# Patient Record
Sex: FEMALE | Race: WHITE | NOT HISPANIC OR LATINO | Employment: OTHER | URBAN - METROPOLITAN AREA
[De-identification: names, ages, dates, MRNs, and addresses within clinical notes are randomized per-mention and may not be internally consistent; named-entity substitution may affect disease eponyms.]

---

## 2018-09-21 ENCOUNTER — OFFICE VISIT (OUTPATIENT)
Dept: FAMILY MEDICINE CLINIC | Facility: HOSPITAL | Age: 78
End: 2018-09-21
Payer: MEDICARE

## 2018-09-21 VITALS
TEMPERATURE: 96.9 F | HEIGHT: 62 IN | WEIGHT: 196.4 LBS | BODY MASS INDEX: 36.14 KG/M2 | DIASTOLIC BLOOD PRESSURE: 80 MMHG | SYSTOLIC BLOOD PRESSURE: 150 MMHG | HEART RATE: 78 BPM

## 2018-09-21 DIAGNOSIS — N39.46 MIXED STRESS AND URGE URINARY INCONTINENCE: ICD-10-CM

## 2018-09-21 DIAGNOSIS — E78.2 MIXED HYPERLIPIDEMIA: ICD-10-CM

## 2018-09-21 DIAGNOSIS — F51.01 PRIMARY INSOMNIA: ICD-10-CM

## 2018-09-21 DIAGNOSIS — F41.1 GENERALIZED ANXIETY DISORDER: ICD-10-CM

## 2018-09-21 DIAGNOSIS — I48.91 ATRIAL FIBRILLATION WITH RAPID VENTRICULAR RESPONSE (HCC): Primary | ICD-10-CM

## 2018-09-21 DIAGNOSIS — G25.0 BENIGN FAMILIAL TREMOR: ICD-10-CM

## 2018-09-21 DIAGNOSIS — K21.00 GASTROESOPHAGEAL REFLUX DISEASE WITH ESOPHAGITIS: ICD-10-CM

## 2018-09-21 DIAGNOSIS — I10 ACCELERATED ESSENTIAL HYPERTENSION: ICD-10-CM

## 2018-09-21 DIAGNOSIS — G62.9 NEUROPATHY: ICD-10-CM

## 2018-09-21 PROCEDURE — 99204 OFFICE O/P NEW MOD 45 MIN: CPT | Performed by: FAMILY MEDICINE

## 2018-09-21 RX ORDER — AMLODIPINE BESYLATE 2.5 MG/1
2.5 TABLET ORAL 2 TIMES DAILY
Qty: 60 TABLET | Refills: 3 | Status: SHIPPED | OUTPATIENT
Start: 2018-09-21 | End: 2018-11-07 | Stop reason: SDUPTHER

## 2018-09-21 RX ORDER — AMLODIPINE BESYLATE 2.5 MG/1
1 TABLET ORAL 2 TIMES DAILY
COMMUNITY
Start: 2013-09-30 | End: 2018-09-21 | Stop reason: SDUPTHER

## 2018-09-21 RX ORDER — OMEPRAZOLE 20 MG/1
CAPSULE, DELAYED RELEASE ORAL
COMMUNITY
Start: 2011-10-11 | End: 2018-11-07 | Stop reason: SDUPTHER

## 2018-09-21 RX ORDER — ATORVASTATIN CALCIUM 40 MG/1
1 TABLET, FILM COATED ORAL DAILY
COMMUNITY
Start: 2013-05-10 | End: 2019-03-18

## 2018-09-21 RX ORDER — ALPRAZOLAM 0.5 MG/1
TABLET ORAL
COMMUNITY
Start: 2015-09-11 | End: 2019-03-18

## 2018-09-21 RX ORDER — DULOXETIN HYDROCHLORIDE 20 MG/1
20 CAPSULE, DELAYED RELEASE ORAL DAILY
Qty: 30 CAPSULE | Refills: 3 | Status: SHIPPED | OUTPATIENT
Start: 2018-09-21 | End: 2019-02-27 | Stop reason: SDUPTHER

## 2018-09-21 RX ORDER — PROPRANOLOL HYDROCHLORIDE 20 MG/1
TABLET ORAL
COMMUNITY
Start: 2015-09-11 | End: 2018-10-30 | Stop reason: ALTCHOICE

## 2018-09-21 RX ORDER — ZOLPIDEM TARTRATE 5 MG/1
5 TABLET ORAL
Qty: 30 TABLET | Refills: 0 | Status: SHIPPED | OUTPATIENT
Start: 2018-09-21 | End: 2019-03-18

## 2018-09-21 RX ORDER — FLUOXETINE HYDROCHLORIDE 20 MG/1
4 CAPSULE ORAL DAILY
COMMUNITY
Start: 2011-05-19 | End: 2018-09-21 | Stop reason: ALTCHOICE

## 2018-09-21 NOTE — PROGRESS NOTES
Assessment/Plan:         Diagnoses and all orders for this visit:    Atrial fibrillation with rapid ventricular response ([facility] [address] )  -     Ambulatory referral to Cardiology; Future    Accelerated essential hypertension  -     amLODIPine (NORVASC) 2 5 mg tablet; Take 1 tablet (2 5 mg total) by mouth 2 (two) times a day    Neuropathy  -     DULoxetine (CYMBALTA) 20 mg capsule; Take 1 capsule (20 mg total) by mouth daily    Mixed hyperlipidemia    Generalized anxiety disorder    Primary insomnia  -     zolpidem (AMBIEN) 5 mg tablet; Take 1 tablet (5 mg total) by mouth daily at bedtime as needed for sleep    Benign familial tremor    Gastroesophageal reflux disease with esophagitis    Mixed stress and urge urinary incontinence  -     Incontinence Supply Disposable (ADHERES INCONTINENCE PAD) MISC; by Does not apply route 2 (two) times a day  -     Incontinence Supply Disposable (INCONTINENCE BRIEF MEDIUM) MISC; by Does not apply route 2 (two) times a day          Subjective:      Patient ID: Nirmala Manzano is a 77 y o  female  Reestablshing care  Moved back to [facility] after living in [place]  Has great financial problems but trying to stabilize all things  Wants to start with a local Cardiologist, hasnt been to one in two years and hasnt had pacemaker checks since then  She just got a cell phone but doesn't have a land line  She stopped Prozac because she has been on it forever and didn't think it helped her depression  She didn't notice a mood change but did notice that her neuropathy got significantly worse and is not tolerable  Having trouble sleeping        The following portions of the patient's history were reviewed and updated as appropriate: allergies, current medications, past family history, past medical history, past social history, past surgical history and problem list     Review of Systems   Constitutional: Negative for fatigue and fever  HENT: Negative for congestion      Respiratory: Negative for shortness of breath and wheezing  Cardiovascular: Negative for chest pain, palpitations and leg swelling  Genitourinary: Positive for enuresis, frequency and urgency  Musculoskeletal: Positive for arthralgias and back pain  Neurological: Negative for weakness and headaches  Hematological: Negative for adenopathy  Bruises/bleeds easily  All other systems reviewed and are negative  Objective:      /80   Pulse 78   Temp (!) 96 9 °F (36 1 °C)   Ht 5' 1 5" (1 562 m)   Wt 89 1 kg (196 lb 6 4 oz)   BMI 36 51 kg/m²          Physical Exam   Constitutional: She is oriented to person, place, and time  She appears well-developed and well-nourished  Neck: No thyromegaly present  Cardiovascular: An irregularly irregular rhythm present  Tachycardia present  Murmur heard  Systolic murmur is present with a grade of 2/6   Pulmonary/Chest: Effort normal and breath sounds normal    Neurological: She is oriented to person, place, and time  Psychiatric: Her speech is normal and behavior is normal  Judgment and thought content normal  Her mood appears anxious  Cognition and memory are normal  She exhibits a depressed mood  Nursing note and vitals reviewed

## 2018-09-22 RX ORDER — UNDERPADS 23" X 36"
EACH MISCELLANEOUS 2 TIMES DAILY
Qty: 44 EACH | Refills: 5 | Status: SHIPPED | OUTPATIENT
Start: 2018-09-22 | End: 2019-10-23 | Stop reason: ALTCHOICE

## 2018-10-30 ENCOUNTER — OFFICE VISIT (OUTPATIENT)
Dept: CARDIOLOGY CLINIC | Facility: CLINIC | Age: 78
End: 2018-10-30
Payer: MEDICARE

## 2018-10-30 VITALS
HEART RATE: 119 BPM | SYSTOLIC BLOOD PRESSURE: 160 MMHG | DIASTOLIC BLOOD PRESSURE: 88 MMHG | WEIGHT: 186 LBS | BODY MASS INDEX: 34.23 KG/M2 | HEIGHT: 62 IN

## 2018-10-30 DIAGNOSIS — I10 ESSENTIAL HYPERTENSION: ICD-10-CM

## 2018-10-30 DIAGNOSIS — I48.91 ATRIAL FIBRILLATION WITH RAPID VENTRICULAR RESPONSE (HCC): ICD-10-CM

## 2018-10-30 DIAGNOSIS — I25.10 CORONARY ARTERY DISEASE INVOLVING NATIVE CORONARY ARTERY OF NATIVE HEART WITHOUT ANGINA PECTORIS: Primary | ICD-10-CM

## 2018-10-30 PROCEDURE — 99204 OFFICE O/P NEW MOD 45 MIN: CPT | Performed by: INTERNAL MEDICINE

## 2018-10-30 PROCEDURE — 93000 ELECTROCARDIOGRAM COMPLETE: CPT | Performed by: INTERNAL MEDICINE

## 2018-10-30 RX ORDER — ASPIRIN 325 MG
325 TABLET ORAL AS NEEDED
COMMUNITY
End: 2018-10-30 | Stop reason: ALTCHOICE

## 2018-10-30 RX ORDER — WARFARIN SODIUM 4 MG/1
4 TABLET ORAL
Qty: 30 TABLET | Refills: 5 | Status: SHIPPED | OUTPATIENT
Start: 2018-10-30 | End: 2019-10-11 | Stop reason: HOSPADM

## 2018-10-30 RX ORDER — METOPROLOL SUCCINATE 25 MG/1
25 TABLET, EXTENDED RELEASE ORAL DAILY
Qty: 90 TABLET | Refills: 3 | Status: SHIPPED | OUTPATIENT
Start: 2018-10-30 | End: 2018-11-15 | Stop reason: SDUPTHER

## 2018-10-30 NOTE — PROGRESS NOTES
Consultation - Cardiology   Meng Akash Sandoval 77 y o  female MRN: [id number]    Encounter: [id number]    Assessment/Plan     Assessment:     Atrial Fibrillation  CAD  AVR   PPM    Plan:    1  Persistent Atrial Fibrillation: Her rates are not controlled  Will add Metoprolol Succinate 25 mg daily  WE discuswsed the importance of antiocoagulation  Will start coumadin after her lab work is back with a goal INR 2 - 3  I asked her to stop using aspirin  2  Hx of AS s/p AVR  Stable based on exam and prior echo at [facility]  3  PPM implant: postop bradycardia  Will interrogate in her office this month  4  CAD: According to prior records this involved the LAD which was a small vessel and she did not have bypass at the time of AVR  History of Present Illness   Physician Requesting Consult: No att  providers found  Reason for Consult / Principal Problem: Atrial Fibrillation  HPI: Alto Sandhoff is a 77y o  year old female who presents with a new diagnosis of atrial fibrillation  She has a history of aortic stenosis s/p Bioprosthetic AVR and she has a PPM implant due to postoperative bradycardia  SHe has not seen cardiology for a while  She does have intermittent palpitations, dyspnea with essentially mild exertion, no angina and no other symptoms  She recently moved back here from [facility]  Review of Systems   Constitution: Negative  HENT: Negative  Eyes: Negative  Cardiovascular: Positive for palpitations  Respiratory: Negative  Endocrine: Negative  Hematologic/Lymphatic: Negative  Skin: Negative  Musculoskeletal: Negative  Gastrointestinal: Negative  Genitourinary: Negative  Neurological: Negative  Psychiatric/Behavioral: Negative  Allergic/Immunologic: Negative          Historical Information   Past Medical History:   Diagnosis Date    Depression     Melanoma [facility])      Past Surgical History:   Procedure Laterality Date    AORTIC VALVE REPLACEMENT 12/2012    CARDIAC PACEMAKER PLACEMENT      dual chamber last assessed 10/14/13    CHOLECYSTECTOMY      JOINT REPLACEMENT      B/L knee replacement       Social History:  History   Alcohol Use No     History   Drug Use No     History   Smoking Status    Never Smoker   Smokeless Tobacco    Never Used       Family History:   Family History   Problem Relation Age of Onset    Hypertension Mother        Meds/Allergies   Allergies   Allergen Reactions    Penicillins Hives    Sulfa Antibiotics Hives       Current Outpatient Prescriptions:     amLODIPine (NORVASC) 2 5 mg tablet, Take 1 tablet (2 5 mg total) by mouth 2 (two) times a day, Disp: 60 tablet, Rfl: 3    aspirin 325 mg tablet, Take 325 mg by mouth as needed for mild pain, Disp: , Rfl:     DULoxetine (CYMBALTA) 20 mg capsule, Take 1 capsule (20 mg total) by mouth daily, Disp: 30 capsule, Rfl: 3    Incontinence Supply Disposable (ADHERES INCONTINENCE PAD) MISC, by Does not apply route 2 (two) times a day, Disp: 75 each, Rfl: 5    Incontinence Supply Disposable (INCONTINENCE BRIEF MEDIUM) MISC, by Does not apply route 2 (two) times a day, Disp: 44 each, Rfl: 5    omeprazole (PriLOSEC) 20 mg delayed release capsule, Take by mouth, Disp: , Rfl:     ALPRAZolam (XANAX) 0 5 mg tablet, Take by mouth, Disp: , Rfl:     atorvastatin (LIPITOR) 40 mg tablet, Take 1 tablet by mouth daily, Disp: , Rfl:     propranolol (INDERAL) 20 mg tablet, Take by mouth, Disp: , Rfl:     zolpidem (AMBIEN) 5 mg tablet, Take 1 tablet (5 mg total) by mouth daily at bedtime as needed for sleep (Patient not taking: Reported on 10/30/2018 ), Disp: 30 tablet, Rfl: 0    Vitals:   Pulse: (!) 119  Blood Pressue: 160/88  Weight: 84 4 kg (186 lb)      Physical Exam   Constitutional: She is oriented to person, place, and time  No distress  HENT:   Mouth/Throat: No oropharyngeal exudate  Eyes: No scleral icterus  Neck: No JVD present  Cardiovascular: Normal rate    An irregular rhythm present  Pulmonary/Chest: Effort normal and breath sounds normal  No respiratory distress  She has no wheezes  She has no rales  Abdominal: Soft  Bowel sounds are normal  She exhibits no distension  There is no tenderness  There is no rebound  Musculoskeletal: She exhibits no edema  Neurological: She is alert and oriented to person, place, and time  No cranial nerve deficit  Skin: Skin is warm and dry  She is not diaphoretic  Psychiatric: She has a normal mood and affect  Her behavior is normal        [unfilled]    Invasive Devices          No matching active lines, drains, or airways          No results found for: NA, CL, CO2, ANIONGAP, BUN, CREATININE, EGFR, GLUCOSE, CALCIUM, AST, ALT, ALKPHOS, PROT, ALBUMIN, BILITOT  No results found for: WBC, HGB, PLT  No components found for: TROP    Imaging:     EKG: Atrial Fibrillation 119 bpm      Counseling / Coordination of Care  Total floor / unit time spent today 45 minutes  Greater than 50% of total time was spent with the patient and / or family counseling and / or coordination of care  A description of the counseling / coordination of care

## 2018-10-30 NOTE — PATIENT INSTRUCTIONS
1  Atrial Fibrillation: Start Metoprolol Succinate (Ordered to the pharmacy)  2  Have lab work done BEFORE YOU START COUMADIN    3  Once we get the lab work, will contact you about the coumadin dosing and followup lab work  4  I ordered a home INR monitor  Hold off on this until we get you started on coumadin

## 2018-11-01 ENCOUNTER — TELEPHONE (OUTPATIENT)
Dept: CARDIOLOGY CLINIC | Facility: CLINIC | Age: 78
End: 2018-11-01

## 2018-11-01 NOTE — TELEPHONE ENCOUNTER
Received message from dr Jagdeep Nava, patient was to have blood work done prior to starting on warfarin  Placed call to patient today, left message at 9 am, and again at 3 pm today, requesting patient call our office regarding if she has gone for blood work and if so, what  lab did she use  Have not seen results for blood work in her chart  Will wait for patient call back, and wait for further advisement from dr Jagdeep Nava

## 2018-11-05 NOTE — TELEPHONE ENCOUNTER
11/5/18, faxed copy of this note to dr Edgar Ornelas at [facility] office, received message from  Meng Monet, she reported dr Edgar Ornelas reviewed same

## 2018-11-05 NOTE — TELEPHONE ENCOUNTER
11/5/18, placed call to patient, left message on answering machine to please call office regarding lab work ordered by dr Michael Wilcox at last office which was requested to be done prior to starting warfarin  Will again wait for patient to return call  Will notify dr Michael Wilcox of calls

## 2018-11-06 ENCOUNTER — TELEPHONE (OUTPATIENT)
Dept: CARDIOLOGY CLINIC | Facility: CLINIC | Age: 78
End: 2018-11-06

## 2018-11-06 NOTE — TELEPHONE ENCOUNTER
Phone call to patient, left message on her answering machine to call our office regarding if she had her blood work done as requested by dr Dong Grace  Patient, is to start warfarin after blood work results are obtained  will wait for return call

## 2018-11-07 ENCOUNTER — TRANSCRIBE ORDERS (OUTPATIENT)
Dept: ADMINISTRATIVE | Facility: HOSPITAL | Age: 78
End: 2018-11-07

## 2018-11-07 ENCOUNTER — APPOINTMENT (OUTPATIENT)
Dept: LAB | Facility: HOSPITAL | Age: 78
End: 2018-11-07
Attending: INTERNAL MEDICINE
Payer: MEDICARE

## 2018-11-07 ENCOUNTER — OFFICE VISIT (OUTPATIENT)
Dept: FAMILY MEDICINE CLINIC | Facility: HOSPITAL | Age: 78
End: 2018-11-07
Payer: MEDICARE

## 2018-11-07 VITALS
WEIGHT: 191.6 LBS | SYSTOLIC BLOOD PRESSURE: 160 MMHG | TEMPERATURE: 96.8 F | HEART RATE: 84 BPM | DIASTOLIC BLOOD PRESSURE: 80 MMHG | HEIGHT: 62 IN | BODY MASS INDEX: 35.26 KG/M2

## 2018-11-07 DIAGNOSIS — I10 ACCELERATED ESSENTIAL HYPERTENSION: ICD-10-CM

## 2018-11-07 DIAGNOSIS — Z12.39 SCREENING FOR MALIGNANT NEOPLASM OF BREAST: ICD-10-CM

## 2018-11-07 DIAGNOSIS — F32.1 CURRENT MODERATE EPISODE OF MAJOR DEPRESSIVE DISORDER, UNSPECIFIED WHETHER RECURRENT (HCC): ICD-10-CM

## 2018-11-07 DIAGNOSIS — I10 ESSENTIAL HYPERTENSION: ICD-10-CM

## 2018-11-07 DIAGNOSIS — I25.10 CORONARY ARTERY DISEASE INVOLVING NATIVE CORONARY ARTERY OF NATIVE HEART WITHOUT ANGINA PECTORIS: ICD-10-CM

## 2018-11-07 DIAGNOSIS — G25.0 BENIGN FAMILIAL TREMOR: ICD-10-CM

## 2018-11-07 DIAGNOSIS — I10 ESSENTIAL HYPERTENSION: Primary | ICD-10-CM

## 2018-11-07 DIAGNOSIS — I48.0 PAROXYSMAL ATRIAL FIBRILLATION (HCC): Primary | ICD-10-CM

## 2018-11-07 DIAGNOSIS — I48.0 PAROXYSMAL ATRIAL FIBRILLATION (HCC): ICD-10-CM

## 2018-11-07 DIAGNOSIS — I48.91 ATRIAL FIBRILLATION WITH RAPID VENTRICULAR RESPONSE (HCC): ICD-10-CM

## 2018-11-07 DIAGNOSIS — F41.1 GENERALIZED ANXIETY DISORDER: ICD-10-CM

## 2018-11-07 DIAGNOSIS — G62.9 NEUROPATHY: ICD-10-CM

## 2018-11-07 DIAGNOSIS — M54.31 SCIATIC NERVE PAIN, RIGHT: ICD-10-CM

## 2018-11-07 DIAGNOSIS — K21.00 GASTROESOPHAGEAL REFLUX DISEASE WITH ESOPHAGITIS: ICD-10-CM

## 2018-11-07 LAB
ALBUMIN SERPL BCP-MCNC: 3.7 G/DL (ref 3.5–5)
ALP SERPL-CCNC: 115 U/L (ref 46–116)
ALT SERPL W P-5'-P-CCNC: 23 U/L (ref 12–78)
ANION GAP SERPL CALCULATED.3IONS-SCNC: 10 MMOL/L (ref 4–13)
AST SERPL W P-5'-P-CCNC: 20 U/L (ref 5–45)
BASOPHILS # BLD AUTO: 0.05 THOUSANDS/ΜL (ref 0–0.1)
BASOPHILS NFR BLD AUTO: 1 % (ref 0–1)
BILIRUB SERPL-MCNC: 0.5 MG/DL (ref 0.2–1)
BUN SERPL-MCNC: 19 MG/DL (ref 5–25)
CALCIUM SERPL-MCNC: 9.9 MG/DL (ref 8.3–10.1)
CHLORIDE SERPL-SCNC: 101 MMOL/L (ref 100–108)
CHOLEST SERPL-MCNC: 269 MG/DL (ref 50–200)
CO2 SERPL-SCNC: 27 MMOL/L (ref 21–32)
CREAT SERPL-MCNC: 0.98 MG/DL (ref 0.6–1.3)
EOSINOPHIL # BLD AUTO: 0.43 THOUSAND/ΜL (ref 0–0.61)
EOSINOPHIL NFR BLD AUTO: 4 % (ref 0–6)
ERYTHROCYTE [DISTWIDTH] IN BLOOD BY AUTOMATED COUNT: 15.6 % (ref 11.6–15.1)
GFR SERPL CREATININE-BSD FRML MDRD: 55 ML/MIN/1.73SQ M
GLUCOSE P FAST SERPL-MCNC: 126 MG/DL (ref 65–99)
HCT VFR BLD AUTO: 48.8 % (ref 34.8–46.1)
HDLC SERPL-MCNC: 49 MG/DL (ref 40–60)
HGB BLD-MCNC: 15.8 G/DL (ref 11.5–15.4)
IMM GRANULOCYTES # BLD AUTO: 0.03 THOUSAND/UL (ref 0–0.2)
IMM GRANULOCYTES NFR BLD AUTO: 0 % (ref 0–2)
INR PPP: 1.03 (ref 0.86–1.17)
LDLC SERPL CALC-MCNC: 195 MG/DL (ref 0–100)
LYMPHOCYTES # BLD AUTO: 2.85 THOUSANDS/ΜL (ref 0.6–4.47)
LYMPHOCYTES NFR BLD AUTO: 29 % (ref 14–44)
MCH RBC QN AUTO: 29.6 PG (ref 26.8–34.3)
MCHC RBC AUTO-ENTMCNC: 32.4 G/DL (ref 31.4–37.4)
MCV RBC AUTO: 92 FL (ref 82–98)
MONOCYTES # BLD AUTO: 0.68 THOUSAND/ΜL (ref 0.17–1.22)
MONOCYTES NFR BLD AUTO: 7 % (ref 4–12)
NEUTROPHILS # BLD AUTO: 5.79 THOUSANDS/ΜL (ref 1.85–7.62)
NEUTS SEG NFR BLD AUTO: 59 % (ref 43–75)
PLATELET # BLD AUTO: 320 THOUSANDS/UL (ref 149–390)
PMV BLD AUTO: 10.8 FL (ref 8.9–12.7)
POTASSIUM SERPL-SCNC: 4.3 MMOL/L (ref 3.5–5.3)
PROT SERPL-MCNC: 7.3 G/DL (ref 6.4–8.2)
PROTHROMBIN TIME: 12.9 SECONDS (ref 11.8–14.2)
RBC # BLD AUTO: 5.33 MILLION/UL (ref 3.81–5.12)
SODIUM SERPL-SCNC: 138 MMOL/L (ref 136–145)
TRIGL SERPL-MCNC: 126 MG/DL
TSH SERPL DL<=0.05 MIU/L-ACNC: 1.27 UIU/ML (ref 0.36–3.74)
WBC # BLD AUTO: 9.83 THOUSAND/UL (ref 4.31–10.16)

## 2018-11-07 PROCEDURE — 85025 COMPLETE CBC W/AUTO DIFF WBC: CPT

## 2018-11-07 PROCEDURE — 99215 OFFICE O/P EST HI 40 MIN: CPT | Performed by: FAMILY MEDICINE

## 2018-11-07 PROCEDURE — 85610 PROTHROMBIN TIME: CPT | Performed by: INTERNAL MEDICINE

## 2018-11-07 PROCEDURE — 36415 COLL VENOUS BLD VENIPUNCTURE: CPT | Performed by: INTERNAL MEDICINE

## 2018-11-07 PROCEDURE — 84443 ASSAY THYROID STIM HORMONE: CPT

## 2018-11-07 PROCEDURE — 80053 COMPREHEN METABOLIC PANEL: CPT

## 2018-11-07 PROCEDURE — 80061 LIPID PANEL: CPT

## 2018-11-07 RX ORDER — OMEPRAZOLE 20 MG/1
20 CAPSULE, DELAYED RELEASE ORAL DAILY
Qty: 30 CAPSULE | Refills: 5 | Status: SHIPPED | OUTPATIENT
Start: 2018-11-07 | End: 2019-05-29 | Stop reason: SDUPTHER

## 2018-11-07 RX ORDER — ACETAMINOPHEN 500 MG
500 TABLET ORAL EVERY 6 HOURS PRN
Qty: 120 TABLET | Refills: 3 | Status: SHIPPED | OUTPATIENT
Start: 2018-11-07

## 2018-11-07 RX ORDER — AMLODIPINE BESYLATE 5 MG/1
5 TABLET ORAL 2 TIMES DAILY
Qty: 60 TABLET | Refills: 5 | Status: SHIPPED | OUTPATIENT
Start: 2018-11-07 | End: 2019-05-29 | Stop reason: SDUPTHER

## 2018-11-07 NOTE — PROGRESS NOTES
Assessment/Plan:         Diagnoses and all orders for this visit:    Essential hypertension  Comments:  Increase Amlodipine back to 5mg BID    Coronary artery disease involving native coronary artery of native heart without angina pectoris    Neuropathy  Comments:  Stable on Duloxetine    Benign familial tremor    Generalized anxiety disorder    Current moderate episode of major depressive disorder, unspecified whether recurrent ([facility] [address] )  Comments:  Excellent stability on Duloxetine    Atrial fibrillation with rapid ventricular response (HCC)  Comments:  Continue Warfarin   Orders:  -     TSH, 3rd generation with Free T4 reflex; Future    Accelerated essential hypertension  -     amLODIPine (NORVASC) 5 mg tablet; Take 1 tablet (5 mg total) by mouth 2 (two) times a day    Gastroesophageal reflux disease with esophagitis  -     omeprazole (PriLOSEC) 20 mg delayed release capsule; Take 1 capsule (20 mg total) by mouth daily    Sciatic nerve pain, right  -     acetaminophen (TYLENOL) 500 mg tablet; Take 1 tablet (500 mg total) by mouth every 6 (six) hours as needed for mild pain    Screening for malignant neoplasm of breast  -     Mammo screening bilateral w cad; Future          Subjective:      Patient ID: Shelba Canavan is a 77 y o  female  6 month follow up  Seen by Dr Cora Robertson and discussed atrial fibrillation and need for anticoagulation, which she is agreeable to  Started Metoprolol last week, and will start Warfarin after blood done  He will be telephonically testing her pacer and will try to get home INR testing  She realized there was a daose error on Amlodipine and she was only getting 2 5mg BID instead of 5mg BID    Daughter is having double mastectomy, she is 48years old  Still very limited mobility, uses walker at all times    No recent falls    Feels her emotional best since stopping Prozac when she did  She feels the Duloxetine is doing a very good job for her mood but also for her R sciatic pain and neuropathy        The following portions of the patient's history were reviewed and updated as appropriate: allergies, current medications, past family history, past medical history, past social history, past surgical history and problem list     Review of Systems   Constitutional: Positive for activity change and fatigue  Negative for unexpected weight change  HENT: Negative for congestion, nosebleeds and sore throat  Respiratory: Negative for chest tightness and shortness of breath  Cardiovascular: Positive for leg swelling  Negative for chest pain and palpitations  Gastrointestinal: Negative for abdominal pain  Endocrine: Negative for polydipsia and polyuria  Musculoskeletal: Positive for arthralgias, back pain, gait problem, joint swelling, myalgias, neck pain and neck stiffness  Skin: Negative for rash  Neurological: Positive for tremors  Negative for dizziness and headaches  Hematological: Does not bruise/bleed easily  Psychiatric/Behavioral: Positive for dysphoric mood  Negative for decreased concentration  The patient is nervous/anxious  All other systems reviewed and are negative  Objective:      /80   Pulse 84   Temp (!) 96 8 °F (36 °C)   Ht 5' 1 5" (1 562 m)   Wt 86 9 kg (191 lb 9 6 oz)   BMI 35 62 kg/m²          Physical Exam   Constitutional: She is oriented to person, place, and time  She appears well-developed and well-nourished  Neck: No thyromegaly present  Cardiovascular: Normal rate and regular rhythm  Murmur heard  Pulmonary/Chest: Effort normal and breath sounds normal    Musculoskeletal: She exhibits edema  Neurological: She is alert and oriented to person, place, and time  Skin: No rash noted  Psychiatric: She has a normal mood and affect  Her behavior is normal  Judgment and thought content normal    Nursing note and vitals reviewed

## 2018-11-08 ENCOUNTER — TELEPHONE (OUTPATIENT)
Dept: CARDIOLOGY CLINIC | Facility: CLINIC | Age: 78
End: 2018-11-08

## 2018-11-08 ENCOUNTER — ANTICOAG VISIT (OUTPATIENT)
Dept: CARDIOLOGY CLINIC | Facility: CLINIC | Age: 78
End: 2018-11-08

## 2018-11-08 DIAGNOSIS — I10 ESSENTIAL HYPERTENSION: ICD-10-CM

## 2018-11-08 DIAGNOSIS — I48.91 ATRIAL FIBRILLATION WITH RAPID VENTRICULAR RESPONSE (HCC): ICD-10-CM

## 2018-11-08 DIAGNOSIS — I25.10 CORONARY ARTERY DISEASE INVOLVING NATIVE CORONARY ARTERY OF NATIVE HEART WITHOUT ANGINA PECTORIS: ICD-10-CM

## 2018-11-08 NOTE — TELEPHONE ENCOUNTER
Phone call to patient, left message on answering machine  Requested pt call office regarding inr  results and starting warfarin  Waiting for return call

## 2018-11-08 NOTE — PROGRESS NOTES
11/8/18, tc to pt, left message on answering machine, per dr Arleen Bernard, ok to start warfarin 4 mg daily, inr due mon  Pt to call office to verify she understood instructions and to review further info on warfarin   reginald

## 2018-11-09 ENCOUNTER — TELEPHONE (OUTPATIENT)
Dept: CARDIOLOGY CLINIC | Facility: CLINIC | Age: 78
End: 2018-11-09

## 2018-11-09 NOTE — TELEPHONE ENCOUNTER
Spoke with patient regarding high lipids  She stated the blood work was done fasting  Her PCP  Dr Archie Thomas stopped the atorvastatin about five years ago but she doesn't remember why   Please advise

## 2018-11-12 DIAGNOSIS — I25.10 CAD (CORONARY ARTERY DISEASE): Primary | ICD-10-CM

## 2018-11-12 RX ORDER — EZETIMIBE 10 MG/1
10 TABLET ORAL DAILY
Qty: 30 TABLET | Refills: 5 | Status: SHIPPED | OUTPATIENT
Start: 2018-11-12 | End: 2019-03-18

## 2018-11-14 ENCOUNTER — IN-CLINIC DEVICE VISIT (OUTPATIENT)
Dept: CARDIOLOGY CLINIC | Facility: CLINIC | Age: 78
End: 2018-11-14
Payer: MEDICARE

## 2018-11-14 ENCOUNTER — TELEPHONE (OUTPATIENT)
Dept: CARDIOLOGY CLINIC | Facility: CLINIC | Age: 78
End: 2018-11-14

## 2018-11-14 DIAGNOSIS — I48.19 PERSISTENT ATRIAL FIBRILLATION (HCC): Primary | ICD-10-CM

## 2018-11-14 DIAGNOSIS — I49.5 SICK SINUS SYNDROME (HCC): ICD-10-CM

## 2018-11-14 DIAGNOSIS — Z95.0 CARDIAC PACEMAKER: ICD-10-CM

## 2018-11-14 PROCEDURE — 93280 PM DEVICE PROGR EVAL DUAL: CPT | Performed by: INTERNAL MEDICINE

## 2018-11-14 NOTE — PROGRESS NOTES
Results for orders placed or performed in visit on 11/14/18   Cardiac EP device report    Narrative    MDT DUAL PPM  DEVICE INTERROGATED IN THE [place] OFFICE: NP TRANSFER TO [place] [id number] INTERROGATION 2/6/2015  BATTERY VOLTAGE ADEQUATE (6 5 YRS)  AP -70%  - 0 2% (MVP ON)  ALL LEAD PARAMETERS TEST WITHIN NORMAL LIMITS & STABLE  ALL OTHER TESTING WITHIN NORMAL LIMITS  [facility] ON DEVICE SINCE LAST INTERRO  AF IN PROGRESS - AT/AF BURDEN @ 100% SINCE 5/23/18  RVR NOTED - PRESENTING RATE ~ 112 BPM  PT ON WARFARIN, METOPROLOL SUCC  PT STATES SHE IS STILL AWARE OF THE IRREGULAR/FAST HR  TASK TO DR VARGAS FOR RVR  EGRM STORAGE PROGRAMMED TO [facility] PROTOCOLS  PT TO BEGIN HOME REMOTE MONITORING - INITIAL TRANSMISSION DONE WITH PATIENT IN OFFICE TODAY  NO OTHER PROGRAMMING CHANGES MADE TO DEVICE PARAMETERS  PACEMAKER FUNCTIONING APPROPRIATELY    eb

## 2018-11-15 ENCOUNTER — ANTICOAG VISIT (OUTPATIENT)
Dept: CARDIOLOGY CLINIC | Facility: CLINIC | Age: 78
End: 2018-11-15

## 2018-11-15 DIAGNOSIS — I10 ESSENTIAL HYPERTENSION: ICD-10-CM

## 2018-11-15 DIAGNOSIS — I25.10 CORONARY ARTERY DISEASE INVOLVING NATIVE CORONARY ARTERY OF NATIVE HEART WITHOUT ANGINA PECTORIS: ICD-10-CM

## 2018-11-15 DIAGNOSIS — I48.91 ATRIAL FIBRILLATION WITH RAPID VENTRICULAR RESPONSE (HCC): ICD-10-CM

## 2018-11-15 RX ORDER — METOPROLOL SUCCINATE 25 MG/1
25 TABLET, EXTENDED RELEASE ORAL 2 TIMES DAILY
Qty: 60 TABLET | Refills: 5 | Status: SHIPPED | OUTPATIENT
Start: 2018-11-15 | End: 2019-03-18 | Stop reason: SDUPTHER

## 2018-11-15 NOTE — PROGRESS NOTES
11/15/18, tc to pt, left message on answering machine, reminded patient to have inr done asap  Explained  importance of blood work to be done when ordered when taking warfarin  Requested pt to call office if any questions   regianld

## 2018-11-15 NOTE — TELEPHONE ENCOUNTER
Left VM for pt regarding instructions from Dr Fareed Rojas  Pt is to increase Metoprolol Succinate 25 daily to BID  Pt is to come in for an EKG in 2 weeks

## 2018-11-15 NOTE — TELEPHONE ENCOUNTER
----- Message from Quentin Bond MD sent at 11/15/2018 10:03 AM EST -----  Regarding: FW: RVR ON PPM       Can we please increase her metoprolol to 25mg bid and have her come in for an EKG in about two weeks? Thx    ----- Message -----  From: Sarahi Sinai  Sent: 11/14/2018  11:56 AM  To: Quentin Bond MD  Subject: RVR ON PPM                                       Hi Dr Kiran Chandler - pt was seen today for initial device clinic interrogation  She presents with af/vs @ ~ 112 bpm and V rates remained 90 - 122 bpm during visit  It appears AF in progress since 5/2018  Full report in EPIC and sent to [facility] for cosign  She is on Warfarin, Metoprolol Succ  25 mg daily  (recent start)and is aware of the irregular & fast heart rates  Also, she is now on home remote monitoring       Thanks  - Ruthie Horn

## 2018-11-15 NOTE — TELEPHONE ENCOUNTER
Left message for patient to call office back regarding BW results and starting medication for high lipids per Dr Dany Thornton

## 2018-11-19 NOTE — TELEPHONE ENCOUNTER
Left another  for pt with instructions on Medication increase and EKG 2 weeks after starting the new dose  Pt was instructed to call back

## 2018-12-24 ENCOUNTER — ANTICOAG VISIT (OUTPATIENT)
Dept: CARDIOLOGY CLINIC | Facility: CLINIC | Age: 78
End: 2018-12-24

## 2018-12-24 DIAGNOSIS — I48.91 ATRIAL FIBRILLATION WITH RAPID VENTRICULAR RESPONSE (HCC): ICD-10-CM

## 2018-12-24 DIAGNOSIS — I25.10 CORONARY ARTERY DISEASE INVOLVING NATIVE CORONARY ARTERY OF NATIVE HEART WITHOUT ANGINA PECTORIS: ICD-10-CM

## 2018-12-24 DIAGNOSIS — I10 ESSENTIAL HYPERTENSION: ICD-10-CM

## 2018-12-24 NOTE — PROGRESS NOTES
12/24/18,tc to pt, left message on answering machine, questioned if she is taking warfarin, if so, have inr done asap  If not please call our office    reginald

## 2018-12-26 ENCOUNTER — TELEPHONE (OUTPATIENT)
Dept: CARDIOLOGY CLINIC | Facility: CLINIC | Age: 78
End: 2018-12-26

## 2018-12-26 ENCOUNTER — ANTICOAG VISIT (OUTPATIENT)
Dept: CARDIOLOGY CLINIC | Facility: CLINIC | Age: 78
End: 2018-12-26

## 2018-12-26 DIAGNOSIS — I48.91 ATRIAL FIBRILLATION WITH RAPID VENTRICULAR RESPONSE (HCC): ICD-10-CM

## 2018-12-26 DIAGNOSIS — I10 ESSENTIAL HYPERTENSION: ICD-10-CM

## 2018-12-26 DIAGNOSIS — I25.10 CORONARY ARTERY DISEASE INVOLVING NATIVE CORONARY ARTERY OF NATIVE HEART WITHOUT ANGINA PECTORIS: ICD-10-CM

## 2018-12-26 NOTE — PROGRESS NOTES
12/26/18, tc to pt, reports she never started warfarin   She is aware of risk of stroke not taking warfarin with afib  She refuses to start warfarin  Message sent to dr Azalea montelongo

## 2018-12-26 NOTE — TELEPHONE ENCOUNTER
Phone call to patient left message on her cell phone , requesting she call regarding delinquent inr testing  Called to patient's daughter, yoshi  Patient happened to be with daughter  Spoke with patient  Questioned if she is taking warfarin  Patient stated she never started warfarin  She states she understood risk of stroke with atrial fib if not taking anticoagulation, she accepts that risk and does not want to take warfarin  She has ov with dr Jos Diallo 2/1/19  Will discuss further with dr Jos Diallo at [facility]  Will notify dr Jos Diallo of call   Will remove from [address] [place] [address] clinic program

## 2019-02-27 DIAGNOSIS — G62.9 NEUROPATHY: ICD-10-CM

## 2019-02-27 RX ORDER — DULOXETIN HYDROCHLORIDE 20 MG/1
20 CAPSULE, DELAYED RELEASE ORAL DAILY
Qty: 30 CAPSULE | Refills: 3 | Status: SHIPPED | OUTPATIENT
Start: 2019-02-27 | End: 2019-03-18 | Stop reason: SDUPTHER

## 2019-03-18 ENCOUNTER — OFFICE VISIT (OUTPATIENT)
Dept: FAMILY MEDICINE CLINIC | Facility: HOSPITAL | Age: 79
End: 2019-03-18
Payer: MEDICARE

## 2019-03-18 VITALS
HEIGHT: 62 IN | TEMPERATURE: 97.5 F | SYSTOLIC BLOOD PRESSURE: 150 MMHG | DIASTOLIC BLOOD PRESSURE: 80 MMHG | WEIGHT: 196.6 LBS | HEART RATE: 88 BPM | BODY MASS INDEX: 36.18 KG/M2

## 2019-03-18 DIAGNOSIS — E78.2 MIXED HYPERLIPIDEMIA: ICD-10-CM

## 2019-03-18 DIAGNOSIS — I10 ESSENTIAL HYPERTENSION: ICD-10-CM

## 2019-03-18 DIAGNOSIS — I25.10 CORONARY ARTERY DISEASE INVOLVING NATIVE CORONARY ARTERY OF NATIVE HEART WITHOUT ANGINA PECTORIS: ICD-10-CM

## 2019-03-18 DIAGNOSIS — Z00.00 MEDICARE ANNUAL WELLNESS VISIT, SUBSEQUENT: Primary | ICD-10-CM

## 2019-03-18 DIAGNOSIS — G25.0 BENIGN FAMILIAL TREMOR: ICD-10-CM

## 2019-03-18 DIAGNOSIS — I48.91 ATRIAL FIBRILLATION WITH RAPID VENTRICULAR RESPONSE (HCC): ICD-10-CM

## 2019-03-18 DIAGNOSIS — G62.9 NEUROPATHY: ICD-10-CM

## 2019-03-18 DIAGNOSIS — R79.9 ABNORMAL BLOOD CHEMISTRY: ICD-10-CM

## 2019-03-18 DIAGNOSIS — F32.1 CURRENT MODERATE EPISODE OF MAJOR DEPRESSIVE DISORDER, UNSPECIFIED WHETHER RECURRENT (HCC): ICD-10-CM

## 2019-03-18 PROCEDURE — 99215 OFFICE O/P EST HI 40 MIN: CPT | Performed by: FAMILY MEDICINE

## 2019-03-18 PROCEDURE — G0439 PPPS, SUBSEQ VISIT: HCPCS | Performed by: FAMILY MEDICINE

## 2019-03-18 RX ORDER — DULOXETIN HYDROCHLORIDE 60 MG/1
60 CAPSULE, DELAYED RELEASE ORAL DAILY
Qty: 30 CAPSULE | Refills: 5 | Status: SHIPPED | OUTPATIENT
Start: 2019-03-18 | End: 2019-10-11 | Stop reason: HOSPADM

## 2019-03-18 RX ORDER — METOPROLOL SUCCINATE 25 MG/1
25 TABLET, EXTENDED RELEASE ORAL 2 TIMES DAILY
Qty: 60 TABLET | Refills: 5 | Status: SHIPPED | OUTPATIENT
Start: 2019-03-18 | End: 2019-11-12 | Stop reason: ALTCHOICE

## 2019-03-18 RX ORDER — DULOXETIN HYDROCHLORIDE 20 MG/1
60 CAPSULE, DELAYED RELEASE ORAL DAILY
Qty: 30 CAPSULE | Refills: 3 | Status: SHIPPED | OUTPATIENT
Start: 2019-03-18 | End: 2019-03-18 | Stop reason: SDUPTHER

## 2019-03-18 NOTE — PROGRESS NOTES
Assessment/Plan:         Diagnoses and all orders for this visit:    Medicare annual wellness visit, subsequent    Current moderate episode of major depressive disorder, unspecified whether recurrent ([facility] [address] )  Comments:  Anticipate further improvement with increased Duloxetine    [facility]  Comments:  Fair stability of rate but increase Metoprolol to BID as previously Rxed  She is declining anticoagulants with understanding that it increass her risk of CVA  Orders:  -     metoprolol succinate (TOPROL-XL) 25 mg 24 hr tablet; Take 1 tablet (25 mg total) by mouth 2 (two) times a day    Mixed hyperlipidemia    Essential hypertension  Comments:  Good BP control    Coronary artery disease involving native coronary artery of native heart without angina pectoris    Abnormal blood chemistry    Benign familial tremor  Comments:  Increase in Metoprolol may further benefit the essential tremor    Neuropathy  Comments:  Anticipate further benefit on higher Duloxetine  Orders:  -     DULoxetine (CYMBALTA) 20 mg capsule; Take 3 capsules (60 mg total) by mouth daily          Subjective:      Patient ID: Andreina Matias is a [de-identified] y o  female  4 month followup    Happy to have hit [de-identified]years old  No recent illness or injury  Daughter had double mastectomy and chemo, now is back to work  Michaela Badillo is agreeable with our recommendation  Using Cymbalta at 20mg but wasn't helping with pain  Increased to 60mg for past 2 days  Has helped with anxiety as well  Sleeping pretty well, up to 10 hours a night  Tremors are bad again and does relate to stress in her life      Feels return of atrial fibrillation  Doesn't want to return to Dr Phuc Garcia and doesn't want to be on blood thinners at all      The following portions of the patient's history were reviewed and updated as appropriate: allergies, current medications, past family history, past medical history, past social history, past surgical history and problem list     Review of Systems   Constitutional: Positive for fatigue and unexpected weight change  HENT: Negative  Respiratory: Negative  Cardiovascular: Positive for palpitations  Negative for chest pain and leg swelling  Gastrointestinal: Negative  Genitourinary: Negative  Musculoskeletal: Positive for arthralgias, back pain, gait problem, joint swelling and myalgias  Hematological: Negative  Psychiatric/Behavioral: Positive for dysphoric mood  The patient is nervous/anxious  All other systems reviewed and are negative  Objective:      /80   Pulse 88   Temp 97 5 °F (36 4 °C)   Ht 5' 1 5" (1 562 m)   Wt 89 2 kg (196 lb 9 6 oz)   BMI 36 55 kg/m²          Physical Exam   Constitutional: She is oriented to person, place, and time  She appears well-developed and well-nourished  HENT:   Head: Normocephalic  Neck: Normal range of motion  Cardiovascular: Normal rate  An irregularly irregular rhythm present  Pulmonary/Chest: Effort normal and breath sounds normal    Musculoskeletal: She exhibits no edema  Neurological: She is alert and oriented to person, place, and time  Skin: No rash noted  Psychiatric: She has a normal mood and affect  Her behavior is normal  Judgment and thought content normal    Nursing note and vitals reviewed

## 2019-03-18 NOTE — PROGRESS NOTES
Assessment and Plan:  Problem List Items Addressed This Visit        Cardiovascular and Mediastinum    Atrial fibrillation with rapid ventricular response (HCC)    Relevant Medications    metoprolol succinate (TOPROL-XL) 25 mg 24 hr tablet    Essential hypertension    Relevant Medications    metoprolol succinate (TOPROL-XL) 25 mg 24 hr tablet    Coronary artery disease involving native coronary artery of native heart without angina pectoris    Relevant Medications    metoprolol succinate (TOPROL-XL) 25 mg 24 hr tablet       Nervous and Auditory    Benign familial tremor    Neuropathy    Relevant Medications    DULoxetine (CYMBALTA) 20 mg capsule       Other    Mixed hyperlipidemia    Abnormal blood chemistry    Current moderate episode of major depressive disorder (HCC)    Relevant Medications    DULoxetine (CYMBALTA) 20 mg capsule    Medicare annual wellness visit, subsequent - Primary        Health Maintenance Due   Topic Date Due    Medicare Annual Wellness Visit (AWV)  1940    DXA SCAN  1940    BMI: Followup Plan  01/10/1957    DTaP,Tdap,and Td Vaccines (1 - Tdap) 01/10/1960         HPI:  Patient Active Problem List   Diagnosis    History of permanent cardiac pacemaker placement    Other muscle spasm    Mixed hyperlipidemia    Generalized anxiety disorder    Gastroesophageal reflux disease with esophagitis    Depression    Benign familial tremor    Accelerated essential hypertension    Abnormal blood chemistry    Neuropathy    Primary insomnia    [facility] with [facility]    Essential hypertension    Coronary artery disease involving native coronary artery of native heart without angina pectoris    Sciatic nerve pain, right    Current moderate episode of major depressive disorder [facility]    Medicare annual wellness visit, subsequent     Past Medical History:   Diagnosis Date    Depression     [facility]oma ([facility] )      Past Surgical History:   Procedure Laterality Date    AORTIC VALVE REPLACEMENT  12/2012    CARDIAC PACEMAKER PLACEMENT      dual chamber last assessed 10/14/13    CHOLECYSTECTOMY      JOINT REPLACEMENT      B/L knee replacement     Family History   Problem Relation Age of Onset    Hypertension Mother      Social History     Tobacco Use   Smoking Status Never Smoker   Smokeless Tobacco Never Used     Social History     Substance and Sexual Activity   Alcohol Use No      Social History     Substance and Sexual Activity   Drug Use No         Current Outpatient Medications   Medication Sig Dispense Refill    amLODIPine (NORVASC) 5 mg tablet Take 1 tablet (5 mg total) by mouth 2 (two) times a day 60 tablet 5    DULoxetine (CYMBALTA) 20 mg capsule Take 3 capsules (60 mg total) by mouth daily 30 capsule 3    metoprolol succinate (TOPROL-XL) 25 mg 24 hr tablet Take 1 tablet (25 mg total) by mouth 2 (two) times a day 60 tablet 5    omeprazole (PriLOSEC) 20 mg delayed release capsule Take 1 capsule (20 mg total) by mouth daily 30 capsule 5    acetaminophen (TYLENOL) 500 mg tablet Take 1 tablet (500 mg total) by mouth every 6 (six) hours as needed for mild pain (Patient not taking: Reported on 3/18/2019) 120 tablet 3    Incontinence Supply Disposable (ADHERES INCONTINENCE PAD) MISC by Does not apply route 2 (two) times a day (Patient not taking: Reported on 3/18/2019) 75 each 5    Incontinence Supply Disposable (INCONTINENCE BRIEF MEDIUM) MISC by Does not apply route 2 (two) times a day (Patient not taking: Reported on 3/18/2019) 44 each 5    warfarin (COUMADIN) 4 mg tablet Take 1 tablet (4 mg total) by mouth daily (Patient not taking: Reported on 3/18/2019) 30 tablet 5     No current facility-administered medications for this visit        Allergies   Allergen Reactions    Penicillins Hives    Sulfa Antibiotics Hives     Immunization History   Administered Date(s) Administered    Pneumococcal Polysaccharide PPV23 11/01/2006       Patient Care Team:  Judy Cardenas MD as PCP - General    Medicare Screening Tests and Risk Assessments:  Nena Neely is here for her Subsequent Wellness visit  Health Risk Assessment:  Patient rates overall health as good  Patient feels that their physical health rating is Slightly worse  Eyesight was rated as Same  Hearing was rated as Same  Patient feels that their emotional and mental health rating is Same  Pain experienced by patient in the last 7 days has been None  Patient's pain rating has been 8/10  Patient states that she has experienced no weight loss or gain in last 6 months  (Additional comments: Lower back pain varies  )    Emotional/Mental Health:  Patient has been feeling nervous/anxious  PHQ-9 Depression Screening:    Frequency of the following problems over the past two weeks:      1  Little interest or pleasure in doing things: 0 - not at all      2  Feeling down, depressed, or hopeless: 0 - not at all  PHQ-2 Score: 0          Broken Bones/Falls: Fall Risk Assessment:    In the past year, patient has experienced: No history of falling in past year          Bladder/Bowel:  Patient has leaked urine accidently in the last six months  Patient reports no loss of bowel control  Immunizations:  Patient has not had a flu vaccination within the last year  Patient has not received a pneumonia shot  Patient has not received a shingles shot  Patient has not received tetanus/diphtheria shot  Home Safety:  Patient has trouble with stairs inside or outside of their home  Patient currently reports that there are no safety hazards present in home, working smoke alarms, working carbon monoxide detectors        Preventative Screenings:   No breast cancer screening performed, no colon cancer screen completed, cholesterol screen completed, 11/7/2018  no glaucoma eye exam completed    Nutrition:  Current diet: Regular and No Added Salt with servings of the following:    Medications:  Patient is not currently taking any over-the-counter supplements  Patient is able to manage medications  Lifestyle Choices:  Patient reports no tobacco use  Patient has not smoked or used tobacco in the past   Patient reports no alcohol use  Patient does not drive a vehicle  Patient wears seat belt  Current level of exercise of physical activity described by patient as: No regular exercise  Activities of Daily Living:  Can get out of bed by his or her self, able to dress self, able to make own meals, able to do own shopping, able to bathe self, can do own laundry/housekeeping, can manage own money, pay bills and track expenses    Previous Hospitalizations:  No hospitalization or ED visit in past 12 months        Advanced Directives:  Patient has decided on a power of   Patient has spoken to designated power of   Patient has not completed advanced directive  Preventative Screening/Counseling:      Cardiovascular:      General: Screening Current          Diabetes:      General: Screening Current          Colorectal Cancer:      General: Screening Current          Breast Cancer:      General: Risks and Benefits Discussed          Cervical Cancer:      General: Screening Not Indicated          Osteoporosis:      General: Risks and Benefits Discussed      Due for studies: DXA Axial          AAA:      General: Screening Not Indicated          Glaucoma:      General: Screening Current          HIV:      General: Screening Not Indicated          Hepatitis C:      General: Screening Not Indicated        Advanced Directives:   Patient has living will for healthcare, has durable POA for healthcare, patient does not have an advanced directive  Information on ACP and/or AD not provided  No 5 wishes given  End of life assessment reviewed with patient  Provider agrees with end of life decisions             Visual Acuity Screening    Right eye Left eye Both eyes   Without correction: 20/30 20/25 20/20   With correction:          Physical Exam:  Review of Systems   Gastrointestinal: Negative for bowel incontinence  Psychiatric/Behavioral: The patient is nervous/anxious  Vitals:    03/18/19 0923   BP: 150/80   Pulse: 88   Temp: 97 5 °F (36 4 °C)   Weight: 89 2 kg (196 lb 9 6 oz)   Height: 5' 1 5" (1 562 m)   Body mass index is 36 55 kg/m²      Physical Exam

## 2019-03-18 NOTE — PATIENT INSTRUCTIONS

## 2019-05-29 DIAGNOSIS — I10 ACCELERATED ESSENTIAL HYPERTENSION: ICD-10-CM

## 2019-05-29 DIAGNOSIS — K21.00 GASTROESOPHAGEAL REFLUX DISEASE WITH ESOPHAGITIS: ICD-10-CM

## 2019-05-29 RX ORDER — OMEPRAZOLE 20 MG/1
CAPSULE, DELAYED RELEASE ORAL
Qty: 30 CAPSULE | Refills: 5 | Status: SHIPPED | OUTPATIENT
Start: 2019-05-29

## 2019-05-29 RX ORDER — AMLODIPINE BESYLATE 5 MG/1
TABLET ORAL
Qty: 60 TABLET | Refills: 5 | Status: SHIPPED | OUTPATIENT
Start: 2019-05-29 | End: 2019-11-12 | Stop reason: ALTCHOICE

## 2019-09-24 ENCOUNTER — OFFICE VISIT (OUTPATIENT)
Dept: FAMILY MEDICINE CLINIC | Facility: HOSPITAL | Age: 79
End: 2019-09-24
Payer: MEDICARE

## 2019-09-24 VITALS
HEIGHT: 62 IN | DIASTOLIC BLOOD PRESSURE: 86 MMHG | BODY MASS INDEX: 36.99 KG/M2 | TEMPERATURE: 97.1 F | WEIGHT: 201 LBS | SYSTOLIC BLOOD PRESSURE: 150 MMHG | HEART RATE: 105 BPM

## 2019-09-24 DIAGNOSIS — N39.44 NOCTURNAL ENURESIS: ICD-10-CM

## 2019-09-24 DIAGNOSIS — F33.9 RECURRENT MAJOR DEPRESSIVE DISORDER, REMISSION STATUS UNSPECIFIED (HCC): ICD-10-CM

## 2019-09-24 DIAGNOSIS — I25.10 CORONARY ARTERY DISEASE INVOLVING NATIVE CORONARY ARTERY OF NATIVE HEART WITHOUT ANGINA PECTORIS: ICD-10-CM

## 2019-09-24 DIAGNOSIS — I48.91 ATRIAL FIBRILLATION WITH RAPID VENTRICULAR RESPONSE (HCC): ICD-10-CM

## 2019-09-24 DIAGNOSIS — I10 ESSENTIAL HYPERTENSION: Primary | ICD-10-CM

## 2019-09-24 DIAGNOSIS — F41.1 GENERALIZED ANXIETY DISORDER: ICD-10-CM

## 2019-09-24 DIAGNOSIS — E66.9 OBESITY (BMI 35.0-39.9 WITHOUT COMORBIDITY): ICD-10-CM

## 2019-09-24 DIAGNOSIS — R09.89 GLOBUS SENSATION: ICD-10-CM

## 2019-09-24 PROBLEM — R09.A2 GLOBUS SENSATION: Status: ACTIVE | Noted: 2019-09-24

## 2019-09-24 PROCEDURE — 99215 OFFICE O/P EST HI 40 MIN: CPT | Performed by: FAMILY MEDICINE

## 2019-09-24 RX ORDER — FLUOXETINE HYDROCHLORIDE 40 MG/1
40 CAPSULE ORAL DAILY
Qty: 30 CAPSULE | Refills: 3 | Status: SHIPPED | OUTPATIENT
Start: 2019-09-24 | End: 2020-12-28

## 2019-09-24 NOTE — PROGRESS NOTES
Assessment/Plan:         Diagnoses and all orders for this visit:    Essential hypertension  Comments:  Labile BP control    Coronary artery disease involving native coronary artery of native heart without angina pectoris  Comments:  CAD asymptomatic    Recurrent major depressive disorder, remission status unspecified ([facility] )  Comments:  Return to Fluoxetine    Atrial fibrillation with rapid ventricular response (HCC)  Comments:  Rate adequate control, declines cardiology follow up and declines anticoagulation    Generalized anxiety disorder  -     FLUoxetine (PROzac) 40 MG capsule; Take 1 capsule (40 mg total) by mouth daily    Obesity (BMI 35 0-39 9 without comorbidity)    Globus sensation  -     Ambulatory Referral to Otolaryngology; Future    Nocturnal enuresis  Comments:  Unable to give urine sample now, will test a m specimen          Subjective:      Patient ID: Amita Woods is a [de-identified] y o  female  6 month follow up  Urinary incontinence started all of a sudden  Happens as soon as she lies down, soaks a Depends  Never had issue before even with urgency or leakage  Urine does have an odor  No fecal incontinence  Wants to go back on Prozac instead of Cymbalta 10mg or up to 60mg  Had been on 80mg of Fluoxetine in the past     Feels something in her throat like food is stuck for the past week    Stopped caffeine, no longer palpitations  The following portions of the patient's history were reviewed and updated as appropriate: allergies, current medications, past family history, past medical history, past social history, past surgical history and problem list     Review of Systems   Constitutional: Negative for unexpected weight change  HENT: Negative  Respiratory: Negative  Cardiovascular: Negative  Gastrointestinal: Negative  Genitourinary: Positive for enuresis  Neurological: Positive for tremors     Psychiatric/Behavioral: Positive for decreased concentration and dysphoric mood  The patient is nervous/anxious  All other systems reviewed and are negative  Objective:      /86   Pulse 105   Temp (!) 97 1 °F (36 2 °C)   Ht 5' 1 5" (1 562 m)   Wt 91 2 kg (201 lb)   BMI 37 36 kg/m²          Physical Exam   Constitutional: She is oriented to person, place, and time  She appears well-developed and well-nourished  Neck: No thyromegaly present  Cardiovascular: Normal rate  An irregularly irregular rhythm present  Pulmonary/Chest: Effort normal and breath sounds normal    Musculoskeletal: She exhibits no edema  Neurological: She is alert and oriented to person, place, and time  Psychiatric: She has a normal mood and affect  Her behavior is normal    Nursing note and vitals reviewed  BMI Counseling: Body mass index is 37 36 kg/m²  The BMI is above normal  Nutrition recommendations include reducing portion sizes and moderation in carbohydrate intake  Exercise recommendations include strength training exercises

## 2019-10-06 ENCOUNTER — APPOINTMENT (EMERGENCY)
Dept: RADIOLOGY | Facility: HOSPITAL | Age: 79
End: 2019-10-06
Payer: MEDICARE

## 2019-10-06 ENCOUNTER — HOSPITAL ENCOUNTER (EMERGENCY)
Facility: HOSPITAL | Age: 79
End: 2019-10-06
Attending: EMERGENCY MEDICINE | Admitting: EMERGENCY MEDICINE
Payer: MEDICARE

## 2019-10-06 ENCOUNTER — APPOINTMENT (EMERGENCY)
Dept: RADIOLOGY | Facility: HOSPITAL | Age: 79
DRG: 253 | End: 2019-10-06
Payer: MEDICARE

## 2019-10-06 ENCOUNTER — ANESTHESIA (EMERGENCY)
Dept: PERIOP | Facility: HOSPITAL | Age: 79
DRG: 253 | End: 2019-10-06
Payer: MEDICARE

## 2019-10-06 ENCOUNTER — ANESTHESIA EVENT (EMERGENCY)
Dept: PERIOP | Facility: HOSPITAL | Age: 79
DRG: 253 | End: 2019-10-06
Payer: MEDICARE

## 2019-10-06 ENCOUNTER — HOSPITAL ENCOUNTER (INPATIENT)
Facility: HOSPITAL | Age: 79
LOS: 5 days | Discharge: NURSING, REHAB OR LONG-TERM CARE | DRG: 253 | End: 2019-10-11
Attending: INTERNAL MEDICINE | Admitting: INTERNAL MEDICINE
Payer: MEDICARE

## 2019-10-06 VITALS
BODY MASS INDEX: 35.87 KG/M2 | DIASTOLIC BLOOD PRESSURE: 88 MMHG | SYSTOLIC BLOOD PRESSURE: 165 MMHG | HEART RATE: 125 BPM | HEIGHT: 61 IN | WEIGHT: 190 LBS | OXYGEN SATURATION: 96 % | TEMPERATURE: 97.9 F | RESPIRATION RATE: 31 BRPM

## 2019-10-06 DIAGNOSIS — I74.3 ARTERIAL EMBOLISM OF LEFT LEG (HCC): Primary | ICD-10-CM

## 2019-10-06 DIAGNOSIS — I99.8 ISCHEMIA OF LEFT LOWER EXTREMITY: ICD-10-CM

## 2019-10-06 DIAGNOSIS — I74.4 PERIPHERAL ARTERY EMBOLISM OR THROMBOSIS (HCC): Primary | ICD-10-CM

## 2019-10-06 DIAGNOSIS — B35.1 ONYCHOMYCOSIS: Chronic | ICD-10-CM

## 2019-10-06 DIAGNOSIS — I48.91 ATRIAL FIBRILLATION WITH RVR (HCC): ICD-10-CM

## 2019-10-06 LAB
ABO GROUP BLD: NORMAL
ALBUMIN SERPL BCP-MCNC: 3.9 G/DL (ref 3.5–5)
ALP SERPL-CCNC: 150 U/L (ref 46–116)
ALT SERPL W P-5'-P-CCNC: 20 U/L (ref 12–78)
ANION GAP SERPL CALCULATED.3IONS-SCNC: 18 MMOL/L (ref 4–13)
ANION GAP SERPL CALCULATED.3IONS-SCNC: 7 MMOL/L (ref 4–13)
APTT PPP: 27 SECONDS (ref 23–37)
APTT PPP: >210 SECONDS (ref 23–37)
AST SERPL W P-5'-P-CCNC: 31 U/L (ref 5–45)
BASE EXCESS BLDA CALC-SCNC: 0 MMOL/L (ref -2–3)
BASOPHILS # BLD AUTO: 0.08 THOUSANDS/ΜL (ref 0–0.1)
BASOPHILS NFR BLD AUTO: 1 % (ref 0–1)
BILIRUB SERPL-MCNC: 0.5 MG/DL (ref 0.2–1)
BLD GP AB SCN SERPL QL: NEGATIVE
BUN SERPL-MCNC: 24 MG/DL (ref 5–25)
BUN SERPL-MCNC: 25 MG/DL (ref 5–25)
CA-I BLD-SCNC: 1.16 MMOL/L (ref 1.12–1.32)
CALCIUM SERPL-MCNC: 10.1 MG/DL (ref 8.3–10.1)
CALCIUM SERPL-MCNC: 7.8 MG/DL (ref 8.3–10.1)
CHLORIDE SERPL-SCNC: 110 MMOL/L (ref 100–108)
CHLORIDE SERPL-SCNC: 98 MMOL/L (ref 100–108)
CO2 SERPL-SCNC: 20 MMOL/L (ref 21–32)
CO2 SERPL-SCNC: 23 MMOL/L (ref 21–32)
CREAT SERPL-MCNC: 1.13 MG/DL (ref 0.6–1.3)
CREAT SERPL-MCNC: 1.48 MG/DL (ref 0.6–1.3)
EOSINOPHIL # BLD AUTO: 0.21 THOUSAND/ΜL (ref 0–0.61)
EOSINOPHIL NFR BLD AUTO: 2 % (ref 0–6)
ERYTHROCYTE [DISTWIDTH] IN BLOOD BY AUTOMATED COUNT: 13.3 % (ref 11.6–15.1)
ERYTHROCYTE [DISTWIDTH] IN BLOOD BY AUTOMATED COUNT: 13.3 % (ref 11.6–15.1)
GFR SERPL CREATININE-BSD FRML MDRD: 33 ML/MIN/1.73SQ M
GFR SERPL CREATININE-BSD FRML MDRD: 46 ML/MIN/1.73SQ M
GLUCOSE SERPL-MCNC: 209 MG/DL (ref 65–140)
GLUCOSE SERPL-MCNC: 253 MG/DL (ref 65–140)
GLUCOSE SERPL-MCNC: 275 MG/DL (ref 65–140)
HCO3 BLDA-SCNC: 23.8 MMOL/L (ref 22–28)
HCT VFR BLD AUTO: 38.9 % (ref 34.8–46.1)
HCT VFR BLD AUTO: 48 % (ref 34.8–46.1)
HCT VFR BLD CALC: 43 % (ref 34.8–46.1)
HGB BLD-MCNC: 12.4 G/DL (ref 11.5–15.4)
HGB BLD-MCNC: 16 G/DL (ref 11.5–15.4)
HGB BLDA-MCNC: 14.6 G/DL (ref 11.5–15.4)
IMM GRANULOCYTES # BLD AUTO: 0.07 THOUSAND/UL (ref 0–0.2)
IMM GRANULOCYTES NFR BLD AUTO: 1 % (ref 0–2)
INR PPP: 1.04 (ref 0.84–1.19)
INR PPP: 1.42 (ref 0.84–1.19)
KCT BLD-ACNC: 166 SEC (ref 89–137)
KCT BLD-ACNC: 206 SEC (ref 89–137)
LYMPHOCYTES # BLD AUTO: 4.04 THOUSANDS/ΜL (ref 0.6–4.47)
LYMPHOCYTES NFR BLD AUTO: 36 % (ref 14–44)
MCH RBC QN AUTO: 30.4 PG (ref 26.8–34.3)
MCH RBC QN AUTO: 30.7 PG (ref 26.8–34.3)
MCHC RBC AUTO-ENTMCNC: 31.9 G/DL (ref 31.4–37.4)
MCHC RBC AUTO-ENTMCNC: 33.3 G/DL (ref 31.4–37.4)
MCV RBC AUTO: 92 FL (ref 82–98)
MCV RBC AUTO: 95 FL (ref 82–98)
MONOCYTES # BLD AUTO: 0.81 THOUSAND/ΜL (ref 0.17–1.22)
MONOCYTES NFR BLD AUTO: 7 % (ref 4–12)
NEUTROPHILS # BLD AUTO: 5.97 THOUSANDS/ΜL (ref 1.85–7.62)
NEUTS SEG NFR BLD AUTO: 53 % (ref 43–75)
NRBC BLD AUTO-RTO: 0 /100 WBCS
PCO2 BLD: 25 MMOL/L (ref 21–32)
PCO2 BLD: 37.1 MM HG (ref 36–44)
PH BLD: 7.42 [PH] (ref 7.35–7.45)
PLATELET # BLD AUTO: 300 THOUSANDS/UL (ref 149–390)
PLATELET # BLD AUTO: 391 THOUSANDS/UL (ref 149–390)
PMV BLD AUTO: 10.6 FL (ref 8.9–12.7)
PMV BLD AUTO: 9.9 FL (ref 8.9–12.7)
PO2 BLD: 125 MM HG (ref 75–129)
POTASSIUM BLD-SCNC: 4.4 MMOL/L (ref 3.5–5.3)
POTASSIUM SERPL-SCNC: 4 MMOL/L (ref 3.5–5.3)
POTASSIUM SERPL-SCNC: 4.2 MMOL/L (ref 3.5–5.3)
PROT SERPL-MCNC: 7.8 G/DL (ref 6.4–8.2)
PROTHROMBIN TIME: 13.3 SECONDS (ref 11.6–14.5)
PROTHROMBIN TIME: 16.9 SECONDS (ref 11.6–14.5)
RBC # BLD AUTO: 4.08 MILLION/UL (ref 3.81–5.12)
RBC # BLD AUTO: 5.22 MILLION/UL (ref 3.81–5.12)
RH BLD: POSITIVE
SAO2 % BLD FROM PO2: 99 % (ref 95–98)
SODIUM BLD-SCNC: 137 MMOL/L (ref 136–145)
SODIUM SERPL-SCNC: 136 MMOL/L (ref 136–145)
SODIUM SERPL-SCNC: 140 MMOL/L (ref 136–145)
SPECIMEN EXPIRATION DATE: NORMAL
SPECIMEN SOURCE: ABNORMAL
TROPONIN I SERPL-MCNC: 0.13 NG/ML
WBC # BLD AUTO: 11.18 THOUSAND/UL (ref 4.31–10.16)
WBC # BLD AUTO: 13.47 THOUSAND/UL (ref 4.31–10.16)

## 2019-10-06 PROCEDURE — 04CL0ZZ EXTIRPATION OF MATTER FROM LEFT FEMORAL ARTERY, OPEN APPROACH: ICD-10-PCS | Performed by: SURGERY

## 2019-10-06 PROCEDURE — 85730 THROMBOPLASTIN TIME PARTIAL: CPT | Performed by: SURGERY

## 2019-10-06 PROCEDURE — 99223 1ST HOSP IP/OBS HIGH 75: CPT | Performed by: SURGERY

## 2019-10-06 PROCEDURE — 93005 ELECTROCARDIOGRAM TRACING: CPT

## 2019-10-06 PROCEDURE — 80053 COMPREHEN METABOLIC PANEL: CPT | Performed by: PHYSICIAN ASSISTANT

## 2019-10-06 PROCEDURE — 75710 ARTERY X-RAYS ARM/LEG: CPT

## 2019-10-06 PROCEDURE — 96375 TX/PRO/DX INJ NEW DRUG ADDON: CPT

## 2019-10-06 PROCEDURE — 82803 BLOOD GASES ANY COMBINATION: CPT

## 2019-10-06 PROCEDURE — 85025 COMPLETE CBC W/AUTO DIFF WBC: CPT | Performed by: PHYSICIAN ASSISTANT

## 2019-10-06 PROCEDURE — 99285 EMERGENCY DEPT VISIT HI MDM: CPT

## 2019-10-06 PROCEDURE — 84132 ASSAY OF SERUM POTASSIUM: CPT

## 2019-10-06 PROCEDURE — 34201 REMOVAL OF ARTERY CLOT: CPT | Performed by: SURGERY

## 2019-10-06 PROCEDURE — 86900 BLOOD TYPING SEROLOGIC ABO: CPT | Performed by: SURGERY

## 2019-10-06 PROCEDURE — 85014 HEMATOCRIT: CPT

## 2019-10-06 PROCEDURE — 84295 ASSAY OF SERUM SODIUM: CPT

## 2019-10-06 PROCEDURE — 84484 ASSAY OF TROPONIN QUANT: CPT | Performed by: PHYSICIAN ASSISTANT

## 2019-10-06 PROCEDURE — 85730 THROMBOPLASTIN TIME PARTIAL: CPT | Performed by: PHYSICIAN ASSISTANT

## 2019-10-06 PROCEDURE — 99285 EMERGENCY DEPT VISIT HI MDM: CPT | Performed by: EMERGENCY MEDICINE

## 2019-10-06 PROCEDURE — 99284 EMERGENCY DEPT VISIT MOD MDM: CPT

## 2019-10-06 PROCEDURE — 96365 THER/PROPH/DIAG IV INF INIT: CPT

## 2019-10-06 PROCEDURE — 85610 PROTHROMBIN TIME: CPT | Performed by: SURGERY

## 2019-10-06 PROCEDURE — 82947 ASSAY GLUCOSE BLOOD QUANT: CPT

## 2019-10-06 PROCEDURE — 96366 THER/PROPH/DIAG IV INF ADDON: CPT

## 2019-10-06 PROCEDURE — 82330 ASSAY OF CALCIUM: CPT

## 2019-10-06 PROCEDURE — 85347 COAGULATION TIME ACTIVATED: CPT

## 2019-10-06 PROCEDURE — 1124F ACP DISCUSS-NO DSCNMKR DOCD: CPT | Performed by: INTERNAL MEDICINE

## 2019-10-06 PROCEDURE — 86901 BLOOD TYPING SEROLOGIC RH(D): CPT | Performed by: SURGERY

## 2019-10-06 PROCEDURE — 80048 BASIC METABOLIC PNL TOTAL CA: CPT | Performed by: SURGERY

## 2019-10-06 PROCEDURE — 85610 PROTHROMBIN TIME: CPT | Performed by: PHYSICIAN ASSISTANT

## 2019-10-06 PROCEDURE — 96374 THER/PROPH/DIAG INJ IV PUSH: CPT

## 2019-10-06 PROCEDURE — 99222 1ST HOSP IP/OBS MODERATE 55: CPT | Performed by: PHYSICIAN ASSISTANT

## 2019-10-06 PROCEDURE — 36415 COLL VENOUS BLD VENIPUNCTURE: CPT | Performed by: PHYSICIAN ASSISTANT

## 2019-10-06 PROCEDURE — 96376 TX/PRO/DX INJ SAME DRUG ADON: CPT

## 2019-10-06 PROCEDURE — 85027 COMPLETE CBC AUTOMATED: CPT | Performed by: SURGERY

## 2019-10-06 PROCEDURE — 86850 RBC ANTIBODY SCREEN: CPT | Performed by: SURGERY

## 2019-10-06 PROCEDURE — 04CK0ZZ EXTIRPATION OF MATTER FROM RIGHT FEMORAL ARTERY, OPEN APPROACH: ICD-10-PCS | Performed by: SURGERY

## 2019-10-06 PROCEDURE — B41GZZZ FLUOROSCOPY OF LEFT LOWER EXTREMITY ARTERIES: ICD-10-PCS | Performed by: SURGERY

## 2019-10-06 PROCEDURE — B41FZZZ FLUOROSCOPY OF RIGHT LOWER EXTREMITY ARTERIES: ICD-10-PCS | Performed by: SURGERY

## 2019-10-06 PROCEDURE — 71045 X-RAY EXAM CHEST 1 VIEW: CPT

## 2019-10-06 RX ORDER — HEPARIN SODIUM 1000 [USP'U]/ML
INJECTION, SOLUTION INTRAVENOUS; SUBCUTANEOUS AS NEEDED
Status: DISCONTINUED | OUTPATIENT
Start: 2019-10-06 | End: 2019-10-06 | Stop reason: SURG

## 2019-10-06 RX ORDER — OXYCODONE HYDROCHLORIDE 10 MG/1
10 TABLET ORAL EVERY 4 HOURS PRN
Status: DISCONTINUED | OUTPATIENT
Start: 2019-10-06 | End: 2019-10-11 | Stop reason: HOSPADM

## 2019-10-06 RX ORDER — NITROGLYCERIN 5 MG/ML
INJECTION, SOLUTION INTRAVENOUS AS NEEDED
Status: DISCONTINUED | OUTPATIENT
Start: 2019-10-06 | End: 2019-10-06 | Stop reason: HOSPADM

## 2019-10-06 RX ORDER — FENTANYL CITRATE 50 UG/ML
INJECTION, SOLUTION INTRAMUSCULAR; INTRAVENOUS AS NEEDED
Status: DISCONTINUED | OUTPATIENT
Start: 2019-10-06 | End: 2019-10-06 | Stop reason: SURG

## 2019-10-06 RX ORDER — PANTOPRAZOLE SODIUM 40 MG/1
40 TABLET, DELAYED RELEASE ORAL
Status: DISCONTINUED | OUTPATIENT
Start: 2019-10-07 | End: 2019-10-11 | Stop reason: HOSPADM

## 2019-10-06 RX ORDER — PROPOFOL 10 MG/ML
INJECTION, EMULSION INTRAVENOUS AS NEEDED
Status: DISCONTINUED | OUTPATIENT
Start: 2019-10-06 | End: 2019-10-06 | Stop reason: SURG

## 2019-10-06 RX ORDER — SODIUM CHLORIDE 9 MG/ML
75 INJECTION, SOLUTION INTRAVENOUS CONTINUOUS
Status: DISCONTINUED | OUTPATIENT
Start: 2019-10-06 | End: 2019-10-07

## 2019-10-06 RX ORDER — OXYCODONE HYDROCHLORIDE 5 MG/1
5 TABLET ORAL EVERY 4 HOURS PRN
Status: DISCONTINUED | OUTPATIENT
Start: 2019-10-06 | End: 2019-10-11 | Stop reason: HOSPADM

## 2019-10-06 RX ORDER — METOPROLOL TARTRATE 5 MG/5ML
10 INJECTION INTRAVENOUS ONCE
Status: COMPLETED | OUTPATIENT
Start: 2019-10-06 | End: 2019-10-06

## 2019-10-06 RX ORDER — SODIUM CHLORIDE 9 MG/ML
125 INJECTION, SOLUTION INTRAVENOUS CONTINUOUS
Status: DISCONTINUED | OUTPATIENT
Start: 2019-10-06 | End: 2019-10-06 | Stop reason: HOSPADM

## 2019-10-06 RX ORDER — HEPARIN SODIUM 10000 [USP'U]/100ML
INJECTION, SOLUTION INTRAVENOUS CONTINUOUS PRN
Status: DISCONTINUED | OUTPATIENT
Start: 2019-10-06 | End: 2019-10-06

## 2019-10-06 RX ORDER — HEPARIN SODIUM 10000 [USP'U]/100ML
22 INJECTION, SOLUTION INTRAVENOUS CONTINUOUS
Status: DISCONTINUED | OUTPATIENT
Start: 2019-10-06 | End: 2019-10-06

## 2019-10-06 RX ORDER — LIDOCAINE HYDROCHLORIDE 10 MG/ML
INJECTION, SOLUTION EPIDURAL; INFILTRATION; INTRACAUDAL; PERINEURAL AS NEEDED
Status: DISCONTINUED | OUTPATIENT
Start: 2019-10-06 | End: 2019-10-06 | Stop reason: SURG

## 2019-10-06 RX ORDER — DULOXETIN HYDROCHLORIDE 60 MG/1
60 CAPSULE, DELAYED RELEASE ORAL DAILY
Status: DISCONTINUED | OUTPATIENT
Start: 2019-10-07 | End: 2019-10-06

## 2019-10-06 RX ORDER — HEPARIN SODIUM 1000 [USP'U]/ML
3200 INJECTION, SOLUTION INTRAVENOUS; SUBCUTANEOUS AS NEEDED
Status: DISCONTINUED | OUTPATIENT
Start: 2019-10-06 | End: 2019-10-06 | Stop reason: HOSPADM

## 2019-10-06 RX ORDER — FENTANYL CITRATE 50 UG/ML
1 INJECTION, SOLUTION INTRAMUSCULAR; INTRAVENOUS ONCE
Status: COMPLETED | OUTPATIENT
Start: 2019-10-06 | End: 2019-10-06

## 2019-10-06 RX ORDER — DILTIAZEM HYDROCHLORIDE 5 MG/ML
20 INJECTION INTRAVENOUS ONCE
Status: COMPLETED | OUTPATIENT
Start: 2019-10-06 | End: 2019-10-06

## 2019-10-06 RX ORDER — AMLODIPINE BESYLATE 5 MG/1
5 TABLET ORAL 2 TIMES DAILY
Status: DISCONTINUED | OUTPATIENT
Start: 2019-10-07 | End: 2019-10-08

## 2019-10-06 RX ORDER — MORPHINE SULFATE 4 MG/ML
4 INJECTION, SOLUTION INTRAMUSCULAR; INTRAVENOUS ONCE
Status: COMPLETED | OUTPATIENT
Start: 2019-10-06 | End: 2019-10-06

## 2019-10-06 RX ORDER — NEOSTIGMINE METHYLSULFATE 1 MG/ML
INJECTION INTRAVENOUS AS NEEDED
Status: DISCONTINUED | OUTPATIENT
Start: 2019-10-06 | End: 2019-10-06 | Stop reason: SURG

## 2019-10-06 RX ORDER — SODIUM CHLORIDE 9 MG/ML
INJECTION, SOLUTION INTRAVENOUS CONTINUOUS PRN
Status: DISCONTINUED | OUTPATIENT
Start: 2019-10-06 | End: 2019-10-06

## 2019-10-06 RX ORDER — HYDROMORPHONE HCL/PF 1 MG/ML
0.2 SYRINGE (ML) INJECTION
Status: DISCONTINUED | OUTPATIENT
Start: 2019-10-06 | End: 2019-10-06 | Stop reason: HOSPADM

## 2019-10-06 RX ORDER — ROCURONIUM BROMIDE 10 MG/ML
INJECTION, SOLUTION INTRAVENOUS AS NEEDED
Status: DISCONTINUED | OUTPATIENT
Start: 2019-10-06 | End: 2019-10-06 | Stop reason: SURG

## 2019-10-06 RX ORDER — ONDANSETRON 2 MG/ML
INJECTION INTRAMUSCULAR; INTRAVENOUS AS NEEDED
Status: DISCONTINUED | OUTPATIENT
Start: 2019-10-06 | End: 2019-10-06 | Stop reason: SURG

## 2019-10-06 RX ORDER — HEPARIN SODIUM 10000 [USP'U]/100ML
3-30 INJECTION, SOLUTION INTRAVENOUS
Status: DISCONTINUED | OUTPATIENT
Start: 2019-10-06 | End: 2019-10-06 | Stop reason: HOSPADM

## 2019-10-06 RX ORDER — SODIUM CHLORIDE 9 MG/ML
20 INJECTION, SOLUTION INTRAVENOUS CONTINUOUS
Status: CANCELLED | OUTPATIENT
Start: 2019-10-06

## 2019-10-06 RX ORDER — ALBUMIN, HUMAN INJ 5% 5 %
SOLUTION INTRAVENOUS CONTINUOUS PRN
Status: DISCONTINUED | OUTPATIENT
Start: 2019-10-06 | End: 2019-10-06

## 2019-10-06 RX ORDER — FLUOXETINE HYDROCHLORIDE 20 MG/1
40 CAPSULE ORAL DAILY
Status: DISCONTINUED | OUTPATIENT
Start: 2019-10-07 | End: 2019-10-11 | Stop reason: HOSPADM

## 2019-10-06 RX ORDER — GLYCOPYRROLATE 0.2 MG/ML
INJECTION INTRAMUSCULAR; INTRAVENOUS AS NEEDED
Status: DISCONTINUED | OUTPATIENT
Start: 2019-10-06 | End: 2019-10-06 | Stop reason: SURG

## 2019-10-06 RX ORDER — KETOROLAC TROMETHAMINE 30 MG/ML
1 INJECTION, SOLUTION INTRAMUSCULAR; INTRAVENOUS ONCE
Status: COMPLETED | OUTPATIENT
Start: 2019-10-06 | End: 2019-10-06

## 2019-10-06 RX ORDER — ONDANSETRON 2 MG/ML
4 INJECTION INTRAMUSCULAR; INTRAVENOUS ONCE AS NEEDED
Status: DISCONTINUED | OUTPATIENT
Start: 2019-10-06 | End: 2019-10-06 | Stop reason: HOSPADM

## 2019-10-06 RX ORDER — ONDANSETRON 2 MG/ML
1 INJECTION INTRAMUSCULAR; INTRAVENOUS ONCE
Status: COMPLETED | OUTPATIENT
Start: 2019-10-06 | End: 2019-10-06

## 2019-10-06 RX ORDER — METOPROLOL SUCCINATE 25 MG/1
25 TABLET, EXTENDED RELEASE ORAL EVERY 12 HOURS SCHEDULED
Status: DISCONTINUED | OUTPATIENT
Start: 2019-10-06 | End: 2019-10-11 | Stop reason: HOSPADM

## 2019-10-06 RX ORDER — ONDANSETRON 2 MG/ML
4 INJECTION INTRAMUSCULAR; INTRAVENOUS ONCE
Status: COMPLETED | OUTPATIENT
Start: 2019-10-06 | End: 2019-10-06

## 2019-10-06 RX ORDER — HEPARIN SODIUM 10000 [USP'U]/100ML
3-30 INJECTION, SOLUTION INTRAVENOUS
Status: DISPENSED | OUTPATIENT
Start: 2019-10-06 | End: 2019-10-08

## 2019-10-06 RX ORDER — HEPARIN SODIUM 1000 [USP'U]/ML
6400 INJECTION, SOLUTION INTRAVENOUS; SUBCUTANEOUS ONCE
Status: COMPLETED | OUTPATIENT
Start: 2019-10-06 | End: 2019-10-06

## 2019-10-06 RX ORDER — HEPARIN SODIUM 1000 [USP'U]/ML
3400 INJECTION, SOLUTION INTRAVENOUS; SUBCUTANEOUS AS NEEDED
Status: DISCONTINUED | OUTPATIENT
Start: 2019-10-06 | End: 2019-10-08

## 2019-10-06 RX ORDER — HEPARIN SODIUM 1000 [USP'U]/ML
6400 INJECTION, SOLUTION INTRAVENOUS; SUBCUTANEOUS AS NEEDED
Status: DISCONTINUED | OUTPATIENT
Start: 2019-10-06 | End: 2019-10-06 | Stop reason: HOSPADM

## 2019-10-06 RX ORDER — ACETAMINOPHEN 325 MG/1
650 TABLET ORAL EVERY 6 HOURS PRN
Status: DISCONTINUED | OUTPATIENT
Start: 2019-10-06 | End: 2019-10-11 | Stop reason: HOSPADM

## 2019-10-06 RX ORDER — HEPARIN SODIUM 1000 [USP'U]/ML
6800 INJECTION, SOLUTION INTRAVENOUS; SUBCUTANEOUS AS NEEDED
Status: DISCONTINUED | OUTPATIENT
Start: 2019-10-06 | End: 2019-10-08

## 2019-10-06 RX ORDER — CLINDAMYCIN PHOSPHATE 900 MG/50ML
INJECTION INTRAVENOUS AS NEEDED
Status: DISCONTINUED | OUTPATIENT
Start: 2019-10-06 | End: 2019-10-06 | Stop reason: SURG

## 2019-10-06 RX ORDER — FENTANYL CITRATE/PF 50 MCG/ML
12.5 SYRINGE (ML) INJECTION
Status: DISCONTINUED | OUTPATIENT
Start: 2019-10-06 | End: 2019-10-06 | Stop reason: HOSPADM

## 2019-10-06 RX ORDER — SUCCINYLCHOLINE/SOD CL,ISO/PF 100 MG/5ML
SYRINGE (ML) INTRAVENOUS AS NEEDED
Status: DISCONTINUED | OUTPATIENT
Start: 2019-10-06 | End: 2019-10-06 | Stop reason: SURG

## 2019-10-06 RX ADMIN — HEPARIN SODIUM 5000 UNITS: 1000 INJECTION INTRAVENOUS; SUBCUTANEOUS at 17:47

## 2019-10-06 RX ADMIN — LIDOCAINE HYDROCHLORIDE 50 MG: 10 INJECTION, SOLUTION EPIDURAL; INFILTRATION; INTRACAUDAL; PERINEURAL at 16:51

## 2019-10-06 RX ADMIN — SODIUM CHLORIDE 75 ML/HR: 0.9 INJECTION, SOLUTION INTRAVENOUS at 21:00

## 2019-10-06 RX ADMIN — MORPHINE SULFATE 4 MG: 4 INJECTION INTRAVENOUS at 14:18

## 2019-10-06 RX ADMIN — CLINDAMYCIN PHOSPHATE 900 MG: 900 INJECTION, SOLUTION INTRAVENOUS at 17:25

## 2019-10-06 RX ADMIN — ONDANSETRON 4 MG: 2 INJECTION INTRAMUSCULAR; INTRAVENOUS at 19:23

## 2019-10-06 RX ADMIN — SODIUM CHLORIDE: 0.9 INJECTION, SOLUTION INTRAVENOUS at 16:20

## 2019-10-06 RX ADMIN — PHENYLEPHRINE HYDROCHLORIDE 200 MCG: 10 INJECTION INTRAVENOUS at 17:45

## 2019-10-06 RX ADMIN — PHENYLEPHRINE HYDROCHLORIDE 100 MCG: 10 INJECTION INTRAVENOUS at 17:30

## 2019-10-06 RX ADMIN — ALBUMIN (HUMAN): 12.5 SOLUTION INTRAVENOUS at 17:40

## 2019-10-06 RX ADMIN — SODIUM CHLORIDE: 0.9 INJECTION, SOLUTION INTRAVENOUS at 19:03

## 2019-10-06 RX ADMIN — METOPROLOL TARTRATE 10 MG: 5 INJECTION INTRAVENOUS at 14:34

## 2019-10-06 RX ADMIN — FENTANYL CITRATE 25 MCG: 50 INJECTION, SOLUTION INTRAMUSCULAR; INTRAVENOUS at 17:00

## 2019-10-06 RX ADMIN — PHENYLEPHRINE HYDROCHLORIDE 20 MCG/MIN: 10 INJECTION INTRAVENOUS at 17:54

## 2019-10-06 RX ADMIN — PHENYLEPHRINE HYDROCHLORIDE 100 MCG: 10 INJECTION INTRAVENOUS at 18:19

## 2019-10-06 RX ADMIN — PROPOFOL 80 MG: 10 INJECTION, EMULSION INTRAVENOUS at 16:51

## 2019-10-06 RX ADMIN — HEPARIN SODIUM AND DEXTROSE 18 UNITS/KG/HR: 10000; 5 INJECTION INTRAVENOUS at 14:59

## 2019-10-06 RX ADMIN — HEPARIN SODIUM 6400 UNITS: 1000 INJECTION, SOLUTION INTRAVENOUS; SUBCUTANEOUS at 14:58

## 2019-10-06 RX ADMIN — SODIUM CHLORIDE 125 ML/HR: 0.9 INJECTION, SOLUTION INTRAVENOUS at 13:42

## 2019-10-06 RX ADMIN — MORPHINE SULFATE 4 MG: 4 INJECTION INTRAVENOUS at 13:45

## 2019-10-06 RX ADMIN — ROCURONIUM BROMIDE 20 MG: 50 INJECTION, SOLUTION INTRAVENOUS at 17:00

## 2019-10-06 RX ADMIN — PHENYLEPHRINE HYDROCHLORIDE 100 MCG: 10 INJECTION INTRAVENOUS at 17:08

## 2019-10-06 RX ADMIN — DILTIAZEM HYDROCHLORIDE 20 MG: 5 INJECTION INTRAVENOUS at 13:31

## 2019-10-06 RX ADMIN — METOPROLOL SUCCINATE 25 MG: 25 TABLET, EXTENDED RELEASE ORAL at 21:37

## 2019-10-06 RX ADMIN — DILTIAZEM HYDROCHLORIDE 5 MG/HR: 5 INJECTION INTRAVENOUS at 13:36

## 2019-10-06 RX ADMIN — PHENYLEPHRINE HYDROCHLORIDE 100 MCG: 10 INJECTION INTRAVENOUS at 17:54

## 2019-10-06 RX ADMIN — HEPARIN SODIUM 18 UNITS/KG/HR: 10000 INJECTION, SOLUTION INTRAVENOUS at 16:30

## 2019-10-06 RX ADMIN — ALBUMIN (HUMAN): 12.5 SOLUTION INTRAVENOUS at 18:10

## 2019-10-06 RX ADMIN — NEOSTIGMINE METHYLSULFATE 3 MG: 1 INJECTION, SOLUTION INTRAVENOUS at 19:30

## 2019-10-06 RX ADMIN — GLYCOPYRROLATE 0.4 MG: 0.2 INJECTION, SOLUTION INTRAMUSCULAR; INTRAVENOUS at 19:30

## 2019-10-06 RX ADMIN — ONDANSETRON 4 MG: 2 INJECTION INTRAMUSCULAR; INTRAVENOUS at 13:49

## 2019-10-06 RX ADMIN — FENTANYL CITRATE 25 MCG: 50 INJECTION, SOLUTION INTRAMUSCULAR; INTRAVENOUS at 19:24

## 2019-10-06 RX ADMIN — Medication 100 MG: at 16:51

## 2019-10-06 NOTE — SEPSIS NOTE
Sepsis Note   Corky Sandoval [de-identified] y o  female MRN: [id number]  Unit/Bed#: ED 04 A Encounter: [id number]      qSOFA     Row Name 10/06/19 1303                Altered mental status GCS < 15          Respiratory Rate > / =11  1        Systolic BP < / =779  0        Q Sofa Score  1            Initial Sepsis Screening     Row Name 10/06/19 1349                Is the patient's history suggestive of a new or worsening infection? (!) Yes (Proceed)  -PRINCE        Suspected source of infection  pneumonia  -PRINCE        Are two or more of the following signs & symptoms of infection both present and new to the patient? (!) Yes (Proceed)  -PRINCE        Indicate SIRS criteria  Altered mental status; Tachycardia > 90 bpm  -PRINCE        If the answer is yes to both questions, suspicion of sepsis is present          If severe sepsis is present AND tissue hypoperfusion perists in the hour after fluid resuscitation or lactate > 4, the patient meets criteria for SEPTIC SHOCK          Are any of the following organ dysfunction criteria present within 6 hours of suspected infection and SIRS criteria that are NOT considered to be chronic conditions? No  -PRINCE        Organ dysfunction          Date of presentation of severe sepsis          Time of presentation of severe sepsis          Tissue hypoperfusion persists in the hour after crystalloid fluid administration, evidenced, by either:          Was hypotension present within one hour of the conclusion of crystalloid fluid administration?           Date of presentation of septic shock          Time of presentation of septic shock            User Key  (r) = Recorded By, (t) = Taken By, (c) = Cosigned By    Initials Name Provider Type    [address], DO Physician

## 2019-10-06 NOTE — CONSULTS
Consultation - Vascular Surgery   Glide Jaelyn Sandoval [de-identified] y o  female MRN: [id number]  Unit/Bed#: ED 01 Encounter: [id number]      Assessment/Plan      Assessment:  [de-identified] F with h/o afib (not on AC), HTN who p/w acute LLE ischemia  Monitor showing afib with RVR  Plan:  · Hybrid OR for urgent LLE thromboembolectomy, possible fasciotomy  · Continue heparin gtt  · Admit to medicine    History of Present Illness   Physician Requesting Consult: No att  providers found  Reason for Consult / Principal Problem: acute LLE ischemia    HPI: Ruthie Blackman is a [de-identified]y o  year old female with PMHx of afib (not on AC), HTN who presents with acute LLE ischemia  The patient states she fell 2 days ago, but refused medical treatment  She presented to the [place] ED today d/t L-sided back pain radiating down her LLE with numbness  She was [facility] from [facility] for further care  Consults    Review of Systems   Constitutional: Negative  HENT: Negative  Respiratory: Negative  Cardiovascular: Negative  Gastrointestinal: Negative  Endocrine: Negative  Genitourinary: Negative  Musculoskeletal: Negative  L foot pain   Skin: Negative  Neurological: Positive for weakness and numbness         Historical Information   Past Medical History:   Diagnosis Date    [facility]     Depression     Hypertension     Melanoma [facility])      Past Surgical History:   Procedure Laterality Date    AORTIC VALVE REPLACEMENT  12/2012    CARDIAC PACEMAKER PLACEMENT      dual chamber last assessed 10/14/13    CHOLECYSTECTOMY      JOINT REPLACEMENT      B/L knee replacement     Social History   Social History     Substance and Sexual Activity   Alcohol Use No     Social History     Substance and Sexual Activity   Drug Use No     Social History     Tobacco Use   Smoking Status Never Smoker   Smokeless Tobacco Never Used     Family History:   Family History   Problem Relation Age of Onset    Hypertension Mother      Meds/Allergies   current meds:   No current facility-administered medications for this encounter  and PTA meds:   Prior to Admission Medications   Prescriptions Last Dose Informant Patient Reported? Taking? DULoxetine (CYMBALTA) 20 mg capsule   Yes No   Sig: Take 20 mg by mouth daily   DULoxetine (CYMBALTA) 60 mg delayed release capsule   No No   Sig: Take 1 capsule (60 mg total) by mouth daily   FLUoxetine (PROzac) 40 MG capsule   No No   Sig: Take 1 capsule (40 mg total) by mouth daily   Incontinence Supply Disposable (ADHERES INCONTINENCE PAD) MISC  Self No No   Sig: by Does not apply route 2 (two) times a day   Patient not taking: Reported on 3/18/2019   Incontinence Supply Disposable (INCONTINENCE BRIEF MEDIUM) MISC  Self No No   Sig: by Does not apply route 2 (two) times a day   Patient not taking: Reported on 3/18/2019   acetaminophen (TYLENOL) 500 mg tablet   No No   Sig: Take 1 tablet (500 mg total) by mouth every 6 (six) hours as needed for mild pain   Patient not taking: Reported on 3/18/2019   amLODIPine (NORVASC) 5 mg tablet   No No   Sig: TAKE ONE TABLET BY MOUTH TWICE DAILY   metoprolol succinate (TOPROL-XL) 25 mg 24 hr tablet   No No   Sig: Take 1 tablet (25 mg total) by mouth 2 (two) times a day   omeprazole (PriLOSEC) 20 mg delayed release capsule   No No   Sig: TAKE ONE CAPSULE BY MOUTH EVERY DAY   warfarin (COUMADIN) 4 mg tablet   No No   Sig: Take 1 tablet (4 mg total) by mouth daily   Patient not taking: Reported on 3/18/2019      Facility-Administered Medications: None     Allergies   Allergen Reactions    Penicillins Hives    Sulfa Antibiotics Hives    Medical Tape Rash       Objective   Vitals: Blood pressure 149/95, pulse (!) 113, resp  rate 18, SpO2 96 %  ,There is no height or weight on file to calculate BMI    No intake or output data in the 24 hours ending 10/06/19 1612  Invasive Devices     Peripheral Intravenous Line            Peripheral IV 10/06/19 Left Forearm less than 1 day    Peripheral IV 10/06/19 Right Forearm less than 1 day                Physical Exam   Constitutional: She is oriented to person, place, and time  She appears well-developed and well-nourished  No distress  HENT:   Head: Normocephalic and atraumatic  Eyes: EOM are normal    Neck: Normal range of motion  Cardiovascular:   Pulses:       Femoral pulses are 2+ on the right side, and 2+ on the left side  Popliteal pulses are 0 on the left side  Dorsalis pedis pulses are Detected w/ doppler on the right side, and 0 on the left side  Posterior tibial pulses are Detected w/ doppler on the right side, and 0 on the left side  Pulmonary/Chest: Effort normal  No respiratory distress  Abdominal: Soft  She exhibits distension  Genitourinary:   Genitourinary Comments: Deferred   Musculoskeletal: She exhibits no edema or deformity  Left foot: There is decreased range of motion  L foot decreased ROM (ankle, toes)  Sensation to LT decreased distal to ankle  L foot cold to touch, L calf cool up to level of knee   Neurological: She is alert and oriented to person, place, and time  Skin: Skin is dry  No rash noted  She is not diaphoretic  No erythema  Psychiatric: She has a normal mood and affect   Her behavior is normal        Lab Results:   CBC with diff:   Lab Results   Component Value Date    WBC 11 18 (H) 10/06/2019    HGB 16 0 (H) 10/06/2019    HCT 48 0 (H) 10/06/2019    MCV 92 10/06/2019     (H) 10/06/2019    MCH 30 7 10/06/2019    MCHC 33 3 10/06/2019    RDW 13 3 10/06/2019    MPV 10 6 10/06/2019    NRBC 0 10/06/2019   , BMP/CMP:   Lab Results   Component Value Date    SODIUM 136 10/06/2019    K 4 0 10/06/2019    CL 98 (L) 10/06/2019    CO2 20 (L) 10/06/2019    BUN 25 10/06/2019    CREATININE 1 48 (H) 10/06/2019    CALCIUM 10 1 10/06/2019    AST 31 10/06/2019    ALT 20 10/06/2019    ALKPHOS 150 (H) 10/06/2019    EGFR 33 10/06/2019   , Coags:   Lab Results   Component Value Date    PTT 27 10/06/2019    INR 1 04 10/06/2019     Imaging Studies: I have personally reviewed pertinent reports  EKG, Pathology, and Other Studies: I have personally reviewed pertinent reports

## 2019-10-06 NOTE — ANESTHESIA PROCEDURE NOTES
Arterial Line Insertion  Performed by: Rosy Howard CRNA  Authorized by: Peace Childers MD   Consent: Verbal consent obtained  Written consent obtained  Risks and benefits: risks, benefits and alternatives were discussed  Consent given by: patient  Patient identity confirmed: verbally with patient  Preparation: Patient was prepped and draped in the usual sterile fashion    Indications: hemodynamic monitoring  Orientation:  Left  Location: radial artery  Sedation:  Patient sedated: yes  Sedation type: Dyann Marrow)      Procedure Details:  Landry's test normal: yes  Needle gauge: 20  Seldinger technique: Seldinger technique used  Number of attempts: 2 (attempt by BENNY on right)    Post-procedure:  Post-procedure: dressing applied  Waveform: good waveform  Post-procedure CNS: normal  Patient tolerance: Patient tolerated the procedure well with no immediate complications

## 2019-10-06 NOTE — H&P
[address] [place] [de-identified] y o  female MRN: [id number]  Unit/Bed#: OR POOL Encounter: [id number]      -------------------------------------------------------------------------------------------------------------  Chief Complaint: ***    History of Present Illness :  [de-identified]year old female with hx of afib, not anticoagulated, HTN, transferred from [facility] with LLE ischemia in the setting of Afib with RVR  Sent to [place] for thrombectomy  History obtained from chart review and the patient   -------------------------------------------------------------------------------------------------------------  Assessment and Plan:    Neuro:    Diagnosis: ***   Plan: ***      CV:    Diagnosis: ***   Plan: ***      Pulm:   Diagnosis: ***   Plan: ***      GI:    Diagnosis: ***   Plan: ***      :    Diagnosis: ***   Plan: ***      F/E/N:    Plan: ***      Heme/Onc:    Diagnosis: ***   Plan: ***      Endo:    Diagnosis: ***   Plan: ***      ID:    Diagnosis: ***   Plan: ***      MSK/Skin:    Diagnosis: ***   Plan: ***    Disposition: { Dispo ULQK:66627}  Code Status: No Order  --------------------------------------------------------------------------------------------------------------  Review of Systems   Constitutional: Negative  Negative for chills and fever  HENT: Negative  Negative for rhinorrhea  Eyes: Negative  Respiratory: Negative  Negative for cough and shortness of breath  Cardiovascular: Negative  Negative for chest pain and leg swelling  Gastrointestinal: Negative  Negative for abdominal pain, diarrhea, nausea and vomiting  Genitourinary: Negative  Negative for dysuria, flank pain and frequency  Musculoskeletal: Negative  Negative for back pain and neck pain  Skin: Negative  Negative for rash  Neurological: Negative  Negative for light-headedness and headaches  All other systems reviewed and are negative        A 12-point, complete review of systems was reviewed and negative except as stated above     Physical Exam   Constitutional: She is oriented to person, place, and time  She appears well-developed and well-nourished  No distress  HENT:   Head: Normocephalic and atraumatic  Mouth/Throat: Oropharynx is clear and moist    Eyes: EOM are normal    Neck: Normal range of motion  Neck supple  Cardiovascular: Normal rate, regular rhythm, normal heart sounds and intact distal pulses  Exam reveals no gallop and no friction rub  No murmur heard  Pulmonary/Chest: Effort normal and breath sounds normal  No respiratory distress  She has no wheezes  She has no rales  Abdominal: Soft  There is no tenderness  There is no rebound and no guarding  Musculoskeletal: She exhibits no edema or tenderness  Neurological: She is alert and oriented to person, place, and time  No cranial nerve deficit  Clear fluent speech   Skin: Skin is warm and dry  Capillary refill takes less than 2 seconds  Psychiatric: She has a normal mood and affect  Nursing note and vitals reviewed      --------------------------------------------------------------------------------------------------------------  Historical Information   Past Medical History:   Diagnosis Date    Atrial fibrillation ([facility] )     Depression     Hypertension     Melanoma ([facility] )      Past Surgical History:   Procedure Laterality Date    AORTIC VALVE REPLACEMENT  12/2012    CARDIAC PACEMAKER PLACEMENT      dual chamber last assessed 10/14/13    CHOLECYSTECTOMY      JOINT REPLACEMENT      B/L knee replacement     Social History   Social History     Substance and Sexual Activity   Alcohol Use No     Social History     Substance and Sexual Activity   Drug Use No     Social History     Tobacco Use   Smoking Status Never Smoker   Smokeless Tobacco Never Used     Family History: {Critical Care Family History Options :72139::"I have reviewed this patient's family history and commented on sigificant items within the HPI"}    Vitals:   Vitals:    10/06/19 1546   BP: 149/95   Pulse: (!) 113   Resp: 18   Temp: 99 °F (37 2 °C)   TempSrc: Oral   SpO2: 96%     Temp  Min: 97 9 °F (36 6 °C)  Max: 99 °F (37 2 °C)        There is no height or weight on file to calculate BMI  Medications:  Current Facility-Administered Medications   Medication Dose Route Frequency    gentamicin (GARAMYCIN) 40 mg/mL 80 mg in sodium chloride 0 9 % 1,000 mL irrigation bottle    PRN    heparin (porcine) 2,000 Units, papaverine 60 mg in multi-electrolyte (PLASMALYTE-A/ISOLYTE-S PH 7 4) 500 mL irrigation   Irrigation Once     Facility-Administered Medications Ordered in Other Encounters   Medication Dose Route Frequency    albumin human (FLEXBUMIN) 5 % injection    Continuous PRN    clindamycin (CLEOCIN) IVPB (premix)    PRN    fentanyl citrate (PF) 100 MCG/2ML   Intravenous PRN    heparin (porcine) injection    PRN    lidocaine (PF) (XYLOCAINE-MPF) 1 % injection    PRN    norepinephrine (LEVOPHED) bolus from bag    PRN    phenylephrine (KIMO-SYNEPHRINE) 50 mg (STANDARD CONCENTRATION) in sodium chloride 0 9% 250 mL    Continuous PRN    phenylephrine bolus from bag    PRN    propofol (DIPRIVAN) 200 MG/20ML bolus injection   Intravenous PRN    ROCuronium (ZEMURON) injection    PRN    sodium chloride 0 9 % infusion    Continuous PRN    Succinylcholine Chloride 100 mg/5 mL syringe   Intravenous PRN     Home medications:  Prior to Admission Medications   Prescriptions Last Dose Informant Patient Reported? Taking?    DULoxetine (CYMBALTA) 20 mg capsule   Yes No   Sig: Take 20 mg by mouth daily   DULoxetine (CYMBALTA) 60 mg delayed release capsule   No No   Sig: Take 1 capsule (60 mg total) by mouth daily   FLUoxetine (PROzac) 40 MG capsule   No No   Sig: Take 1 capsule (40 mg total) by mouth daily   Incontinence Supply Disposable (ADHERES INCONTINENCE PAD) MISC  Self No No   Sig: by Does not apply route 2 (two) times a day   Patient not taking: Reported on 3/18/2019   Incontinence Supply Disposable (INCONTINENCE BRIEF MEDIUM) MISC  Self No No   Sig: by Does not apply route 2 (two) times a day   Patient not taking: Reported on 3/18/2019   acetaminophen (TYLENOL) 500 mg tablet   No No   Sig: Take 1 tablet (500 mg total) by mouth every 6 (six) hours as needed for mild pain   Patient not taking: Reported on 3/18/2019   amLODIPine (NORVASC) 5 mg tablet   No No   Sig: TAKE ONE TABLET BY MOUTH TWICE DAILY   metoprolol succinate (TOPROL-XL) 25 mg 24 hr tablet   No No   Sig: Take 1 tablet (25 mg total) by mouth 2 (two) times a day   omeprazole (PriLOSEC) 20 mg delayed release capsule   No No   Sig: TAKE ONE CAPSULE BY MOUTH EVERY DAY   warfarin (COUMADIN) 4 mg tablet   No No   Sig: Take 1 tablet (4 mg total) by mouth daily   Patient not taking: Reported on 3/18/2019      Facility-Administered Medications: None     Allergies:   Allergies   Allergen Reactions    Penicillins Hives    Sulfa Antibiotics Hives    Medical Tape Rash         Laboratory and Diagnostics:  Results from last 7 days   Lab Units 10/06/19  1327   WBC Thousand/uL 11 18*   HEMOGLOBIN g/dL 16 0*   HEMATOCRIT % 48 0*   PLATELETS Thousands/uL 391*   NEUTROS PCT % 53   MONOS PCT % 7     Results from last 7 days   Lab Units 10/06/19  1327   SODIUM mmol/L 136   POTASSIUM mmol/L 4 0   CHLORIDE mmol/L 98*   CO2 mmol/L 20*   ANION GAP mmol/L 18*   BUN mg/dL 25   CREATININE mg/dL 1 48*   CALCIUM mg/dL 10 1   GLUCOSE RANDOM mg/dL 275*   ALT U/L 20   AST U/L 31   ALK PHOS U/L 150*   ALBUMIN g/dL 3 9   TOTAL BILIRUBIN mg/dL 0 50          Results from last 7 days   Lab Units 10/06/19  1327   INR  1 04   PTT seconds 27      Results from last 7 days   Lab Units 10/06/19  1327   TROPONIN I ng/mL 0 13*         ABG:    VBG:          Micro:          EKG: ***  Imaging: {Results Review Statement:82403}    ------------------------------------------------------------------------------------------------------------  Advance Directive and Living Will: Yes    Power of :    POLST:    ------------------------------------------------------------------------------------------------------------  Anticipated Length of Stay is > 2 midnights    Counseling / Coordination of Care  {SL IP Counseling/Coordination of Care Statement:79629}      Allyn Robles MD        Portions of the record may have been created with voice recognition software  Occasional wrong word or "sound a like" substitutions may have occurred due to the inherent limitations of voice recognition software    Read the chart carefully and recognize, using context, where substitutions have occurred

## 2019-10-06 NOTE — ANESTHESIA PREPROCEDURE EVALUATION
Review of Systems/Medical History  Patient summary reviewed  Chart reviewed  No history of anesthetic complications     Cardiovascular  Hyperlipidemia, Hypertension , CAD ,    Pulmonary       GI/Hepatic            Endo/Other     GYN       Hematology   Musculoskeletal    Arthritis     Neurology   Psychology   Anxiety, Depression ,              Physical Exam    Airway    Mallampati score: II  TM Distance: >3 FB  Neck ROM: full     Dental   Comment: Upper dentures removed, upper dentures,     Cardiovascular  Rhythm: irregular, Rate: abnormal,     Pulmonary      Other Findings        Anesthesia Plan  ASA Score- 4     Anesthesia Type- general with ASA Monitors  Additional Monitors: arterial line  Airway Plan: ETT  Plan Factors-    Induction- intravenous  Postoperative Plan- Plan for postoperative opioid use  Informed Consent- Anesthetic plan and risks discussed with patient

## 2019-10-06 NOTE — EMTALA/ACUTE CARE TRANSFER
[address]  [facility]  [address]  Dept: [address] TRANSFER CONSENT    NAME Delano Sandoval                                         1940                              MRN [id number]    I have been informed of my rights regarding examination, treatment, and transfer   by Dr Elma Colbert DO    Benefits: Specialized equipment and/or services available at the receiving facility (Include comment)________________________(vascular surgeon, IR)    Risks: Potential for delay in receiving treatment, Potential deterioration of medical condition, Loss of IV, Increased discomfort during transfer, Possible worsening of condition or death during transfer      Consent for Transfer:  I acknowledge that my medical condition has been evaluated and explained to me by the emergency department physician or other qualified medical person and/or my attending physician, who has recommended that I be transferred to the service of  Accepting Physician: Juan Diego Graff at [facility] Name, [address] : BRAIN [facility]IN  The above potential benefits of such transfer, the potential risks associated with such transfer, and the probable risks of not being transferred have been explained to me, and I fully understand them  The doctor has explained that, in my case, the benefits of transfer outweigh the risks  I agree to be transferred  I authorize the performance of emergency medical procedures and treatments upon me in both transit and upon arrival at the receiving facility  Additionally, I authorize the release of any and all medical records to the receiving facility and request they be transported with me, if possible  I understand that the safest mode of transportation during a medical emergency is an ambulance and that the Hospital advocates the use of this mode of transport   Risks of traveling to the receiving facility by car, including absence of medical control, life sustaining equipment, such as oxygen, and medical personnel has been explained to me and I fully understand them  (BASSAM CORRECT BOX BELOW)  [  ]  I consent to the stated transfer and to be transported by ambulance/helicopter  [  ]  I consent to the stated transfer, but refuse transportation by ambulance and accept full responsibility for my transportation by car  I understand the risks of non-ambulance transfers and I exonerate the Hospital and its staff from any deterioration in my condition that results from this refusal     X___________________________________________    DATE  10/06/19  TIME________  Signature of patient or legally responsible individual signing on patient behalf           RELATIONSHIP TO PATIENT_________________________          Provider Certification    NAME Elizabeth Sandoval                                        [facility] 1940                              MRN [id number]    A medical screening exam was performed on the above named patient  Based on the examination:    Condition Necessitating Transfer The primary encounter diagnosis was Arterial embolism of left leg ([address] )  A diagnosis of Atrial fibrillation with RVR (HCC) was also pertinent to this visit      Patient Condition: The patient has been stabilized such that within reasonable medical probability, no material deterioration of the patient condition or the condition of the unborn child(aydee) is likely to result from the transfer    Reason for Transfer: Level of Care needed not available at this facility    Transfer Requirements: [address]   · Space available and qualified personnel available for treatment as acknowledged by [id number]  · Agreed to accept transfer and to provide appropriate medical treatment as acknowledged by       [place]  · Appropriate medical records of the examination and treatment of the patient are provided at the time of transfer   [address] _______  · Transfer will be performed by qualified personnel from    and appropriate transfer equipment as required, including the use of necessary and appropriate life support measures  Provider Certification: I have examined the patient and explained the following risks and benefits of being transferred/refusing transfer to the patient/family:  General risk, such as traffic hazards, adverse weather conditions, rough terrain or turbulence, possible failure of equipment (including vehicle or aircraft), or consequences of actions of persons outside the control of the transport personnel, Unanticipated needs of medical equipment and personnel during transport, Risk of worsening condition, The possibility of a transport vehicle being unavailable      Based on these reasonable risks and benefits to the patient and/or the unborn child(aydee), and based upon the information available at the time of the patients examination, I certify that the medical benefits reasonably to be expected from the provision of appropriate medical treatments at another medical facility outweigh the increasing risks, if any, to the individuals medical condition, and in the case of labor to the unborn child, from effecting the transfer      X____________________________________________ DATE 10/06/19        TIME_______      ORIGINAL - SEND TO MEDICAL RECORDS   COPY - SEND WITH PATIENT DURING TRANSFER

## 2019-10-06 NOTE — OP NOTE
OPERATIVE REPORT  PATIENT NAME: Romaine Brand    :  1940  MRN: [id number]  Pt Location: [facility] OR ROOM 02    SURGERY DATE: 10/6/2019    Surgeon(s) and Role:     Shahab Lyon MD - Primary     * Abel Odonnell MD - Assisting    Preop Diagnosis:  LLE ischemia    Postop Diagnosis:  LLE ischemia 2/2 cardiac embolus    Procedure(s) (LRB):  L femoral exposure and primary closure  Femoral embolectomy with #3 and #4 Dieudonne catheter  Right lower extremity runoff    Specimen(s):  none    Estimated Blood Loss:   100 mL    Drains:  Urethral Catheter Latex 16 Fr  (Active)   Number of days: 0       Anesthesia Type:   GETA    Operative Indications:  Pt is an [de-identified] yo F w/ hx of CAD, s/p bioprosthetic AoV, s/p PPM, afib, not on anticoagulation (patient refused), HTN, presents with acute LLE ischemia most likely 2/2 cardiac embolus  Operative Findings:  Fresh appearing thrombus at the left femoral bifurcation  Smaller thrombus at the proximal popliteal artery  What appears to be chronic disease at the tibial-peroneal trunk with high grade stenosis vs short segment occlusion  Dominant PT runoff; peroneal and AT are patent but smaller    Complications:   None    Procedure and Technique:  After informed consent was obtained, the patient was brought to the operating room and placed in the supine position  Perioperative IV antibiotics were given  She was given anesthesia and endotracheally intubated  She was then prepped and draped in the usual sterile fashion exposing the left groin  A timeout was performed  A marking pen was used to rani longitudinal incision over the femoral artery, below the level of the inguinal ligament  A 15-blade was used to make the incision  Cautery was used to dissect through the soft tissue  The femoral sheath was incised and the common femoral artery was identified and freed proximally  A vessel loop was placed    The dissection was continued distally along the superficial femoral artery which was freed circumferentially and a vessel loop was placed  Lastly, the profunda femoral artery was identified and encircled with a vessel loop  A palpable pulse was noted in the proximal common femoral artery  There was thrombus noted at the femoral bifurcation particularly in the origin of the profunda femoral artery  Despite continued heparin gtt, an ACT was found to be 160   5000 units of additional IV heparin were given  An angled debakey clamp was placed on the common femoral and a transverse arteriotomy was made in the distal common femoral artery  Fresh thrombus was noted within  A #4 Dieudonne catheter was inserted proximally and pulled back with return of a small amount of fresh thrombus  Brisk pulsatile bleeding was now noted  It was passed one more time with a small amount of debris  The artery was flushed and reclamped  We then passed a #3 Dieudonne into the profunda femoral artery with return of a chunk of thrombus with endcap  Brisk backbleeding was noted  The artery was flushed and clamped with a profunda clamp  Lastly, the catheter was inserted into the superficial femoral artery with return of small amount of thrombus and difficulty passing the catheter past the 30 rani  It was passed again and we were able to pass the obstruction with a small amount of subacute to chronic debris returned  There was some backbleeding and the artery was flushed and clamped  The arteriotomy was repaired with running 6-0 prolene suture  A micropuncture needle was directed in a retrograde fashion into the common femoral artery and a microcatheter was placed  Right lower extremity runoff was performed  This showed patent common femoral, profunda femoral   There was a 2-3cm piece of residual thrombus in the proximal popliteal artery  There was high grade stenosis vs occlusion of the TP trunk with numerous surrounding collaterals    We then reclamped the arteries and reopened our arteriotomy  Again the #3 Dieudonne was passed down the superficial femoral artery to the level of the residual thrombus and a small piece was returned  We again flushed the artery and closed the arteriotomy with 6-0 prolene running suture  Right lower extremity runoff was again performed  The area of residual thrombus still has a small defect but flow was much improved  There was still a more chronic appearing lesion at the TP trunk  PT runoff was dominent with smaller AT and peroneal vessels  The wound was then copiously irrigated with antibiotic saline  It was then checked for hemostasis  The wound was closed with two layers of running 2-0 vicryl suture for the femoral sheath and shannon's fascia, and two layers of running 3-0 vicryl for the soft tissue and deep dermal layers  Staples were used for the skin  The incision was dressed with a silver mepilex  Strong multiphasic L PT signal at completion    The patient was allowed to awaken and was extubated  She was then transferred to the PACU for postoperative care       I was present for the entire procedure    Patient Disposition:  PACU     SIGNATURE: Rohan Lyon MD  DATE: October 6, 2019  TIME: 7:54 PM

## 2019-10-06 NOTE — ED NOTES
Dr Franco Rankin at [facility] for evaluation       Jessica Teixeira, SOFÍA  10/06/19 8319

## 2019-10-06 NOTE — ED PROVIDER NOTES
History  Chief Complaint   Patient presents with    Back Pain     pt presents to ER stating she fell 2 days ago, was seen by EMS and refused to be seen  today she called due to left sided back pain radiating down her left leg with numbness      Patient brought in by ambulance from home for severe left buttock pain radiating to entire left leg with associated numbness  Symptoms started at rest couple hours ago  Patient did fall 2 days ago but did not hit her head or pass out  She did not notice any injuries from the fall  Patient does have hx of a fibb but is not taking anticoagulants  She recently moved to area from [place] as states that her memory is getting bad  She admits to SOB but she thought that was anxiety  Denies Chest pain  Prior to Admission Medications   Prescriptions Last Dose Informant Patient Reported? Taking?    DULoxetine (CYMBALTA) 20 mg capsule   Yes No   Sig: Take 20 mg by mouth daily   DULoxetine (CYMBALTA) 60 mg delayed release capsule   No No   Sig: Take 1 capsule (60 mg total) by mouth daily   FLUoxetine (PROzac) 40 MG capsule   No No   Sig: Take 1 capsule (40 mg total) by mouth daily   Incontinence Supply Disposable (ADHERES INCONTINENCE PAD) MISC  Self No No   Sig: by Does not apply route 2 (two) times a day   Patient not taking: Reported on 3/18/2019   Incontinence Supply Disposable (INCONTINENCE BRIEF MEDIUM) MISC  Self No No   Sig: by Does not apply route 2 (two) times a day   Patient not taking: Reported on 3/18/2019   acetaminophen (TYLENOL) 500 mg tablet   No No   Sig: Take 1 tablet (500 mg total) by mouth every 6 (six) hours as needed for mild pain   Patient not taking: Reported on 3/18/2019   amLODIPine (NORVASC) 5 mg tablet   No No   Sig: TAKE ONE TABLET BY MOUTH TWICE DAILY   metoprolol succinate (TOPROL-XL) 25 mg 24 hr tablet   No No   Sig: Take 1 tablet (25 mg total) by mouth 2 (two) times a day   omeprazole (PriLOSEC) 20 mg delayed release capsule   No No Sig: TAKE ONE CAPSULE BY MOUTH EVERY DAY   warfarin (COUMADIN) 4 mg tablet   No No   Sig: Take 1 tablet (4 mg total) by mouth daily   Patient not taking: Reported on 3/18/2019      Facility-Administered Medications: None       Past Medical History:   Diagnosis Date    [facility]rial fibrillation ([facility] )     Depression     Hypertension     Melanoma ([facility] )        Past Surgical History:   Procedure Laterality Date    AORTIC VALVE REPLACEMENT  12/2012    CARDIAC PACEMAKER PLACEMENT      dual chamber last assessed 10/14/13    CHOLECYSTECTOMY      JOINT REPLACEMENT      B/L knee replacement       Family History   Problem Relation Age of Onset    Hypertension Mother      I have reviewed and agree with the history as documented  Social History     Tobacco Use    Smoking status: Never Smoker    Smokeless tobacco: Never Used   Substance Use Topics    Alcohol use: No    Drug use: No        Review of Systems   All other systems reviewed and are negative  Physical Exam  Physical Exam   Constitutional: She is oriented to person, place, and time  She appears well-developed and well-nourished  HENT:   Mouth/Throat: Oropharynx is clear and moist    Eyes: Pupils are equal, round, and reactive to light  Conjunctivae and EOM are normal    Neck: Normal range of motion  Neck supple  No spinous process tenderness present  Cardiovascular: Normal heart sounds and intact distal pulses  An irregularly irregular rhythm present  Tachycardia present  Pulmonary/Chest: Effort normal and breath sounds normal  No respiratory distress  She has no wheezes  Abdominal: Soft  Bowel sounds are normal  She exhibits no distension  There is no tenderness  Musculoskeletal: Normal range of motion  She exhibits tenderness  Legs:  Neurological: She is alert and oriented to person, place, and time  She has normal strength  GCS eye subscore is 4  GCS verbal subscore is 5  GCS motor subscore is 6  Skin: Skin is warm  No rash noted  She is diaphoretic  Psychiatric: Her mood appears anxious  Nursing note and vitals reviewed        Vital Signs  ED Triage Vitals [10/06/19 1303]   Temperature Pulse Respirations Blood Pressure SpO2   97 9 °F (36 6 °C) (!) 120 (!) 24 (!) 179/93 97 %      Temp Source Heart Rate Source Patient Position - Orthostatic VS BP Location FiO2 (%)   Temporal Monitor Lying Right arm --      Pain Score       8           Vitals:    10/06/19 1345 10/06/19 1400 10/06/19 1416 10/06/19 1445   BP: 151/67 151/67 145/65 165/88   Pulse: 104 (!) 132 (!) 137 (!) 125   Patient Position - Orthostatic VS: Lying Lying Lying Lying         Visual Acuity      ED Medications  Medications   fentanyl citrate (PF) (FOR EMS ONLY) 100 mcg/2 mL injection 100 mcg (0 mcg Does not apply Given to EMS 10/6/19 1309)   ondansetron (FOR EMS ONLY) (ZOFRAN) 4 mg/2 mL injection 4 mg (0 mg Does not apply Given to EMS 10/6/19 1310)   ketorolac (FOR EMS ONLY) (TORADOL) injection 30 mg (0 mg Does not apply Given to EMS 10/6/19 1310)   diltiazem (CARDIZEM) injection 20 mg (20 mg Intravenous Given 10/6/19 1331)   diltiazem (CARDIZEM) 125 mg in sodium chloride 0 9 % 125 mL infusion (7 5 mg/hr Intravenous Rate/Dose Change 10/6/19 1416)   morphine (PF) 4 mg/mL injection 4 mg (4 mg Intravenous Given 10/6/19 1345)   ondansetron (ZOFRAN) injection 4 mg (4 mg Intravenous Given 10/6/19 1349)   morphine (PF) 4 mg/mL injection 4 mg (4 mg Intravenous Given 10/6/19 1418)   metoprolol (LOPRESSOR) injection 10 mg (10 mg Intravenous Given 10/6/19 1434)   heparin (porcine) injection 6,400 Units (6,400 Units Intravenous Given 10/6/19 1458)       Diagnostic Studies  Results Reviewed     Procedure Component Value Units Date/Time    Troponin I [[id number]]  (Abnormal) Collected:  10/06/19 1327    Lab Status:  Final result Specimen:  Blood from Arm, Left Updated:  10/06/19 1359     Troponin I 0 13 ng/mL     Comprehensive metabolic panel [[id number]]  (Abnormal) Collected:  10/06/19 1327 Lab Status:  Final result Specimen:  Blood from Arm, Left Updated:  10/06/19 1358     Sodium 136 mmol/L      Potassium 4 0 mmol/L      Chloride 98 mmol/L      CO2 20 mmol/L      ANION GAP 18 mmol/L      BUN 25 mg/dL      Creatinine 1 48 mg/dL      Glucose 275 mg/dL      Calcium 10 1 mg/dL      AST 31 U/L      ALT 20 U/L      Alkaline Phosphatase 150 U/L      Total Protein 7 8 g/dL      Albumin 3 9 g/dL      Total Bilirubin 0 50 mg/dL      eGFR 33 ml/min/1 73sq m     Narrative:       National Kidney Disease Foundation guidelines for Chronic Kidney Disease (CKD):     Stage 1 with normal or high GFR (GFR > 90 mL/min/1 73 square meters)    Stage 2 Mild CKD (GFR = 60-89 mL/min/1 73 square meters)    Stage 3A Moderate CKD (GFR = 45-59 mL/min/1 73 square meters)    Stage 3B Moderate CKD (GFR = 30-44 mL/min/1 73 square meters)    Stage 4 Severe CKD (GFR = 15-29 mL/min/1 73 square meters)    Stage 5 End Stage CKD (GFR <15 mL/min/1 73 square meters)  Note: GFR calculation is accurate only with a steady state creatinine    Protime-INR [[id number]]  (Normal) Collected:  10/06/19 1327    Lab Status:  Final result Specimen:  Blood from Arm, Left Updated:  10/06/19 1349     Protime 13 3 seconds      INR 1 04    APTT [[id number]]  (Normal) Collected:  10/06/19 1327    Lab Status:  Final result Specimen:  Blood from Arm, Left Updated:  10/06/19 1349     PTT 27 seconds     CBC and differential [[id number]]  (Abnormal) Collected:  10/06/19 1327    Lab Status:  Final result Specimen:  Blood from Arm, Left Updated:  10/06/19 1337     WBC 11 18 Thousand/uL      RBC 5 22 Million/uL      Hemoglobin 16 0 g/dL      Hematocrit 48 0 %      MCV 92 fL      MCH 30 7 pg      MCHC 33 3 g/dL      RDW 13 3 %      MPV 10 6 fL      Platelets 369 Thousands/uL      nRBC 0 /100 WBCs      Neutrophils Relative 53 %      Immat GRANS % 1 %      Lymphocytes Relative 36 %      Monocytes Relative 7 %      Eosinophils Relative 2 %      Basophils Relative 1 % Neutrophils Absolute 5 97 Thousands/µL      Immature Grans Absolute 0 07 Thousand/uL      Lymphocytes Absolute 4 04 Thousands/µL      Monocytes Absolute 0 81 Thousand/µL      Eosinophils Absolute 0 21 Thousand/µL      Basophils Absolute 0 08 Thousands/µL                  XR chest 1 view portable   ED Interpretation by Maddison Lancaster DO (10/06 1416)   Increased vascular markings                 Procedures  ECG 12 Lead Documentation Only  Date/Time: 10/6/2019 1:44 PM  Performed by: Maddison Lancaster DO  Authorized by: Maddison Lancaster DO     Indications / Diagnosis:  Tachycardia  ECG reviewed by me, the ED Provider: yes    Patient location:  ED  Previous ECG:     Previous ECG:  Compared to current    Comparison ECG info:  2009    Similarity:  Changes noted  Interpretation:     Interpretation: abnormal    Rate:     ECG rate:  149    ECG rate assessment: tachycardic    Rhythm:     Rhythm: atrial fibrillation    Ectopy:     Ectopy: none    QRS:     QRS axis:  Normal    QRS intervals:  Normal  Conduction:     Conduction: normal    ST segments:     ST segments:  Normal  T waves:     T waves: normal             ED Course  ED Course as of Oct 06 2053   Sun Oct 06, 2019   1416 Patient admits to SOB but thinks it is anxiety  Initial Sepsis Screening     Row Name 10/06/19 1349                Is the patient's history suggestive of a new or worsening infection? (!) Yes (Proceed)  -PRINCE        Suspected source of infection  pneumonia  -PRINCE        Are two or more of the following signs & symptoms of infection both present and new to the patient? (!) Yes (Proceed)  -PRINCE        Indicate SIRS criteria  Altered mental status; Tachycardia > 90 bpm  -PRINCE        If the answer is yes to both questions, suspicion of sepsis is present          If severe sepsis is present AND tissue hypoperfusion perists in the hour after fluid resuscitation or lactate > 4, the patient meets criteria for SEPTIC SHOCK          Are any of the following organ dysfunction criteria present within 6 hours of suspected infection and SIRS criteria that are NOT considered to be chronic conditions? No  -PRINCE        Organ dysfunction          Date of presentation of severe sepsis          Time of presentation of severe sepsis          Tissue hypoperfusion persists in the hour after crystalloid fluid administration, evidenced, by either:          Was hypotension present within one hour of the conclusion of crystalloid fluid administration?         Date of presentation of septic shock          Time of presentation of septic shock            User Key  (r) = Recorded By, (t) = Taken By, (c) = Cosigned By    [address] Name Provider Type    [address], DO Physician                  MDM  Number of Diagnoses or Management Options  Arterial embolism of left leg [facility]): new and requires workup  Atrial fibrillation with R [facility]): new and requires workup     Amount and/or Complexity of Data Reviewed  Clinical lab tests: ordered and reviewed  Tests in the radiology section of CPT®: ordered and reviewed  Obtain history from someone other than the patient: yes  Discuss the patient with other providers: yes    Patient Progress  Patient progress: improved      Disposition  Final diagnoses:   Arterial embolism of left leg ([address] )   Atrial fibrillation with RVR ([facility] )     Time reflects when diagnosis was documented in both MDM as applicable and the Disposition within this note     Time User Action Codes Description Comment    10/6/2019  2:29 PM Caryl Mariner Add [I74 3] Arterial embolism of left leg ([facility] )     10/6/2019  2:30 PM Caryl Mariner Add [I48 91] Atrial fibrillation with RVR [facility])       ED Disposition     ED Disposition Condition Date/Time Comment    Transfer to Another Facility-In Network  Carmel Oct 6, 2019  2:28 PM Jazzy Marks should be transferred out to **SLB*          MD Documentation      Most Recent Value   Patient Condition  The patient has been stabilized such that within reasonable medical probability, no material deterioration of the patient condition or the condition of the unborn child(aydee) is likely to result from the transfer   Reason for Transfer  Level of Care needed not available at this facility   Benefits of Transfer  Specialized equipment and/or services available at the receiving facility (Include comment)________________________ Jerson Greene surgeon, IR]   Risks of Transfer  Potential for delay in receiving treatment, Potential deterioration of medical condition, Loss of IV, Increased discomfort during transfer, Possible worsening of condition or death during transfer   Accepting Physician  Leandra Name, [address] [place]    (Name & Tel [phone]   Sending MD Jolene Bunn Do   Provider Certification  General risk, such as traffic hazards, adverse weather conditions, rough terrain or turbulence, possible failure of equipment (including vehicle or aircraft), or consequences of actions of persons outside the control of the transport personnel, Unanticipated needs of medical equipment and personnel during transport, Risk of worsening condition, The possibility of a transport vehicle being unavailable      RN Documentation      [address] Name, [address] [facility]    (Name & Tel number)  [phone]   Level of Care  Advanced life support      Follow-up Information    None         Discharge Medication List as of 10/6/2019  3:15 PM      CONTINUE these medications which have NOT CHANGED    Details   acetaminophen (TYLENOL) 500 mg tablet Take 1 tablet (500 mg total) by mouth every 6 (six) hours as needed for mild pain, Starting Wed 11/7/2018, Normal      amLODIPine (NORVASC) 5 mg tablet TAKE ONE TABLET BY MOUTH TWICE DAILY, Normal      !!  DULoxetine (CYMBALTA) 20 mg capsule Take 20 mg by mouth daily, Starting Tue 4/30/2019, Historical Med      !! DULoxetine (CYMBALTA) 60 mg delayed release capsule Take 1 capsule (60 mg total) by mouth daily, Starting Mon 3/18/2019, Normal      FLUoxetine (PROzac) 40 MG capsule Take 1 capsule (40 mg total) by mouth daily, Starting Tue 9/24/2019, Normal      !! Incontinence Supply Disposable (ADHERES INCONTINENCE PAD) MISC by Does not apply route 2 (two) times a day, Starting Sat 9/22/2018, Print      !! Incontinence Supply Disposable (INCONTINENCE BRIEF MEDIUM) MISC by Does not apply route 2 (two) times a day, Starting Sat 9/22/2018, Print      metoprolol succinate (TOPROL-XL) 25 mg 24 hr tablet Take 1 tablet (25 mg total) by mouth 2 (two) times a day, Starting Mon 3/18/2019, Normal      omeprazole (PriLOSEC) 20 mg delayed release capsule TAKE ONE CAPSULE BY MOUTH EVERY DAY, Normal      warfarin (COUMADIN) 4 mg tablet Take 1 tablet (4 mg total) by mouth daily, Starting Tue 10/30/2018, Normal       !! - Potential duplicate medications found  Please discuss with provider  No discharge procedures on file      ED Provider  Electronically Signed by           Millie Crawley DO  10/06/19 7805

## 2019-10-07 ENCOUNTER — APPOINTMENT (INPATIENT)
Dept: NON INVASIVE DIAGNOSTICS | Facility: HOSPITAL | Age: 79
DRG: 253 | End: 2019-10-07
Payer: MEDICARE

## 2019-10-07 PROBLEM — R32 URINARY INCONTINENCE: Chronic | Status: ACTIVE | Noted: 2019-10-07

## 2019-10-07 PROBLEM — B35.1 ONYCHOMYCOSIS: Chronic | Status: ACTIVE | Noted: 2019-10-07

## 2019-10-07 PROBLEM — Z98.890 HISTORY OF EMBOLECTOMY: Status: ACTIVE | Noted: 2019-10-07

## 2019-10-07 PROBLEM — Z00.00 MEDICARE ANNUAL WELLNESS VISIT, SUBSEQUENT: Status: RESOLVED | Noted: 2019-03-18 | Resolved: 2019-10-07

## 2019-10-07 PROBLEM — F32.1 CURRENT MODERATE EPISODE OF MAJOR DEPRESSIVE DISORDER (HCC): Status: RESOLVED | Noted: 2019-03-18 | Resolved: 2019-10-07

## 2019-10-07 PROBLEM — I48.91 ATRIAL FIBRILLATION WITH RAPID VENTRICULAR RESPONSE (HCC): Status: RESOLVED | Noted: 2018-09-21 | Resolved: 2019-10-07

## 2019-10-07 PROBLEM — I48.91 ATRIAL FIBRILLATION (HCC): Chronic | Status: ACTIVE | Noted: 2019-10-06

## 2019-10-07 LAB
ANION GAP SERPL CALCULATED.3IONS-SCNC: 6 MMOL/L (ref 4–13)
APTT PPP: 121 SECONDS (ref 23–37)
APTT PPP: 134 SECONDS (ref 23–37)
APTT PPP: >210 SECONDS (ref 23–37)
ATRIAL RATE: 147 BPM
BASE EXCESS BLDA CALC-SCNC: -5 MMOL/L (ref -2–3)
BUN SERPL-MCNC: 21 MG/DL (ref 5–25)
CA-I BLD-SCNC: 1.09 MMOL/L (ref 1.12–1.32)
CALCIUM SERPL-MCNC: 8 MG/DL (ref 8.3–10.1)
CHLORIDE SERPL-SCNC: 110 MMOL/L (ref 100–108)
CO2 SERPL-SCNC: 25 MMOL/L (ref 21–32)
CREAT SERPL-MCNC: 0.87 MG/DL (ref 0.6–1.3)
ERYTHROCYTE [DISTWIDTH] IN BLOOD BY AUTOMATED COUNT: 13.7 % (ref 11.6–15.1)
EST. AVERAGE GLUCOSE BLD GHB EST-MCNC: 177 MG/DL
GFR SERPL CREATININE-BSD FRML MDRD: 63 ML/MIN/1.73SQ M
GLUCOSE SERPL-MCNC: 162 MG/DL (ref 65–140)
GLUCOSE SERPL-MCNC: 203 MG/DL (ref 65–140)
HBA1C MFR BLD: 7.8 % (ref 4.2–6.3)
HCO3 BLDA-SCNC: 21.4 MMOL/L (ref 22–28)
HCT VFR BLD AUTO: 35 % (ref 34.8–46.1)
HCT VFR BLD CALC: 34 % (ref 34.8–46.1)
HGB BLD-MCNC: 11.1 G/DL (ref 11.5–15.4)
HGB BLDA-MCNC: 11.6 G/DL (ref 11.5–15.4)
MCH RBC QN AUTO: 30.5 PG (ref 26.8–34.3)
MCHC RBC AUTO-ENTMCNC: 31.7 G/DL (ref 31.4–37.4)
MCV RBC AUTO: 96 FL (ref 82–98)
PCO2 BLD: 23 MMOL/L (ref 21–32)
PCO2 BLD: 42.8 MM HG (ref 36–44)
PH BLD: 7.31 [PH] (ref 7.35–7.45)
PLATELET # BLD AUTO: 267 THOUSANDS/UL (ref 149–390)
PMV BLD AUTO: 10.4 FL (ref 8.9–12.7)
PO2 BLD: 348 MM HG (ref 75–129)
POTASSIUM BLD-SCNC: 4 MMOL/L (ref 3.5–5.3)
POTASSIUM SERPL-SCNC: 4 MMOL/L (ref 3.5–5.3)
QRS AXIS: -4 DEGREES
QRSD INTERVAL: 76 MS
QT INTERVAL: 314 MS
QTC INTERVAL: 494 MS
RBC # BLD AUTO: 3.64 MILLION/UL (ref 3.81–5.12)
SAO2 % BLD FROM PO2: 100 % (ref 95–98)
SODIUM BLD-SCNC: 139 MMOL/L (ref 136–145)
SODIUM SERPL-SCNC: 141 MMOL/L (ref 136–145)
SPECIMEN SOURCE: ABNORMAL
T WAVE AXIS: 121 DEGREES
VENTRICULAR RATE: 149 BPM
WBC # BLD AUTO: 13.93 THOUSAND/UL (ref 4.31–10.16)

## 2019-10-07 PROCEDURE — 85730 THROMBOPLASTIN TIME PARTIAL: CPT | Performed by: INTERNAL MEDICINE

## 2019-10-07 PROCEDURE — 83036 HEMOGLOBIN GLYCOSYLATED A1C: CPT | Performed by: INTERNAL MEDICINE

## 2019-10-07 PROCEDURE — NC001 PR NO CHARGE: Performed by: NURSE PRACTITIONER

## 2019-10-07 PROCEDURE — 93306 TTE W/DOPPLER COMPLETE: CPT

## 2019-10-07 PROCEDURE — 99232 SBSQ HOSP IP/OBS MODERATE 35: CPT | Performed by: PHYSICIAN ASSISTANT

## 2019-10-07 PROCEDURE — 80048 BASIC METABOLIC PNL TOTAL CA: CPT | Performed by: INTERNAL MEDICINE

## 2019-10-07 PROCEDURE — 85027 COMPLETE CBC AUTOMATED: CPT | Performed by: INTERNAL MEDICINE

## 2019-10-07 PROCEDURE — 0HBRXZZ EXCISION OF TOE NAIL, EXTERNAL APPROACH: ICD-10-PCS | Performed by: INTERNAL MEDICINE

## 2019-10-07 PROCEDURE — 93306 TTE W/DOPPLER COMPLETE: CPT | Performed by: INTERNAL MEDICINE

## 2019-10-07 PROCEDURE — 99024 POSTOP FOLLOW-UP VISIT: CPT | Performed by: SURGERY

## 2019-10-07 PROCEDURE — 93010 ELECTROCARDIOGRAM REPORT: CPT | Performed by: INTERNAL MEDICINE

## 2019-10-07 RX ORDER — TRIAMCINOLONE ACETONIDE 1 MG/G
CREAM TOPICAL 2 TIMES DAILY
Status: DISCONTINUED | OUTPATIENT
Start: 2019-10-07 | End: 2019-10-11 | Stop reason: HOSPADM

## 2019-10-07 RX ORDER — LANOLIN ALCOHOL/MO/W.PET/CERES
3 CREAM (GRAM) TOPICAL
Status: DISCONTINUED | OUTPATIENT
Start: 2019-10-07 | End: 2019-10-11 | Stop reason: HOSPADM

## 2019-10-07 RX ORDER — LANOLIN ALCOHOL/MO/W.PET/CERES
3 CREAM (GRAM) TOPICAL ONCE
Status: COMPLETED | OUTPATIENT
Start: 2019-10-07 | End: 2019-10-07

## 2019-10-07 RX ORDER — CLOTRIMAZOLE 1 %
CREAM (GRAM) TOPICAL 2 TIMES DAILY
Status: DISCONTINUED | OUTPATIENT
Start: 2019-10-07 | End: 2019-10-07

## 2019-10-07 RX ORDER — DOCUSATE SODIUM 100 MG/1
100 CAPSULE, LIQUID FILLED ORAL 2 TIMES DAILY
Status: DISCONTINUED | OUTPATIENT
Start: 2019-10-07 | End: 2019-10-11 | Stop reason: HOSPADM

## 2019-10-07 RX ORDER — NYSTATIN 100000 [USP'U]/G
POWDER TOPICAL 2 TIMES DAILY
Status: DISCONTINUED | OUTPATIENT
Start: 2019-10-07 | End: 2019-10-11 | Stop reason: HOSPADM

## 2019-10-07 RX ADMIN — HEPARIN SODIUM 16 UNITS/KG/HR: 10000 INJECTION, SOLUTION INTRAVENOUS at 08:15

## 2019-10-07 RX ADMIN — NYSTATIN: 100000 POWDER TOPICAL at 08:15

## 2019-10-07 RX ADMIN — DOCUSATE SODIUM 100 MG: 100 CAPSULE, LIQUID FILLED ORAL at 18:37

## 2019-10-07 RX ADMIN — Medication 1 SPRAY: at 01:51

## 2019-10-07 RX ADMIN — OXYCODONE HYDROCHLORIDE 5 MG: 5 TABLET ORAL at 13:34

## 2019-10-07 RX ADMIN — METOPROLOL SUCCINATE 25 MG: 25 TABLET, EXTENDED RELEASE ORAL at 08:14

## 2019-10-07 RX ADMIN — FLUOXETINE 40 MG: 20 CAPSULE ORAL at 08:14

## 2019-10-07 RX ADMIN — NYSTATIN: 100000 POWDER TOPICAL at 18:50

## 2019-10-07 RX ADMIN — Medication 1 SPRAY: at 04:32

## 2019-10-07 RX ADMIN — OXYCODONE HYDROCHLORIDE 5 MG: 5 TABLET ORAL at 18:50

## 2019-10-07 RX ADMIN — MELATONIN 3 MG: 3 TAB ORAL at 01:55

## 2019-10-07 RX ADMIN — PANTOPRAZOLE SODIUM 40 MG: 40 TABLET, DELAYED RELEASE ORAL at 06:02

## 2019-10-07 RX ADMIN — MELATONIN 3 MG: 3 TAB ORAL at 22:08

## 2019-10-07 RX ADMIN — DOCUSATE SODIUM 100 MG: 100 CAPSULE, LIQUID FILLED ORAL at 12:00

## 2019-10-07 RX ADMIN — AMLODIPINE BESYLATE 5 MG: 5 TABLET ORAL at 18:37

## 2019-10-07 RX ADMIN — AMLODIPINE BESYLATE 5 MG: 5 TABLET ORAL at 08:14

## 2019-10-07 NOTE — PROGRESS NOTES
PT refusing to get OOB after ECHO due to pain  Pain medication given, will encourage OOB for dinner

## 2019-10-07 NOTE — PROGRESS NOTES
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    Plan:  - debrided x10 toenails with large nail nipper without any incidents  - Rx Kenalog daily to be applied on both feet  - recommended patient to follow up with podiatrist outpatient to manage, tenia pedis and possible considering to Rx orthotics as she complaints of unsteady while ambulating   - Will update attending   - rest of care per primary service   - Podiatry will sign off, please call if any questions  History of Present Illness     HPI:  Butch Boast is a [de-identified] y o  female who presents with elongated, discolored and thickened toenails  Patient also presents with bilateral feet tinea pedis vs  Psoriasis  She does not have pain to the feet however complaints of neuropathy of the left lower extremity  Patient denies itching but complaints of skin flaking  She states she does not see a foot doctor  She also does not a doctor for psoriasis of the elbow  Patient has collapsed left foot that is secondary to trauma years ago  She states she is unsteady when she walks but uses walker to desirae around  Patient is accompanied her daughter today  Denies n/v/f/sob  Inpatient consult to Podiatry     Date/Time 10/7/2019 1:31 PM     Performed by  Adriano Lantigua DPM     Authorized by SHIRA Dow            Review of Systems   Constitutional: Negative  HENT: Negative  Eyes: Negative  Respiratory: Negative  Cardiovascular: Negative  Gastrointestinal: Negative  Musculoskeletal: Negative    Skin: elongated toenails, tinea pedis   Neurological: Negative  Psych: negative         Historical Information   Past Medical History: Diagnosis Date    Atrial fibrillation ([facility] [facility] )     Depression     Hypertension     Melanoma [facility])      Past Surgical History:   Procedure Laterality Date    AORTIC VALVE REPLACEMENT  12/2012    CARDIAC PACEMAKER PLACEMENT      dual chamber last assessed 10/14/13    CHOLECYSTECTOMY      JOINT REPLACEMENT      B/L knee replacement     Social History   Social History     Substance and Sexual Activity   Alcohol Use No     Social History     Substance and Sexual Activity   Drug Use No     Social History     Tobacco Use   Smoking Status Never Smoker   Smokeless Tobacco Never Used     Family History:   Family History   Problem Relation Age of Onset    Hypertension Mother        Meds/Allergies   Medications Prior to Admission   Medication    acetaminophen (TYLENOL) 500 mg tablet    amLODIPine (NORVASC) 5 mg tablet    DULoxetine (CYMBALTA) 20 mg capsule    DULoxetine (CYMBALTA) 60 mg delayed release capsule    FLUoxetine (PROzac) 40 MG capsule    Incontinence Supply Disposable (ADHERES INCONTINENCE PAD) MISC    Incontinence Supply Disposable (INCONTINENCE BRIEF MEDIUM) MISC    metoprolol succinate (TOPROL-XL) 25 mg 24 hr tablet    omeprazole (PriLOSEC) 20 mg delayed release capsule    warfarin (COUMADIN) 4 mg tablet     Allergies   Allergen Reactions    Penicillins Hives    Sulfa Antibiotics Hives    Medical Tape Rash       Objective   First Vitals:   Blood Pressure: 149/95 (10/06/19 1546)  Pulse: (!) 113 (10/06/19 1546)  Temperature: 99 °F (37 2 °C) (10/06/19 1546)  Temp Source: Oral (10/06/19 1546)  Respirations: 18 (10/06/19 1546)  SpO2: 96 % (10/06/19 1546)    Current Vitals:   Blood Pressure: 126/74 (10/07/19 0718)  Pulse: 86 (10/07/19 1100)  Temperature: 98 8 °F (37 1 °C) (10/07/19 0718)  Temp Source: Oral (10/07/19 0718)  Respirations: (!) 28 (10/07/19 1100)  SpO2: 95 % (10/07/19 1100)        /74 (BP Location: Left arm)   Pulse 86   Temp 98 8 °F (37 1 °C) (Oral)   Resp (!) 28   SpO2 95% General Appearance:    Alert, cooperative, no distress   Head:    Normocephalic, without obvious abnormality, atraumatic   Eyes:    PERRL, conjunctiva/corneas clear, EOM's intact        Nose:   Moist mucous membranes   Neck:   Supple, symmetrical, trachea midline   Back:     Symmetric   Lungs:     Respirations unlabored   Heart:    Regular rate and rhythm, S1 and S2 normal, no murmur, rub   or gallop   Abdomen:     Soft, non-tender   Extremities:   Cavus foot deformity noted on the right, flatfoot deformity noted on the left secondary to old trauma  No gross edema or tenderness noted to the lower extremities  Pulses:   Palpable right pedal pulses  Non-palpable however dopplerable left pedal pulses  Both feet warm to touch  Cap refill less then 3 seconds  Skin:  Bilateral feet with flaky skin and small circular, nonpalpable flat macules/rash noted on the medial arch  Plantar skin is falky  Thickened, elongated and discolored toenails with subungual debris noted  Neurologic:   Gross sensation is intact              Lab Results:   Admission on 10/06/2019   Component Date Value    ABO Grouping 10/06/2019 O     Rh Factor 10/06/2019 Positive     Antibody Screen 10/06/2019 Negative     Specimen Expiration Date 10/06/2019 [id number]     pH, Art i-STAT 10/06/2019 7 415     pCO2, Art i-STAT 10/06/2019 37 1     pO2, ART i-STAT 10/06/2019 125 0     BE, i-STAT 10/06/2019 0     HCO3, Art i-STAT 10/06/2019 23 8     CO2, i-STAT 10/06/2019 25     O2 Sat, i-STAT 10/06/2019 99*    SODIUM, I-STAT 10/06/2019 137     Potassium, i-STAT 10/06/2019 4 4     Calcium, Ionized i-STAT 10/06/2019 1 16     Hct, i-STAT 10/06/2019 43     Hgb, i-STAT 10/06/2019 14 6     Glucose, i-STAT 10/06/2019 253*    Specimen Type 10/06/2019 ARTERIAL     Activated Clotting Time,* 10/06/2019 166*    Specimen Type 10/06/2019 ARTERIAL     Activated Clotting Time,* 10/06/2019 206*    Specimen Type 10/06/2019 ARTERIAL     WBC 10/06/2019 13 47*    RBC 10/06/2019 4 08     Hemoglobin 10/06/2019 12 4     Hematocrit 10/06/2019 38 9     MCV 10/06/2019 95     MCH 10/06/2019 30 4     MCHC 10/06/2019 31 9     RDW 10/06/2019 13 3     Platelets 73/64/1065 300     MPV 10/06/2019 9 9     Sodium 10/06/2019 140     Potassium 10/06/2019 4 2     Chloride 10/06/2019 110*    CO2 10/06/2019 23     ANION GAP 10/06/2019 7     BUN 10/06/2019 24     Creatinine 10/06/2019 1 13     Glucose 10/06/2019 209*    Calcium 10/06/2019 7 8*    eGFR 10/06/2019 46     PTT 10/06/2019 >210*    Protime 10/06/2019 16 9*    INR 10/06/2019 1 42*    PTT 10/07/2019 >210*    Sodium 10/07/2019 141     Potassium 10/07/2019 4 0     Chloride 10/07/2019 110*    CO2 10/07/2019 25     ANION GAP 10/07/2019 6     BUN 10/07/2019 21     Creatinine 10/07/2019 0 87     Glucose 10/07/2019 162*    Calcium 10/07/2019 8 0*    eGFR 10/07/2019 63     WBC 10/07/2019 13 93*    RBC 10/07/2019 3 64*    Hemoglobin 10/07/2019 11 1*    Hematocrit 10/07/2019 35 0     MCV 10/07/2019 96     MCH 10/07/2019 30 5     MCHC 10/07/2019 31 7     RDW 10/07/2019 13 7     Platelets 77/04/2401 267     MPV 10/07/2019 10 4     Hemoglobin A1C 10/07/2019 7 8*    EAG 10/07/2019 177     pH, Art i-STAT 10/06/2019 7 307*    pCO2, Art i-STAT 10/06/2019 42 8     pO2, ART i-STAT 10/06/2019 348 0*    BE, i-STAT 10/06/2019 -5*    HCO3, Art i-STAT 10/06/2019 21 4*    CO2, i-STAT 10/06/2019 23     O2 Sat, i-STAT 10/06/2019 100*    SODIUM, I-STAT 10/06/2019 139     Potassium, i-STAT 10/06/2019 4 0     Calcium, Ionized i-STAT 10/06/2019 1 09*    Hct, i-STAT 10/06/2019 34*    Hgb, i-STAT 10/06/2019 11 6     Glucose, i-STAT 10/06/2019 203*    Specimen Type 10/06/2019 ARTERIAL                    Invalid input(s): LABAEARO            Imaging: I have personally reviewed pertinent films in PACS  EKG, Pathology, and Other Studies: I have personally reviewed pertinent reports        Code Status: Level 1 - Full Code  Advance Directive and Living Will: Yes    Power of :    POLST:

## 2019-10-07 NOTE — PLAN OF CARE
Problem: Prexisting or High Potential for Compromised Skin Integrity  Goal: Skin integrity is maintained or improved  Description  INTERVENTIONS:  - Identify patients at risk for skin breakdown  - Assess and monitor skin integrity  - Assess and monitor nutrition and hydration status  - Monitor labs   - Assess for incontinence   - Turn and reposition patient  - Assist with mobility/ambulation  - Relieve pressure over bony prominences  - Avoid friction and shearing  - Provide appropriate hygiene as needed including keeping skin clean and dry  - Evaluate need for skin moisturizer/barrier cream  - Collaborate with interdisciplinary team   - Patient/family teaching  - Consider wound care consult   Outcome: Progressing     Problem: Potential for Falls  Goal: Patient will remain free of falls  Description  INTERVENTIONS:  - Assess patient frequently for physical needs  -  Identify cognitive and physical deficits and behaviors that affect risk of falls    -  [place] fall precautions as indicated by assessment   - Educate patient/family on patient safety including physical limitations  - Instruct patient to call for assistance with activity based on assessment  - Modify environment to reduce risk of injury  - Consider OT/PT consult to assist with strengthening/mobility  Outcome: Progressing     Problem: CARDIOVASCULAR - ADULT  Goal: Maintains optimal cardiac output and hemodynamic stability  Description  INTERVENTIONS:  - Monitor I/O, vital signs and rhythm  - Monitor for S/S and trends of decreased cardiac output  - Administer and titrate ordered vasoactive medications to optimize hemodynamic stability  - Assess quality of pulses, skin color and temperature  - Assess for signs of decreased coronary artery perfusion  - Instruct patient to report change in severity of symptoms  Outcome: Progressing  Goal: Absence of cardiac dysrhythmias or at baseline rhythm  Description  INTERVENTIONS:  - Continuous cardiac monitoring, vital signs, obtain 12 lead EKG if ordered  - Administer antiarrhythmic and heart rate control medications as ordered  - Monitor electrolytes and administer replacement therapy as ordered  Outcome: Progressing     Problem: SKIN/TISSUE INTEGRITY - ADULT  Goal: Skin integrity remains intact  Description  INTERVENTIONS  - Identify patients at risk for skin breakdown  - Assess and monitor skin integrity  - Assess and monitor nutrition and hydration status  - Monitor labs (i e  albumin)  - Assess for incontinence   - Turn and reposition patient  - Assist with mobility/ambulation  - Relieve pressure over bony prominences  - Avoid friction and shearing  - Provide appropriate hygiene as needed including keeping skin clean and dry  - Evaluate need for skin moisturizer/barrier cream  - Collaborate with interdisciplinary team (i e  Nutrition, Rehabilitation, etc )   - Patient/family teaching  Outcome: Progressing  Goal: Incision(s), wounds(s) or drain site(s) healing without S/S of infection  Description  INTERVENTIONS  - Assess and document risk factors for skin impairment   - Assess and document dressing, incision, wound bed, drain sites and surrounding tissue  - Consider nutrition services referral as needed  - Oral mucous membranes remain intact  - Provide patient/ family education  Outcome: Progressing     Problem: PAIN - ADULT  Goal: Verbalizes/displays adequate comfort level or baseline comfort level  Description  Interventions:  - Encourage patient to monitor pain and request assistance  - Assess pain using appropriate pain scale  - Administer analgesics based on type and severity of pain and evaluate response  - Implement non-pharmacological measures as appropriate and evaluate response  - Consider cultural and social influences on pain and pain management  - Notify physician/advanced practitioner if interventions unsuccessful or patient reports new pain  Outcome: Progressing     Problem: DISCHARGE PLANNING  Goal: Discharge to home or other facility with appropriate resources  Description  INTERVENTIONS:  - Identify barriers to discharge w/patient and caregiver  - Arrange for needed discharge resources and transportation as appropriate  - Identify discharge learning needs (meds, wound care, etc )  - Arrange for interpretive services to assist at discharge as needed  - Refer to Case Management Department for coordinating discharge planning if the patient needs post-hospital services based on physician/advanced practitioner order or complex needs related to functional status, cognitive ability, or social support system  Outcome: Progressing     Problem: Knowledge Deficit  Goal: Patient/family/caregiver demonstrates understanding of disease process, treatment plan, medications, and discharge instructions  Description  Complete learning assessment and assess knowledge base    Interventions:  - Provide teaching at level of understanding  - Provide teaching via preferred learning methods  Outcome: Progressing

## 2019-10-07 NOTE — SOCIAL WORK
Met with the patient to complete assessment and explain role of CM  The patient lives alone in a first floor apartment with 3 ALISHA  She reports that she is independent with self care and ambulates using a wheeled walker  Other DME in the home includes commode and tub bench  Patient had past HHC from [facility] but would not want that agency again  She would select  HHC if she needs HHC at discharge  PCP is Dr Estrella Mujica  The patient has prescription coverage and uses [employer]  Patient has an Advance Directive and was asked to provide a copy  Family contact is Chani Ocasio, daughter, [phone]  Her daughter has current health issues and is limited in ability to help at discharge  A neighbor transports to medical appointments  Patient receives [facility] and is open to [facility]  She says she needs homemaker services and she will call her  to discuss this after she is home  CM reviewed d/c planning process including the following: identifying help at home, patient preference for d/c planning needs, Discharge Lounge, [facility] Meds to Bed program, availability of treatment team to discuss questions or concerns patient and/or family may have regarding understanding medications and recognizing signs and symptoms once discharged  CM also encouraged patient to follow up with all recommended appointments after discharge  Patient advised of importance for patient and family to participate in managing patients medical well being  Patient/caregiver received discharge checklist  Content reviewed  Patient/caregiver encouraged to participate in discharge plan of care prior to discharge home

## 2019-10-07 NOTE — H&P
H&P Exam - Via Nancy Venegas M Jaime [de-identified] y o  female MRN: [id number]  Unit/Bed#: [facility] 680-00 Encounter: [id number]      -------------------------------------------------------------------------------------------------------------  Chief Complaint:  Patient came to the ER with a complaint left buttock pain and left leg numbness    History of Present Illness   HX and PE limited by: Erma Klein is a [de-identified] y o  female who presents with who presented to the [facility] ER with a fall 2 days ago and left-sided back pain radiating down her left leg with numbness  Patient stated to me she did fall 2 days ago refused to go to the hospital status post fall she did not hit her head or pass out it was a mechanical fall  She stated to me she has a history of AFib but does not take any anticoagulation and she has recently moved to this area, patient denies any recent chest pain admits to shortness of breath unrelated to exertion she also states to me that she has some memory problem  Patient was transferred from [address] [place] [address] directly to the OR with vascular for left lower extremity ischemia secondary to cardiac embolus procedures performed was a femoral embolectomy, patient was extubated in the PACU and transferred to   Postop labs were ordered under significant for leukocytosis  History obtained from chart review and the patient   -------------------------------------------------------------------------------------------------------------  Assessment and Plan:    Neuro:    Diagnosis:  Depression  · Plan:    · Continue Prozac and Cymbalta  · CAM ICU daily  · Monitor wake and sleep cycles  · Neuro checks q 4 hours  · Pain controlled with oxycodone IR 10 Q 4 p r n  For severe pain and 5 Q 4 p r n   Moderate pain      CV:    Diagnosis:  Rapid atrial fibrillation, peripheral artery thrombosis, left lower extremity ischemia, Postop day # 0 status post left femoral embolectomy, hypertension, history of aortic valve replacement   Plan:    Vascular is following   Neurovascular checks q 1 hour in bilateral lower extremities   Heparin GTT high VT protocol    Will need permanent anticoagulation choice patient used to be on Coumadin and was discontinued in March of 2019      Continue Norvasc and metoprolol   Patient has a permanent cardiac pacemaker   From an echocardiogram in 2013 she has a normal EF of 60% with grade 1 diastolic dysfunction also has a aortic valve replacement   Echocardiogram pending      Pulm:   Diagnosis:  Acute respiratory insufficiency requiring nasal cannula O2   Plan:    Wean as tolerated   Incentive spirometer   Encourage deep breaths and coughing      GI:    Diagnosis:  GERD with esophagitis, obesity   Plan:     Continue Protonix 40 mg   Start stool softeners and bowel regime   Regular diet      :    Diagnosis:     Plan:     On arrival to the ER elevated creatinine which resolved postoperatively   Monitor ins and outs   Monitor creatinine      F/E/N:    Plan:  Monitor electrolytes and keep potassium greater than 4 2, magnesium greater than 2 2 and potassium greater than 2 8   DVT prophylaxis patient is on heparin drip        Heme/Onc:    Diagnosis:  Leukocytosis   Plan:    Reactive in nature   Monitor endpoints be WBC hemoglobin and platelets      Endo:    Diagnosis:  Postoperatively hyperglycemia   Plan:  Monitor glucose with BMP daily no history of diabetes      ID:    Diagnosis:  Leukocytosis   Plan:    Reactive in nature   Postoperative antibiotics only   Monitor white count and fever curve      MSK/Skin:    Diagnosis:  History of bilateral knee replacement, stage I sacral decubitus, cutanous candida infection within the abdominal skin folds   Plan:     Monitor for skin breakdown used preventative measures   Treatment of stage I   Nystatin powder    Discussion was made with patient no family at bedside at time, updated patient       Disposition: Continue Critical Care   Code Status: No Order  --------------------------------------------------------------------------------------------------------------  Review of Systems   Constitutional: Negative for appetite change, diaphoresis, fatigue and fever  HENT: Negative for congestion, facial swelling, rhinorrhea, sneezing and tinnitus  Eyes: Negative for photophobia, pain and discharge  Respiratory: Negative for apnea, cough and shortness of breath  Cardiovascular: Negative for chest pain and leg swelling  Gastrointestinal: Negative for abdominal distention, abdominal pain, diarrhea, nausea and vomiting  Endocrine: Negative for cold intolerance, polydipsia and polyphagia  Genitourinary: Negative for enuresis, frequency and hematuria  Musculoskeletal: Positive for back pain and gait problem  Negative for arthralgias and myalgias  Skin: Negative for color change and rash  Allergic/Immunologic: Negative for environmental allergies and food allergies  Neurological: Positive for tremors and weakness  Negative for syncope, speech difficulty, light-headedness and headaches  Hematological: Negative for adenopathy  Does not bruise/bleed easily  Psychiatric/Behavioral: Negative for behavioral problems, decreased concentration and hallucinations  The patient is not hyperactive  A 12-point, complete review of systems was reviewed and negative except as stated above     Physical Exam   Constitutional: She is oriented to person, place, and time  She appears well-developed and well-nourished  No distress  HENT:   Head: Normocephalic and atraumatic  Mouth/Throat: No oropharyngeal exudate  Eyes: Pupils are equal, round, and reactive to light  Conjunctivae and EOM are normal    Neck: Normal range of motion  Neck supple  No JVD present  No tracheal deviation present  Cardiovascular: Normal rate, normal heart sounds and intact distal pulses   Exam reveals no gallop and no friction rub  No murmur heard  Irregular, A-line in the left radial   Pulmonary/Chest: Effort normal and breath sounds normal  No respiratory distress  She has no wheezes  She has no rales  She exhibits no tenderness  Abdominal: Soft  Bowel sounds are normal  She exhibits no distension  There is no tenderness  There is no guarding  Musculoskeletal: Normal range of motion  She exhibits edema  She exhibits no tenderness or deformity  Neurological: She is alert and oriented to person, place, and time  Skin: Skin is warm and dry  Capillary refill takes less than 2 seconds  She is not diaphoretic  No erythema  Left buttocks sacral decubitus stage I  Redness  within the abdominal folds - Candida infection   Psychiatric: She has a normal mood and affect  Her behavior is normal    Nursing note and vitals reviewed      --------------------------------------------------------------------------------------------------------------  Historical Information   Past Medical History:   Diagnosis Date    [facility]     Depression     Hypertension     [facility]anoma [facility]      Past Surgical History:   Procedure Laterality Date    AORTIC VALVE REPLACEMENT  12/2012    CARDIAC PACEMAKER PLACEMENT      dual chamber last assessed 10/14/13    CHOLECYSTECTOMY      JOINT REPLACEMENT      B/L knee replacement     Social History   Social History     Substance and Sexual Activity   Alcohol Use No     Social History     Substance and Sexual Activity   Drug Use No     Social History     Tobacco Use   Smoking Status Never Smoker   Smokeless Tobacco Never Used     Exercise History: N/A  Family History:   I have reviewed this patient's family history and commented on sigificant items within the HPI and   Family History   Problem Relation Age of Onset    Hypertension Mother        Vitals:   Vitals:    10/06/19 1546 10/06/19 2000 10/06/19 2015 10/06/19 2030   BP: 149/95 121/65  111/63   Pulse: (!) 113 70 70 72 Resp: 18 20 17 16   Temp: 99 °F (37 2 °C) 98 3 °F (36 8 °C)     TempSrc: Oral      SpO2: 96% 98% 93% 96%     Temp  Min: 97 9 °F (36 6 °C)  Max: 99 °F (37 2 °C)        There is no height or weight on file to calculate BMI  N/A    Medications:  Current Facility-Administered Medications   Medication Dose Route Frequency    acetaminophen (TYLENOL) tablet 650 mg  650 mg Oral Q6H PRN    [START ON 10/7/2019] amLODIPine (NORVASC) tablet 5 mg  5 mg Oral BID    [START ON 10/7/2019] DULoxetine (CYMBALTA) delayed release capsule 60 mg  60 mg Oral Daily    [START ON 10/7/2019] FLUoxetine (PROzac) capsule 40 mg  40 mg Oral Daily    heparin (porcine) 25,000 units in 250 mL infusion (premix)  3-30 Units/kg/hr (Order-Specific) Intravenous Titrated    heparin (porcine) injection 3,400 Units  3,400 Units Intravenous PRN    heparin (porcine) injection 6,800 Units  6,800 Units Intravenous PRN    lactated ringers bolus 500 mL  500 mL Intravenous Once PRN    And    lactated ringers bolus 500 mL  500 mL Intravenous Once PRN    metoprolol succinate (TOPROL-XL) 24 hr tablet 25 mg  25 mg Oral Q12H PATRICIA    oxyCODONE (ROXICODONE) IR tablet 10 mg  10 mg Oral Q4H PRN    oxyCODONE (ROXICODONE) IR tablet 5 mg  5 mg Oral Q4H PRN    [START ON 10/7/2019] pantoprazole (PROTONIX) EC tablet 40 mg  40 mg Oral Early Morning    sodium chloride 0 9 % bolus 500 mL  500 mL Intravenous Once PRN    And    sodium chloride 0 9 % bolus 500 mL  500 mL Intravenous Once PRN    sodium chloride 0 9 % infusion  75 mL/hr Intravenous Continuous     Home medications:  Prior to Admission Medications   Prescriptions Last Dose Informant Patient Reported? Taking?    DULoxetine (CYMBALTA) 20 mg capsule   Yes No   Sig: Take 20 mg by mouth daily   DULoxetine (CYMBALTA) 60 mg delayed release capsule   No No   Sig: Take 1 capsule (60 mg total) by mouth daily   FLUoxetine (PROzac) 40 MG capsule   No No   Sig: Take 1 capsule (40 mg total) by mouth daily   Incontinence Supply Disposable (ADHERES INCONTINENCE PAD) MISC  Self No No   Sig: by Does not apply route 2 (two) times a day   Patient not taking: Reported on 3/18/2019   Incontinence Supply Disposable (INCONTINENCE BRIEF MEDIUM) MISC  Self No No   Sig: by Does not apply route 2 (two) times a day   Patient not taking: Reported on 3/18/2019   acetaminophen (TYLENOL) 500 mg tablet   No No   Sig: Take 1 tablet (500 mg total) by mouth every 6 (six) hours as needed for mild pain   Patient not taking: Reported on 3/18/2019   amLODIPine (NORVASC) 5 mg tablet   No No   Sig: TAKE ONE TABLET BY MOUTH TWICE DAILY   metoprolol succinate (TOPROL-XL) 25 mg 24 hr tablet   No No   Sig: Take 1 tablet (25 mg total) by mouth 2 (two) times a day   omeprazole (PriLOSEC) 20 mg delayed release capsule   No No   Sig: TAKE ONE CAPSULE BY MOUTH EVERY DAY   warfarin (COUMADIN) 4 mg tablet   No No   Sig: Take 1 tablet (4 mg total) by mouth daily   Patient not taking: Reported on 3/18/2019      Facility-Administered Medications: None     Allergies:   Allergies   Allergen Reactions    Penicillins Hives    Sulfa Antibiotics Hives    Medical Tape Rash         Laboratory and Diagnostics:  Results from last 7 days   Lab Units 10/06/19  2008 10/06/19  1716 10/06/19  1327   WBC Thousand/uL 13 47*  --  11 18*   HEMOGLOBIN g/dL 12 4  --  16 0*   I STAT HEMOGLOBIN g/dl  --  14 6  --    HEMATOCRIT % 38 9  --  48 0*   HEMATOCRIT, ISTAT %  --  43  --    PLATELETS Thousands/uL 300  --  391*   NEUTROS PCT %  --   --  53   MONOS PCT %  --   --  7     Results from last 7 days   Lab Units 10/06/19  2008 10/06/19  1716 10/06/19  1327   SODIUM mmol/L 140  --  136   POTASSIUM mmol/L 4 2  --  4 0   CHLORIDE mmol/L 110*  --  98*   CO2 mmol/L 23  --  20*   CO2, I-STAT mmol/L  --  25  --    ANION GAP mmol/L 7  --  18*   BUN mg/dL 24  --  25   CREATININE mg/dL 1 13  --  1 48*   CALCIUM mg/dL 7 8*  --  10 1   GLUCOSE RANDOM mg/dL 209*  --  275*   ALT U/L  --   --  20   AST U/L  -- --  31   ALK PHOS U/L  --   --  150*   ALBUMIN g/dL  --   --  3 9   TOTAL BILIRUBIN mg/dL  --   --  0 50          Results from last 7 days   Lab Units 10/06/19  2008 10/06/19  1327   INR  1 42* 1 04   PTT seconds >210* 27      Results from last 7 days   Lab Units 10/06/19  1327   TROPONIN I ng/mL 0 13*         ABG:    VBG:          Micro:          EKG:  A fibrillation  Imaging: I have personally reviewed pertinent reports  ------------------------------------------------------------------------------------------------------------  Advance Directive and Living Will: Yes    Power of :    POLST:    ------------------------------------------------------------------------------------------------------------  Anticipated Length of Stay is > 2 midnights    Counseling / Coordination of Care  Total Critical Care time spent 30 minutes excluding procedures, teaching and family updates  Leigha Ng PA-C        Portions of the record may have been created with voice recognition software  Occasional wrong word or "sound a like" substitutions may have occurred due to the inherent limitations of voice recognition software    Read the chart carefully and recognize, using context, where substitutions have occurred

## 2019-10-07 NOTE — PROGRESS NOTES
Daily Progress Note - Critical Care   Enoc Sandoval [de-identified] y o  female MRN: [id number]  Unit/Bed#: [facility] 669-33 Encounter: [id number]        ----------------------------------------------------------------------------------------  HPI/24hr events:  Patient is postop day 1 status post left femoral embolectomy, patient remained stable during the night no acute events blood pressure within normal limits heart rate remained at 83 in AFib  Patient was continued on PE protocol heparin  Patient is aware that she has to continue anticoagulation, she did stop anticoagulation due to living alone in afraid to bleed  She used to be on Coumadin and she stopped in March of 2019     ---------------------------------------------------------------------------------------  SUBJECTIVE  Patient has no acute complaints denies any pain in her left lower extremities stated the numbness has resolved, patient only complaint is soreness in her throat and the dryness in the mouth    Review of Systems   Constitutional: Negative for appetite change, diaphoresis, fatigue and fever  HENT: Negative for congestion, facial swelling, rhinorrhea, sneezing and tinnitus  Eyes: Negative for photophobia, pain and discharge  Respiratory: Negative for apnea, cough and shortness of breath  Cardiovascular: Negative for chest pain and leg swelling  Gastrointestinal: Negative for abdominal distention, abdominal pain, diarrhea, nausea and vomiting  Endocrine: Negative for cold intolerance, polydipsia and polyphagia  Genitourinary: Negative for enuresis, frequency and hematuria  Musculoskeletal: Negative for arthralgias, gait problem and myalgias  Skin: Negative for color change and rash  Allergic/Immunologic: Negative for environmental allergies and food allergies  Neurological: Negative for syncope, speech difficulty, light-headedness and headaches  Hematological: Negative for adenopathy  Does not bruise/bleed easily  Psychiatric/Behavioral: Negative for behavioral problems, decreased concentration and hallucinations  The patient is not hyperactive  Review of systems was reviewed and negative unless stated above in HPI/24-hour events   ---------------------------------------------------------------------------------------  Assessment and Plan:  Neuro:   · ·Diagnosis:  Depression  · ·Plan:    · ·Continue Prozac and Cymbalta  · ·CAM ICU daily  · ·Monitor wake and sleep cycles  · ·Neuro checks q 4 hours  · ·Pain controlled with oxycodone IR 10 Q 4 p r n  For severe pain and 5 Q 4 p r n  Moderate pain        CV:   · Diagnosis:  Rapid atrial fibrillation, peripheral artery thrombosis, left lower extremity ischemia, Postop day # 0 status post left femoral embolectomy, hypertension, history of aortic valve replacement  · Plan:   · Vascular is following  · Neurovascular checks  Q 4 hour in bilateral lower extremities  · Heparin GTT high VT protocol change to PO   · ·Will need permanent anticoagulation choice patient used to be on Coumadin and was discontinued in March of 2019     · ·Continue Norvasc and metoprolol  · ·Patient has a permanent cardiac pacemaker  · ·From an echocardiogram in 2013 she has a normal EF of 60% with grade 1 diastolic dysfunction also has a aortic valve replacement  · ·Echocardiogram pending  · DC a line         Pulm:  · ·Diagnosis:  Acute respiratory insufficiency requiring nasal cannula O2  · ·Plan:   · ·Wean as tolerated  · ·Incentive spirometer  · ·Encourage deep breaths and coughing  ·       GI:   · Diagnosis:  GERD with esophagitis, obesity  · Plan:    · Continue Protonix 40 mg  · ·Start stool softeners and bowel regime  · ·Regular diet        :   · Diagnosis:    · Plan:    · On arrival to the ER elevated creatinine which resolved postoperatively  · Monitor ins and outs  · Monitor creatinine  · DC bonnie         F/E/N:   ·Plan:  Monitor electrolytes and keep potassium greater than 4 2, magnesium greater than 2 2 and potassium greater than 2 8  ·DVT prophylaxis patient is on heparin drip           Heme/Onc:   · ·Diagnosis:  Leukocytosis  · ·Plan:   · ·Reactive in nature  · ·Monitor endpoints be WBC hemoglobin and platelets        Endo:   ·Diagnosis:  Postoperatively hyperglycemia  ·Plan:  Monitor glucose with BMP daily no history of diabetes        ID:   · Diagnosis:  Leukocytosis  · Plan:   · ·Reactive in nature  · ·Postoperative antibiotics only  · ·Monitor white count and fever curve        MSK/Skin:   · Diagnosis:  History of bilateral knee replacement, stage I sacral decubitus, cutanous candida infection within the abdominal skin folds  · Plan:    · Monitor for skin breakdown used preventative measures  · ·Treatment of stage I  · ·Nystatin powder       Disposition: Transfer to Stepdown Level 2  Code Status: Level 1 - Full Code  ---------------------------------------------------------------------------------------  ICU CORE MEASURES    Prophylaxis   VTE Pharmacologic Prophylaxis: Heparin Drip  VTE Mechanical Prophylaxis: sequential compression device  Stress Ulcer Prophylaxis: Pantoprazole PO    ABCDE Protocol (if indicated)  Plan to perform spontaneous awakening trial today? Not applicable  Plan to perform spontaneous breathing trial today? Not applicable  CAM-ICU: Negative  Obvious barriers to extubation? no    Invasive Devices Review  Invasive Devices     Peripheral Intravenous Line            Peripheral IV 10/06/19 Left Hand less than 1 day    Peripheral IV 10/06/19 Right Forearm less than 1 day    Peripheral IV 10/06/19 Right Wrist less than 1 day          Arterial Line            Arterial Line 10/06/19 Left Radial less than 1 day          Drain            Urethral Catheter Latex 16 Fr  less than 1 day              Can any invasive devices be discontinued today?  Yes  ---------------------------------------------------------------------------------------  OBJECTIVE    Physical Exam Constitutional: She is oriented to person, place, and time  She appears well-developed and well-nourished  No distress  HENT:   Head: Normocephalic and atraumatic  Mouth/Throat: No oropharyngeal exudate  Eyes: Pupils are equal, round, and reactive to light  Conjunctivae and EOM are normal    Neck: Normal range of motion  Neck supple  No JVD present  No tracheal deviation present  Cardiovascular: Normal rate, normal heart sounds and intact distal pulses  Exam reveals no gallop and no friction rub  No murmur heard  Irregular rhythm   Pulmonary/Chest: Effort normal and breath sounds normal  No respiratory distress  She has no wheezes  She has no rales  She exhibits no tenderness  Abdominal: Soft  Bowel sounds are normal  She exhibits no distension  There is no tenderness  There is no guarding  Musculoskeletal: Normal range of motion  She exhibits no edema, tenderness or deformity  +2 pulses bilaterally lower extremity   Neurological: She is alert and oriented to person, place, and time  Skin: Skin is warm and dry  Capillary refill takes less than 2 seconds  She is not diaphoretic  No erythema  Left-sided buttocks decubitus stage I  Cutaneous miotic infection treated with nystatin powder within the folds of the abdomen   Psychiatric: She has a normal mood and affect  Her behavior is normal    Nursing note and vitals reviewed        Vitals   Vitals:    10/07/19 0100 10/07/19 0200 10/07/19 0300 10/07/19 0400   BP:       Pulse: 90 88 78 82   Resp: 14 21 14 16   Temp:    97 8 °F (36 6 °C)   TempSrc:    Oral   SpO2: 96% 98% 98% 98%     Temp (24hrs), Av 1 °F (36 7 °C), Min:97 7 °F (36 5 °C), Max:99 °F (37 2 °C)  Current: Temperature: 97 8 °F (36 6 °C)  HR: 83  BP: 136/56  RR: 16  SpO2: 96 %    Invasive/non-invasive ventilation settings   Respiratory    Lab Data (Last 4 hours)    None         O2/Vent Data (Last 4 hours)    None                Height and Weights         There is no height or weight on file to calculate BMI  Weight (last 2 days)     None            Intake and Output  I/O       10/05 0701 - 10/06 0700 10/06 0701 - 10/07 0700    I V   1817 4    IV Piggyback  500    Total Intake  2317 4    Urine  375    Blood  100    Total Output  475    Net  +1842 4              UOP: 50ml/hr     Nutrition       Diet Orders   (From admission, onward)             Start     Ordered    10/06/19 1953  Diet Regular; Regular House  Diet effective now     Question Answer Comment   Diet Type Regular    Regular Regular House    RD to adjust diet per protocol?  Yes        10/06/19 1956              Patient is on a regular  Laboratory and Diagnostics:  Results from last 7 days   Lab Units 10/07/19  0417 10/06/19  2008 10/06/19  1716 10/06/19  1327   WBC Thousand/uL 13 93* 13 47*  --  11 18*   HEMOGLOBIN g/dL 11 1* 12 4  --  16 0*   I STAT HEMOGLOBIN g/dl  --   --  14 6  --    HEMATOCRIT % 35 0 38 9  --  48 0*   HEMATOCRIT, ISTAT %  --   --  43  --    PLATELETS Thousands/uL 267 300  --  391*   NEUTROS PCT %  --   --   --  53   MONOS PCT %  --   --   --  7     Results from last 7 days   Lab Units 10/07/19  0417 10/06/19  2008 10/06/19  1716 10/06/19  1327   SODIUM mmol/L 141 140  --  136   POTASSIUM mmol/L 4 0 4 2  --  4 0   CHLORIDE mmol/L 110* 110*  --  98*   CO2 mmol/L 25 23  --  20*   CO2, I-STAT mmol/L  --   --  25  --    ANION GAP mmol/L 6 7  --  18*   BUN mg/dL 21 24  --  25   CREATININE mg/dL 0 87 1 13  --  1 48*   CALCIUM mg/dL 8 0* 7 8*  --  10 1   GLUCOSE RANDOM mg/dL 162* 209*  --  275*   ALT U/L  --   --   --  20   AST U/L  --   --   --  31   ALK PHOS U/L  --   --   --  150*   ALBUMIN g/dL  --   --   --  3 9   TOTAL BILIRUBIN mg/dL  --   --   --  0 50          Results from last 7 days   Lab Units 10/06/19  2008 10/06/19  1327   INR  1 42* 1 04   PTT seconds >210* 27      Results from last 7 days   Lab Units 10/06/19  1327   TROPONIN I ng/mL 0 13*         ABG:    VBG:          Micro        EKG: a fib  Imaging: I have personally reviewed pertinent reports        Active Medications  Scheduled Meds:    Current Facility-Administered Medications:  acetaminophen 650 mg Oral Q6H PRN Kam Ross MD    amLODIPine 5 mg Oral BID Kam Ross MD    FLUoxetine 40 mg Oral Daily Kam Ross MD    heparin (porcine) 3-30 Units/kg/hr (Order-Specific) Intravenous Titrated Kam Ross MD Last Rate: 19 Units/kg/hr (10/06/19 2215)   heparin (porcine) 3,400 Units Intravenous PRN Kam Ross MD    heparin (porcine) 6,800 Units Intravenous PRN Kam Ross MD    lactated ringers 500 mL Intravenous Once PRN Kam Ross MD    And        lactated ringers 500 mL Intravenous Once PRN Kam Ross MD    metoprolol succinate 25 mg Oral Q12H [facility] Kam Ross MD    nystatin  Topical BID Leigha Ng PA-C    oxyCODONE 10 mg Oral Q4H PRN Kam Ross MD    oxyCODONE 5 mg Oral Q4H PRN Kam Ross MD    pantoprazole 40 mg Oral Early Morning Kam Ross MD    phenol 1 spray Mouth/Throat Q2H PRN Leigha Ng PA-C    sodium chloride 500 mL Intravenous Once PRN Kam Ross MD    And        sodium chloride 500 mL Intravenous Once PRN Kam Ross MD    sodium chloride 75 mL/hr Intravenous Continuous Kam Ross MD Last Rate: 75 mL/hr (10/06/19 2100)     Continuous Infusions:    heparin (porcine) 3-30 Units/kg/hr (Order-Specific) Last Rate: 19 Units/kg/hr (10/06/19 2215)   sodium chloride 75 mL/hr Last Rate: 75 mL/hr (10/06/19 2100)     PRN Meds:     acetaminophen 650 mg Q6H PRN   heparin (porcine) 3,400 Units PRN   heparin (porcine) 6,800 Units PRN   lactated ringers 500 mL Once PRN   And     lactated ringers 500 mL Once PRN   oxyCODONE 10 mg Q4H PRN   oxyCODONE 5 mg Q4H PRN   phenol 1 spray Q2H PRN   sodium chloride 500 mL Once PRN   And     sodium chloride 500 mL Once PRN       Allergies   Allergies   Allergen Reactions    Penicillins Hives    Sulfa Antibiotics Hives    Medical Tape Rash Advance Directive and Living Will: Yes    Power of :    POLST:        Counseling / Coordination of Care  Total Critical Care time spent 30 minutes excluding procedures, teaching and family updates  Leigha Ng PA-C        Portions of the record may have been created with voice recognition software  Occasional wrong word or "sound a like" substitutions may have occurred due to the inherent limitations of voice recognition software    Read the chart carefully and recognize, using context, where substitutions have occurred

## 2019-10-07 NOTE — PROGRESS NOTES
Progress Note - Vascular Surgery   Yayo Sandoval [de-identified] y o  female MRN: [id number]  Unit/Bed#: [facility] 272-73 Encounter: [id number]    Assessment:  [de-identified] yo F with A fib (no anticoagulation) with LLE ischemia secondary to emboli  S/P LLE thrombectomy    Plan:  Continue heparin gtt  Recommend long-term anticoagulation, although patient has refused in the past for A fib  HR control - controlled this AM, continue home meds & monitor  Keep nicole - patient incontinent and high risk of wound infection due to pannus, urine contamination  Okay for transfer out of ICU - to medical service    Subjective/Objective   Chief Complaint:     Subjective: No complaints, states leg feels much better s/p thromboembolectomy  Notes some L upper thigh pain  HR better after OR, in 90-100s    Objective:     Blood pressure 126/74, pulse 79, temperature 98 8 °F (37 1 °C), temperature source Oral, resp  rate 18, SpO2 98 %  ,There is no height or weight on file to calculate BMI  Intake/Output Summary (Last 24 hours) at 10/7/2019 0734  Last data filed at 10/7/2019 0600  Gross per 24 hour   Intake 2860 59 ml   Output 850 ml   Net 2010 59 ml       Invasive Devices     Peripheral Intravenous Line            Peripheral IV 10/06/19 Left Hand less than 1 day    Peripheral IV 10/06/19 Right Forearm less than 1 day    Peripheral IV 10/06/19 Right Wrist less than 1 day          Arterial Line            Arterial Line 10/06/19 Left Radial less than 1 day          Drain            Urethral Catheter Latex 16 Fr  less than 1 day                Physical Exam:   Gen: A&O, NAD  Cardio: RRR  Lungs: CTAB  Abd: Soft, non distended, non tender  Groin: L groin meplex c/d/i, some ecchymosis left thigh medial to incision   LLE warm, M/S intact  Vasc; Biphasic DP signal LLE      Lab, Imaging and other studies:  CBC:   Lab Results   Component Value Date    WBC 13 93 (H) 10/07/2019    HGB 11 1 (L) 10/07/2019    HCT 35 0 10/07/2019    MCV 96 10/07/2019     10/07/2019 MCH 30 5 10/07/2019    MCHC 31 7 10/07/2019    RDW 13 7 10/07/2019    MPV 10 4 10/07/2019    NRBC 0 10/06/2019   , CMP:   Lab Results   Component Value Date    SODIUM 141 10/07/2019    K 4 0 10/07/2019     (H) 10/07/2019    CO2 25 10/07/2019    CO2 25 10/06/2019    BUN 21 10/07/2019    CREATININE 0 87 10/07/2019    GLUCOSE 253 (H) 10/06/2019    CALCIUM 8 0 (L) 10/07/2019    AST 31 10/06/2019    ALT 20 10/06/2019    ALKPHOS 150 (H) 10/06/2019    EGFR 63 10/07/2019   , Coagulation:   Lab Results   Component Value Date    INR 1 42 (H) 10/06/2019     VTE Pharmacologic Prophylaxis: Heparin  VTE Mechanical Prophylaxis: sequential compression device

## 2019-10-07 NOTE — UTILIZATION REVIEW
MEDICARE ICU INPATIENT ADMISSION - NO REVIEW REQUIRED    10/06/19 1957  Inpatient Admission Once     Transfer Service: General Medicine       Question Answer Comment   Admitting Physician Cassidy Said    Level of Care Critical Care    Estimated length of stay More than 2 Midnights    Certification I certify that inpatient services are medically necessary for this patient for a duration of greater than two midnights  See H&P and MD Progress Notes for additional information about the patient's course of treatment          10/06/19 1957

## 2019-10-07 NOTE — PROGRESS NOTES
Transfer Note - ICU/Stepdown Transfer to [facility]/MS tele   Ruddy Sandoval [de-identified] y o  female MRN: [id number]  [address]   Unit/Bed#: [facility] 714-33 Encounter: [id number]    Code Status: Level 1 - Full Code    Reason for ICU/Stepdown admission: left lower limb ischemia s/p left femoral embolectomy     Active problems:     * Peripheral artery embolism or thrombosis ([facility] [address] )  Assessment & Plan  · S/p left femoral embolectomy 10/6   · Continue heparin gtt with eventual bridge to coumadin per vascular surgery recommendations  · Continue ASA    Atrial fibrillation ([facility] [address] )  Assessment & Plan  · Continue metoprolol for rate control  · Anticoagulation with heparin gtt, will need coumadin bridge and long term coumadin therapy at discharge    Accelerated essential hypertension  Assessment & Plan  · Continue Norvasc and metoprolol    Urinary incontinence  Assessment & Plan  · Continue nicole catheter today, voiding trial tomorrow    Onychomycosis  Assessment & Plan  · Podiatry consulted         Consultants:   · Vascular     History of Present Illness/Summary of clinical course: From H&P 10/6 2119 by Leigha Ng PA-C: "[de-identified] y o  female who presents with who presented to the [facility] ER with a fall 2 days ago and left-sided back pain radiating down her left leg with numbness  Patient stated to me she did fall 2 days ago refused to go to the hospital status post fall she did not hit her head or pass out it was a mechanical fall  She stated to me she has a history of AFib but does not take any anticoagulation and she has recently moved to this area, patient denies any recent chest pain admits to shortness of breath unrelated to exertion she also states to me that she has some memory problem     Patient was transferred from [address] [place] [address] directly to the OR with vascular for left lower extremity ischemia secondary to cardiac embolus procedures performed was a femoral embolectomy, patient was extubated in the PACU and transferred to   Postop labs were ordered under significant for leukocytosis "     Please refer to today's progress note for further clinical details  Recent or scheduled procedures: 10/6: left femoral embolectomy     Outstanding/pending diagnostics: none       Mobilization Plan: ambulates with walker  PT/OT consult    Nutrition Plan: regular diet     Discharge Plan: Patient should be ready for discharge to home verses rehab after medical optimization     [  ] Family aware of transfer out of critical care: no family present, patient aware    Spoke with Dr Mariam Olson regarding transfer @    Patient accepted to their service      SHIRA Solano

## 2019-10-07 NOTE — ASSESSMENT & PLAN NOTE
· S/p left femoral embolectomy 10/6   · Continue heparin gtt with eventual bridge to coumadin per vascular surgery recommendations  · Continue ASA

## 2019-10-07 NOTE — PROGRESS NOTES
Pt encouraged to get OOB to chair for breakfast  PT asked to wait a little while   Agreed upon getting OOB before lunch

## 2019-10-07 NOTE — ASSESSMENT & PLAN NOTE
· Continue metoprolol for rate control  · Anticoagulation with heparin gtt, will need coumadin bridge and long term coumadin therapy at discharge

## 2019-10-08 LAB
ANION GAP SERPL CALCULATED.3IONS-SCNC: 8 MMOL/L (ref 4–13)
APTT PPP: 55 SECONDS (ref 23–37)
APTT PPP: 60 SECONDS (ref 23–37)
BUN SERPL-MCNC: 14 MG/DL (ref 5–25)
CALCIUM SERPL-MCNC: 8.7 MG/DL (ref 8.3–10.1)
CHLORIDE SERPL-SCNC: 105 MMOL/L (ref 100–108)
CO2 SERPL-SCNC: 23 MMOL/L (ref 21–32)
CREAT SERPL-MCNC: 0.88 MG/DL (ref 0.6–1.3)
ERYTHROCYTE [DISTWIDTH] IN BLOOD BY AUTOMATED COUNT: 13.7 % (ref 11.6–15.1)
GFR SERPL CREATININE-BSD FRML MDRD: 62 ML/MIN/1.73SQ M
GLUCOSE SERPL-MCNC: 129 MG/DL (ref 65–140)
HCT VFR BLD AUTO: 31.2 % (ref 34.8–46.1)
HGB BLD-MCNC: 9.8 G/DL (ref 11.5–15.4)
INR PPP: 1.24 (ref 0.84–1.19)
MAGNESIUM SERPL-MCNC: 2 MG/DL (ref 1.6–2.6)
MCH RBC QN AUTO: 30.6 PG (ref 26.8–34.3)
MCHC RBC AUTO-ENTMCNC: 31.4 G/DL (ref 31.4–37.4)
MCV RBC AUTO: 98 FL (ref 82–98)
PHOSPHATE SERPL-MCNC: 2.7 MG/DL (ref 2.3–4.1)
PLATELET # BLD AUTO: 263 THOUSANDS/UL (ref 149–390)
PMV BLD AUTO: 10.8 FL (ref 8.9–12.7)
POTASSIUM SERPL-SCNC: 3.8 MMOL/L (ref 3.5–5.3)
PROTHROMBIN TIME: 15.2 SECONDS (ref 11.6–14.5)
RBC # BLD AUTO: 3.2 MILLION/UL (ref 3.81–5.12)
SODIUM SERPL-SCNC: 136 MMOL/L (ref 136–145)
WBC # BLD AUTO: 13.62 THOUSAND/UL (ref 4.31–10.16)

## 2019-10-08 PROCEDURE — 85730 THROMBOPLASTIN TIME PARTIAL: CPT | Performed by: INTERNAL MEDICINE

## 2019-10-08 PROCEDURE — 97167 OT EVAL HIGH COMPLEX 60 MIN: CPT

## 2019-10-08 PROCEDURE — 85610 PROTHROMBIN TIME: CPT | Performed by: NURSE PRACTITIONER

## 2019-10-08 PROCEDURE — 83735 ASSAY OF MAGNESIUM: CPT | Performed by: NURSE PRACTITIONER

## 2019-10-08 PROCEDURE — 84100 ASSAY OF PHOSPHORUS: CPT | Performed by: NURSE PRACTITIONER

## 2019-10-08 PROCEDURE — 99232 SBSQ HOSP IP/OBS MODERATE 35: CPT | Performed by: INTERNAL MEDICINE

## 2019-10-08 PROCEDURE — NS001 PR NO SIGNATURE OR ATTESTATION: Performed by: SURGERY

## 2019-10-08 PROCEDURE — G8979 MOBILITY GOAL STATUS: HCPCS

## 2019-10-08 PROCEDURE — G8987 SELF CARE CURRENT STATUS: HCPCS

## 2019-10-08 PROCEDURE — G8978 MOBILITY CURRENT STATUS: HCPCS

## 2019-10-08 PROCEDURE — 99223 1ST HOSP IP/OBS HIGH 75: CPT | Performed by: INTERNAL MEDICINE

## 2019-10-08 PROCEDURE — 97163 PT EVAL HIGH COMPLEX 45 MIN: CPT

## 2019-10-08 PROCEDURE — 85027 COMPLETE CBC AUTOMATED: CPT | Performed by: NURSE PRACTITIONER

## 2019-10-08 PROCEDURE — G8988 SELF CARE GOAL STATUS: HCPCS

## 2019-10-08 PROCEDURE — 80048 BASIC METABOLIC PNL TOTAL CA: CPT | Performed by: NURSE PRACTITIONER

## 2019-10-08 RX ORDER — ATORVASTATIN CALCIUM 20 MG/1
20 TABLET, FILM COATED ORAL
Status: DISCONTINUED | OUTPATIENT
Start: 2019-10-08 | End: 2019-10-11 | Stop reason: HOSPADM

## 2019-10-08 RX ORDER — AMLODIPINE BESYLATE 5 MG/1
5 TABLET ORAL DAILY
Status: DISCONTINUED | OUTPATIENT
Start: 2019-10-09 | End: 2019-10-11 | Stop reason: HOSPADM

## 2019-10-08 RX ADMIN — METOPROLOL SUCCINATE 25 MG: 25 TABLET, EXTENDED RELEASE ORAL at 08:43

## 2019-10-08 RX ADMIN — ATORVASTATIN CALCIUM 20 MG: 20 TABLET, FILM COATED ORAL at 20:35

## 2019-10-08 RX ADMIN — NYSTATIN: 100000 POWDER TOPICAL at 19:52

## 2019-10-08 RX ADMIN — DOCUSATE SODIUM 100 MG: 100 CAPSULE, LIQUID FILLED ORAL at 08:43

## 2019-10-08 RX ADMIN — AMLODIPINE BESYLATE 5 MG: 5 TABLET ORAL at 08:43

## 2019-10-08 RX ADMIN — MELATONIN 3 MG: 3 TAB ORAL at 20:33

## 2019-10-08 RX ADMIN — OXYCODONE HYDROCHLORIDE 5 MG: 5 TABLET ORAL at 13:07

## 2019-10-08 RX ADMIN — HEPARIN SODIUM 11 UNITS/KG/HR: 10000 INJECTION, SOLUTION INTRAVENOUS at 05:30

## 2019-10-08 RX ADMIN — METOPROLOL SUCCINATE 25 MG: 25 TABLET, EXTENDED RELEASE ORAL at 20:34

## 2019-10-08 RX ADMIN — FLUOXETINE 40 MG: 20 CAPSULE ORAL at 08:43

## 2019-10-08 RX ADMIN — OXYCODONE HYDROCHLORIDE 5 MG: 5 TABLET ORAL at 18:11

## 2019-10-08 RX ADMIN — RIVAROXABAN 15 MG: 15 TABLET, FILM COATED ORAL at 19:51

## 2019-10-08 RX ADMIN — HEPARIN SODIUM 3400 UNITS: 1000 INJECTION, SOLUTION INTRAVENOUS; SUBCUTANEOUS at 12:56

## 2019-10-08 RX ADMIN — PANTOPRAZOLE SODIUM 40 MG: 40 TABLET, DELAYED RELEASE ORAL at 05:34

## 2019-10-08 NOTE — ASSESSMENT & PLAN NOTE
Patient currently with Ervin in place, however will attempt voiding trial today  States she normally wears adult diapers at home; consider diapers vs bed pan and versus wick while inpatient

## 2019-10-08 NOTE — CONSULTS
Cardiology   Pawel Sandoval [de-identified] y o  female MRN: [id number]  Unit/Bed#: [facility] 423-01 Encounter: [id number]      Reason for Consult / Principal Problem: Afib, LLE ischemia; anticoagulation recs    Physician Requesting Consult:  Joann Huertas MD    Cardiologist: Dr Alice Lanier    PCP: Dr Blank Thorne    Assessment/Plan:     Persistent atrial fibrillation  Tachy-lashon syndrome s/p ppm implantation (2012)  -12 Lead ECG 10/6:  Atrial fibrillation with RVR, rate 149 bpm  -24 hr telemetry review: Atrial fibrillation, rates controlled  -Previously was ordered Coumadin by outpatient Cardiologist, pt non-compliant d/t easy bleeding, and fear of falling/living by herself  -On metoprolol succinate 25 mg q12h--can up titrate further if needed  -Currently on IV heparin infusion--eventual plan to transition to Coumadin vs NOAC agent--will discuss final plan w/attending--patient prefers a NOAC agent if affordable secondary to limited means of transportation to have blood work drawn--will price out both Eliquis and Xarelto    Left lower limb ischemia 2/2 cardiac embolus  -Status post left femoral embolectomy POD #2  -Remains on IV heparin infusion  -Long term anticoagulation plan--eventual plan to transition to Coumadin vs NOAC agent--will discuss final plan w/attending    CAD  -Report of significant LAD lesion (2012, pre-op cath report), vessel was small and not grafted at time of AVR surgery  -No complaints of angina, SOB, or CHAMBERLAIN on admission or prior to admission  -12 Lead ECG;  Afib RVR, lateral T wave inversions, septal q-waves  -Troponin elevation (POA) 0 13--likely represents a non-MI trop elevation in the setting of afib RVR  -2D echo 10/6--preserved EF, no regional wall motion abnormalites  -On BB therapy, no aspirin (d/t bleed risk w/anticouagilation), and can consider statin therapy     Dyslipidemia  -Last lipid profile 11/2018:  Cholesterol 269, triglyceride 126, HDL 49, LDL direct 195  -Repeat lipid profile in am  -Consider low dose statin initiation    Hypertension  -BP controlled over the past 24 hours  -Current regimen:  Amlodipine 5 mg b i d, and Toprol XL 25 mg q 12 hours  -Will decrease amlodipine to 5 mg daily and continue Toprol XL 25 mg q 12 hours--this will allow for more room and up titration in beta-blocker if needed    History of severe AS status post bioprosthetic AVR (2012)  2D echo 10/7:  LVEF 65%, no regional wall motion abnormalities, RV normal, LA and RA mildly dilated, mild MR, mild AS, mild to moderate TR      HPI: Samantha Sandoval [de-identified]y o  year old female who presents with a past medical history of persistent atrial fibrillation--previously on no oral anticoagulation, severe AS status post bioprosthetic AVR; with postop bradycardia status post ppm implantation, hypertension, and hyperlipidemia    She was previously following with Dr Nida Barron with [facility] last having been seen back in October 2018  She was seen as a new consult/referral for new onset atrial fibrillation  She was started on Toprol XL 25 mg daily and was ordered Coumadin to be started as an outpatient  Unfortunately the patient was noncompliant with Coumadin as an outpatient  She presented to the [address] [place] [address] ED, with complaints of back pain and left lower extremity numbness following a mechanical fall she sustained 2 days ago  She was found to be in atrial fibrillation with RVR  On exam she was reported to have nonpalpable pulses in the low left lower extremity, consistent with left lower limb ischemia  She was initiated on IV heparin infusion and was transferred to the [facility] for vascular surgery evaluation  On 10/6/19 she underwent left femoral embolectomy  Cardiology was consulted for anticoagulation recommendations      Family History:   Family History   Problem Relation Age of Onset    Hypertension Mother      Historical Information   Past Medical History:   Diagnosis Date    Atrial fibrillation ([facility] [address] )     Depression     Hypertension     Melanoma [facility])      Past Surgical History:   Procedure Laterality Date    AORTIC VALVE REPLACEMENT  12/2012    CARDIAC PACEMAKER PLACEMENT      dual chamber last assessed 10/14/13    CHOLECYSTECTOMY      JOINT REPLACEMENT      B/L knee replacement    THROMBECTOMY W/ EMBOLECTOMY N/A 10/6/2019    Procedure: EMBOLECTOMY/THROMBECTOMY Left LOWER EXTREMITY; angiogram;  Surgeon: Humberto Santos MD;  Location: [facility];  Service: Vascular     Social History   Social History     Substance and Sexual Activity   Alcohol Use No     Social History     Substance and Sexual Activity   Drug Use No     Social History     Tobacco Use   Smoking Status Never Smoker   Smokeless Tobacco Never Used     Family History:   Family History   Problem Relation Age of Onset    Hypertension Mother        Review of Systems:  Review of Systems   Constitutional: Negative for chills, fatigue and fever  HENT: Negative for congestion  Eyes: Negative for visual disturbance  Respiratory: Negative for cough, chest tightness and shortness of breath  Cardiovascular: Negative for chest pain, palpitations and leg swelling  Gastrointestinal: Negative for abdominal distention, abdominal pain, constipation, diarrhea, nausea and vomiting  Genitourinary: Negative for difficulty urinating and dysuria  Musculoskeletal: Negative for arthralgias and myalgias  Neurological: Negative for dizziness, light-headedness and headaches  All other systems reviewed and are negative            Scheduled Meds:  Current Facility-Administered Medications:  acetaminophen 650 mg Oral Q6H PRN Ame Spironello V, CRNP    amLODIPine 5 mg Oral BID Ame Spironello V, CRNP    docusate sodium 100 mg Oral BID Ame Spironello V, CRNP    FLUoxetine 40 mg Oral Daily Ame Spironello V, CRNP    heparin (porcine) 3-30 Units/kg/hr (Order-Specific) Intravenous Titrated Ame Spironello V, CRNP Last Rate: 11 Units/kg/hr (10/08/19 0530)   heparin (porcine) 3,400 Units Intravenous PRN Ame Spironello V, CRNP    heparin (porcine) 6,800 Units Intravenous PRN Ame Spironello V, CRNP    melatonin 3 mg Oral HS Ame Spironello V, CRNP    metoprolol succinate 25 mg Oral Q12H [facility] Ame Spironello V, CRNP    nystatin  Topical BID Ame Spironello V, CRNP    oxyCODONE 10 mg Oral Q4H PRN Ame Spironello V, CRNP    oxyCODONE 5 mg Oral Q4H PRN Ame Spironello V, CRNP    pantoprazole 40 mg Oral Early Morning Ame Spironello V, CRNP    phenol 1 spray Mouth/Throat Q2H PRN Ame Spironello V, CRNP    [MAR Hold] triamcinolone  Topical BID Darcella Vahid, DPM      Continuous Infusions:  heparin (porcine) 3-30 Units/kg/hr (Order-Specific) Last Rate: 11 Units/kg/hr (10/08/19 0530)     PRN Meds:   acetaminophen    heparin (porcine)    heparin (porcine)    oxyCODONE    oxyCODONE    phenol  all current active meds have been reviewed, current meds:   Current Facility-Administered Medications   Medication Dose Route Frequency    acetaminophen (TYLENOL) tablet 650 mg  650 mg Oral Q6H PRN    amLODIPine (NORVASC) tablet 5 mg  5 mg Oral BID    docusate sodium (COLACE) capsule 100 mg  100 mg Oral BID    FLUoxetine (PROzac) capsule 40 mg  40 mg Oral Daily    heparin (porcine) 25,000 units in 250 mL infusion (premix)  3-30 Units/kg/hr (Order-Specific) Intravenous Titrated    heparin (porcine) injection 3,400 Units  3,400 Units Intravenous PRN    heparin (porcine) injection 6,800 Units  6,800 Units Intravenous PRN    melatonin tablet 3 mg  3 mg Oral HS    metoprolol succinate (TOPROL-XL) 24 hr tablet 25 mg  25 mg Oral Q12H PATRICIA    nystatin (MYCOSTATIN) powder   Topical BID    oxyCODONE (ROXICODONE) IR tablet 10 mg  10 mg Oral Q4H PRN    oxyCODONE (ROXICODONE) IR tablet 5 mg  5 mg Oral Q4H PRN    pantoprazole (PROTONIX) EC tablet 40 mg  40 mg Oral Early Morning    phenol (CHLORASEPTIC) 1 4 % mucosal liquid 1 spray  1 spray Mouth/Throat Q2H PRN    [MAR Hold] triamcinolone (KENALOG) 0 1 % cream   Topical BID    and PTA meds:   Prior to Admission Medications   Prescriptions Last Dose Informant Patient Reported? Taking? DULoxetine (CYMBALTA) 20 mg capsule   Yes No   Sig: Take 20 mg by mouth daily   DULoxetine (CYMBALTA) 60 mg delayed release capsule   No No   Sig: Take 1 capsule (60 mg total) by mouth daily   FLUoxetine (PROzac) 40 MG capsule   No No   Sig: Take 1 capsule (40 mg total) by mouth daily   Incontinence Supply Disposable (ADHERES INCONTINENCE PAD) MISC  Self No No   Sig: by Does not apply route 2 (two) times a day   Patient not taking: Reported on 3/18/2019   Incontinence Supply Disposable (INCONTINENCE BRIEF MEDIUM) MISC  Self No No   Sig: by Does not apply route 2 (two) times a day   Patient not taking: Reported on 3/18/2019   acetaminophen (TYLENOL) 500 mg tablet   No No   Sig: Take 1 tablet (500 mg total) by mouth every 6 (six) hours as needed for mild pain   Patient not taking: Reported on 3/18/2019   amLODIPine (NORVASC) 5 mg tablet   No No   Sig: TAKE ONE TABLET BY MOUTH TWICE DAILY   metoprolol succinate (TOPROL-XL) 25 mg 24 hr tablet   No No   Sig: Take 1 tablet (25 mg total) by mouth 2 (two) times a day   omeprazole (PriLOSEC) 20 mg delayed release capsule   No No   Sig: TAKE ONE CAPSULE BY MOUTH EVERY DAY   warfarin (COUMADIN) 4 mg tablet   No No   Sig: Take 1 tablet (4 mg total) by mouth daily   Patient not taking: Reported on 3/18/2019      Facility-Administered Medications: None       Allergies   Allergen Reactions    Penicillins Hives    Sulfa Antibiotics Hives    Medical Tape Rash       Objective   Vitals: Blood pressure 109/56, pulse 78, temperature 99 5 °F (37 5 °C), temperature source Oral, resp  rate 20, SpO2 98 %  , There is no height or weight on file to calculate BMI , Orthostatic Blood Pressures      Most Recent Value   Blood Pressure  109/56 filed at 10/08/2019 0720   Patient Position - Orthostatic VS  Lying filed at 10/08/2019 0720          Intake/Output Summary (Last 24 hours) at 10/8/2019 1035  Last data filed at 10/8/2019 0720  Gross per 24 hour   Intake 783 75 ml   Output 455 ml   Net 328 75 ml       Invasive Devices     Peripheral Intravenous Line            Peripheral IV 10/06/19 Left Hand 1 day    Peripheral IV 10/06/19 Right Forearm 1 day    Peripheral IV 10/06/19 Right Wrist 1 day          Drain            Urethral Catheter Latex 16 Fr  1 day              Physical Exam:  Physical Exam   Constitutional: She is oriented to person, place, and time  She appears well-developed and well-nourished  No distress  HENT:   Head: Normocephalic and atraumatic  Mouth/Throat: Oropharynx is clear and moist    Eyes: No scleral icterus  Neck: No JVD present  Cardiovascular: An irregularly irregular rhythm present  Tachycardia present  No murmur heard  Pulses:       Radial pulses are 2+ on the right side, and 2+ on the left side  Dorsalis pedis pulses are 2+ on the right side, and 2+ on the left side  Pulmonary/Chest: Effort normal and breath sounds normal    Abdominal: Soft  Bowel sounds are normal    Obese   Musculoskeletal: She exhibits no edema  Neurological: She is alert and oriented to person, place, and time  Skin: Skin is warm and dry  Capillary refill takes less than 2 seconds  She is not diaphoretic  Left femoral incision, covered by CDI dressing, resolving hematoma left groin/inner thigh   Psychiatric: She has a normal mood and affect  Nursing note and vitals reviewed        Lab Results:   Recent Results (from the past 24 hour(s))   APTT    Collection Time: 10/07/19 11:59 AM   Result Value Ref Range     (HH) 23 - 37 seconds   APTT    Collection Time: 10/07/19  8:25 PM   Result Value Ref Range     (H) 23 - 37 seconds   APTT    Collection Time: 10/08/19  4:50 AM   Result Value Ref Range    PTT 60 (H) 23 - 37 seconds   Basic metabolic panel Collection Time: 10/08/19  4:50 AM   Result Value Ref Range    Sodium 136 136 - 145 mmol/L    Potassium 3 8 3 5 - 5 3 mmol/L    Chloride 105 100 - 108 mmol/L    CO2 23 21 - 32 mmol/L    ANION GAP 8 4 - 13 mmol/L    BUN 14 5 - 25 mg/dL    Creatinine 0 88 0 60 - 1 30 mg/dL    Glucose 129 65 - 140 mg/dL    Calcium 8 7 8 3 - 10 1 mg/dL    eGFR 62 ml/min/1 73sq m   Magnesium    Collection Time: 10/08/19  4:50 AM   Result Value Ref Range    Magnesium 2 0 1 6 - 2 6 mg/dL   Phosphorus    Collection Time: 10/08/19  4:50 AM   Result Value Ref Range    Phosphorus 2 7 2 3 - 4 1 mg/dL   CBC    Collection Time: 10/08/19  4:50 AM   Result Value Ref Range    WBC 13 62 (H) 4 31 - 10 16 Thousand/uL    RBC 3 20 (L) 3 81 - 5 12 Million/uL    Hemoglobin 9 8 (L) 11 5 - 15 4 g/dL    Hematocrit 31 2 (L) 34 8 - 46 1 %    MCV 98 82 - 98 fL    MCH 30 6 26 8 - 34 3 pg    MCHC 31 4 31 4 - 37 4 g/dL    RDW 13 7 11 6 - 15 1 %    Platelets 595 181 - 493 Thousands/uL    MPV 10 8 8 9 - 12 7 fL   Protime-INR    Collection Time: 10/08/19  4:50 AM   Result Value Ref Range    Protime 15 2 (H) 11 6 - 14 5 seconds    INR 1 24 (H) 0 84 - 1 19     Imaging: I have personally reviewed pertinent reports  and I have personally reviewed pertinent films in PACS  Code Status:LVL 1 Full Code  Epic/ Allscripts/Care Everywhere records reviewed: Yes  * Please Note: Fluency DirectDictation voice to text software may have been used in the creation of this document   **

## 2019-10-08 NOTE — PROGRESS NOTES
Progress Note - Vascular Surgery   Sarai Sandoval [de-identified] y o  female MRN: [id number]  Unit/Bed#: [facility] 423-01 Encounter: [id number]    Assessment:  [de-identified] yo F with A fib (no anticoagulation) with LLE ischemia secondary to emboli  S/P LLE thrombectomy    Plan:  Diet as tolerated  Heparin gtt- recommend long term anticoagulation  Podiatry following  ECHO- EF 65%, f/u Cards consult  Ervin  PT/OT  Primary per [facility]    Subjective/Objective   Chief Complaint:     Subjective: VON  Pain controlled  Denies being out of bed yet  Tolerating PO  Objective:     Blood pressure 115/72, pulse 105, temperature 98 6 °F (37 °C), temperature source Oral, resp  rate 20, SpO2 90 %  ,There is no height or weight on file to calculate BMI  Intake/Output Summary (Last 24 hours) at 10/8/2019 0026  Last data filed at 10/7/2019 1801  Gross per 24 hour   Intake 1212 59 ml   Output 845 ml   Net 367 59 ml       Invasive Devices     Peripheral Intravenous Line            Peripheral IV 10/06/19 Left Hand 1 day    Peripheral IV 10/06/19 Right Forearm 1 day    Peripheral IV 10/06/19 Right Wrist 1 day          Drain            Urethral Catheter Latex 16 Fr  1 day                Physical Exam: NAD  Atraumatic/normocephalic  AAOX3  Normal mood and affect  Normal respiratory effort  Soft, NT, ND  Skin warm/dry  Cap refil <2 sec  B/l feet motor-sensation intact  L dopp PT  R dopp PT/DP      Lab, Imaging and other studies:  I have personally reviewed pertinent lab results    , CBC:   Lab Results   Component Value Date    WBC 13 93 (H) 10/07/2019    HGB 11 1 (L) 10/07/2019    HCT 35 0 10/07/2019    MCV 96 10/07/2019     10/07/2019    MCH 30 5 10/07/2019    MCHC 31 7 10/07/2019    RDW 13 7 10/07/2019    MPV 10 4 10/07/2019   , CMP:   Lab Results   Component Value Date    SODIUM 141 10/07/2019    K 4 0 10/07/2019     (H) 10/07/2019    CO2 25 10/07/2019    BUN 21 10/07/2019    CREATININE 0 87 10/07/2019    CALCIUM 8 0 (L) 10/07/2019    EGFR 63 10/07/2019     VTE Pharmacologic Prophylaxis: Heparin  VTE Mechanical Prophylaxis: sequential compression device

## 2019-10-08 NOTE — ASSESSMENT & PLAN NOTE
Appreciate Podiatry recommendations, as per Podiatry note:   debrided x10 toenails with large nail nipper without any incidents     - Rx Kenalog daily to be applied on both feet  - recommended patient to follow up with podiatrist outpatient to manage, tenia pedis and possible considering to Rx orthotics as she complaints of unsteady while ambulating

## 2019-10-08 NOTE — OCCUPATIONAL THERAPY NOTE
[address] Evaluation     Patient Name: Bridget Gongora  CKIBL'B Date: 10/8/2019  Problem List  Principal Problem:    Peripheral artery embolism or thrombosis (HCC)  Active Problems:    History of permanent cardiac pacemaker placement    Mixed hyperlipidemia    Generalized anxiety disorder    Gastroesophageal reflux disease with esophagitis    Depression    Accelerated essential hypertension    Ischemia of left lower extremity    Atrial fibrillation (HCC)    Onychomycosis    Left femoral embolectomy    Urinary incontinence    Past Medical History  Past Medical History:   Diagnosis Date    [facility] fibrillation ([facility] [address] )     Depression     Hypertension     [facility])      Past Surgical History  Past Surgical History:   Procedure Laterality Date    AORTIC VALVE REPLACEMENT  12/2012    CARDIAC PACEMAKER PLACEMENT      dual chamber last assessed 10/14/13    CHOLECYSTECTOMY      JOINT REPLACEMENT      B/L knee replacement    THROMBECTOMY W/ EMBOLECTOMY N/A 10/6/2019    Procedure: EMBOLECTOMY/THROMBECTOMY Left LOWER EXTREMITY; angiogram;  Surgeon: Santos Lyon MD;  Location: [facility];  Service: Vascular         10/08/19 1113   Note Type   Note type Eval/Treat   Restrictions/Precautions   Other Precautions Multiple lines;Telemetry; Bed Alarm; Fall Risk;O2;Pain  (BED ALARM ACTIVATED END OF SESION 2* FALL RISK)   Pain Assessment   Pain Assessment FLACC   Pain Rating: FLACC (Rest) - Face 0   Pain Rating: FLACC (Rest) - Legs 0   Pain Rating: FLACC (Rest) - Activity 0   Pain Rating: FLACC (Rest) - Cry 0   Pain Rating: FLACC (Rest) - Consolability 0   Score: FLACC (Rest) 0   Pain Rating: FLACC (Activity) - Face 1   Pain Rating: FLACC (Activity) - Legs 0   Pain Rating: FLACC (Activity) - Activity 1   Pain Rating: FLACC (Activity) - Cry 1   Pain Rating: FLACC (Activity) - Consolability 1   Score: FLACC (Activity) 4   Home Living   Type of Home Apartment   Home Layout One level;Stairs to enter with rails Bathroom Shower/Tub Tub/shower unit   Bathroom Toilet Standard   Bathroom Equipment Toilet raiser;Tub transfer bench   Home Equipment Walker   Prior Function   Level of [place] Independent with ADLs and functional mobility   Lives With Alone   Receives Help From [facility]   IAD Independent   Falls in the last 6 months 1 to 4  (1 RECENT FALL)   Vocational Retired   Lifestyle   Autonomy PTA PT REPORTS I IN ADLS/IADLS/ NEIGHBORS DRIVE HER PLACES; REPORTS SHE HAS HAD MROE DIFFICULTY RECENTLY MANAGING HER HOME   Reciprocal Relationships LOCAL NEIGHBORS WHO ASSIST HER    Service to Others RETIRED   Intrinsic Gratification ENJOYS WATCHING TV   Psychosocial   Psychosocial (WDL) WDL   Subjective   Subjective "HOW WILL I MANAGE AT HOME?"   ADL   Where Assessed Edge of bed   Eating Assistance 5  Supervision/Setup   Grooming Assistance 4  Minimal Assistance   UB Bathing Assistance 3  Moderate Assistance   LB Bathing Assistance 2  Maximal Assistance   UB Dressing Assistance 3  Moderate Assistance   LB Dressing Assistance 2  Maximal Assistance   Bed Mobility   Supine to Sit 3  Moderate assistance   Additional items Assist x 2; Increased time required;Verbal cues;LE management   Sit to Supine 3  Moderate assistance   Additional items Assist x 2; Increased time required;Verbal cues;LE management   Transfers   Sit to Stand 3  Moderate assistance   Additional items Assist x 2; Increased time required;Verbal cues   Stand to Sit 3  Moderate assistance   Additional items Assist x 2; Increased time required;Verbal cues   Functional Mobility   Additional Comments PT REPORTING TOO MUCH PAIN IN LEG UPON STANDING   Balance   Static Sitting Poor +   Dynamic Sitting Poor -   Static Standing Poor   Activity Tolerance   Activity Tolerance Patient limited by fatigue;Patient limited by pain   Medical Staff Made Aware PT ERON   Nurse Made Aware OKAY TO SEE PER SOFÍA BROWN Assessment   RUE Assessment WFL   JIMMY Assessment   LUE Assessment WFL   Hand Function   Gross Motor Coordination Functional   Fine Motor Coordination Functional   Cognition   Overall Cognitive Status WFL   Arousal/Participation Cooperative   Attention Attends with cues to redirect   Orientation Level Oriented X4   Memory Decreased recall of precautions   Following Commands Follows one step commands without difficulty   Comments REPETITION OF CUES UPON STANDING 2* INCREASED PAIN? Assessment   Limitation Decreased ADL status; Decreased Safe judgement during ADL;Decreased endurance;Decreased self-care trans;Decreased high-level ADLs   Prognosis Good   Assessment Pt is a [de-identified] y o  YO  female admitted to [facility] on 10/6/2019 w/ L PERIPHERAL ARTERY EMBOLISM S/P FEMORAL EMBOLECTOMY ON 10/6/19  Comorbidities include a h/o CAD, A-FIB, ANXIETY, DEPRESSION, AND HTN   Pt with active OT orders, OKAY TO SEE PER RN  Pt resides in AN APT ALONE  Pt was I w/  ADLS and IADLS, (-) drove, & required USE OF RW PTA  Currently pt is MOD A X 2 FOR BED MOBILITY AND TRANSFERS, MOD A FOR UB ADLS, AND MAX A FOR LB ADLS  Pt is limited at this time 2*: pain, endurance, activity tolerance, functional mobility, balance, trunk control, functional standing tolerance, unsupportive home environment, decreased I w/ ADLS/IADLS, decreased safety awareness and decreased insight into deficits  The following Occupational Performance Areas to address include: grooming, bathing/shower, toilet hygiene, dressing, functional mobility and household maintenance  Pt scored overall  30/100 on the Barthel Index  Based on the aforementioned OT evaluation, functional performance deficits, and assessments, pt has been identified as a high complexity evaluation  From OT standpoint, anticipate d/c STR  Pt to continue to benefit from acute immediate OT services to address the following goals 3-5x/week to  w/in 7-10 days: Majo Red    Goals   Patient Goals TO GET BETTER   LTG Time Frame 7-10   Plan   Treatment Interventions ADL retraining;Functional transfer training; Endurance training;Patient/family training;Equipment evaluation/education; Compensatory technique education;Continued evaluation; Energy conservation; Activityengagement   Goal Expiration Date 10/18/19   OT Treatment Day 1   OT Frequency 3-5x/wk   Recommendation   OT Discharge Recommendation Short Term Rehab   Barthel Index   Feeding 10   Bathing 0   Grooming Score 0   Dressing Score 0   Bladder Score 0   Bowels Score 10   Toilet Use Score 5   Transfers (Bed/Chair) Score 5   Mobility (Level Surface) Score 0   Stairs Score 0   Barthel Index Score 30   Modified Jonah Scale   Modified Jonah Scale 4     GOALS    1) Pt will increase activity tolerance to G for 30 min txment sessions    2) Pt will complete UB/LB dressing/self care w/ mod I using adaptive device and DME as needed    3) Pt will complete bathing w/ Mod I w/ use of AE and DME as needed    4) Pt will complete toileting w/ mod I w/ G hygiene/thoroughness using DME as needed    5) Pt will improve functional transfers to Mod I on/off all surfaces using DME as needed w/ G balance/safety     6) Pt will improve functional mobility during ADL/IADL/leisure tasks to Mod I using DME as needed w/ G balance/safety     7) Pt will participate in simulated IADL management task to increase independence to  w/ G safety and endurance    8) Pt will demonstrate G carryover of pt/caregiver education and training as appropriate  9) Pt will demonstrate 100% carryover of energy conservation techniques t/o functional I/ADL/leisure tasks w/o cues s/p skilled education    10) Pt will independently identify and utilize 2-3 coping strategies to increase positive affect and promote overall well-being      11) Pt will engage in ongoing cognitive assessment w/ G participation to assist w/ safe d/c planning/recommendations (AS NEEDED)    Svetlana Dougherty MS, OTR/L

## 2019-10-08 NOTE — SOCIAL WORK
Pt is recommended for short-term skilled rehab placement for her aftercare plan  CM met w/ pt to discuss recommendation  Pt is agreeable  CM provided pt w/ freedom of choice for SNF providers  Pt requested referral to [address] [place] [address],[facility]  CM made Ecin referral to [address] [place] [address] [facility]  CM to follow

## 2019-10-08 NOTE — PLAN OF CARE
Problem: PHYSICAL THERAPY ADULT  Goal: Performs mobility at highest level of function for planned discharge setting  See evaluation for individualized goals  Description  Treatment/Interventions: Functional transfer training, LE strengthening/ROM, Elevations, Therapeutic exercise, Endurance training, Bed mobility, Gait training, Spoke to nursing, Spoke to case management, OT, Spoke to MD  Equipment Recommended: Walker(w/ (A)x2)       See flowsheet documentation for full assessment, interventions and recommendations  Note:   Prognosis: Good  Problem List: Decreased strength, Decreased range of motion, Decreased endurance, Impaired balance, Decreased mobility, Obesity, Pain  Assessment: Pt is [de-identified] y o  female admitted with hx of left-sided back pain radiating down her left leg with numbness and hx of fall; pt was found to be in atrial fibrillation with RVR  Pt was Dx of LLE ischemia 2/2 cardiac embolus and underwent L femoral exposure and primary closure, femoral embolectomy with #3 and #4 Dieudonne catheter, and right lower extremity runoff on 10/6/2019  Pt 's comorbidities affecting POC include: CAD, a-fib, anxiety, depression, and HTN and personal factors of: advanced age, living alone, ALISHA and step in the apt  Pt's clinical presentation is currently unstable/unpredictable which is evident in mod (L) LE pain and guarding w/ limited ability to WB on it, ongoing telem monitoring, and abn lab values being monitored  Pt presents w/ generalized weakness, decreased (L) LE AROM, decreased (L) > (R) LE strength, decreased functional endurance and activity tolerance, impaired balance, bed mob and transfers deficits, inability to progress further w/ OOB mobility at this time and fall risk  Will cont to follow pt in PT for progressive mobilization to address above functional deficits and to max level of (I), endurance, and safety   Currently, based on overall status, recommend rehab upon D/C when medically cleared; pt informed  Will cont to follow until then  Barriers to Discharge: Inaccessible home environment, Decreased caregiver support     Recommendation: Post acute IP rehab          See flowsheet documentation for full assessment

## 2019-10-08 NOTE — ASSESSMENT & PLAN NOTE
10/8-patient not on statin therapy for unclear reasons, will discuss with patient regarding allergies and tolerance  Consider starting high-intensity statin

## 2019-10-08 NOTE — ASSESSMENT & PLAN NOTE
· S/p left femoral embolectomy 10/6   · Continue heparin gtt with eventual bridge to coumadin or transition to Eliquis  · Continue ASA  · Patient with hematoma of left groin, complains of moderate discomfort as well as radiculopathy in the left leg  · Physical exam reveals warm extremity with weak pulses palpated  · PT/OT evaluation likely to reveal patient needs short-term skilled nursing facility for physical rehab, will await recommendations and seek placement as appropriate

## 2019-10-08 NOTE — ASSESSMENT & PLAN NOTE
Patient with history of atrial fibrillation, however is not anticoagulated as she is noncompliant with medications    Noted to be in AFib with RVR on EKG-10/06/2019  Currently anticoagulated with heparin drip, will consider lifelong anticoagulation with NOAC pending vascular surgery approval

## 2019-10-08 NOTE — PROGRESS NOTES
Progress Note - Elizabeth Sandoval 1940, [de-identified] y o  female MRN: [id number]    Unit/Bed#: [facility] 423-01 Encounter: [id number]    Primary Care Provider: Aide Stevens MD   Date and time admitted to hospital: 10/6/2019  3:34 PM        Urinary incontinence  Assessment & Plan  Patient currently with Ervin in place, however will attempt voiding trial today  States she normally wears adult diapers at home; consider diapers vs bed pan and versus wick while inpatient    Onychomycosis  Assessment & Plan  Appreciate Podiatry recommendations, as per Podiatry note:   debrided x10 toenails with large nail nipper without any incidents  - Rx Kenalog daily to be applied on both feet  - recommended patient to follow up with podiatrist outpatient to manage, tenia pedis and possible considering to Rx orthotics as she complaints of unsteady while ambulating     Atrial fibrillation [facility])  Assessment & Plan  Patient with history of atrial fibrillation, however is not anticoagulated as she is noncompliant with medications  Noted to be in AFib with RVR on EKG-10/06/2019  Currently anticoagulated with heparin drip, will consider lifelong anticoagulation with NOAC pending vascular surgery approval     Accelerated essential hypertension  Assessment & Plan  Continue home dose amlodipine, 5 mg b i d  And Toprol, 25 mg b i d  Blood pressure well controlled    Gastroesophageal reflux disease with esophagitis  Assessment & Plan  Continue PPI    Generalized anxiety disorder  Assessment & Plan  Continue home dose Prozac    Mixed hyperlipidemia  Assessment & Plan  10/8-patient not on statin therapy for unclear reasons, will discuss with patient regarding allergies and tolerance  Consider starting high-intensity statin      * Peripheral artery embolism or thrombosis (HCC)  Assessment & Plan  · S/p left femoral embolectomy 10/6   · Continue heparin gtt with eventual bridge to coumadin or transition to Eliquis  · Continue ASA  · Patient with hematoma of left groin, complains of moderate discomfort as well as radiculopathy in the left leg  · Physical exam reveals warm extremity with weak pulses palpated  · PT/OT evaluation likely to reveal patient needs short-term skilled nursing facility for physical rehab, will await recommendations and seek placement as appropriate      VTE Pharmacologic Prophylaxis:   Pharmacologic: Heparin Drip  Mechanical VTE Prophylaxis in Place: Yes    Patient Centered Rounds: I have performed bedside rounds with nursing staff today  Discussions with Specialists or Other Care Team Provider:  Vascular surgery    Education and Discussions with Family / Patient:  Care plan discussed with patient voiced understanding and agrees with findings  Time Spent for Care: 30 minutes  More than 50% of total time spent on counseling and coordination of care as described above  Current Length of Stay: 2 day(s)    Current Patient Status: Inpatient   Certification Statement: The patient will continue to require additional inpatient hospital stay due to Likely physical rehabilitation placement and anticoagulation    Discharge Plan: To be determined    Code Status: Level 1 - Full Code      Subjective:   Patient seen examined bedside, no acute distress or discomfort noted  Will remove Ervin and do voiding trial; bandage over left groin hematoma dry and intact  PT OT has evaluated patient and likely will recommend patient received acute care rehab  Will discuss and place as appropriate  Otherwise, continue patient on heparin drip and awaiting vascular surgeries okay on transitioning to Eliquis b i d  For lifelong anticoagulation      Objective:     Vitals:   Temp (24hrs), Av 9 °F (37 2 °C), Min:98 2 °F (36 8 °C), Max:99 5 °F (37 5 °C)    Temp:  [98 2 °F (36 8 °C)-99 5 °F (37 5 °C)] 98 8 °F (37 1 °C)  HR:  [] 117  Resp:  [19-20] 20  BP: (102-116)/(46-82) 116/82  SpO2:  [90 %-98 %] 97 %  There is no height or weight on file to calculate BMI  Input and Output Summary (last 24 hours): Intake/Output Summary (Last 24 hours) at 10/8/2019 1230  Last data filed at 10/8/2019 0720  Gross per 24 hour   Intake 783 75 ml   Output 455 ml   Net 328 75 ml       Physical Exam:     Physical Exam   Constitutional: She is oriented to person, place, and time  She appears well-developed and well-nourished  No distress  HENT:   Head: Normocephalic and atraumatic  Nose: Nose normal    Mouth/Throat: Oropharynx is clear and moist  No oropharyngeal exudate  Eyes: Pupils are equal, round, and reactive to light  Conjunctivae and EOM are normal  Right eye exhibits no discharge  Left eye exhibits no discharge  No scleral icterus  Neck: Normal range of motion  No JVD present  No tracheal deviation present  Cardiovascular: Normal rate  Exam reveals no gallop and no friction rub  No murmur heard  Irregularly irregular rhythm   Pulmonary/Chest: Effort normal and breath sounds normal  No stridor  No respiratory distress  She has no wheezes  She has no rales  Abdominal: Soft  She exhibits no distension and no mass  There is no tenderness  There is no rebound and no guarding  Genitourinary:   Genitourinary Comments: Ervin in place   Musculoskeletal: Normal range of motion  She exhibits no edema, tenderness or deformity  Neurological: She is alert and oriented to person, place, and time  Skin: Skin is warm and dry  She is not diaphoretic  There is erythema (Suprapubic region)  Hematoma left groin   Psychiatric: She has a normal mood and affect   Her behavior is normal  Judgment normal          Additional Data:     Labs:    Results from last 7 days   Lab Units 10/08/19  0450  10/06/19  1327   WBC Thousand/uL 13 62*   < > 11 18*   HEMOGLOBIN g/dL 9 8*   < > 16 0*   I STAT HEMOGLOBIN   --    < >  --    HEMATOCRIT % 31 2*   < > 48 0*   HEMATOCRIT, ISTAT   --    < >  --    PLATELETS Thousands/uL 263   < > 391*   NEUTROS PCT %  --   --  53   LYMPHS PCT %  --   --  36   MONOS PCT %  --   --  7   EOS PCT %  --   --  2    < > = values in this interval not displayed  Results from last 7 days   Lab Units 10/08/19  0450  10/06/19  1327   SODIUM mmol/L 136   < > 136   POTASSIUM mmol/L 3 8   < > 4 0   CHLORIDE mmol/L 105   < > 98*   CO2 mmol/L 23   < > 20*   CO2, I-STAT   --    < >  --    BUN mg/dL 14   < > 25   CREATININE mg/dL 0 88   < > 1 48*   ANION GAP mmol/L 8   < > 18*   CALCIUM mg/dL 8 7   < > 10 1   ALBUMIN g/dL  --   --  3 9   TOTAL BILIRUBIN mg/dL  --   --  0 50   ALK PHOS U/L  --   --  150*   ALT U/L  --   --  20   AST U/L  --   --  31   GLUCOSE RANDOM mg/dL 129   < > 275*    < > = values in this interval not displayed  Results from last 7 days   Lab Units 10/08/19  0450   INR  1 24*         Results from last 7 days   Lab Units 10/07/19  0417   HEMOGLOBIN A1C % 7 8*               * I Have Reviewed All Lab Data Listed Above  * Additional Pertinent Lab Tests Reviewed:  All Labs Within Last 24 Hours Reviewed    Imaging:    Imaging Reports Reviewed Today Include:  None  Imaging Personally Reviewed by Myself Includes:  None    Recent Cultures (last 7 days):           Last 24 Hours Medication List:     Current Facility-Administered Medications:  acetaminophen 650 mg Oral Q6H PRN Bettye Hernandez MD    [START ON 10/9/2019] amLODIPine 5 mg Oral Daily Lawence SHIRA Foster    atorvastatin 20 mg Oral Daily With Basia Salmeron MD    docusate sodium 100 mg Oral BID Bettye Hernandez MD    FLUoxetine 40 mg Oral Daily Bettey Hernandez MD    heparin (porcine) 3-30 Units/kg/hr (Order-Specific) Intravenous Titrated Bettye Hernandez MD Last Rate: 11 Units/kg/hr (10/08/19 0530)   heparin (porcine) 3,400 Units Intravenous PRN Bettye Hernandez MD    heparin (porcine) 6,800 Units Intravenous PRN Bettye Hernandez MD    melatonin 3 mg Oral HS Bettye Hernandez MD    metoprolol succinate 25 mg Oral Q12H 1338 Mahsa Reese MD    nystatin  Topical BID Bettye Hernandez MD    oxyCODONE 10 mg Oral Q4H PRN Obed Baldwin MD    oxyCODONE 5 mg Oral Q4H PRN Obed Baldwin MD    pantoprazole 40 mg Oral Early Morning Obed Baldwin MD    phenol 1 spray Mouth/Throat Q2H PRN Obed Baldwin MD    triamcinolone  Topical BID Obed Baldwin MD         Today, Patient Was Seen By: Chivo Choudhary MD    ** Please Note: Dictation voice to text software may have been used in the creation of this document   **

## 2019-10-08 NOTE — PROGRESS NOTES
Pt being transported from P5 to room 423 and received a phone call from previous nurse about a new finding of a large hematoma on the pt's left groin site  Vascular surgery team paged and at bedside to assess pt  Pt hemodynamically stable with /69, HR 89  Left groin site hard and ecchymotic with large hematoma  Hematoma size marked  Vascular surgery at bedside and able to doppler pedal pulse  Pt still on heparin gtts  Will continue to monitor pt's groin and neurovascular assessment

## 2019-10-08 NOTE — ASSESSMENT & PLAN NOTE
Continue home dose amlodipine, 5 mg b i d  And Toprol, 25 mg b i d    Blood pressure well controlled

## 2019-10-08 NOTE — PHYSICAL THERAPY NOTE
Physical Therapy Evaluation     Patient's Name: Luzma Partida    Admitting Diagnosis  A-fib [facility]) [G31 97]    Problem List  Patient Active Problem List   Diagnosis    History of permanent cardiac pacemaker placement    Mixed hyperlipidemia    Generalized anxiety disorder    Gastroesophageal reflux disease with esophagitis    Depression    Benign familial tremor    Accelerated essential hypertension    Abnormal blood chemistry    Neuropathy    Primary insomnia    Essential hypertension    Coronary artery disease involving native coronary artery of native heart without angina pectoris    Sciatic nerve pain, right    Obesity (BMI 35 0-39 9 without comorbidity)    Globus sensation    Nocturnal enuresis    Ischemia of left lower extremity    Peripheral artery embolism or thrombosis (HCC)    [facility] fibrillation [facility]    Onychomycosis    Left femoral embolectomy    Urinary incontinence       Past Medical History  Past Medical History:   Diagnosis Date    Atrial fibrillation ([facility] )     Depression     Hypertension     [facility]        Past Surgical History  Past Surgical History:   Procedure Laterality Date    AORTIC VALVE REPLACEMENT  12/2012    CARDIAC PACEMAKER PLACEMENT      dual chamber last assessed 10/14/13    CHOLECYSTECTOMY      JOINT REPLACEMENT      B/L knee replacement    THROMBECTOMY W/ EMBOLECTOMY N/A 10/6/2019    Procedure: EMBOLECTOMY/THROMBECTOMY Left LOWER EXTREMITY; angiogram;  Surgeon: Osito yLon MD;  Location: [facility];  Service: Vascular        10/08/19 1114   Note Type   Note type Eval only   Pain Assessment   Pain Assessment FLACC   Pain Type Acute pain;Surgical pain;Neuropathic pain   Pain Location Incision;Leg;Groin   Pain Orientation Left   Pain Descriptors Aching;Discomfort;Radiating; Shooting   Pain Frequency Intermittent   Pain Onset Ongoing   Clinical Progression Not changed   Effect of Pain on Daily Activities guarding w/ mvt and mobility; pain w/ sitting up initially   Patient's Stated Pain Goal No pain   Hospital Pain Intervention(s) Repositioned; Ambulation/increased activity; Distraction; Emotional support   Response to Interventions comfortable   Pain Rating: FLACC (Rest) - Face 0   Pain Rating: FLACC (Rest) - Legs 0   Pain Rating: FLACC (Rest) - Activity 0   Pain Rating: FLACC (Rest) - Cry 0   Pain Rating: FLACC (Rest) - Consolability 0   Score: FLACC (Rest) 0   Pain Rating: FLACC (Activity) - Face 1   Pain Rating: FLACC (Activity) - Legs 0   Pain Rating: FLACC (Activity) - Activity 1   Pain Rating: FLACC (Activity) - Cry 1   Pain Rating: FLACC (Activity) - Consolability 1   Score: FLACC (Activity) 4   Home Living   Type of Home Apartment   Home Layout One level  (1st floor w/ 3 ALISHA w/ hand rail and 1 step inside)   Home Equipment Walker; Other (Comment)  (BSC, tub bench)   Prior Function   Level of [place] Independent with ADLs and functional mobility  (amb w/ rw)   Lives With Alone   Restrictions/Precautions   Braces or Orthoses   (denies)   Other Precautions Multiple lines;Telemetry; Fall Risk;Pain;Bed Alarm  (bed alarm re-activated at the end of session)   General   Additional Pertinent History Cleared for assessment/mobilization (spoke to nsg)   Cognition   Overall Cognitive Status [facility]   Arousal/Participation Alert   Orientation Level Oriented to person;Oriented to place;Oriented to situation   Memory Decreased recall of recent events   Following Commands Follows one step commands without difficulty   Comments Pt is observed in bed; responds to questions appropriately; somewhat apprehensive about mobilization but agreeable to try   RUE Assessment   RUE Assessment WFL  (AROM)   LUE Assessment   LUE Assessment WFL  (AROM)   RLE Assessment   RLE Assessment WFL  (AROM)   Strength RLE   RLE Overall Strength   (fair + (grossly))   LLE Assessment   LLE Assessment X  (decreased AROM hip and knee)   Strength LLE   LLE Overall Strength   (poor - hip; poor knee; fair ankle (grossly))   Bed Mobility   Rolling R 3  Moderate assistance   Additional items Assist x 2; Increased time required;Verbal cues;LE management   Rolling L 3  Moderate assistance   Additional items Assist x 2; Increased time required;LE management;Verbal cues   Supine to Sit 3  Moderate assistance   Additional items Assist x 2;HOB elevated; Increased time required;Verbal cues;LE management   Sit to Supine 3  Moderate assistance   Additional items Assist x 2; Increased time required;Verbal cues;LE management   Additional Comments Repositioned higher in bed w/ (A)x2   Transfers   Sit to Stand 3  Moderate assistance   Additional items Assist x 2; Increased time required;Verbal cues   Stand to Sit 3  Moderate assistance   Additional items Assist x 2; Increased time required;Verbal cues   Stand pivot   (not appropriate at this time)   Ambulation/Elevation   Gait pattern Not appropriate; Not tested  (decreased standing balance/tolerance; unable to WB on (L) LE)   Assistive Device Rolling walker   Balance   Static Sitting Poor +  (retropulsive initially due to (L) groin discomfort;; )   Dynamic Sitting Poor -   Static Standing Poor  (20 sec prior to returning back to edge of bed)   Activity Tolerance   Activity Tolerance Patient limited by fatigue;Patient limited by pain   Medical Staff Made Aware Spoke to Dr Kathryn Tee (also requested clarification of activity orders)   Nurse Made Aware spoke to Earlene De Oliveira, [address] [place] [address]   Assessment   Prognosis Good   Problem List Decreased strength;Decreased range of motion;Decreased endurance; Impaired balance;Decreased mobility;Obesity;Pain   Assessment Pt is [de-identified] y o  female admitted with hx of left-sided back pain radiating down her left leg with numbness and hx of fall; pt was found to be in atrial fibrillation with RVR   Pt was Dx of LLE ischemia 2/2 cardiac embolus and underwent L femoral exposure and primary closure, femoral embolectomy with #3 and #4 Dieudonne catheter, and right lower extremity runoff on 10/6/2019  Pt 's comorbidities affecting POC include: CAD, a-fib, anxiety, depression, and HTN and personal factors of: advanced age, living alone, ALISHA and step in the apt  Pt's clinical presentation is currently unstable/unpredictable which is evident in mod (L) LE pain and guarding w/ limited ability to WB on it, ongoing telem monitoring, and abn lab values being monitored  Pt presents w/ generalized weakness, decreased (L) LE AROM, decreased (L) > (R) LE strength, decreased functional endurance and activity tolerance, impaired balance, bed mob and transfers deficits, inability to progress further w/ OOB mobility at this time and fall risk  Will cont to follow pt in PT for progressive mobilization to address above functional deficits and to max level of (I), endurance, and safety  Currently, based on overall status, recommend rehab upon D/C when medically cleared; pt informed  Will cont to follow until then  Barriers to Discharge Inaccessible home environment;Decreased caregiver support   Goals   Patient Goals to feel better   STG Expiration Date 10/18/19   Short Term Goal #1 7-10 days  Pt will amb 50 ft w/ rw, (S)x1 and chair follow PRN in order to initiate return to premorbid environment and at least household amb status  Pt will negotiate 1 step w/ rw, (S)x1 and 3 steps w/ hand rail and SPC PRN, (S)x1, in order to assure safe navigation in and out of the premorbid living environment  Pt will achieve (I) level w/ bed mob in order to facilitate safety with OOB and back to bed transitions in own living environment  Pt will perform transfers w/ mod (I) to assure (I) and safety w/ functional mobility/transitions w/ all aspects of mobility/locomotion  Pt will participate in LE therex and balance activities to max progression w/ mobility skills  PT Treatment Day 0   Plan   Treatment/Interventions Functional transfer training;LE strengthening/ROM; Elevations; Therapeutic exercise; Endurance training; Bed mobility;Gait training;Spoke to nursing;Spoke to case management;OT;Spoke to MD   PT Frequency Other (Comment)  (3-5x/wk)   Recommendation   Recommendation Post acute IP rehab   Equipment Recommended Walker  (w/ (A)x2)   Modified Jonah Scale   Modified Terrell Scale 4   Barthel Index   Feeding 10   Bathing 0   Grooming Score 0   Dressing Score 0   Bladder Score 0   Bowels Score 10   Toilet Use Score 5   Transfers (Bed/Chair) Score 5   Mobility (Level Surface) Score 0   Stairs Score 0   Barthel Index Score 30           Gasper Woodson, PT

## 2019-10-08 NOTE — PLAN OF CARE
Problem: OCCUPATIONAL THERAPY ADULT  Goal: Performs self-care activities at highest level of function for planned discharge setting  See evaluation for individualized goals  Description  Treatment Interventions: ADL retraining, Functional transfer training, Endurance training, Patient/family training, Equipment evaluation/education, Compensatory technique education, Continued evaluation, Energy conservation, Activityengagement          See flowsheet documentation for full assessment, interventions and recommendations  Note:   Limitation: Decreased ADL status, Decreased Safe judgement during ADL, Decreased endurance, Decreased self-care trans, Decreased high-level ADLs  Prognosis: Good  Assessment: Pt is a [de-identified] y o  YO  female admitted to [facility] on 10/6/2019 w/ L PERIPHERAL ARTERY EMBOLISM S/P FEMORAL EMBOLECTOMY ON 10/6/19  Comorbidities include a h/o CAD, A-FIB, ANXIETY, DEPRESSION, AND HTN   Pt with active OT orders, OKAY TO SEE PER RN  Pt resides in AN APT ALONE  Pt was I w/  ADLS and IADLS, (-) drove, & required USE OF RW PTA  Currently pt is MOD A X 2 FOR BED MOBILITY AND TRANSFERS, MOD A FOR UB ADLS, AND MAX A FOR LB ADLS  Pt is limited at this time 2*: pain, endurance, activity tolerance, functional mobility, balance, trunk control, functional standing tolerance, unsupportive home environment, decreased I w/ ADLS/IADLS, decreased safety awareness and decreased insight into deficits  The following Occupational Performance Areas to address include: grooming, bathing/shower, toilet hygiene, dressing, functional mobility and household maintenance  Pt scored overall  30/100 on the Barthel Index  Based on the aforementioned OT evaluation, functional performance deficits, and assessments, pt has been identified as a high complexity evaluation  From OT standpoint, anticipate d/c STR  Pt to continue to benefit from acute immediate OT services to address the following goals 3-5x/week to  w/in 7-10 days:        OT Discharge Recommendation: Short Term Rehab

## 2019-10-09 ENCOUNTER — APPOINTMENT (INPATIENT)
Dept: RADIOLOGY | Facility: HOSPITAL | Age: 79
DRG: 253 | End: 2019-10-09
Attending: INTERNAL MEDICINE
Payer: MEDICARE

## 2019-10-09 LAB
ANION GAP SERPL CALCULATED.3IONS-SCNC: 7 MMOL/L (ref 4–13)
BASOPHILS # BLD AUTO: 0.06 THOUSANDS/ΜL (ref 0–0.1)
BASOPHILS NFR BLD AUTO: 0 % (ref 0–1)
BUN SERPL-MCNC: 16 MG/DL (ref 5–25)
CALCIUM SERPL-MCNC: 9 MG/DL (ref 8.3–10.1)
CHLORIDE SERPL-SCNC: 104 MMOL/L (ref 100–108)
CHOLEST SERPL-MCNC: 162 MG/DL (ref 50–200)
CO2 SERPL-SCNC: 25 MMOL/L (ref 21–32)
CREAT SERPL-MCNC: 0.84 MG/DL (ref 0.6–1.3)
EOSINOPHIL # BLD AUTO: 0.17 THOUSAND/ΜL (ref 0–0.61)
EOSINOPHIL NFR BLD AUTO: 1 % (ref 0–6)
ERYTHROCYTE [DISTWIDTH] IN BLOOD BY AUTOMATED COUNT: 13.6 % (ref 11.6–15.1)
GFR SERPL CREATININE-BSD FRML MDRD: 66 ML/MIN/1.73SQ M
GLUCOSE SERPL-MCNC: 171 MG/DL (ref 65–140)
HCT VFR BLD AUTO: 30.9 % (ref 34.8–46.1)
HDLC SERPL-MCNC: 32 MG/DL (ref 40–60)
HGB BLD-MCNC: 9.7 G/DL (ref 11.5–15.4)
IMM GRANULOCYTES # BLD AUTO: 0.23 THOUSAND/UL (ref 0–0.2)
IMM GRANULOCYTES NFR BLD AUTO: 2 % (ref 0–2)
LDLC SERPL CALC-MCNC: 104 MG/DL (ref 0–100)
LYMPHOCYTES # BLD AUTO: 1.56 THOUSANDS/ΜL (ref 0.6–4.47)
LYMPHOCYTES NFR BLD AUTO: 11 % (ref 14–44)
MCH RBC QN AUTO: 30.5 PG (ref 26.8–34.3)
MCHC RBC AUTO-ENTMCNC: 31.4 G/DL (ref 31.4–37.4)
MCV RBC AUTO: 97 FL (ref 82–98)
MONOCYTES # BLD AUTO: 1.42 THOUSAND/ΜL (ref 0.17–1.22)
MONOCYTES NFR BLD AUTO: 10 % (ref 4–12)
NEUTROPHILS # BLD AUTO: 10.78 THOUSANDS/ΜL (ref 1.85–7.62)
NEUTS SEG NFR BLD AUTO: 76 % (ref 43–75)
NRBC BLD AUTO-RTO: 0 /100 WBCS
PLATELET # BLD AUTO: 268 THOUSANDS/UL (ref 149–390)
PMV BLD AUTO: 10.4 FL (ref 8.9–12.7)
POTASSIUM SERPL-SCNC: 3.9 MMOL/L (ref 3.5–5.3)
RBC # BLD AUTO: 3.18 MILLION/UL (ref 3.81–5.12)
SODIUM SERPL-SCNC: 136 MMOL/L (ref 136–145)
TRIGL SERPL-MCNC: 132 MG/DL
WBC # BLD AUTO: 14.22 THOUSAND/UL (ref 4.31–10.16)

## 2019-10-09 PROCEDURE — 99232 SBSQ HOSP IP/OBS MODERATE 35: CPT | Performed by: INTERNAL MEDICINE

## 2019-10-09 PROCEDURE — 85025 COMPLETE CBC W/AUTO DIFF WBC: CPT | Performed by: INTERNAL MEDICINE

## 2019-10-09 PROCEDURE — 71045 X-RAY EXAM CHEST 1 VIEW: CPT

## 2019-10-09 PROCEDURE — 80061 LIPID PANEL: CPT | Performed by: NURSE PRACTITIONER

## 2019-10-09 PROCEDURE — 80048 BASIC METABOLIC PNL TOTAL CA: CPT | Performed by: INTERNAL MEDICINE

## 2019-10-09 RX ORDER — POLYETHYLENE GLYCOL 3350 17 G/17G
17 POWDER, FOR SOLUTION ORAL DAILY
Status: DISCONTINUED | OUTPATIENT
Start: 2019-10-09 | End: 2019-10-11 | Stop reason: HOSPADM

## 2019-10-09 RX ORDER — ASPIRIN 81 MG/1
81 TABLET ORAL DAILY
Status: DISCONTINUED | OUTPATIENT
Start: 2019-10-09 | End: 2019-10-11 | Stop reason: HOSPADM

## 2019-10-09 RX ORDER — SENNOSIDES 8.6 MG
1 TABLET ORAL
Status: DISCONTINUED | OUTPATIENT
Start: 2019-10-09 | End: 2019-10-11 | Stop reason: HOSPADM

## 2019-10-09 RX ADMIN — METOPROLOL SUCCINATE 25 MG: 25 TABLET, EXTENDED RELEASE ORAL at 08:59

## 2019-10-09 RX ADMIN — SENNOSIDES 8.6 MG: 8.6 TABLET, FILM COATED ORAL at 10:21

## 2019-10-09 RX ADMIN — FLUOXETINE 40 MG: 20 CAPSULE ORAL at 08:59

## 2019-10-09 RX ADMIN — TRIAMCINOLONE ACETONIDE: 1 CREAM TOPICAL at 09:00

## 2019-10-09 RX ADMIN — SENNOSIDES 8.6 MG: 8.6 TABLET, FILM COATED ORAL at 21:22

## 2019-10-09 RX ADMIN — NYSTATIN: 100000 POWDER TOPICAL at 17:15

## 2019-10-09 RX ADMIN — RIVAROXABAN 15 MG: 15 TABLET, FILM COATED ORAL at 08:59

## 2019-10-09 RX ADMIN — OXYCODONE HYDROCHLORIDE 5 MG: 5 TABLET ORAL at 08:59

## 2019-10-09 RX ADMIN — RIVAROXABAN 15 MG: 15 TABLET, FILM COATED ORAL at 17:14

## 2019-10-09 RX ADMIN — ATORVASTATIN CALCIUM 20 MG: 20 TABLET, FILM COATED ORAL at 17:14

## 2019-10-09 RX ADMIN — PANTOPRAZOLE SODIUM 40 MG: 40 TABLET, DELAYED RELEASE ORAL at 06:25

## 2019-10-09 RX ADMIN — MELATONIN 3 MG: 3 TAB ORAL at 21:23

## 2019-10-09 RX ADMIN — POLYETHYLENE GLYCOL 3350 17 G: 17 POWDER, FOR SOLUTION ORAL at 10:21

## 2019-10-09 RX ADMIN — NYSTATIN: 100000 POWDER TOPICAL at 08:59

## 2019-10-09 RX ADMIN — AMLODIPINE BESYLATE 5 MG: 5 TABLET ORAL at 08:59

## 2019-10-09 RX ADMIN — TRIAMCINOLONE ACETONIDE: 1 CREAM TOPICAL at 17:15

## 2019-10-09 RX ADMIN — ASPIRIN 81 MG: 81 TABLET, COATED ORAL at 08:59

## 2019-10-09 RX ADMIN — METOPROLOL SUCCINATE 25 MG: 25 TABLET, EXTENDED RELEASE ORAL at 21:22

## 2019-10-09 RX ADMIN — DOCUSATE SODIUM 100 MG: 100 CAPSULE, LIQUID FILLED ORAL at 17:15

## 2019-10-09 RX ADMIN — DOCUSATE SODIUM 100 MG: 100 CAPSULE, LIQUID FILLED ORAL at 08:59

## 2019-10-09 NOTE — PROGRESS NOTES
Progress Note - [employer]oval 1940, [de-identified] y o  female MRN: [id number]    Unit/Bed#: [facility] 423-01 Encounter: [id number]    Primary Care Provider: Elham Em MD   Date and time admitted to hospital: 10/6/2019  3:34 PM        Urinary incontinence  Assessment & Plan  Patient currently with Ervin in place, however will attempt voiding trial today  States she normally wears adult diapers at home; consider diapers vs bed pan and versus wick while inpatient    Onychomycosis  Assessment & Plan  Appreciate Podiatry recommendations, as per Podiatry note:   debrided x10 toenails with large nail nipper without any incidents  - Rx Kenalog daily to be applied on both feet  - recommended patient to follow up with podiatrist outpatient to manage, tenia pedis and possible considering to Rx orthotics as she complaints of unsteady while ambulating     Atrial fibrillation [facility])  Assessment & Plan  Patient with history of atrial fibrillation, however is not anticoagulated as she is noncompliant with medications  Noted to be in AFib with RVR on EKG-10/06/2019  Currently anticoagulated with heparin drip, will consider lifelong anticoagulation with NOAC pending vascular surgery approval     10/9-discussed with vascular surgery and Cardiology, patient appropriate for lifelong anticoagulation with Xarelto  Will start Xarelto at 15 mg b i d  For an additional 20 days, then transition to 20 mg daily  Accelerated essential hypertension  Assessment & Plan  Continue home dose amlodipine, 5 mg b i d  And Toprol, 25 mg b i d  Blood pressure well controlled    Gastroesophageal reflux disease with esophagitis  Assessment & Plan  Continue PPI    Generalized anxiety disorder  Assessment & Plan  Continue home dose Prozac    Mixed hyperlipidemia  Assessment & Plan  10/8-patient not on statin therapy for unclear reasons, will discuss with patient regarding allergies and tolerance  Consider starting  Statin      10/9-patient was started on Lipitor 20 mg q day    * Peripheral artery embolism or thrombosis (HCC)  Assessment & Plan  · S/p left femoral embolectomy 10/6   · Continue heparin gtt with eventual bridge to coumadin or transition to Eliquis  · Continue ASA  · Patient with hematoma of left groin, complains of moderate discomfort as well as radiculopathy in the left leg  · Physical exam reveals warm extremity with weak pulses palpated  · PT/OT evaluation likely to reveal patient needs short-term skilled nursing facility for physical rehab, will await recommendations and seek placement as appropriate    10/9- Started on Xarelto for lifelong anticoagulation  Stated during exam that she has still not had a bowel movement, will increase bowel regimen  Also with elevated white count, will get chest x-ray to rule out possible pulmonary infiltrates  Likely stable for discharge tomorrow  VTE Pharmacologic Prophylaxis:   Pharmacologic: Rivaroxaban (Xarelto)  Mechanical VTE Prophylaxis in Place: Yes    Patient Centered Rounds: I have performed bedside rounds with nursing staff today  Discussions with Specialists or Other Care Team Provider:  None    Education and Discussions with Family / Patient:  Care plan discussed with patient who voiced understanding and agrees with recommendations  Time Spent for Care: 45 minutes  More than 50% of total time spent on counseling and coordination of care as described above  Current Length of Stay: 3 day(s)    Current Patient Status: Inpatient   Certification Statement: The patient will continue to require additional inpatient hospital stay due to Constipation and elevated white count    Discharge Plan:  Patient likely stable for discharge tomorrow    Code Status: Level 1 - Full Code      Subjective:   Patient seen examined bedside, no acute distress or discomfort noted  Patient did admit that she was constipated, not having had a bowel movement 6 days  Have increased bowel regimen  Patient also with elevation of white count, could be from having undergone embolectomy, and she has no dysuria, cough, fever, chills, or any other acute complaint  However concerning for occult infection  Will obtain chest x-ray as there are mild crackles on examination  Otherwise vital signs stable, and labs largly within normal limits  Anticipate discharge tomorrow after patient successfully had bowel movement and ruled out pulmonary infection  Objective:     Vitals:   Temp (24hrs), Av 3 °F (36 8 °C), Min:97 6 °F (36 4 °C), Max:98 8 °F (37 1 °C)    Temp:  [97 6 °F (36 4 °C)-98 8 °F (37 1 °C)] 98 7 °F (37 1 °C)  HR:  [] 86  Resp:  [18-20] 20  BP: (114-122)/(56-82) 114/64  SpO2:  [94 %-97 %] 95 %  There is no height or weight on file to calculate BMI  Input and Output Summary (last 24 hours): Intake/Output Summary (Last 24 hours) at 10/9/2019 1009  Last data filed at 10/9/2019 1000  Gross per 24 hour   Intake 875 52 ml   Output 1465 ml   Net -589 48 ml       Physical Exam:     Physical Exam  Constitutional: She is oriented to person, place, and time  She appears well-developed and well-nourished  No distress  HENT:   Head: Normocephalic and atraumatic  Nose: Nose normal    Mouth/Throat: Oropharynx is clear and moist  No oropharyngeal exudate  Eyes: Pupils are equal, round, and reactive to light  Conjunctivae and EOM are normal  Right eye exhibits no discharge  Left eye exhibits no discharge  No scleral icterus  Neck: Normal range of motion  No JVD present  No tracheal deviation present  Cardiovascular: Normal rate  Exam reveals no gallop and no friction rub  No murmur heard  Irregularly irregular rhythm   Pulmonary/Chest: Effort normal and breath sounds normal  No stridor  No respiratory distress  Right basilar rales  Abdominal: Soft  She exhibits no distension and no mass  There is no tenderness  There is no rebound and no guarding     Genitourinary:   Genitourinary Comments: Ervin in place   Musculoskeletal: Normal range of motion  She exhibits no edema, tenderness or deformity  Neurological: She is alert and oriented to person, place, and time  Skin: Skin is warm and dry  She is not diaphoretic  There is erythema (Suprapubic region)  Hematoma left groin   Psychiatric: She has a normal mood and affect  Her behavior is normal  Judgment normal      Additional Data:     Labs:    Results from last 7 days   Lab Units 10/09/19  0447   WBC Thousand/uL 14 22*   HEMOGLOBIN g/dL 9 7*   HEMATOCRIT % 30 9*   PLATELETS Thousands/uL 268   NEUTROS PCT % 76*   LYMPHS PCT % 11*   MONOS PCT % 10   EOS PCT % 1     Results from last 7 days   Lab Units 10/09/19  0447  10/06/19  1327   SODIUM mmol/L 136   < > 136   POTASSIUM mmol/L 3 9   < > 4 0   CHLORIDE mmol/L 104   < > 98*   CO2 mmol/L 25   < > 20*   CO2, I-STAT   --    < >  --    BUN mg/dL 16   < > 25   CREATININE mg/dL 0 84   < > 1 48*   ANION GAP mmol/L 7   < > 18*   CALCIUM mg/dL 9 0   < > 10 1   ALBUMIN g/dL  --   --  3 9   TOTAL BILIRUBIN mg/dL  --   --  0 50   ALK PHOS U/L  --   --  150*   ALT U/L  --   --  20   AST U/L  --   --  31   GLUCOSE RANDOM mg/dL 171*   < > 275*    < > = values in this interval not displayed  Results from last 7 days   Lab Units 10/08/19  0450   INR  1 24*         Results from last 7 days   Lab Units 10/07/19  0417   HEMOGLOBIN A1C % 7 8*               * I Have Reviewed All Lab Data Listed Above  * Additional Pertinent Lab Tests Reviewed:  All Labs Within Last 24 Hours Reviewed    Imaging:    Imaging Reports Reviewed Today Include:  None  Imaging Personally Reviewed by Myself Includes:  None    Recent Cultures (last 7 days):           Last 24 Hours Medication List:     Current Facility-Administered Medications:  acetaminophen 650 mg Oral Q6H PRN Chelsea Angulo MD   amLODIPine 5 mg Oral Daily SHIRA Parada   aspirin 81 mg Oral Daily Eladia Lyon MD   atorvastatin 20 mg Oral After Ygle Anabel Ambrocio MD   docusate sodium 100 mg Oral BID Anabel Ambrocio MD   FLUoxetine 40 mg Oral Daily Anabel Ambrocio MD   melatonin 3 mg Oral HS Anabel Ambrocio MD   metoprolol succinate 25 mg Oral Q12H [facility] Anabel Ambrocio MD   nystatin  Topical BID Anabel Ambrocio MD   oxyCODONE 10 mg Oral Q4H PRN Anabel Ambrocio MD   oxyCODONE 5 mg Oral Q4H PRN Anabel Ambrocio MD   pantoprazole 40 mg Oral Early Morning Anabel Ambrocio MD   phenol 1 spray Mouth/Throat Q2H PRN Anabel Ambrocio MD   polyethylene glycol 17 g Oral Daily Anabel Ambrocio MD   rivaroxaban 15 mg Oral BID With Meals SHIRA Lorenzo   senna 1 tablet Oral HS Anabel Ambrocio MD   triamcinolone  Topical BID Anabel Ambrocio MD        Today, Patient Was Seen By: Christi Alaniz MD    ** Please Note: Dictation voice to text software may have been used in the creation of this document   **

## 2019-10-09 NOTE — ASSESSMENT & PLAN NOTE
· S/p left femoral embolectomy 10/6   · Continue heparin gtt with eventual bridge to coumadin or transition to Eliquis  · Continue ASA  · Patient with hematoma of left groin, complains of moderate discomfort as well as radiculopathy in the left leg  · Physical exam reveals warm extremity with weak pulses palpated  · PT/OT evaluation likely to reveal patient needs short-term skilled nursing facility for physical rehab, will await recommendations and seek placement as appropriate    10/9- Started on Xarelto for lifelong anticoagulation  Stated during exam that she has still not had a bowel movement, will increase bowel regimen  Also with elevated white count, will get chest x-ray to rule out possible pulmonary infiltrates  Likely stable for discharge tomorrow

## 2019-10-09 NOTE — ASSESSMENT & PLAN NOTE
10/8-patient not on statin therapy for unclear reasons, will discuss with patient regarding allergies and tolerance  Consider starting  Statin      10/9-patient was started on Lipitor 20 mg q day

## 2019-10-09 NOTE — ASSESSMENT & PLAN NOTE
Patient with history of atrial fibrillation, however is not anticoagulated as she is noncompliant with medications  Noted to be in AFib with RVR on EKG-10/06/2019  Currently anticoagulated with heparin drip, will consider lifelong anticoagulation with NOAC pending vascular surgery approval     10/9-discussed with vascular surgery and Cardiology, patient appropriate for lifelong anticoagulation with Xarelto  Will start Xarelto at 15 mg b i d  For an additional 20 days, then transition to 20 mg daily

## 2019-10-09 NOTE — PROGRESS NOTES
General Cardiology   Progress Note -  [facility]   Skip Sandoval [de-identified] y o  female MRN: [id number]    Unit/Bed#: [facility] 423-01 Encounter: [id number]    Assessment/ Plan  Persistent atrial fibrillation  Tachy-lashon syndrome s/p ppm implantation (2012)  -12 Lead ECG 10/6:  Atrial fibrillation with RVR, rate 149 bpm  -24 hr telemetry review: Atrial fibrillation, rates controlled  -Pt noncompliant with Coumadin as an outpatient  -Transitioned off of IV heparin infusion to Xarelto  -On metoprolol succinate 25 mg q12h    Acute Left lower limb ischemia 2/2 cardiac embolus  -Vascular surgery on board closely following along  -Pt noncompliant with Coumadin as an outpatient  -Status post left femoral embolectomy POD #2  -Transitioned off of IV heparin infusion to Xarelto    CAD  -No complaints of CP, SOB, or CHAMBERLAIN  -Report of significant LAD lesion (2012, pre-op cath report), vessel was small and not grafted at time of AVR surgery  -12 Lead ECG 10/6; Afib RVR, lateral T wave inversions, septal q-waves  -Troponin elevation (POA) 0 13--likely represents a non-MI trop elevation in the setting of afib RVR  -2D echo 10/6--preserved EF, no regional wall motion abnormalites  -On BB therapy, no aspirin (d/t bleed risk w/anticouagilation), and no statin therapy as an outpatient     Dyslipidemia  -Last lipid profile 11/2018:  Cholesterol 269, triglyceride 126, HDL 49, LDL direct 195  -Lipid profile/direct LDL pending     Hypertension  -average /65, last recorded 114/64, HR 86  -Current oral antihypertensive regimen:  Amlodipine 5 mg daily  and Toprol XL 25 mg q 12 hours    History of severe AS status post bioprosthetic AVR (2012)  -2D echo 10/7/19:  LVEF 65%, no regional wall motion abnormalities, RV normal, LA and RA mildly dilated, mild MR, mild AS, mild to moderate TR      Plan:  -Discussed with vascular surgery, patient to continue Xarelto on discharge (will utilize venous thromboembolism dosing); will order Xarelto starter pack, 15 mg b i d  for 21 days and then 20 mg daily going forward  -Continue amlodipine 5 mg daily  -Continue Toprol XL 25 mg q 12 hours  -Follow-up lipid profile can consider initiation of low-dose statin therapy--with history of CAD, and PVD  -Continue closely monitor volume status, renal function and electrolytes  -Continue to monitor on telemetry  -Will need close cardiology follow-up as an outpatient with Dr Kelly Stevenson of Systems   Constitution: Negative for chills, fever and malaise/fatigue  HENT: Negative for congestion  Eyes: Negative for visual disturbance  Cardiovascular: Negative for chest pain, dyspnea on exertion, irregular heartbeat, leg swelling, near-syncope, orthopnea, palpitations and syncope  Respiratory: Negative for cough and shortness of breath  Musculoskeletal: Negative for arthritis and myalgias  Gastrointestinal: Negative for abdominal pain, constipation, diarrhea, nausea and vomiting  Genitourinary: Negative for dysuria  Neurological: Negative for dizziness, headaches, light-headedness and weakness  All other systems reviewed and are negative  Objective:   Vitals: Blood pressure 114/64, pulse 86, temperature 98 7 °F (37 1 °C), temperature source Oral, resp  rate 20, SpO2 95 %  ,       There is no height or weight on file to calculate BMI ,     Systolic (84QPK), CRI:773 , Min:114 , MTV:773     Diastolic (55HBM), KENIA:39, Min:56, Max:82      Intake/Output Summary (Last 24 hours) at 10/9/2019 1109  Last data filed at 10/9/2019 1000  Gross per 24 hour   Intake 875 52 ml   Output 1465 ml   Net -589 48 ml     Weight (last 2 days)     None        Telemetry Review: No significant arrhythmias seen on telemetry review    EKG personally reviewed by SHIRA Cordova    Physical Exam   Constitutional: She is oriented to person, place, and time  She appears well-developed and well-nourished  No distress  HENT:   Head: Normocephalic and atraumatic  Mouth/Throat: Oropharynx is clear and moist  No oropharyngeal exudate  Eyes: Pupils are equal, round, and reactive to light  No scleral icterus  Neck: No JVD present  Cardiovascular: Normal rate  An irregularly irregular rhythm present  No murmur heard  Pulses:       Radial pulses are 2+ on the right side, and 2+ on the left side  Dorsalis pedis pulses are 1+ on the right side, and 1+ on the left side  Pulmonary/Chest: Effort normal and breath sounds normal  She has no wheezes  She has no rales  Abdominal: Soft  Bowel sounds are normal  There is no tenderness  Musculoskeletal: Normal range of motion  She exhibits no edema  Neurological: She is alert and oriented to person, place, and time  Skin: Skin is warm and dry  Capillary refill takes less than 2 seconds  She is not diaphoretic  Left groin incision covered by CDI dressing; resolving hematoma; bruising noted in the left groin and inner thigh   Psychiatric: She has a normal mood and affect  Nursing note and vitals reviewed        LABORATORY RESULTS  Results from last 7 days   Lab Units 10/06/19  1327   TROPONIN I ng/mL 0 13*     CBC with diff:   Results from last 7 days   Lab Units 10/09/19  0447 10/08/19  0450 10/07/19  0417 10/06/19  2008 10/06/19  1941 10/06/19  1716 10/06/19  1327   WBC Thousand/uL 14 22* 13 62* 13 93* 13 47*  --   --  11 18*   HEMOGLOBIN g/dL 9 7* 9 8* 11 1* 12 4  --   --  16 0*   I STAT HEMOGLOBIN g/dl  --   --   --   --  11 6 14 6  --    HEMATOCRIT % 30 9* 31 2* 35 0 38 9  --   --  48 0*   HEMATOCRIT, ISTAT %  --   --   --   --  34* 43  --    MCV fL 97 98 96 95  --   --  92   PLATELETS Thousands/uL 268 263 267 300  --   --  391*   MCH pg 30 5 30 6 30 5 30 4  --   --  30 7   MCHC g/dL 31 4 31 4 31 7 31 9  --   --  33 3   RDW % 13 6 13 7 13 7 13 3  --   --  13 3   MPV fL 10 4 10 8 10 4 9 9  --   --  10 6   NRBC AUTO /100 WBCs 0  --   --   --   --   --  0       CMP:  Results from last 7 days   Lab Units 10/09/19  0447 10/08/19  0450 10/07/19  0417 10/06/19  2008 10/06/19  1941 10/06/19  1716 10/06/19  1327   POTASSIUM mmol/L 3 9 3 8 4 0 4 2  --   --  4 0   CHLORIDE mmol/L 104 105 110* 110*  --   --  98*   CO2 mmol/L 25 23 25 23  --   --  20*   CO2, I-STAT mmol/L  --   --   --   --  23 25  --    BUN mg/dL 16 14 21 24  --   --  25   CREATININE mg/dL 0 84 0 88 0 87 1 13  --   --  1 48*   GLUCOSE, ISTAT mg/dl  --   --   --   --  203* 253*  --    CALCIUM mg/dL 9 0 8 7 8 0* 7 8*  --   --  10 1   AST U/L  --   --   --   --   --   --  31   ALT U/L  --   --   --   --   --   --  20   ALK PHOS U/L  --   --   --   --   --   --  150*   EGFR ml/min/1 73sq m 66 62 63 46  --   --  33       BMP:  Results from last 7 days   Lab Units 10/09/19  0447 10/08/19  0450 10/07/19  0417 10/06/19  2008 10/06/19  1941 10/06/19  1716  10/06/19  1327   POTASSIUM mmol/L 3 9 3 8 4 0 4 2  --   --   --  4 0   CHLORIDE mmol/L 104 105 110* 110*  --   --   --  98*   CO2 mmol/L 25 23 25 23  --   --   --  20*   CO2, I-STAT mmol/L  --   --   --   --  23 25  --   --    BUN mg/dL 16 14 21 24  --   --   --  25   CREATININE mg/dL 0 84 0 88 0 87 1 13  --   --   --  1 48*   GLUCOSE, ISTAT mg/dl  --   --   --   --  203* 253*   < >  --    CALCIUM mg/dL 9 0 8 7 8 0* 7 8*  --   --   --  10 1    < > = values in this interval not displayed         No results found for: NTBNP          Results from last 7 days   Lab Units 10/08/19  0450   MAGNESIUM mg/dL 2 0          Results from last 7 days   Lab Units 10/07/19  0417   HEMOGLOBIN A1C % 7 8*              Results from last 7 days   Lab Units 10/08/19  0450 10/06/19  2008 10/06/19  1327   INR  1 24* 1 42* 1 04       Lipid Profile:   No results found for: CHOL  Lab Results   Component Value Date    HDL 49 11/07/2018     Lab Results   Component Value Date    LDLCALC 195 (H) 11/07/2018     Lab Results   Component Value Date    TRIG 126 11/07/2018       Cardiac testing:   Results for orders placed during the hospital encounter of 10/06/19   Echo complete with contrast if indicated    Narrative [address]  [facility] [place], [address] [facility]  [phone]    Transthoracic Echocardiogram  2D, M-mode, Doppler, and Color Doppler    Study date:  07-Oct-2019    Patient: Asif Gold  MR number: [id number]  Account number: [de-identified]  : 10-Oleg-1940  Age: [de-identified] years  Gender: Female  Status: Inpatient  Location: Bedside  Height: 61 in  Weight: 189 6 lb  BP: 126/ 74 mmHg    Indications: AFIB    Diagnoses: I48 0 - Atrial fibrillation    Sonographer:  ROGELIO Calero  Interpreting Physician:  Austin Germain MD  Primary Physician:  Crystal Spaulding MD  Referring Physician:  Socorro Crandall PA-C  Group:  [facility]  Cardiology Fellow:  Jeri Ganser, DO    SUMMARY    LEFT VENTRICLE:  Systolic function was normal  Ejection fraction was estimated to be 65 %  Although no diagnostic regional wall motion abnormality was identified, this possibility cannot be completely excluded on the basis of this study  RIGHT VENTRICLE:  The size was at the upper limits of normal     LEFT ATRIUM:  The atrium was mildly dilated  RIGHT ATRIUM:  The atrium was mildly dilated  MITRAL VALVE:  There was moderate annular calcification  There was mild regurgitation  AORTIC VALVE:  There was mild stenosis  Valve mean gradient was 7 7 mmHg  Estimated aortic valve area (by Vmean) was 1 63 cmï¾²  TRICUSPID VALVE:  There was mild to moderate regurgitation  PULMONIC VALVE:  There was trace regurgitation  HISTORY: PRIOR HISTORY: Pacemaker,GERD,HLD,AFIB,CAD,Obesity,HTN    PROCEDURE: The procedure was performed at the bedside  This was a routine study  The transthoracic approach was used  The study included complete 2D imaging, M-mode, complete spectral Doppler, and color Doppler  Image quality was adequate      LEFT VENTRICLE: Size was normal  Systolic function was normal  Ejection fraction was estimated to be 65 %  Although no diagnostic regional wall motion abnormality was identified, this possibility cannot be completely excluded on the basis  of this study  Wall thickness was normal  There was no evidence of concentric hypertrophy  DOPPLER: Transmitral flow pattern: atrial fibrillation  The study was not technically sufficient to allow evaluation of LV diastolic function  RIGHT VENTRICLE: The size was at the upper limits of normal  Systolic function was low normal     LEFT ATRIUM: The atrium was mildly dilated  RIGHT ATRIUM: The atrium was mildly dilated  MITRAL VALVE: There was moderate annular calcification  Valve structure was normal  There was normal leaflet separation  DOPPLER: The transmitral velocity was within the normal range  There was no evidence for stenosis  There was mild  regurgitation  AORTIC VALVE: The valve was trileaflet  Leaflets exhibited normal thickness, mild to moderate calcification, and normal cuspal separation  DOPPLER: Transaortic velocity was minimally increased  There was mild stenosis  There was no  regurgitation  TRICUSPID VALVE: The valve structure was normal  There was normal leaflet separation  DOPPLER: The transtricuspid velocity was within the normal range  There was no evidence for stenosis  There was mild to moderate regurgitation  Pulmonary  artery systolic pressure was at the upper limits of normal  Estimated peak PA pressure was 40 mmHg  PULMONIC VALVE: Leaflets exhibited normal thickness, no calcification, and normal cuspal separation  DOPPLER: The transpulmonic velocity was within the normal range  There was trace regurgitation  PERICARDIUM: There was no pericardial effusion  AORTA: The root exhibited normal size      MEASUREMENT TABLES    2D MEASUREMENTS  LVOT   (Reference normals)  Diam   21 mm   (--)    DOPPLER MEASUREMENTS  LVOT   (Reference normals)  Peak ahsan   90 cm/s   (--)  Mean ahsan   66 cm/s   (--)  VTI   22 cm (--)  Peak gradient   2 mmHg   (--)  Mean gradient   1 mmHg   (--)  Stroke vol   76 2 ml   (--)  Aortic valve   (Reference normals)  Peak ahsan   200 cm/s   (--)  Mean ahsan   139 cm/s   (--)  VTI   44 cm   (--)  Peak gradient   16 mmHg   (--)  Mean gradient   7 7 mmHg   (--)  Obstr index, VTI   0 5    (--)  Valve area, VTI   1 73 cmï¾²   (--)  Obstr index, Vmax   0 45    (--)  Valve area, Vmax   1 56 cmï¾²   (--)  Obstr index, Vmean   0 47    (--)  Valve area, Vmean   1 63 cmï¾²   (--)    SYSTEM MEASUREMENT TABLES    2D  %FS: 27 65 %  Ao Diam: 2 54 cm  EDV(Teich): 62 12 ml  EF(Teich): 54 4 %  ESV(Teich): 28 33 ml  IVSd: 1 35 cm  LA Area: 23 83 cm2  LA Diam: 3 95 cm  LAAs A2C: 24 23 cm2  LAAs A4C: 22 63 cm2  LAESV A-L A2C: 92 02 ml  LAESV A-L A4C: 79 78 ml  LAESV Index (A-L): 46 45 ml/m2  LAESV MOD A2C: 88 35 ml  LAESV MOD A4C: 76 15 ml  LAESV(A-L): 85 94 ml  LALs A2C: 5 41 cm  LALs A4C: 5 45 cm  LVIDd: 3 8 cm  LVIDs: 2 75 cm  LVOT Diam: 1 88 cm  LVPWd: 1 28 cm  RA Area: 13 73 cm2  RVIDd: 4 22 cm  SV(Teich): 33 79 ml    CW  AV Env  Ti: 336 79 ms  AV VTI: 55 63 cm  AV Vmax: 2 55 m/s  AV Vmean: 1 65 m/s  AV maxP 07 mmHg  AV meanP 3 mmHg  RAP: 5 mmHg  TR Vmax: 2 66 m/s  TR maxP 21 mmHg    MM  AV Cusp: 1 08 cm  Ao Diam: 2 67 cm  LA Diam: 4 58 cm  LA/Ao: 1 71  TAPSE: 0 91 cm    PW  BARRON (VTI): 0 86 cm2  BARRON Vmax: 0 88 cm2  LVOT Env  Ti: 295 27 ms  LVOT VTI: 17 1 cm  LVOT Vmax: 0 81 m/s  LVOT Vmean: 0 58 m/s  LVOT maxP 61 mmHg  LVOT meanP 54 mmHg  LVSI Dopp: 25 77 ml/m2  LVSV Dopp: 47 68 ml  RVSP: 33 21 mmHg    [facility] Accredited Echocardiography Laboratory    Prepared and electronically signed by    Marty Baez MD  Signed 07-Oct-2019 16:28:59       No results found for this or any previous visit  No results found for this or any previous visit  No procedure found  No results found for this or any previous visit        Meds/Allergies   all current active meds have been reviewed, current meds:   Current Facility-Administered Medications   Medication Dose Route Frequency    acetaminophen (TYLENOL) tablet 650 mg  650 mg Oral Q6H PRN    amLODIPine (NORVASC) tablet 5 mg  5 mg Oral Daily    aspirin (ECOTRIN LOW STRENGTH) EC tablet 81 mg  81 mg Oral Daily    atorvastatin (LIPITOR) tablet 20 mg  20 mg Oral After Dinner    docusate sodium (COLACE) capsule 100 mg  100 mg Oral BID    FLUoxetine (PROzac) capsule 40 mg  40 mg Oral Daily    melatonin tablet 3 mg  3 mg Oral HS    metoprolol succinate (TOPROL-XL) 24 hr tablet 25 mg  25 mg Oral Q12H PATRICIA    nystatin (MYCOSTATIN) powder   Topical BID    oxyCODONE (ROXICODONE) IR tablet 10 mg  10 mg Oral Q4H PRN    oxyCODONE (ROXICODONE) IR tablet 5 mg  5 mg Oral Q4H PRN    pantoprazole (PROTONIX) EC tablet 40 mg  40 mg Oral Early Morning    phenol (CHLORASEPTIC) 1 4 % mucosal liquid 1 spray  1 spray Mouth/Throat Q2H PRN    polyethylene glycol (MIRALAX) packet 17 g  17 g Oral Daily    rivaroxaban (XARELTO) tablet 15 mg  15 mg Oral BID With Meals    senna (SENOKOT) tablet 8 6 mg  1 tablet Oral HS    triamcinolone (KENALOG) 0 1 % cream   Topical BID    and PTA meds:   Prior to Admission Medications   Prescriptions Last Dose Informant Patient Reported? Taking?    DULoxetine (CYMBALTA) 20 mg capsule   Yes No   Sig: Take 20 mg by mouth daily   DULoxetine (CYMBALTA) 60 mg delayed release capsule   No No   Sig: Take 1 capsule (60 mg total) by mouth daily   FLUoxetine (PROzac) 40 MG capsule   No No   Sig: Take 1 capsule (40 mg total) by mouth daily   Incontinence Supply Disposable (ADHERES INCONTINENCE PAD) MISC  Self No No   Sig: by Does not apply route 2 (two) times a day   Patient not taking: Reported on 3/18/2019   Incontinence Supply Disposable (INCONTINENCE BRIEF MEDIUM) MISC  Self No No   Sig: by Does not apply route 2 (two) times a day   Patient not taking: Reported on 3/18/2019   acetaminophen (TYLENOL) 500 mg tablet   No No   Sig: Take 1 tablet (500 mg total) by mouth every 6 (six) hours as needed for mild pain   Patient not taking: Reported on 3/18/2019   amLODIPine (NORVASC) 5 mg tablet   No No   Sig: TAKE ONE TABLET BY MOUTH TWICE DAILY   metoprolol succinate (TOPROL-XL) 25 mg 24 hr tablet   No No   Sig: Take 1 tablet (25 mg total) by mouth 2 (two) times a day   omeprazole (PriLOSEC) 20 mg delayed release capsule   No No   Sig: TAKE ONE CAPSULE BY MOUTH EVERY DAY   warfarin (COUMADIN) 4 mg tablet   No No   Sig: Take 1 tablet (4 mg total) by mouth daily   Patient not taking: Reported on 3/18/2019      Facility-Administered Medications: None     Medications Prior to Admission   Medication    acetaminophen (TYLENOL) 500 mg tablet    amLODIPine (NORVASC) 5 mg tablet    DULoxetine (CYMBALTA) 20 mg capsule    DULoxetine (CYMBALTA) 60 mg delayed release capsule    FLUoxetine (PROzac) 40 MG capsule    Incontinence Supply Disposable (ADHERES INCONTINENCE PAD) MISC    Incontinence Supply Disposable (INCONTINENCE BRIEF MEDIUM) MISC    metoprolol succinate (TOPROL-XL) 25 mg 24 hr tablet    omeprazole (PriLOSEC) 20 mg delayed release capsule    warfarin (COUMADIN) 4 mg tablet        Assessment:  Principal Problem:    Peripheral artery embolism or thrombosis (HCC)  Active Problems:    History of permanent cardiac pacemaker placement    Mixed hyperlipidemia    Generalized anxiety disorder    Gastroesophageal reflux disease with esophagitis    Depression    Accelerated essential hypertension    Ischemia of left lower extremity    Atrial fibrillation (HCC)    Onychomycosis    Left femoral embolectomy    Urinary incontinence    Counseling / Coordination of Care  Total floor / unit time spent today 20 minutes  Greater than 50% of total time was spent with the patient and / or family counseling and / or coordination of care  ** Please Note: Dragon 360 Dictation voice to text software may have been used in the creation of this document   **

## 2019-10-10 LAB
ANION GAP SERPL CALCULATED.3IONS-SCNC: 6 MMOL/L (ref 4–13)
BACTERIA UR QL AUTO: ABNORMAL /HPF
BASOPHILS # BLD AUTO: 0.08 THOUSANDS/ΜL (ref 0–0.1)
BASOPHILS NFR BLD AUTO: 1 % (ref 0–1)
BILIRUB UR QL STRIP: NEGATIVE
BUN SERPL-MCNC: 19 MG/DL (ref 5–25)
CALCIUM SERPL-MCNC: 9.4 MG/DL (ref 8.3–10.1)
CHLORIDE SERPL-SCNC: 104 MMOL/L (ref 100–108)
CLARITY UR: ABNORMAL
CO2 SERPL-SCNC: 26 MMOL/L (ref 21–32)
COLOR UR: YELLOW
CREAT SERPL-MCNC: 0.86 MG/DL (ref 0.6–1.3)
EOSINOPHIL # BLD AUTO: 0.31 THOUSAND/ΜL (ref 0–0.61)
EOSINOPHIL NFR BLD AUTO: 2 % (ref 0–6)
ERYTHROCYTE [DISTWIDTH] IN BLOOD BY AUTOMATED COUNT: 13.6 % (ref 11.6–15.1)
GFR SERPL CREATININE-BSD FRML MDRD: 64 ML/MIN/1.73SQ M
GLUCOSE SERPL-MCNC: 141 MG/DL (ref 65–140)
GLUCOSE UR STRIP-MCNC: NEGATIVE MG/DL
HCT VFR BLD AUTO: 32 % (ref 34.8–46.1)
HGB BLD-MCNC: 10.2 G/DL (ref 11.5–15.4)
HGB UR QL STRIP.AUTO: ABNORMAL
HYALINE CASTS #/AREA URNS LPF: ABNORMAL /LPF
IMM GRANULOCYTES # BLD AUTO: 0.32 THOUSAND/UL (ref 0–0.2)
IMM GRANULOCYTES NFR BLD AUTO: 2 % (ref 0–2)
KETONES UR STRIP-MCNC: NEGATIVE MG/DL
LEUKOCYTE ESTERASE UR QL STRIP: ABNORMAL
LYMPHOCYTES # BLD AUTO: 2.05 THOUSANDS/ΜL (ref 0.6–4.47)
LYMPHOCYTES NFR BLD AUTO: 14 % (ref 14–44)
MCH RBC QN AUTO: 31 PG (ref 26.8–34.3)
MCHC RBC AUTO-ENTMCNC: 31.9 G/DL (ref 31.4–37.4)
MCV RBC AUTO: 97 FL (ref 82–98)
MONOCYTES # BLD AUTO: 1.18 THOUSAND/ΜL (ref 0.17–1.22)
MONOCYTES NFR BLD AUTO: 8 % (ref 4–12)
NEUTROPHILS # BLD AUTO: 10.3 THOUSANDS/ΜL (ref 1.85–7.62)
NEUTS SEG NFR BLD AUTO: 73 % (ref 43–75)
NITRITE UR QL STRIP: NEGATIVE
NON-SQ EPI CELLS URNS QL MICRO: ABNORMAL /HPF
NRBC BLD AUTO-RTO: 0 /100 WBCS
PH UR STRIP.AUTO: 6 [PH]
PLATELET # BLD AUTO: 294 THOUSANDS/UL (ref 149–390)
PMV BLD AUTO: 10.9 FL (ref 8.9–12.7)
POTASSIUM SERPL-SCNC: 4.2 MMOL/L (ref 3.5–5.3)
PROT UR STRIP-MCNC: NEGATIVE MG/DL
RBC # BLD AUTO: 3.29 MILLION/UL (ref 3.81–5.12)
RBC #/AREA URNS AUTO: ABNORMAL /HPF
SODIUM SERPL-SCNC: 136 MMOL/L (ref 136–145)
SP GR UR STRIP.AUTO: 1.01 (ref 1–1.03)
UROBILINOGEN UR QL STRIP.AUTO: 0.2 E.U./DL
WBC # BLD AUTO: 14.24 THOUSAND/UL (ref 4.31–10.16)
WBC #/AREA URNS AUTO: ABNORMAL /HPF

## 2019-10-10 PROCEDURE — 81001 URINALYSIS AUTO W/SCOPE: CPT | Performed by: INTERNAL MEDICINE

## 2019-10-10 PROCEDURE — 97530 THERAPEUTIC ACTIVITIES: CPT

## 2019-10-10 PROCEDURE — 99232 SBSQ HOSP IP/OBS MODERATE 35: CPT | Performed by: INTERNAL MEDICINE

## 2019-10-10 PROCEDURE — 87086 URINE CULTURE/COLONY COUNT: CPT | Performed by: INTERNAL MEDICINE

## 2019-10-10 PROCEDURE — 80048 BASIC METABOLIC PNL TOTAL CA: CPT | Performed by: INTERNAL MEDICINE

## 2019-10-10 PROCEDURE — 87077 CULTURE AEROBIC IDENTIFY: CPT | Performed by: INTERNAL MEDICINE

## 2019-10-10 PROCEDURE — 87186 SC STD MICRODIL/AGAR DIL: CPT | Performed by: INTERNAL MEDICINE

## 2019-10-10 PROCEDURE — 97535 SELF CARE MNGMENT TRAINING: CPT

## 2019-10-10 PROCEDURE — 85025 COMPLETE CBC W/AUTO DIFF WBC: CPT | Performed by: INTERNAL MEDICINE

## 2019-10-10 RX ORDER — MAGNESIUM CARB/ALUMINUM HYDROX 105-160MG
296 TABLET,CHEWABLE ORAL ONCE
Status: COMPLETED | OUTPATIENT
Start: 2019-10-10 | End: 2019-10-10

## 2019-10-10 RX ORDER — CIPROFLOXACIN 500 MG/1
500 TABLET, FILM COATED ORAL EVERY 12 HOURS SCHEDULED
Status: DISCONTINUED | OUTPATIENT
Start: 2019-10-10 | End: 2019-10-11 | Stop reason: HOSPADM

## 2019-10-10 RX ADMIN — DOCUSATE SODIUM 100 MG: 100 CAPSULE, LIQUID FILLED ORAL at 09:47

## 2019-10-10 RX ADMIN — CIPROFLOXACIN HYDROCHLORIDE 500 MG: 500 TABLET, FILM COATED ORAL at 12:02

## 2019-10-10 RX ADMIN — AMLODIPINE BESYLATE 5 MG: 5 TABLET ORAL at 09:48

## 2019-10-10 RX ADMIN — MELATONIN 3 MG: 3 TAB ORAL at 21:23

## 2019-10-10 RX ADMIN — MAGNESIUM CITRATE 296 ML: 1.75 LIQUID ORAL at 12:01

## 2019-10-10 RX ADMIN — METOPROLOL SUCCINATE 25 MG: 25 TABLET, EXTENDED RELEASE ORAL at 21:24

## 2019-10-10 RX ADMIN — TRIAMCINOLONE ACETONIDE: 1 CREAM TOPICAL at 09:52

## 2019-10-10 RX ADMIN — NYSTATIN: 100000 POWDER TOPICAL at 17:04

## 2019-10-10 RX ADMIN — METOPROLOL SUCCINATE 25 MG: 25 TABLET, EXTENDED RELEASE ORAL at 09:49

## 2019-10-10 RX ADMIN — RIVAROXABAN 15 MG: 15 TABLET, FILM COATED ORAL at 17:04

## 2019-10-10 RX ADMIN — ASPIRIN 81 MG: 81 TABLET, COATED ORAL at 09:57

## 2019-10-10 RX ADMIN — FLUOXETINE 40 MG: 20 CAPSULE ORAL at 09:49

## 2019-10-10 RX ADMIN — POLYETHYLENE GLYCOL 3350 17 G: 17 POWDER, FOR SOLUTION ORAL at 09:49

## 2019-10-10 RX ADMIN — RIVAROXABAN 15 MG: 15 TABLET, FILM COATED ORAL at 06:33

## 2019-10-10 RX ADMIN — NYSTATIN: 100000 POWDER TOPICAL at 09:51

## 2019-10-10 RX ADMIN — DOCUSATE SODIUM 100 MG: 100 CAPSULE, LIQUID FILLED ORAL at 17:04

## 2019-10-10 RX ADMIN — PANTOPRAZOLE SODIUM 40 MG: 40 TABLET, DELAYED RELEASE ORAL at 05:48

## 2019-10-10 RX ADMIN — TRIAMCINOLONE ACETONIDE: 1 CREAM TOPICAL at 17:04

## 2019-10-10 RX ADMIN — OXYCODONE HYDROCHLORIDE 5 MG: 5 TABLET ORAL at 21:23

## 2019-10-10 RX ADMIN — CIPROFLOXACIN HYDROCHLORIDE 500 MG: 500 TABLET, FILM COATED ORAL at 21:23

## 2019-10-10 RX ADMIN — OXYCODONE HYDROCHLORIDE 5 MG: 5 TABLET ORAL at 12:53

## 2019-10-10 RX ADMIN — SENNOSIDES 8.6 MG: 8.6 TABLET, FILM COATED ORAL at 21:24

## 2019-10-10 RX ADMIN — ATORVASTATIN CALCIUM 20 MG: 20 TABLET, FILM COATED ORAL at 17:04

## 2019-10-10 NOTE — QUICK NOTE
Discussed with vascular surgery team and cardiology attending, will plan for Xarelto starter pack on discharge (15 mg BID 0-21 days, and 20 mg daily thereafter)  Pt will follow-up with Dr Claudia Alvarado on discharge at the [address] [facility]  Cardiology will sign off at this time      Lana Barrett

## 2019-10-10 NOTE — ASSESSMENT & PLAN NOTE
Patient currently with Ervin in place, however will attempt voiding trial today  States she normally wears adult diapers at home; consider diapers vs bed pan and versus wick while inpatient    10/10-Ervin was removed on 10/08, patient with good urinary output and currently on wick

## 2019-10-10 NOTE — ASSESSMENT & PLAN NOTE
Patient with history of atrial fibrillation, however is not anticoagulated as she is noncompliant with medications  Noted to be in AFib with RVR on EKG-10/06/2019  Currently anticoagulated with heparin drip, will consider lifelong anticoagulation with NOAC pending vascular surgery approval     10/9-discussed with vascular surgery and Cardiology, patient appropriate for lifelong anticoagulation with Xarelto  Will start Xarelto at 15 mg b i d  For an additional 19 days, then transition to 20 mg daily

## 2019-10-10 NOTE — PHYSICAL THERAPY NOTE
PHYSICAL THERAPY NOTE          Patient Name: Ariel Washington  OZBCQ'N Date: 10/10/2019     10/10/19 1103   Pain Assessment   Pain Assessment No/denies pain   Restrictions/Precautions   Other Precautions Multiple lines; Fall Risk   General   Chart Reviewed Yes   Additional Pertinent History cleared for Tx session (spoke to thalia)   Response to Previous Treatment Patient with no complaints from previous session  Cognition   Overall Cognitive Status WFL   Arousal/Participation Alert; Cooperative   Attention Attends with cues to redirect   Orientation Level Oriented to person;Oriented to place;Oriented to situation   Memory Within functional limits   Following Commands Follows one step commands without difficulty   Subjective   Subjective Pt is reclined in the chair; reports feeling better; agreeable to perform therex and mobilize   Transfers   Sit to Stand 4  Minimal assistance   Additional items Assist x 1;Verbal cues   Stand to Sit 4  Minimal assistance   Additional items Assist x 1;Verbal cues   Ambulation/Elevation   Gait pattern Excessively slow; Short stride; Inconsistent rommel   Gait Assistance 4  Minimal assist   Additional items Assist x 1;Verbal cues; Tactile cues  (stand by (A) of 2nd for chair follow)   Assistive Device Rolling walker   Distance 25 ft   Balance   Static Sitting Fair   Static Standing Fair -   Ambulatory Poor +   Activity Tolerance   Activity Tolerance Patient tolerated treatment well   Nurse Made Aware spoke to Joshua Rodriguez RN   Exercises   Knee AROM Long Arc Quad Sitting;10 reps;AROM; Bilateral  (2 sets)   Ankle Pumps Sitting;10 reps;AROM; Bilateral  (2 sets)   Equipment Use   Comments Pt was reclined in the chair w/ LE elevated at the end of session; call bell placed w/in reach; Assessment   Prognosis Good   Problem List Decreased strength;Decreased endurance; Impaired balance;Decreased mobility   Assessment Pt demonstrated mod improvement in tolerance to mobilization progressing to amb trial w/ rw and (A)x1; pt still exhibits min guarding w/ overall weakness and deconditioning likely affecting her balance and mobility skills --> cont to recommend rehab upon D/C when medically cleared; will otherwise cont to follow per updated Tx goals below  Barriers to Discharge Inaccessible home environment;Decreased caregiver support   Goals   Patient Goals to get better   STG Expiration Date 10/20/19   Short Term Goal #1 7-10 days  Pt will amb 150 ft w/ rw, mod (I) in order to facilitate safe return to premorbid environment and to initiate return to community amb status  Pt will negotiate 1 step w/ rw, mod (I) and 3 steps w/ hand rail and SPC PRN, mod (I) in order to assure safe navigation in and out of the premorbid living environment  Pt will achieve (I) level w/ bed mob in order to facilitate safety with OOB and back to bed transitions in own living environment  Pt will perform transfers w/ mod (I) to assure (I) and safety w/ functional mobility/transitions w/ all aspects of mobility/locomotion  PT Treatment Day 1   Plan   Treatment/Interventions Functional transfer training;LE strengthening/ROM; Elevations; Therapeutic exercise; Endurance training;Bed mobility;Gait training;Spoke to nursing;Spoke to case management;OT   Progress Progressing toward goals   PT Frequency Other (Comment)  (3-5x/wk)   Recommendation   Recommendation Post acute IP rehab   Equipment Recommended Mat Prader  (w/ (A))       Emma Nichole, PT

## 2019-10-10 NOTE — PLAN OF CARE
Problem: PHYSICAL THERAPY ADULT  Goal: Performs mobility at highest level of function for planned discharge setting  See evaluation for individualized goals  Description  Treatment/Interventions: Functional transfer training, LE strengthening/ROM, Elevations, Therapeutic exercise, Endurance training, Bed mobility, Gait training, elevations, Spoke to nursing, Spoke to case management, OT, Spoke to MD  Equipment Recommended: Desirae Marshall (w/ (A))       See flowsheet documentation for full assessment, interventions and recommendations  Outcome: Progressing  Note:   Prognosis: Good  Problem List: Decreased strength, Decreased endurance, Impaired balance, Decreased mobility  Assessment: Pt demonstrated mod improvement in tolerance to mobilization progressing to amb trial w/ rw and (A)x1; pt still exhibits min guarding w/ overall weakness and deconditioning likely affecting her balance and mobility skills --> cont to recommend rehab upon D/C when medically cleared; will otherwise cont to follow per updated Tx goals below  Barriers to Discharge: Inaccessible home environment, Decreased caregiver support     Recommendation: Post acute IP rehab          See flowsheet documentation for full assessment

## 2019-10-10 NOTE — ASSESSMENT & PLAN NOTE
· S/p left femoral embolectomy 10/6   · Continue heparin gtt with eventual bridge to coumadin or transition to Eliquis  · Continue ASA  · Patient with hematoma of left groin, complains of moderate discomfort as well as radiculopathy in the left leg  · Physical exam reveals warm extremity with weak pulses palpated  · PT/OT evaluation likely to reveal patient needs short-term skilled nursing facility for physical rehab, will await recommendations and seek placement as appropriate    10/9- Started on Xarelto for lifelong anticoagulation  Stated during exam that she has still not had a bowel movement, will increase bowel regimen  Also with elevated white count, will get chest x-ray to rule out possible pulmonary infiltrates  Likely stable for discharge tomorrow  10/10-patient still with no bowel movement over night despite PEG and senna started  Will trial Mag citrate, and get a UA for elevated white count despite patient denying dysuria or other abnormal urinary symptoms    She has a history of a continence, however that has been well documented in her past

## 2019-10-10 NOTE — PROGRESS NOTES
Progress Note - Luis Sandoval 1940, [de-identified] y o  female MRN: [id number]    Unit/Bed#: [facility] 423-01 Encounter: [id number]    Primary Care Provider: Judi Antoine MD   Date and time admitted to hospital: 10/6/2019  3:34 PM        Urinary incontinence  Assessment & Plan  Patient currently with Ervin in place, however will attempt voiding trial today  States she normally wears adult diapers at home; consider diapers vs bed pan and versus wick while inpatient    10/10-Ervin was removed on 10/08, patient with good urinary output and currently on wick  Onychomycosis  Assessment & Plan  Appreciate Podiatry recommendations, as per Podiatry note:   debrided x10 toenails with large nail nipper without any incidents  - Rx Kenalog daily to be applied on both feet  - recommended patient to follow up with podiatrist outpatient to manage, tenia pedis and possible considering to Rx orthotics as she complaints of unsteady while ambulating     Atrial fibrillation [facility])  Assessment & Plan  Patient with history of atrial fibrillation, however is not anticoagulated as she is noncompliant with medications  Noted to be in AFib with RVR on EKG-10/06/2019  Currently anticoagulated with heparin drip, will consider lifelong anticoagulation with NOAC pending vascular surgery approval     10/9-discussed with vascular surgery and Cardiology, patient appropriate for lifelong anticoagulation with Xarelto  Will start Xarelto at 15 mg b i d  For an additional 19 days, then transition to 20 mg daily  Accelerated essential hypertension  Assessment & Plan  Continue home dose amlodipine, 5 mg b i d  And Toprol, 25 mg b i d    Blood pressure well controlled    Gastroesophageal reflux disease with esophagitis  Assessment & Plan  Continue PPI    Generalized anxiety disorder  Assessment & Plan  Continue home dose Prozac    Mixed hyperlipidemia  Assessment & Plan  10/8-patient not on statin therapy for unclear reasons, will discuss with patient regarding allergies and tolerance  Consider starting  Statin  10/9-patient was started on Lipitor 20 mg q day    * Peripheral artery embolism or thrombosis (HCC)  Assessment & Plan  · S/p left femoral embolectomy 10/6   · Continue heparin gtt with eventual bridge to coumadin or transition to Eliquis  · Continue ASA  · Patient with hematoma of left groin, complains of moderate discomfort as well as radiculopathy in the left leg  · Physical exam reveals warm extremity with weak pulses palpated  · PT/OT evaluation likely to reveal patient needs short-term skilled nursing facility for physical rehab, will await recommendations and seek placement as appropriate    10/9- Started on Xarelto for lifelong anticoagulation  Stated during exam that she has still not had a bowel movement, will increase bowel regimen  Also with elevated white count, will get chest x-ray to rule out possible pulmonary infiltrates  Likely stable for discharge tomorrow  10/10-patient still with no bowel movement over night despite PEG and senna started  Will trial Mag citrate, and get a UA for elevated white count despite patient denying dysuria or other abnormal urinary symptoms  She has a history of a continence, however that has been well documented in her past       VTE Pharmacologic Prophylaxis:   Pharmacologic: Rivaroxaban (Xarelto)  Mechanical VTE Prophylaxis in Place: Yes    Patient Centered Rounds: I have performed bedside rounds with nursing staff today  Discussions with Specialists or Other Care Team Provider:  None    Education and Discussions with Family / Patient:  Care plan discussed with patient voiced understanding and agrees with recommendations  Time Spent for Care: 30 minutes  More than 50% of total time spent on counseling and coordination of care as described above      Current Length of Stay: 4 day(s)    Current Patient Status: Inpatient   Certification Statement: The patient will continue to require additional inpatient hospital stay due to Constipation and possibly occult infection    Discharge Plan:  Likely tomorrow    Code Status: Level 1 - Full Code      Subjective:   Patient seen examined bedside, states she feels constipated with some mild chills  Will obtain a UA, currently 1/for of SIRS criteria  Despite increasing bowel regimen yesterday, patient still with no bowel movement  Will trial Mag citrate  Not stable for discharge at this time, will hold reassess in the morning  Objective:     Vitals:   Temp (24hrs), Av 3 °F (36 8 °C), Min:97 8 °F (36 6 °C), Max:98 7 °F (37 1 °C)    Temp:  [97 8 °F (36 6 °C)-98 7 °F (37 1 °C)] 98 °F (36 7 °C)  HR:  [78-94] 85  Resp:  [16-20] 16  BP: (110-133)/(52-70) 133/63  SpO2:  [92 %-97 %] 92 %  There is no height or weight on file to calculate BMI  Input and Output Summary (last 24 hours): Intake/Output Summary (Last 24 hours) at 10/10/2019 1047  Last data filed at 10/10/2019 0951  Gross per 24 hour   Intake 720 ml   Output 558 ml   Net 162 ml       Physical Exam:     Physical Exam  Constitutional: She is oriented to person, place, and time  She appears well-developed and well-nourished  No distress  HENT:   Head: Normocephalic and atraumatic  Nose: Nose normal    Mouth/Throat: Oropharynx is clear and moist  No oropharyngeal exudate  Eyes: Pupils are equal, round, and reactive to light  Conjunctivae and EOM are normal  Right eye exhibits no discharge  Left eye exhibits no discharge  No scleral icterus  Neck: Normal range of motion  No JVD present  No tracheal deviation present  Cardiovascular: Normal rate  Exam reveals no gallop and no friction rub    No murmur heard  Irregularly irregular rhythm   Pulmonary/Chest: Effort normal and breath sounds normal  No stridor  No respiratory distress  Right basilar rales  Abdominal: Soft  She exhibits no distension and no mass  There is no tenderness  There is no rebound and no guarding  Genitourinary:   Genitourinary Comments: Ervin in place   Musculoskeletal: Normal range of motion  She exhibits no edema, tenderness or deformity  Neurological: She is alert and oriented to person, place, and time  Skin: Skin is warm and dry  She is not diaphoretic  There is erythema (Suprapubic region)    Hematoma left groin   Psychiatric: She has a normal mood and affect  Her behavior is normal  Judgment normal      Additional Data:     Labs:    Results from last 7 days   Lab Units 10/10/19  0449   WBC Thousand/uL 14 24*   HEMOGLOBIN g/dL 10 2*   HEMATOCRIT % 32 0*   PLATELETS Thousands/uL 294   NEUTROS PCT % 73   LYMPHS PCT % 14   MONOS PCT % 8   EOS PCT % 2     Results from last 7 days   Lab Units 10/10/19  0449  10/06/19  1327   SODIUM mmol/L 136   < > 136   POTASSIUM mmol/L 4 2   < > 4 0   CHLORIDE mmol/L 104   < > 98*   CO2 mmol/L 26   < > 20*   CO2, I-STAT   --    < >  --    BUN mg/dL 19   < > 25   CREATININE mg/dL 0 86   < > 1 48*   ANION GAP mmol/L 6   < > 18*   CALCIUM mg/dL 9 4   < > 10 1   ALBUMIN g/dL  --   --  3 9   TOTAL BILIRUBIN mg/dL  --   --  0 50   ALK PHOS U/L  --   --  150*   ALT U/L  --   --  20   AST U/L  --   --  31   GLUCOSE RANDOM mg/dL 141*   < > 275*    < > = values in this interval not displayed  Results from last 7 days   Lab Units 10/08/19  0450   INR  1 24*         Results from last 7 days   Lab Units 10/07/19  0417   HEMOGLOBIN A1C % 7 8*               * I Have Reviewed All Lab Data Listed Above  * Additional Pertinent Lab Tests Reviewed:  All Labs Within Last 24 Hours Reviewed    Imaging:    Imaging Reports Reviewed Today Include:  Chest x-ray  Imaging Personally Reviewed by Myself Includes:  None    Recent Cultures (last 7 days):           Last 24 Hours Medication List:     Current Facility-Administered Medications:  acetaminophen 650 mg Oral Q6H PRN Nurys Ta MD   amLODIPine 5 mg Oral Daily SHIRA Tate   aspirin 81 mg Oral Daily Izabel Lyon MD atorvastatin 20 mg Oral After Avery Rogers MD   docusate sodium 100 mg Oral BID [place] Slates, MD   FLUoxetine 40 mg Oral Daily [facility]   magnesium citrate 296 mL Oral Once [facility], MD   melatonin 3 mg Oral HS [facility]   metoprolol succinate 25 mg Oral Q12H [address] [place]alfont Slates, MD   nystatin  Topical BID [facility], MD   oxyCODONE 10 mg Oral Q4H PRN [facility], MD   oxyCODONE 5 mg Oral Q4H PRN [facility], MD   pantoprazole 40 mg Oral Early Morning [facility], [facility]   phenol 1 spray Mouth/Throat Q2H PRN [facility], MD   polyethylene glycol 17 g Oral Daily [facility], MD   rivaroxaban 15 mg Oral BID With Meals SHIRA Schneider   senna 1 tablet Oral HS [facility], MD   triamcinolone  Topical BID [facility], MD        Today, Patient Was Seen By: Stephie August MD    ** Please Note: Dictation voice to text software may have been used in the creation of this document   **

## 2019-10-10 NOTE — OCCUPATIONAL THERAPY NOTE
OccupationalTherapy Progress Note     Patient Name: Dorys Chavarria  [id number] Date: 10/10/2019  Problem List  Principal Problem:    Peripheral artery embolism or thrombosis (HCC)  Active Problems:    History of permanent cardiac pacemaker placement    Mixed hyperlipidemia    Generalized anxiety disorder    Gastroesophageal reflux disease with esophagitis    Depression    Accelerated essential hypertension    Ischemia of left lower extremity    Atrial fibrillation (HCC)    Onychomycosis    Left femoral embolectomy    Urinary incontinence          10/10/19 0930   Lifestyle   Autonomy PTA PT REPORTS I IN ADLS/IADLS/ NEIGHBORS DRIVE HER PLACES; REPORTS SHE HAS HAD MORE DIFFICULTY RECENTLY MANAGING HER HOME   Reciprocal Relationships LOCAL NEIGHBORS WHO ASSIST HER    Service to Others RETIRED   Intrinsic Gratification ENJOYS WATCHING TV   Pain Assessment   Pain Assessment No/denies pain   Bed Mobility   Supine to Sit 4  Minimal assistance   Additional items Assist x 1; Increased time required   Transfers   Sit to Stand 4  Minimal assistance   Additional items Assist x 2; Increased time required;Verbal cues   Stand to Sit 4  Minimal assistance   Additional items Assist x 2; Increased time required;Verbal cues   Functional Mobility   Functional Mobility 3  Moderate assistance   Additional Comments Pt took a few steps walking from bed to chair with mod A and VC for proper hand placement with walker  Additional items Rolling walker   Cognition   Overall Cognitive Status WFL   Arousal/Participation Alert; Cooperative   Attention Attends with cues to redirect   Orientation Level Oriented X4   Memory Within functional limits   Following Commands Follows one step commands without difficulty   Activity Tolerance   Activity Tolerance Patient limited by fatigue;Patient limited by pain   Medical Staff Made Aware RN confirmed okay to see pt   Assessment   Assessment Patient participated in Skilled OT session this date with interventions consisting of functional mobility and functional transfer training  Patient agreeable to OT treatment session, upon arrival patient was found supine in bed  In comparison to previous session, patient with improvements in functional transfers  Patient requiring verbal cues for correct technique with walker use during transfers  Patient continues to be functioning below baseline level, occupational performance remains limited secondary to factors listed above and increased risk for falls and injury  From OT standpoint, recommendation at time of d/c would be Short Term Rehab  Patient to benefit from continued Occupational Therapy treatment while in the hospital to address deficits as defined above and maximize level of functional independence with ADLs and functional mobility  Plan   Treatment Interventions ADL retraining;Functional transfer training; Endurance training;Patient/family training;Equipment evaluation/education; Compensatory technique education;Continued evaluation; Energy conservation; Activityengagement   Goal Expiration Date 10/18/19   OT Treatment Day 2   OT Frequency 3-5x/wk   Recommendation   OT Discharge Recommendation Short Term Rehab     [place], [place], OTR/L

## 2019-10-10 NOTE — PLAN OF CARE
Problem: OCCUPATIONAL THERAPY ADULT  Goal: Performs self-care activities at highest level of function for planned discharge setting  See evaluation for individualized goals  Description  Treatment Interventions: ADL retraining, Functional transfer training, Endurance training, Patient/family training, Equipment evaluation/education, Compensatory technique education, Continued evaluation, Energy conservation, Activityengagement          See flowsheet documentation for full assessment, interventions and recommendations  Outcome: Progressing  Note:   Limitation: Decreased ADL status, Decreased Safe judgement during ADL, Decreased endurance, Decreased self-care trans, Decreased high-level ADLs  Prognosis: Good  Assessment: Patient participated in Skilled OT session this date with interventions consisting of functional mobility and functional transfer training  Patient agreeable to OT treatment session, upon arrival patient was found supine in bed  In comparison to previous session, patient with improvements in functional transfers  Patient requiring verbal cues for correct technique with walker use during transfers  Patient continues to be functioning below baseline level, occupational performance remains limited secondary to factors listed above and increased risk for falls and injury  From OT standpoint, recommendation at time of d/c would be Short Term Rehab  Patient to benefit from continued Occupational Therapy treatment while in the hospital to address deficits as defined above and maximize level of functional independence with ADLs and functional mobility        OT Discharge Recommendation: Short Term Rehab

## 2019-10-11 ENCOUNTER — TRANSITIONAL CARE MANAGEMENT (OUTPATIENT)
Dept: FAMILY MEDICINE CLINIC | Facility: HOSPITAL | Age: 79
End: 2019-10-11

## 2019-10-11 VITALS
SYSTOLIC BLOOD PRESSURE: 122 MMHG | OXYGEN SATURATION: 100 % | HEART RATE: 79 BPM | RESPIRATION RATE: 18 BRPM | DIASTOLIC BLOOD PRESSURE: 60 MMHG | TEMPERATURE: 98.5 F

## 2019-10-11 LAB
ANION GAP SERPL CALCULATED.3IONS-SCNC: 5 MMOL/L (ref 4–13)
BASOPHILS # BLD AUTO: 0.07 THOUSANDS/ΜL (ref 0–0.1)
BASOPHILS NFR BLD AUTO: 1 % (ref 0–1)
BUN SERPL-MCNC: 20 MG/DL (ref 5–25)
CALCIUM SERPL-MCNC: 9.3 MG/DL (ref 8.3–10.1)
CHLORIDE SERPL-SCNC: 103 MMOL/L (ref 100–108)
CO2 SERPL-SCNC: 28 MMOL/L (ref 21–32)
CREAT SERPL-MCNC: 0.81 MG/DL (ref 0.6–1.3)
EOSINOPHIL # BLD AUTO: 0.33 THOUSAND/ΜL (ref 0–0.61)
EOSINOPHIL NFR BLD AUTO: 2 % (ref 0–6)
ERYTHROCYTE [DISTWIDTH] IN BLOOD BY AUTOMATED COUNT: 13.6 % (ref 11.6–15.1)
GFR SERPL CREATININE-BSD FRML MDRD: 69 ML/MIN/1.73SQ M
GLUCOSE SERPL-MCNC: 135 MG/DL (ref 65–140)
HCT VFR BLD AUTO: 30.3 % (ref 34.8–46.1)
HGB BLD-MCNC: 9.4 G/DL (ref 11.5–15.4)
IMM GRANULOCYTES # BLD AUTO: 0.24 THOUSAND/UL (ref 0–0.2)
IMM GRANULOCYTES NFR BLD AUTO: 2 % (ref 0–2)
LYMPHOCYTES # BLD AUTO: 1.78 THOUSANDS/ΜL (ref 0.6–4.47)
LYMPHOCYTES NFR BLD AUTO: 13 % (ref 14–44)
MCH RBC QN AUTO: 30.2 PG (ref 26.8–34.3)
MCHC RBC AUTO-ENTMCNC: 31 G/DL (ref 31.4–37.4)
MCV RBC AUTO: 97 FL (ref 82–98)
MONOCYTES # BLD AUTO: 1.3 THOUSAND/ΜL (ref 0.17–1.22)
MONOCYTES NFR BLD AUTO: 10 % (ref 4–12)
NEUTROPHILS # BLD AUTO: 9.88 THOUSANDS/ΜL (ref 1.85–7.62)
NEUTS SEG NFR BLD AUTO: 72 % (ref 43–75)
NRBC BLD AUTO-RTO: 0 /100 WBCS
PLATELET # BLD AUTO: 361 THOUSANDS/UL (ref 149–390)
PMV BLD AUTO: 10.6 FL (ref 8.9–12.7)
POTASSIUM SERPL-SCNC: 4.2 MMOL/L (ref 3.5–5.3)
RBC # BLD AUTO: 3.11 MILLION/UL (ref 3.81–5.12)
SODIUM SERPL-SCNC: 136 MMOL/L (ref 136–145)
WBC # BLD AUTO: 13.6 THOUSAND/UL (ref 4.31–10.16)

## 2019-10-11 PROCEDURE — 99239 HOSP IP/OBS DSCHRG MGMT >30: CPT | Performed by: INTERNAL MEDICINE

## 2019-10-11 PROCEDURE — 80048 BASIC METABOLIC PNL TOTAL CA: CPT | Performed by: INTERNAL MEDICINE

## 2019-10-11 PROCEDURE — 85025 COMPLETE CBC W/AUTO DIFF WBC: CPT | Performed by: INTERNAL MEDICINE

## 2019-10-11 RX ORDER — RIVAROXABAN 15 MG-20MG
KIT ORAL
Qty: 1 PACKAGE | Refills: 0 | Status: SHIPPED | OUTPATIENT
Start: 2019-10-11 | End: 2019-11-07 | Stop reason: ALTCHOICE

## 2019-10-11 RX ORDER — TRIAMCINOLONE ACETONIDE 1 MG/G
CREAM TOPICAL 2 TIMES DAILY
Qty: 30 G | Refills: 0 | Status: SHIPPED | OUTPATIENT
Start: 2019-10-11 | End: 2019-10-11

## 2019-10-11 RX ORDER — ASPIRIN 81 MG/1
81 TABLET ORAL DAILY
Qty: 60 TABLET | Refills: 0 | Status: SHIPPED | OUTPATIENT
Start: 2019-10-11 | End: 2019-10-11

## 2019-10-11 RX ORDER — ASPIRIN 81 MG/1
81 TABLET ORAL DAILY
Qty: 60 TABLET | Refills: 0 | Status: SHIPPED | OUTPATIENT
Start: 2019-10-11

## 2019-10-11 RX ORDER — SENNOSIDES 8.6 MG
1 TABLET ORAL
Qty: 120 EACH | Refills: 0 | Status: SHIPPED | OUTPATIENT
Start: 2019-10-11 | End: 2019-10-11

## 2019-10-11 RX ORDER — ATORVASTATIN CALCIUM 20 MG/1
20 TABLET, FILM COATED ORAL
Qty: 60 TABLET | Refills: 0 | Status: SHIPPED | OUTPATIENT
Start: 2019-10-11 | End: 2019-10-11

## 2019-10-11 RX ORDER — ATORVASTATIN CALCIUM 20 MG/1
20 TABLET, FILM COATED ORAL
Qty: 60 TABLET | Refills: 0 | Status: SHIPPED | OUTPATIENT
Start: 2019-10-11

## 2019-10-11 RX ORDER — TRIAMCINOLONE ACETONIDE 1 MG/G
CREAM TOPICAL 2 TIMES DAILY
Qty: 30 G | Refills: 0 | Status: SHIPPED | OUTPATIENT
Start: 2019-10-11

## 2019-10-11 RX ORDER — SENNOSIDES 8.6 MG
1 TABLET ORAL
Qty: 120 EACH | Refills: 0 | Status: SHIPPED | OUTPATIENT
Start: 2019-10-11

## 2019-10-11 RX ORDER — CIPROFLOXACIN 500 MG/1
500 TABLET, FILM COATED ORAL EVERY 12 HOURS SCHEDULED
Qty: 10 TABLET | Refills: 0 | Status: SHIPPED | OUTPATIENT
Start: 2019-10-11 | End: 2019-10-16

## 2019-10-11 RX ORDER — CIPROFLOXACIN 500 MG/1
500 TABLET, FILM COATED ORAL EVERY 12 HOURS SCHEDULED
Qty: 10 TABLET | Refills: 0 | Status: SHIPPED | OUTPATIENT
Start: 2019-10-11 | End: 2019-10-11

## 2019-10-11 RX ADMIN — FLUOXETINE 40 MG: 20 CAPSULE ORAL at 09:21

## 2019-10-11 RX ADMIN — ASPIRIN 81 MG: 81 TABLET, COATED ORAL at 09:59

## 2019-10-11 RX ADMIN — NYSTATIN: 100000 POWDER TOPICAL at 09:22

## 2019-10-11 RX ADMIN — DOCUSATE SODIUM 100 MG: 100 CAPSULE, LIQUID FILLED ORAL at 09:21

## 2019-10-11 RX ADMIN — PANTOPRAZOLE SODIUM 40 MG: 40 TABLET, DELAYED RELEASE ORAL at 06:31

## 2019-10-11 RX ADMIN — RIVAROXABAN 15 MG: 15 TABLET, FILM COATED ORAL at 06:30

## 2019-10-11 RX ADMIN — CIPROFLOXACIN HYDROCHLORIDE 500 MG: 500 TABLET, FILM COATED ORAL at 09:21

## 2019-10-11 RX ADMIN — POLYETHYLENE GLYCOL 3350 17 G: 17 POWDER, FOR SOLUTION ORAL at 09:21

## 2019-10-11 RX ADMIN — AMLODIPINE BESYLATE 5 MG: 5 TABLET ORAL at 09:22

## 2019-10-11 RX ADMIN — METOPROLOL SUCCINATE 25 MG: 25 TABLET, EXTENDED RELEASE ORAL at 09:23

## 2019-10-11 RX ADMIN — TRIAMCINOLONE ACETONIDE: 1 CREAM TOPICAL at 09:22

## 2019-10-11 NOTE — DISCHARGE SUMMARY
Progress Note - Sean Sandoval 1940, [de-identified] y o  female MRN: [id number]    Unit/Bed#: [facility] 423-01 Encounter: [id number]    Primary Care Provider: Maryam Kamara MD   Date and time admitted to hospital: 10/6/2019  3:34 PM        Urinary incontinence  Assessment & Plan  Patient currently with Ervin in place, however will attempt voiding trial today  States she normally wears adult diapers at home; consider diapers vs bed pan and versus wick while inpatient    10/10-Ervin was removed on 10/08, patient with good urinary output and currently on wick  Onychomycosis  Assessment & Plan  Appreciate Podiatry recommendations, as per Podiatry note:   debrided x10 toenails with large nail nipper without any incidents  - Rx Kenalog daily to be applied on both feet  - recommended patient to follow up with podiatrist outpatient to manage, tenia pedis and possible considering to Rx orthotics as she complaints of unsteady while ambulating     Atrial fibrillation [facility])  Assessment & Plan  Patient with history of atrial fibrillation, however is not anticoagulated as she is noncompliant with medications  Noted to be in AFib with RVR on EKG-10/06/2019  Currently anticoagulated with heparin drip, will consider lifelong anticoagulation with NOAC pending vascular surgery approval     10/9-discussed with vascular surgery and Cardiology, patient appropriate for lifelong anticoagulation with Xarelto  Will start Xarelto at 15 mg b i d  For an additional 19 days, then transition to 20 mg daily  10/11 - patient has received 3 days of anticoagulation with Xarelto, 15 mg b i d     Will need an additional 18 days of therapy with same; then transition to 20 mg daily  Accelerated essential hypertension  Assessment & Plan  Continue home dose amlodipine, 5 mg b i d  And Toprol, 25 mg b i d    Blood pressure well controlled    Gastroesophageal reflux disease with esophagitis  Assessment & Plan  Continue PPI    Generalized anxiety disorder  Assessment & Plan  Continue home dose Prozac    Mixed hyperlipidemia  Assessment & Plan  10/8-patient not on statin therapy for unclear reasons, will discuss with patient regarding allergies and tolerance  Consider starting  Statin  10/9-patient was started on Lipitor 20 mg q day    * Peripheral artery embolism or thrombosis (HCC)  Assessment & Plan  · S/p left femoral embolectomy 10/6   · Continue heparin gtt with eventual bridge to coumadin or transition to Eliquis  · Continue ASA  · Patient with hematoma of left groin, complains of moderate discomfort as well as radiculopathy in the left leg  · Physical exam reveals warm extremity with weak pulses palpated  · PT/OT evaluation likely to reveal patient needs short-term skilled nursing facility for physical rehab, will await recommendations and seek placement as appropriate    10/9- Started on Xarelto for lifelong anticoagulation  Stated during exam that she has still not had a bowel movement, will increase bowel regimen  Also with elevated white count, will get chest x-ray to rule out possible pulmonary infiltrates  Likely stable for discharge tomorrow  10/10-patient still with no bowel movement over night despite PEG and senna started  Will trial Mag citrate, and get a UA for elevated white count despite patient denying dysuria or other abnormal urinary symptoms  She has a history of a continence, however that has been well documented in her past     10/11-patient with bowel movement yesterday, vital signs stable and white count is trending down  Patient stable for discharge to rehabilitation        Discharging Physician / Practitioner: Wilfrid Dhaliwal MD  PCP: Shawna Vázquez MD  Admission Date:   Admission Orders (From admission, onward)     Ordered        10/06/19 1957  Inpatient Admission  Once                   Discharge Date: 10/11/19    Resolved Problems  Date Reviewed: 10/8/2019    None          Consultations [facility]:  · Vascular surgery, Cardiology, Critical Care, Podiatry    Procedures Performed:   · Embolectomy left lower extremity    Significant Findings / Test Results:   · Left lower extremity embolus    Incidental Findings:   · None    Test Results Pending at Discharge (will require follow up): · None     Outpatient Tests Requested:  · None    Complications:  None    Reason for Admission:  Lower extremity pain and numbness    Hospital Course:       Please see above list of diagnoses and related plan for additional information  Condition at Discharge: stable     Discharge Day Visit / Exam:     Subjective:  Patient seen examined today, had bowel movement yesterday  Vital signs stable and labs large within normal limits  White count declining, has been cleared by vascular surgery for participation in PT/OT  Talita Liu will be removed at outpatient follow-up  Cleared by Cardiology and patient will continue treatment with Xarelto, 15 mg b i d  For an additional 18 days  At which time she will then transition to 20 mg Xarelto daily  Patient stable for discharge at this time  Vitals: Blood Pressure: 127/59 (10/11/19 0743)  Pulse: 81 (10/11/19 0743)  Temperature: 98 5 °F (36 9 °C) (10/11/19 0743)  Temp Source: Oral (10/11/19 0743)  Respirations: 18 (10/11/19 0743)  SpO2: 100 % (10/11/19 0743)  Exam:   Physical Exam   Constitutional: She is oriented to person, place, and time  She appears well-developed and well-nourished  No distress  HENT:   Head: Normocephalic and atraumatic  Nose: Nose normal    Mouth/Throat: Oropharynx is clear and moist  No oropharyngeal exudate  Eyes: Pupils are equal, round, and reactive to light  Conjunctivae and EOM are normal  Right eye exhibits no discharge  Left eye exhibits no discharge  No scleral icterus  Neck: Normal range of motion  No JVD present  No tracheal deviation present  Cardiovascular: Normal rate and normal heart sounds   Exam reveals no gallop and no friction rub    No murmur heard  Irregularly irregular rhythm   Pulmonary/Chest: Effort normal and breath sounds normal  No stridor  No respiratory distress  She has no wheezes  She has no rales  Abdominal: Soft  She exhibits no distension and no mass  There is no tenderness  There is no rebound and no guarding  Musculoskeletal: Normal range of motion  She exhibits no edema, tenderness or deformity  Neurological: She is alert and oriented to person, place, and time  Skin: Skin is warm and dry  She is not diaphoretic  Psychiatric: She has a normal mood and affect  Her behavior is normal  Judgment normal    Nursing note and vitals reviewed  Discharge instructions/Information to patient and family:   See after visit summary for information provided to patient and family  Provisions for Follow-Up Care:  See after visit summary for information related to follow-up care and any pertinent home health orders  Disposition:     Assisted Living Facility at [facility]    For Discharges to [facility] SNF:   · Not Applicable to this Patient - Not Applicable to this Patient    Planned Readmission:  None     Discharge Statement:  I spent 35 minutes discharging the patient  This time was spent on the day of discharge  I had direct contact with the patient on the day of discharge  Greater than 50% of the total time was spent examining patient, answering all patient questions, arranging and discussing plan of care with patient as well as directly providing post-discharge instructions  Additional time then spent on discharge activities  Discharge Medications:  See after visit summary for reconciled discharge medications provided to patient and family        ** Please Note: This note has been constructed using a voice recognition system **

## 2019-10-11 NOTE — ASSESSMENT & PLAN NOTE
· S/p left femoral embolectomy 10/6   · Continue heparin gtt with eventual bridge to coumadin or transition to Eliquis  · Continue ASA  · Patient with hematoma of left groin, complains of moderate discomfort as well as radiculopathy in the left leg  · Physical exam reveals warm extremity with weak pulses palpated  · PT/OT evaluation likely to reveal patient needs short-term skilled nursing facility for physical rehab, will await recommendations and seek placement as appropriate    10/9- Started on Xarelto for lifelong anticoagulation  Stated during exam that she has still not had a bowel movement, will increase bowel regimen  Also with elevated white count, will get chest x-ray to rule out possible pulmonary infiltrates  Likely stable for discharge tomorrow  10/10-patient still with no bowel movement over night despite PEG and senna started  Will trial Mag citrate, and get a UA for elevated white count despite patient denying dysuria or other abnormal urinary symptoms  She has a history of a continence, however that has been well documented in her past     10/11-patient with bowel movement yesterday, vital signs stable and white count is trending down  Patient stable for discharge to rehabilitation

## 2019-10-11 NOTE — NURSING NOTE
Pt d/c to [address] [place] [address],[facility] per transport team on stretcher  Paperwork and prescriptions faxed to facility  Pt d/c with all paperwork and belongings   Report called to Bindu from [address] [place] [address],[facility]

## 2019-10-11 NOTE — ASSESSMENT & PLAN NOTE
Patient with history of atrial fibrillation, however is not anticoagulated as she is noncompliant with medications  Noted to be in AFib with RVR on EKG-10/06/2019  Currently anticoagulated with heparin drip, will consider lifelong anticoagulation with NOAC pending vascular surgery approval     10/9-discussed with vascular surgery and Cardiology, patient appropriate for lifelong anticoagulation with Xarelto  Will start Xarelto at 15 mg b i d  For an additional 19 days, then transition to 20 mg daily  10/11 - patient has received 3 days of anticoagulation with Xarelto, 15 mg b i d     Will need an additional 18 days of therapy with same; then transition to 20 mg daily

## 2019-10-11 NOTE — SOCIAL WORK
Pt is cleared for d/c by CATIE Pina  RN Meseret Suggs was notified of d/c order  Pt is accepted for short-term skilled rehab placement at [facility] located in [address] [place] [address]  CM arranged BLS ambulance via [facility] for 11:00AM pickup this day to transport pt to SNF  The pt and her family were informed of d/c  IMM signed by pt on 10/9/19  CMN completed and faxed to [facility] (f: [phone])  Chart copy requested for SNF  CM to follow

## 2019-10-11 NOTE — TRANSPORTATION MEDICAL NECESSITY
Section I - General Information    Name of Patient: Harshad Granda                 : 1940    Medicare #: [id number]  Transport Date: 10/11/19 (PCS is valid for round trips on this date and for all repetitive trips in the 60-day range as noted below )  Origin: [facility]                                                         Destination: [facility] / Willem CHIN  Is the pt's stay covered under Medicare Part A (PPS/DRG)   [x]     Closest appropriate facility? If no, why is transport to more distant facility required? Yes  If hospice pt, is this transport related to pt's terminal illness? NA       Section II - Medical Necessity Questionnaire  Ambulance transportation is medically necessary only if other means of transport are contraindicated or would be potentially harmful to the patient  To meet this requirement, the patient must either be "bed confined" or suffer from a condition such that transport by means other than ambulance is contraindicated by the patient's condition  The following questions must be answered by the medical professional signing below for this form to be valid:    1)  Describe the MEDICAL CONDITION (physical and/or mental) of this patient AT [address] [place] [address] that requires the patient to be transported in an ambulance and why transport by other means is contraindicated by the patient's condition: PATIENT REQUIRES 2L OF O2 AND IS UNABLE TO SELF ADMINISTER  PATIENT HAS GENERALIZED WEAKNESS AND IS A FALL RISK DUE TO THIS  PATIENT WILL FALL/SLUMP OVER IF LEFT SEATED UPRIGHT FOR TIME OF TRANSPORT  2) Is the patient "bed confined" as defined below? No  To be "be confined" the patient must satisfy all three of the following conditions: (1) unable to get up from bed without Assistance; AND (2) unable to ambulate; AND (3) unable to sit in a chair or wheelchair      3) Can this patient safely be transported by car or wheelchair van (i e , seated during transport without a medical attendant or monitoring)? No    4) In addition to completing questions 1-3 above, please check any of the following conditions that apply*:   *Note: supporting documentation for any boxes checked must be maintained in the patient's medical records  If hosp-hosp transfer, describe services needed at 2nd facility not available at 1st facility? Patient is confused  Moderate/severe pain on movement   Danger to self/others  Medical attendant required   Requires oxygen-unable to self administer  Unable to tolerate seated position for time needed to transport       Section III - Signature of Physician or Healthcare Professional  I certify that the above information is true and correct based on my evaluation of this patient, and represent that the patient requires transport by ambulance and that other forms of transport are contraindicated  I understand that this information will be used by the Centers for Medicare and Medicaid Services (CMS) to support the determination of medical necessity for ambulance services, and I represent that I have personal knowledge of the patient's condition at time of transport  []  If this box is checked, I also certify that the patient is physically or mentally incapable of signing the ambulance service's claim and that the institution with which I am affiliated has furnished care, services, or assistance to the patient  My signature below is made on behalf of the patient pursuant to 42 CFR §424 36(b)(4)   In accordance with 42 CFR §424 37, the specific reason(s) that the patient is physically or mentally incapable of signing the claim form is as follows:       Signature of Physician* or Healthcare Professional______________________________________________________________  Signature Date 10/11/19 (For scheduled repetitive transports, this form is not valid for transports performed more than 60 days after this date)    Printed Name & Credentials of Physician or Healthcare Professional (MD, DO, RN, etc )________________________________  *Form must be signed by patient's attending physician for scheduled, repetitive transports   For non-repetitive, unscheduled ambulance transports, if unable to obtain the signature of the attending physician, any of the following may sign (choose appropriate option below)  [] Physician Assistant []  Clinical Nurse Specialist []  Registered Nurse  []  Nurse Practitioner  [x] Discharge Planner

## 2019-10-12 LAB — BACTERIA UR CULT: ABNORMAL

## 2019-10-16 ENCOUNTER — TRANSITIONAL CARE MANAGEMENT (OUTPATIENT)
Dept: FAMILY MEDICINE CLINIC | Facility: HOSPITAL | Age: 79
End: 2019-10-16

## 2019-10-23 ENCOUNTER — OFFICE VISIT (OUTPATIENT)
Dept: VASCULAR SURGERY | Facility: CLINIC | Age: 79
End: 2019-10-23

## 2019-10-23 VITALS
RESPIRATION RATE: 18 BRPM | HEART RATE: 75 BPM | TEMPERATURE: 97.5 F | SYSTOLIC BLOOD PRESSURE: 104 MMHG | DIASTOLIC BLOOD PRESSURE: 62 MMHG

## 2019-10-23 DIAGNOSIS — I74.4 PERIPHERAL ARTERY EMBOLISM OR THROMBOSIS (HCC): Primary | ICD-10-CM

## 2019-10-23 DIAGNOSIS — Z98.890 HISTORY OF EMBOLECTOMY: ICD-10-CM

## 2019-10-23 PROCEDURE — 99024 POSTOP FOLLOW-UP VISIT: CPT | Performed by: NURSE PRACTITIONER

## 2019-10-23 RX ORDER — LANOLIN ALCOHOL/MO/W.PET/CERES
3 CREAM (GRAM) TOPICAL
COMMUNITY

## 2019-10-23 RX ORDER — GABAPENTIN 100 MG/1
100 CAPSULE ORAL EVERY MORNING
COMMUNITY

## 2019-10-23 RX ORDER — GABAPENTIN 300 MG/1
300 CAPSULE ORAL
COMMUNITY

## 2019-10-23 NOTE — ASSESSMENT & PLAN NOTE
[de-identified] yo F w/ hx of CAD, s/p bioprosthetic AoV, s/p PPM, afib not previously on anticoagulation (patient refused), HTN, who presented to the ER with acute LLE ischemia most likely 2/2 cardiac embolus after a mechanical fall  Patient s/p L femoral embolectomy 10/6/19 by Dr Rashel Kim Doctor  She was d/c on ASA, statin, and Xarelto to nursing facility  She presents to Vascular office today for post-operative f/u and possible staple removal     Patient with large 8-9cm x 6-7cm palpable lump to the left thigh at the incision site  Staples intact, minimal slough to the medial incision but no   wound dehiscence or drainage noted  The area is not red, warm or showing signs of infection  Patient denies any chest pain, shortness of breath, fever, chills, nausea, vomiting  She remains motor and sensory intact to left lower extremity with Doppler signals to PT and DP arteries  Discussed case with Dr Rashel Kim Doctor and reviewed clinical images  Will advise for continued wound care including washing incision with soap and water, pat dry and keep clean and dry with gauze dressing  No lotions, powders, ointments to the area  Patient is aware of instructions and instructions sent to nursing facility  If wound starts to break down or dehisce, or patient develops any s/s of infection, she is aware to call the office or go to the ER at [facility] for further evaluation  We will keep a close eye on the incision and have patient return for f/u in 2 weeks  Susanna Schmidt will remain intact and may be removed at next office visit if deemed appropriate     -cotinue medical management including ASA/Statin and Xarelto  -Continue with moisturizer to lower leg/foot  -continue with PT/OT and rehab  -if any changes to the left leg signs of ischemia, patient aware to go to the ER for evaluation    -She will need LEAD in 3 months - this was ordered today with f/u after LEAD is completed

## 2019-10-23 NOTE — PROGRESS NOTES
Assessment/Plan:    Peripheral artery embolism or thrombosis ([facility] [address] )  [de-identified] yo F w/ hx of CAD, s/p bioprosthetic AoV, s/p PPM, afib not previously on anticoagulation (patient refused), HTN, who presented to the ER with acute LLE ischemia most likely 2/2 cardiac embolus after a mechanical fall  Patient s/p L femoral embolectomy 10/6/19 by Dr Toni Cannon Doctor  She was d/c on ASA, statin, and Xarelto to nursing facility  She presents to Vascular office today for post-operative f/u and possible staple removal     Patient with large 8-9cm x 6-7cm palpable lump to the left thigh at the incision site  Staples intact, minimal slough to the medial incision but no   wound dehiscence or drainage noted  The area is not red, warm or showing signs of infection  Patient denies any chest pain, shortness of breath, fever, chills, nausea, vomiting  She remains motor and sensory intact to left lower extremity with Doppler signals to PT and DP arteries  Discussed case with Dr Toni Cannon Doctor and reviewed clinical images  Will advise for continued wound care including washing incision with soap and water, pat dry and keep clean and dry with gauze dressing  No lotions, powders, ointments to the area  Patient is aware of instructions and instructions sent to nursing facility  If wound starts to break down or dehisce, or patient develops any s/s of infection, she is aware to call the office or go to the ER at [facility] for further evaluation  We will keep a close eye on the incision and have patient return for f/u in 2 weeks    Sabrina Espinoza will remain intact and may be removed at next office visit if deemed appropriate     -cotinue medical management including ASA/Statin and Xarelto  -Continue with moisturizer to lower leg/foot  -continue with PT/OT and rehab  -if any changes to the left leg signs of ischemia, patient aware to go to the ER for evaluation    -She will need LEAD in 3 months - this was ordered today with f/u after LEAD is completed  Diagnoses and all orders for this visit:    Peripheral artery embolism or thrombosis ([facility] [address] )    Left femoral embolectomy    Other orders  -     gabapentin (NEURONTIN) 100 mg capsule; Take 100 mg by mouth every morning  -     gabapentin (NEURONTIN) 300 mg capsule; Take 300 mg by mouth daily at bedtime  -     melatonin 3 mg; Take 3 mg by mouth daily at bedtime as needed          Subjective:      Patient ID: Charlene Rutherford is a [de-identified] y o  female  Patient s/p left femoral embolectomy 10/6/19  Patient complains of swelling to left leg  Patient complains of "lump" to top of left leg near groin  Patient denies complaints of fever, chills, warmth, drainage, redness to incision sites  Patient currently residing at [address] [place] [address] is taking Angelo Shaye is a [de-identified] yo F w/ hx of CAD, s/p bioprosthetic AoV, s/p PPM, afib not previously on anticoagulation (patient refused), HTN, who presented to the ER with acute LLE ischemia most likely 2/2 cardiac embolus after a mechanical fall  Patient s/p L femoral embolectomy 10/6/19 by Dr Arthur Vital Doctor  She was d/c'd from the hospital on ASA, statin, and Xarelto to nursing facility where she continues to participate in rehab  She presents to Vascular office today for post-operative f/u and possible staple removal     Patient with large 8-9cm x 6-7cm palpable lump to the left thigh at the incision site  Staples intact, minimal slough to the medial incision but no wound dehiscence or drainage noted  The area is not red, warm or showing signs of infection  Dressing to the left groin with serous fluid  She does have a malodor but unclear that this is directly related to the left groin vs pannus vs incontinence  Patient denies any chest pain, shortness of breath, fever, chills, nausea, vomiting  She remains motor and sensory intact to left lower extremity with Doppler signals to PT and DP arteries            The following portions of the patient's history were reviewed and updated as appropriate: allergies, current medications, past family history, past medical history, past social history, past surgical history and problem list     Review of Systems   Constitutional: Negative  Negative for chills and fever  HENT: Negative  Eyes: Negative  Respiratory: Negative  Negative for shortness of breath  Cardiovascular: Positive for leg swelling (Left leg)  Negative for chest pain  Gastrointestinal: Negative  Endocrine: Negative  Genitourinary: Negative  Musculoskeletal: Positive for gait problem (Using walker and wheelchair  )  Skin: Negative  Allergic/Immunologic: Negative  Neurological: Positive for numbness (Left leg)  Hematological: Negative  Psychiatric/Behavioral: Negative  Objective:      /62 (BP Location: Left arm, Patient Position: Sitting)   Pulse 75   Temp 97 5 °F (36 4 °C) (Tympanic)   Resp 18          Physical Exam   Constitutional: She is oriented to person, place, and time  She appears well-developed and well-nourished  HENT:   Head: Normocephalic and atraumatic  Eyes: Pupils are equal, round, and reactive to light  EOM are normal  No scleral icterus  Neck: Normal range of motion  Cardiovascular: Normal rate, regular rhythm and normal heart sounds  Doppler signals are biphasic to the LEFT PT/DP  Signals to R DP/PT obtained as well, not as strong   Pulmonary/Chest: Effort normal and breath sounds normal    Abdominal: Soft  Bowel sounds are normal    Musculoskeletal: Normal range of motion  Neurological: She is alert and oriented to person, place, and time  Skin: Skin is warm and dry  Capillary refill takes less than 2 seconds  Psychiatric: She has a normal mood and affect  Her speech is normal and behavior is normal  Judgment and thought content normal  Cognition and memory are normal    Nursing note and vitals reviewed        LEFT GROIN:            I have reviewed and made appropriate changes to the review of systems input by the medical assistant      Vitals:    10/23/19 1308   BP: 104/62   BP Location: Left arm   Patient Position: Sitting   Pulse: 75   Resp: 18   Temp: 97 5 °F (36 4 °C)   TempSrc: Tympanic       Patient Active Problem List   Diagnosis    History of permanent cardiac pacemaker placement    Mixed hyperlipidemia    Generalized anxiety disorder    Gastroesophageal reflux disease with esophagitis    Depression    Benign familial tremor    Accelerated essential hypertension    Abnormal blood chemistry    Neuropathy    Primary insomnia    Essential hypertension    Coronary artery disease involving native coronary artery of native heart without angina pectoris    Sciatic nerve pain, right    Obesity (BMI 35 0-39 9 without comorbidity)    Globus sensation    Nocturnal enuresis    Ischemia of left lower extremity    Peripheral artery embolism or thrombosis (HCC)    Atrial fibrillation (HCC)    Onychomycosis    Left femoral embolectomy    Urinary incontinence       Past Surgical History:   Procedure Laterality Date    AORTIC VALVE REPLACEMENT  12/2012    CARDIAC PACEMAKER PLACEMENT      dual chamber last assessed 10/14/13    CHOLECYSTECTOMY      IR LOWER EXTREMITY / INTERVENTION  10/6/2019    JOINT REPLACEMENT      B/L knee replacement    THROMBECTOMY W/ EMBOLECTOMY N/A 10/6/2019    Procedure: EMBOLECTOMY/THROMBECTOMY Left LOWER EXTREMITY; angiogram;  Surgeon: Brina Lyon MD;  Location: [facility];  Service: Vascular       Family History   Problem Relation Age of Onset    Hypertension Mother        Social History     Socioeconomic History    Marital status:      Spouse name: Not on file    Number of children: Not on file    Years of education: Not on file    Highest education level: Not on file   Occupational History    Not on file   Social Needs    Financial resource strain: Not on file    Food insecurity:     Worry: Not on file     Inability: Not on file   IT[employer] needs:     Medical: Not on file     Non-medical: Not on file   Tobacco Use    Smoking status: Never Smoker    Smokeless tobacco: Never Used   Substance and Sexual Activity    Alcohol use: Not Currently     Alcohol/week: 0 0 standard drinks     Frequency: Never     Drinks per session: Patient refused     Binge frequency: Never    Drug use: No    Sexual activity: Not Currently   Lifestyle    Physical activity:     Days per week: Not on file     Minutes per session: Not on file    Stress: Not on file   Relationships    Social connections:     Talks on phone: Not on file     Gets together: Not on file     Attends Orthodoxy service: Not on file     Active member of club or organization: Not on file     Attends meetings of clubs or organizations: Not on file     Relationship status: Not on file    Intimate partner violence:     Fear of current or ex partner: Not on file     Emotionally abused: Not on file     Physically abused: Not on file     Forced sexual activity: Not on file   Other Topics Concern    Not on file   Social History Narrative    Not on file       Allergies   Allergen Reactions    Penicillins Hives    Sulfa Antibiotics Hives    Medical Tape Rash         Current Outpatient Medications:     acetaminophen (TYLENOL) 500 mg tablet, Take 1 tablet (500 mg total) by mouth every 6 (six) hours as needed for mild pain, Disp: 120 tablet, Rfl: 3    aspirin (ECOTRIN LOW STRENGTH) 81 mg EC tablet, Take 1 tablet (81 mg total) by mouth daily, Disp: 60 tablet, Rfl: 0    atorvastatin (LIPITOR) 20 mg tablet, Take 1 tablet (20 mg total) by mouth daily after dinner, Disp: 60 tablet, Rfl: 0    FLUoxetine (PROzac) 40 MG capsule, Take 1 capsule (40 mg total) by mouth daily, Disp: 30 capsule, Rfl: 3    gabapentin (NEURONTIN) 100 mg capsule, Take 100 mg by mouth every morning, Disp: , Rfl:     gabapentin (NEURONTIN) 300 mg capsule, Take 300 mg by mouth daily at bedtime, Disp: , Rfl:    melatonin 3 mg, Take 3 mg by mouth daily at bedtime as needed, Disp: , Rfl:     metoprolol succinate (TOPROL-XL) 25 mg 24 hr tablet, Take 1 tablet (25 mg total) by mouth 2 (two) times a day, Disp: 60 tablet, Rfl: 5    omeprazole (PriLOSEC) 20 mg delayed release capsule, TAKE ONE CAPSULE BY MOUTH EVERY DAY, Disp: 30 capsule, Rfl: 5    rivaroxaban (XARELTO) 20 mg tablet, Take one (1) tablet PO with dinner after completion of starter pack, Disp: 30 tablet, Rfl: 3    senna (SENOKOT) 8 6 mg, Take 1 tablet (8 6 mg total) by mouth daily at bedtime, Disp: 120 each, Rfl: 0    triamcinolone (KENALOG) 0 1 % cream, Apply topically 2 (two) times a day, Disp: 30 g, Rfl: 0    XARELTO STARTER PACK 15 & 20 MG starter pack, Take 15 mg BID for 18 days (ending 10/29/19) then 20 mg daily (starting 10/30/19) w/evening meal thereafter, Disp: 1 Package, Rfl: 0    amLODIPine (NORVASC) 5 mg tablet, TAKE ONE TABLET BY MOUTH TWICE DAILY (Patient not taking: Reported on 10/23/2019), Disp: 60 tablet, Rfl: 5

## 2019-10-23 NOTE — PATIENT INSTRUCTIONS
Corinne Young had a left femoral thromboembolectomy by Dr Heriberto Lyon on 10/06/2019 at [facility]  She continues to have staples to her left groin incision with swelling and firm palpable lump  She is denying any fever, chills, chest pain, nausea, vomiting or signs and symptoms of infection otherwise  I discussed the incision and lump with Dr Heriberto Lyon today  At this time were recommending continued wound care including washing the incision with soap and water daily, pat dry and keep dry  Place a dry sterile gauze to the area  If patient is incontinent or the area becomes soiled, please clean with soap and water and dry, place dry gauze  Do not place any lotion, ointments, powder or cream to the area  We will reassess the  Incision in 2 weeks  If patient has fever, chills, wound starts to dehisce or open, any drainage including purulent drainage or increased redness to the area, please call the office for immediate evaluation or have the patient go to the [facility] Emergency room for evaluation by our team      if you have any questions, please call our office for further instruction  Patient may continue with physical therapy  Elevate the legs throughout the day as tolerated  I am ordering a lower extremity arterial duplex to be performed at the 3 month postop rani  After testing is completed, patient can return to the office to discuss results with Dr Heriberto Lyon

## 2019-11-07 ENCOUNTER — OFFICE VISIT (OUTPATIENT)
Dept: VASCULAR SURGERY | Facility: CLINIC | Age: 79
End: 2019-11-07

## 2019-11-07 VITALS — HEART RATE: 78 BPM | DIASTOLIC BLOOD PRESSURE: 82 MMHG | SYSTOLIC BLOOD PRESSURE: 136 MMHG

## 2019-11-07 DIAGNOSIS — I74.4 PERIPHERAL ARTERY EMBOLISM OR THROMBOSIS (HCC): Primary | ICD-10-CM

## 2019-11-07 PROCEDURE — 99024 POSTOP FOLLOW-UP VISIT: CPT | Performed by: NURSE PRACTITIONER

## 2019-11-07 NOTE — PROGRESS NOTES
Hospital Follow Up   Office Visit Note  Bhavik Sandoval   [de-identified] y o    female   MRN: [id number]  [place] [address] [address] [address] [phone]    PCP: Victor Manuel Betancourt MD  Cardiologist: Dr Janusz Epstein       Resident [facility], currently acute rehab    Assessment/plan  Chronic atrial fibrillation  Now on anticoagulation  Initiated on Xarelto 15 b i d  times 20 days ( recent embolic event)  then Xarelto 20 mg daily  --on Toprol 25 mg b i d  She is symptomatic  She has palpitations  She has high blood pressure  Will increase to Toprol 50 b i d  Lower extremity embolic event, (in the setting of atrial fibrillation, not on anticoagulation), status post thrombectomy and intervention 10/6/19, recently admitted, 10/6/19- 10/11/19- DCd to rehab     On aspirin 81 daily and oral anticoagulation  History severe aortic stenosis, status post bioprosthetic AVR, at [facility] 2012  Post AVR bradycardia, 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 2012-implanted at [facility], per the pt  --interrogation Nov 2018( prior was noted to be in 2015):  Cardiac voltage adequate, 6 5 years  A paced:  70%, V paced 0 2%  MVP on  She had 136 atrial high rate episodes and 2 ventricular high rate episodes since her prior interrogation  She had AFib in progress  A tach/AFib burden at 100% since May 2018  CAD According to prior records this involved the LAD which was a small vessel and she did not have bypass at the time of AVR  Hypertension  /80  Was prescribed Norvasc 5 b i d --> not on home list, and Toprol 25 mg BID  Add Norvasc 5 b i d  Hyperlipidemia  Not previously on a statin  Started on atorvastatin 20 mg daily 10/9/19  Fasting lipid profile:  Cardiac testing  --TTE 10/7/19  EF 65  No RWMA  RV at [facility]  Mild MR  Mild biatrial enlargement  Mild aortic stenosis  Mild-to-moderate TR    Estimated peak PA pressure 40 mm Hg      Summary of recommendations  Needs to be re-enrolled in our pacemaker clinic   Message sent  Add Norvasc 5 b i d  Increase Toprol to 50 Q 12  Fasting lipid profile prior to the next visit  Follow up will be scheduled with Dr Misael Maradiaga 6--8 weeks            HPI  Sam Coelho is an [de-identified] yo with a history of chronic atrial fibrillation who was previously prescribed warfarin in 2018, however the patient never took it  She also has a history of coronary artery disease, severe aortic stenosis with a prior bioprosthetic aortic valve replacement  Post AVR, she developed bradycardia requiring implantation of a permanent pacemaker  She also has treated hypertension and hyperlipidemia  Recently she presented to [facility] after a fall  She is found to be in AFib with AVR  She was also found to have a non palpable pulses in left lower extremity  She was transferred to [place] [address]  She was treated for a left lower extremity embolic event, with an ischemic, pulseless extremity  She underwent a successful thrombectomy intervention  Cardiology was consulted regarding her atrial fibrillation  Anticoagulation was recommended and she was initiated on Xarelto once medically stable  11/12/19  She presents for hospital follow-up  She occasionally feels palpitations  This can be fairly often on a daily to every other day basis  It can last for several hours  She has some left lower extremity swelling but it is improving  She has mild dyspnea  No chest pain  Her blood pressure is elevated at 150/80    On exam, her heart tones are regular Will up titrate her beta-blocker and add back Norvasc that was ordered before        Assessment  Diagnoses and all orders for this visit:    Hospital discharge follow-up    Coronary artery disease involving native coronary artery of native heart without angina pectoris    Longstanding persistent atrial fibrillation    History of permanent cardiac pacemaker placement    Mixed hyperlipidemia    Left femoral embolectomy    Peripheral artery embolism or thrombosis (HCC)    Obesity (BMI 35 0-39 9 without comorbidity)    Essential hypertension  -     amLODIPine (NORVASC) 5 mg tablet; BID    Other orders  -     magnesium hydroxide (MILK OF MAGNESIA) 400 mg/5 mL oral suspension; Take by mouth daily as needed for constipation          Past Medical History:   Diagnosis Date    Atrial fibrillation ([facility] )     Depression     Hypertension     Melanoma ([facility] )        Review of Systems   Constitution: Negative for chills  Cardiovascular: Positive for palpitations  Negative for chest pain, claudication, cyanosis, dyspnea on exertion, irregular heartbeat, leg swelling, near-syncope, orthopnea, paroxysmal nocturnal dyspnea and syncope  Some left leg edema   Respiratory: Negative for cough and shortness of breath  Gastrointestinal: Negative for heartburn and nausea  Neurological: Negative for dizziness, focal weakness, headaches, light-headedness and weakness  All other systems reviewed and are negative  Allergies   Allergen Reactions    Penicillins Hives    Sulfa Antibiotics Hives    Medical Tape Rash           Current Outpatient Medications:     acetaminophen (TYLENOL) 500 mg tablet, Take 1 tablet (500 mg total) by mouth every 6 (six) hours as needed for mild pain, Disp: 120 tablet, Rfl: 3    aspirin (ECOTRIN LOW STRENGTH) 81 mg EC tablet, Take 1 tablet (81 mg total) by mouth daily, Disp: 60 tablet, Rfl: 0    atorvastatin (LIPITOR) 20 mg tablet, Take 1 tablet (20 mg total) by mouth daily after dinner, Disp: 60 tablet, Rfl: 0    FLUoxetine (PROzac) 40 MG capsule, Take 1 capsule (40 mg total) by mouth daily, Disp: 30 capsule, Rfl: 3    gabapentin (NEURONTIN) 100 mg capsule, Take 100 mg by mouth every morning, Disp: , Rfl:     gabapentin (NEURONTIN) 300 mg capsule, Take 300 mg by mouth daily at bedtime, Disp: , Rfl:     magnesium hydroxide (MILK OF MAGNESIA) 400 mg/5 mL oral suspension, Take by mouth daily as needed for constipation, Disp: , Rfl:     melatonin 3 mg, Take 3 mg by mouth daily at bedtime as needed, Disp: , Rfl:     metoprolol succinate (TOPROL-XL) 25 mg 24 hr tablet, Take 1 tablet (25 mg total) by mouth 2 (two) times a day, Disp: 60 tablet, Rfl: 5    omeprazole (PriLOSEC) 20 mg delayed release capsule, TAKE ONE CAPSULE BY MOUTH EVERY DAY, Disp: 30 capsule, Rfl: 5    rivaroxaban (XARELTO) 20 mg tablet, Take one (1) tablet PO with dinner after completion of starter pack, Disp: 30 tablet, Rfl: 3    senna (SENOKOT) 8 6 mg, Take 1 tablet (8 6 mg total) by mouth daily at bedtime, Disp: 120 each, Rfl: 0    triamcinolone (KENALOG) 0 1 % cream, Apply topically 2 (two) times a day, Disp: 30 g, Rfl: 0    amLODIPine (NORVASC) 5 mg tablet, BID, Disp: 60 tablet, Rfl: 1        Social History     Socioeconomic History    Marital status:      Spouse name: Not on file    Number of children: Not on file    Years of education: Not on file    Highest education level: Not on file   Occupational History    Not on file   Social Needs    Financial resource strain: Not on file    Food insecurity:     Worry: Not on file     Inability: Not on file    Transportation needs:     Medical: Not on file     Non-medical: Not on file   Tobacco Use    Smoking status: Never Smoker    Smokeless tobacco: Never Used   Substance and Sexual Activity    Alcohol use: Not Currently     Alcohol/week: 0 0 standard drinks     Frequency: Never     Drinks per session: Patient refused     Binge frequency: Never    Drug use: No    Sexual activity: Not Currently   Lifestyle    Physical activity:     Days per week: Not on file     Minutes per session: Not on file    Stress: Not on file   Relationships    Social connections:     Talks on phone: Not on file     Gets together: Not on file     Attends Yazidi service: Not on file     Active member of club or organization: Not on file     Attends meetings of clubs or organizations: Not on file     Relationship status: Not on file    Intimate partner violence:     Fear of current or ex partner: Not on file     Emotionally abused: Not on file     Physically abused: Not on file     Forced sexual activity: Not on file   Other Topics Concern    Not on file   Social History Narrative    Not on file       Family History   Problem Relation Age of Onset    Hypertension Mother        Physical Exam   Constitutional: She is oriented to person, place, and time  No distress  HENT:   Head: Normocephalic and atraumatic  Eyes: Conjunctivae and EOM are normal    Neck: Normal range of motion  Neck supple  Cardiovascular: Normal rate, regular rhythm, normal heart sounds and intact distal pulses  Occasional irregularity   Pulmonary/Chest: Effort normal and breath sounds normal    Abdominal: Soft  Bowel sounds are normal    Musculoskeletal: Normal range of motion  She exhibits edema  Some left leg edema   Neurological: She is alert and oriented to person, place, and time  Skin: Skin is warm and dry  She is not diaphoretic  Psychiatric: She has a normal mood and affect  Nursing note and vitals reviewed  Vitals: Blood pressure 150/80, pulse 80, height 5' 4" (1 626 m), weight 90 3 kg (199 lb)     Wt Readings from Last 3 Encounters:   11/12/19 90 3 kg (199 lb)   10/06/19 86 2 kg (190 lb)   09/24/19 91 2 kg (201 lb)         Labs & Results:  Lab Results   Component Value Date    WBC 13 60 (H) 10/11/2019    HGB 9 4 (L) 10/11/2019    HCT 30 3 (L) 10/11/2019    MCV 97 10/11/2019     10/11/2019     No results found for: BNP  No components found for: CHEM  Troponin I   Date Value Ref Range Status   10/06/2019 0 13 (H) <=0 04 ng/mL Final     Comment:       Siemens Chemistry analyzer 99% cutoff is > 0 04 ng/mL in network labs     o cTnI 99% cutoff is useful only when applied to patients in the clinical setting of myocardial ischemia   o cTnI 99% cutoff should be interpreted in the context of clinical history, ECG findings and possibly cardiac imaging to establish correct diagnosis  o cTnI 99% cutoff may be suggestive but clearly not indicative of a coronary event without the clinical setting of myocardial ischemia  Results indicate test should be repeated on new specimen collected within 4-6 hours of the original     Results for orders placed during the hospital encounter of 10/06/19   Echo complete with contrast if indicated    Narrative [address]  [facility] [place], [address] [facility]  [phone]    Transthoracic Echocardiogram  2D, M-mode, Doppler, and Color Doppler    Study date:  07-Oct-2019    Patient: Prisca Gaston  MR number: [id number]  Account number: [de-identified]  : 10-Oleg-1940  Age: [de-identified] years  Gender: Female  Status: Inpatient  Location: Bedside  Height: 61 in  Weight: 189 6 lb  BP: 126/ 74 mmHg    Indications: AFIB    Diagnoses: I48 0 - Atrial fibrillation    Sonographer:  ROGELIO Varela  Interpreting Physician:  Rosie French MD  Primary Physician:  Devora Trammell MD  Referring Phys[facility]:  [facility]  Group:  [facility]  Cardiology Fellow:  Fleeta Scheuermann, DO    SUMMARY    LEFT VENTRICLE:  Systolic function was normal  Ejection fraction was estimated to be 65 %  Although no diagnostic regional wall motion abnormality was identified, this possibility cannot be completely excluded on the basis of this study  RIGHT VENTRICLE:  The size was at the upper limits of normal     LEFT ATRIUM:  The atrium was mildly dilated  RIGHT ATRIUM:  The atrium was mildly dilated  MITRAL VALVE:  There was moderate annular calcification  There was mild regurgitation  AORTIC VALVE:  There was mild stenosis  Valve mean gradient was 7 7 mmHg  Estimated aortic valve area (by Vmean) was 1 63 cmï¾²  TRICUSPID VALVE:  There was mild to moderate regurgitation      PULMONIC VALVE:  There was trace regurgitation  HISTORY: PRIOR HISTORY: Pacemaker,GERD,HLD,AFIB,CAD,Obesity,HTN    PROCEDURE: The procedure was performed at the bedside  This was a routine study  The transthoracic approach was used  The study included complete 2D imaging, M-mode, complete spectral Doppler, and color Doppler  Image quality was adequate  LEFT VENTRICLE: Size was normal  Systolic function was normal  Ejection fraction was estimated to be 65 %  Although no diagnostic regional wall motion abnormality was identified, this possibility cannot be completely excluded on the basis  of this study  Wall thickness was normal  There was no evidence of concentric hypertrophy  DOPPLER: Transmitral flow pattern: atrial fibrillation  The study was not technically sufficient to allow evaluation of LV diastolic function  RIGHT VENTRICLE: The size was at the upper limits of normal  Systolic function was low normal     LEFT ATRIUM: The atrium was mildly dilated  RIGHT ATRIUM: The atrium was mildly dilated  MITRAL VALVE: There was moderate annular calcification  Valve structure was normal  There was normal leaflet separation  DOPPLER: The transmitral velocity was within the normal range  There was no evidence for stenosis  There was mild  regurgitation  AORTIC VALVE: The valve was trileaflet  Leaflets exhibited normal thickness, mild to moderate calcification, and normal cuspal separation  DOPPLER: Transaortic velocity was minimally increased  There was mild stenosis  There was no  regurgitation  TRICUSPID VALVE: The valve structure was normal  There was normal leaflet separation  DOPPLER: The transtricuspid velocity was within the normal range  There was no evidence for stenosis  There was mild to moderate regurgitation  Pulmonary  artery systolic pressure was at the upper limits of normal  Estimated peak PA pressure was 40 mmHg      PULMONIC VALVE: Leaflets exhibited normal thickness, no calcification, and normal cuspal separation  DOPPLER: The transpulmonic velocity was within the normal range  There was trace regurgitation  PERICARDIUM: There was no pericardial effusion  AORTA: The root exhibited normal size  MEASUREMENT TABLES    2D MEASUREMENTS  LVOT   (Reference normals)  Diam   21 mm   (--)    DOPPLER MEASUREMENTS  LVOT   (Reference normals)  Peak ahsan   90 cm/s   (--)  Mean ahsan   66 cm/s   (--)  VTI   22 cm   (--)  Peak gradient   2 mmHg   (--)  Mean gradient   1 mmHg   (--)  Stroke vol   76 2 ml   (--)  Aortic valve   (Reference normals)  Peak ahsan   200 cm/s   (--)  Mean ahsan   139 cm/s   (--)  VTI   44 cm   (--)  Peak gradient   16 mmHg   (--)  Mean gradient   7 7 mmHg   (--)  Obstr index, VTI   0 5    (--)  Valve area, VTI   1 73 cmï¾²   (--)  Obstr index, Vmax   0 45    (--)  Valve area, Vmax   1 56 cmï¾²   (--)  Obstr index, Vmean   0 47    (--)  Valve area, Vmean   1 63 cmï¾²   (--)    SYSTEM MEASUREMENT TABLES    2D  %FS: 27 65 %  Ao Diam: 2 54 cm  EDV(Teich): 62 12 ml  EF(Teich): 54 4 %  ESV(Teich): 28 33 ml  IVSd: 1 35 cm  LA Area: 23 83 cm2  LA Diam: 3 95 cm  LAAs A2C: 24 23 cm2  LAAs A4C: 22 63 cm2  LAESV A-L A2C: 92 02 ml  LAESV A-L A4C: 79 78 ml  LAESV Index (A-L): 46 45 ml/m2  LAESV MOD A2C: 88 35 ml  LAESV MOD A4C: 76 15 ml  LAESV(A-L): 85 94 ml  LALs A2C: 5 41 cm  LALs A4C: 5 45 cm  LVIDd: 3 8 cm  LVIDs: 2 75 cm  LVOT Diam: 1 88 cm  LVPWd: 1 28 cm  RA Area: 13 73 cm2  RVIDd: 4 22 cm  SV(Teich): 33 79 ml    CW  AV Env  Ti: 336 79 ms  AV VTI: 55 63 cm  AV Vmax: 2 55 m/s  AV Vmean: 1 65 m/s  AV maxP 07 mmHg  AV meanP 3 mmHg  RAP: 5 mmHg  TR Vmax: 2 66 m/s  TR maxP 21 mmHg    MM  AV Cusp: 1 08 cm  Ao Diam: 2 67 cm  LA Diam: 4 58 cm  LA/Ao: 1 71  TAPSE: 0 91 cm    PW  BARRON (VTI): 0 86 cm2  BARRON Vmax: 0 88 cm2  LVOT Env  Ti: 295 27 ms  LVOT VTI: 17 1 cm  LVOT Vmax: 0 81 m/s  LVOT Vmean: 0 58 m/s  LVOT maxP 61 mmHg  LVOT meanP 54 mmHg  LVSI Dopp: 25 77 ml/m2  LVSV Dopp: 47 68 ml  RVSP: 33 21 mmHg    [facility]    Prepared and electronically signed by    Blayne Gale MD  Signed 07-Oct-2019 16:28:59       No results found for this or any previous visit  This note was completed in part utilizing m-modal fluency direct voice recognition software  Grammatical errors, random word insertion, spelling mistakes, and incomplete sentences may be an occasional consequence of the system secondary to software limitations, ambient noise and hardware issues  At the time of dictation, efforts were made to edit, clarify and /or correct errors  Please read the chart carefully and recognize, using context, where substitutions have occurred    If you have any questions or concerns about the context, text or information contained within the body of this dictation, please contact myself, the provider, for further clarification

## 2019-11-07 NOTE — PROGRESS NOTES
Assessment/Plan:    Peripheral artery embolism or thrombosis ([facility] [address] )  [de-identified] yo F w/ hx of CAD, s/p bioprosthetic AoV, s/p PPM, afib not previously on anticoagulation (patient refused), HTN, who presented to the ER with acute LLE ischemia most likely 2/2 cardiac embolus after a mechanical fall      Patient s/p L femoral embolectomy 10/6/19 by Dr Osito Rdz Doctor  She was d/c on ASA, statin, and Xarelto to nursing facility  She presents to Vascular office today for a second post-operative f/u and possible staple removal      Patient with large 8cm x 5-6cm palpable lump to the left thigh at the incision site, likely hematoma with improvement since prior assessment  Staples intact, well approximated and healing nicely, no wound dehiscence or drainage noted  The area is not red, warm or showing signs of infection  She continues to have a malodor from the pannus but reports incontinence  Patient denies any chest pain, shortness of breath, fever, chills, nausea, vomiting  She remains motor and sensory intact to left lower extremity with Doppler signals to PT and DP arteries  Staples removed without issue  Incision cleansed with NSS and betadine; dry gauze to area only  Recommendations:  -continue with good care to the left groin incision  Wash with soap and water daily  Pat the area dry and just place dry gauze to the area  No lotions or powder to the area at this time   -The left groin hematoma should continue to subside  Advised to call our office for any open wounds, drainage, or pus from the incision  If any changes to motor or sensory to the left leg, please go to the ER for evaluation or call our office for evaluation   -Continue with Xarelto, Aspirin and statin daily     -Continue with PT  Ultrasound is scheduled to the legs on 1/6/20 and you can return to the office following the test for evaluation and review of results  If you have any questions, please call our office         Diagnoses and all orders for this visit:    Peripheral artery embolism or thrombosis (HCC)          Subjective:      Patient ID: Duong Myrick is a [de-identified] y o  female  Patient presents in office today for follow up from L lower extremity embolectomy/thrombectomy done on 10/6/19 by Dr Michele Merino  Patient reports the hematoma is still present under the incision sight  Patient denies any fever or chills  Patient denies any drainage from incision area  Patient denies any tenderness, redness, or pain in the area  Patient does report since surgery her L leg has been swollen and does not subside with elevation of the leg  Patient is taking Aspirin, Xarelto and Atorvastatin  Lisa Mccormick is a [de-identified] yo F w/ hx of CAD, s/p bioprosthetic AoV, s/p PPM, afib not previously on anticoagulation (patient refused), HTN, who presented to the ER with acute LLE ischemia most likely 2/2 cardiac embolus after a mechanical fall  Patient s/p L femoral embolectomy 10/6/19 by Dr Michele Merino Doctor  She was d/c'd from the hospital on ASA, statin, and Xarelto to nursing facility where she continues to participate in rehab  She presents to Vascular office today for additional post-operative f/u and possible staple removal   Patient reports no drainage from the incision  She has been having the nursing staff aid in wound care and dressing application  She reports continued incontinence  She denies any F/C/N/V, CP/SOB  She has been ambulating with a walker, denies any claudication symptoms but does report some pain to the foot at night  She moves the foot when this occurs and stretches it, which she reports helps to subside the discomfort  She remains motor and sensory intact          The following portions of the patient's history were reviewed and updated as appropriate: allergies, current medications, past family history, past medical history, past social history, past surgical history and problem list     Review of Systems   Constitutional: Negative    Negative for chills and fever  HENT: Negative  Eyes: Negative  Respiratory: Negative for chest tightness and shortness of breath  Cardiovascular: Positive for leg swelling (Left leg)  Gastrointestinal: Negative  Endocrine: Negative  Genitourinary: Negative  Musculoskeletal: Negative  Skin: Negative  Left groin lump   Allergic/Immunologic: Negative  Neurological: Negative  Hematological: Negative  Psychiatric/Behavioral: Negative  Objective:      /82 (BP Location: Left arm, Patient Position: Sitting, Cuff Size: Adult)   Pulse 78          Physical Exam   Constitutional: She is oriented to person, place, and time  She appears well-developed and well-nourished  No distress  Cardiovascular: Normal rate and normal heart sounds  An irregularly irregular rhythm present  No murmur heard  Pulmonary/Chest: Effort normal and breath sounds normal    Neurological: She is alert and oriented to person, place, and time  She has normal strength  Skin: Skin is warm and dry  Capillary refill takes 2 to 3 seconds  Psychiatric: She has a normal mood and affect  Her speech is normal and behavior is normal  Judgment and thought content normal  Cognition and memory are normal    Nursing note and vitals reviewed  B/L LE motor sensory intact; extremities are warm and dry  No wounds or ulcerations to feet  Left groin  I have reviewed and made appropriate changes to the review of systems input by the medical assistant      Vitals:    11/07/19 0831   BP: 136/82   BP Location: Left arm   Patient Position: Sitting   Cuff Size: Adult   Pulse: 78       Patient Active Problem List   Diagnosis    History of permanent cardiac pacemaker placement    Mixed hyperlipidemia    Generalized anxiety disorder    Gastroesophageal reflux disease with esophagitis    Depression    Benign familial tremor    Accelerated essential hypertension    Abnormal blood chemistry    Neuropathy    Primary insomnia    Essential hypertension    Coronary artery disease involving native coronary artery of native heart without angina pectoris    Sciatic nerve pain, right    Obesity (BMI 35 0-39 9 without comorbidity)    Globus sensation    Nocturnal enuresis    Ischemia of left lower extremity    Peripheral artery embolism or thrombosis (HCC)    Atrial fibrillation ([facility] [address] )    Onychomycosis    Left femoral embolectomy    Urinary incontinence       Past Surgical History:   Procedure Laterality Date    AORTIC VALVE REPLACEMENT  12/2012    CARDIAC PACEMAKER PLACEMENT      dual chamber last assessed 10/14/13    CHOLECYSTECTOMY      IR LOWER EXTREMITY / INTERVENTION  10/6/2019    JOINT REPLACEMENT      B/L knee replacement    THROMBECTOMY W/ EMBOLECTOMY N/A 10/6/2019    Procedure: EMBOLECTOMY/THROMBECTOMY Left LOWER EXTREMITY; angiogram;  Surgeon: Kyle Lyon MD;  Location: [facility];  Service: Vascular       Family History   Problem Relation Age of Onset    Hypertension Mother        Social History     Socioeconomic History    Marital status:      Spouse name: Not on file    Number of children: Not on file    Years of education: Not on file    Highest education level: Not on file   Occupational History    Not on file   Social Needs    Financial resource strain: Not on file    Food insecurity:     Worry: Not on file     Inability: Not on file    Transportation needs:     Medical: Not on file     Non-medical: Not on file   Tobacco Use    Smoking status: Never Smoker    Smokeless tobacco: Never Used   Substance and Sexual Activity    Alcohol use: Not Currently     Alcohol/week: 0 0 standard drinks     Frequency: Never     Drinks per session: Patient refused     Binge frequency: Never    Drug use: No    Sexual activity: Not Currently   Lifestyle    Physical activity:     Days per week: Not on file     Minutes per session: Not on file    Stress: Not on file   Relationships    Social connections:     Talks on phone: Not on file     Gets together: Not on file     Attends [employer] service: Not on file     Active member of club or organization: Not on file     Attends meetings of clubs or organizations: Not on file     Relationship status: Not on file    Intimate partner violence:     Fear of current or ex partner: Not on file     Emotionally abused: Not on file     Physically abused: Not on file     Forced sexual activity: Not on file   Other Topics Concern    Not on file   Social History Narrative    Not on file       Allergies   Allergen Reactions    Penicillins Hives    Sulfa Antibiotics Hives    Medical Tape Rash         Current Outpatient Medications:     aspirin (ECOTRIN LOW STRENGTH) 81 mg EC tablet, Take 1 tablet (81 mg total) by mouth daily, Disp: 60 tablet, Rfl: 0    atorvastatin (LIPITOR) 20 mg tablet, Take 1 tablet (20 mg total) by mouth daily after dinner, Disp: 60 tablet, Rfl: 0    FLUoxetine (PROzac) 40 MG capsule, Take 1 capsule (40 mg total) by mouth daily, Disp: 30 capsule, Rfl: 3    gabapentin (NEURONTIN) 100 mg capsule, Take 100 mg by mouth every morning, Disp: , Rfl:     gabapentin (NEURONTIN) 300 mg capsule, Take 300 mg by mouth daily at bedtime, Disp: , Rfl:     melatonin 3 mg, Take 3 mg by mouth daily at bedtime as needed, Disp: , Rfl:     metoprolol succinate (TOPROL-XL) 25 mg 24 hr tablet, Take 1 tablet (25 mg total) by mouth 2 (two) times a day, Disp: 60 tablet, Rfl: 5    omeprazole (PriLOSEC) 20 mg delayed release capsule, TAKE ONE CAPSULE BY MOUTH EVERY DAY, Disp: 30 capsule, Rfl: 5    rivaroxaban (XARELTO) 20 mg tablet, Take one (1) tablet PO with dinner after completion of starter pack, Disp: 30 tablet, Rfl: 3    senna (SENOKOT) 8 6 mg, Take 1 tablet (8 6 mg total) by mouth daily at bedtime, Disp: 120 each, Rfl: 0    triamcinolone (KENALOG) 0 1 % cream, Apply topically 2 (two) times a day, Disp: 30 g, Rfl: 0    acetaminophen (TYLENOL) 500 mg tablet, Take 1 tablet (500 mg total) by mouth every 6 (six) hours as needed for mild pain, Disp: 120 tablet, Rfl: 3    amLODIPine (NORVASC) 5 mg tablet, TAKE ONE TABLET BY MOUTH TWICE DAILY (Patient not taking: Reported on 10/23/2019), Disp: 60 tablet, Rfl: 5

## 2019-11-07 NOTE — ASSESSMENT & PLAN NOTE
[de-identified] yo F w/ hx of CAD, s/p bioprosthetic AoV, s/p PPM, afib not previously on anticoagulation (patient refused), HTN, who presented to the ER with acute LLE ischemia most likely 2/2 cardiac embolus after a mechanical fall      Patient s/p L femoral embolectomy 10/6/19 by Dr Santos Robertson Doctor  She was d/c on ASA, statin, and Xarelto to nursing facility  She presents to Vascular office today for a second post-operative f/u and possible staple removal      Patient with large 8cm x 5-6cm palpable lump to the left thigh at the incision site, likely hematoma with improvement since prior assessment  Staples intact, well approximated and healing nicely, no wound dehiscence or drainage noted  The area is not red, warm or showing signs of infection  She continues to have a malodor from the pannus but reports incontinence  Patient denies any chest pain, shortness of breath, fever, chills, nausea, vomiting  She remains motor and sensory intact to left lower extremity with Doppler signals to PT and DP arteries  Staples removed without issue  Incision cleansed with NSS and betadine; dry gauze to area only  Recommendations:  -continue with good care to the left groin incision  Wash with soap and water daily  Pat the area dry and just place dry gauze to the area  No lotions or powder to the area at this time   -The left groin hematoma should continue to subside  Advised to call our office for any open wounds, drainage, or pus from the incision  If any changes to motor or sensory to the left leg, please go to the ER for evaluation or call our office for evaluation   -Continue with Xarelto, Aspirin and statin daily     -Continue with PT  Ultrasound is scheduled to the legs on 1/6/20 and you can return to the office following the test for evaluation and review of results  If you have any questions, please call our office

## 2019-11-07 NOTE — PATIENT INSTRUCTIONS
Please continue with good care to the left groin incision  Wash with soap and water daily  Pat the area dry and just place dry gauze to the area  No lotions or powder to the area at this time  The left groin hematoma should continue to subside  Please call our office for any open wounds, drainage, or pus from the incision  If any changes to motor or sensory to the left leg, please go to the ER for evaluation or call our office for evaluation  Continue with Xarelto, Aspirin and statin daily  Continue with PT  Ultrasound is scheduled to the legs on 1/6/20 and you can return to the office following the test for evaluation and review of results  If you have any questions, please call our office  Peripheral Artery Disease   WHAT YOU NEED TO KNOW:   What is peripheral artery disease? Peripheral artery disease (PAD) is narrow, weak, or blocked arteries  It may affect any arteries outside of your heart and brain  PAD is usually the result of a buildup of fat and cholesterol, also called plaque, along your artery walls  Inflammation, a blood clot, or abnormal cell growth could also block your arteries  PAD prevents normal blood flow to your legs and arms  You are at risk of an amputation if poor blood flow keeps wounds from healing or causes gangrene (tissue death)  Without treatment, PAD can also cause a heart attack or stroke  What increases my risk for PAD? · Smoking cigarettes     · Diabetes     · High blood pressure     · High cholesterol     · Age older than 40 years    · Heart disease or a family history of heart disease  What are the signs and symptoms of PAD? Mild PAD usually does not cause symptoms   As the disease worsens over time, you may have the following:  · Pain or cramps in your leg or hip while you walk     · A numb, weak, or heavy feeling in your legs     · Dry, scaly, red, or pale skin on your legs     · Thick or brittle nails, or hair loss on your arms and legs     · Foot sores that will not heal     · Burning or aching in your feet and toes while resting (this may be worse when you lie down)  How is PAD diagnosed? · Angiography  is a test that shows pictures of the arteries in your arms and legs  You will be given contrast liquid to help the arteries show up better on the pictures  The pictures will be taken with an MRI or CT scan  Tell the healthcare provider if you have ever had an allergic reaction to contrast liquid  Do not enter the MRI room with anything metal  Metal can cause serious injury  Tell a healthcare provider if you have any metal in or on your body  · Doppler ankle brachial index (ZAIN)  is a test that compares blood pressure in your ankles to blood pressure in your arms  This tells your healthcare provider how well blood is flowing through the arteries in your legs  How is PAD treated? Treatment can help reduce your risk of a heart attack, stroke, or amputation  You may need more than one of the following:  · Medicines  may be given  Your healthcare provider may give you any of the following:     ¨ Antiplatelet medicine,  such as aspirin, helps prevent blood clots and reduces the risk of a heart attack or stroke  ¨ Statin medicine  helps lower your cholesterol and prevents your PAD from getting worse  · A supervised exercise program  helps you stay active in normal daily activities and may prevent disability  Healthcare providers will help you safely walk or do strength training exercises 3 times a week for 30 to 60 minutes  You will do this for several months, then transition to walking on your own  · Angioplasty  is a procedure to open your artery so blood can flow through normally  A thin tube called a catheter is used to insert a small balloon into your artery  The balloon is inflated to open your blocked artery, and then removed  A tube called a stent may be placed in your artery to hold it open       · Bypass surgery  is used to make a new connection to your artery with a vein from another part of your body, or an artificial graft  The vein or graft is attached to your artery above and below your blockage  This allows blood to flow around the blocked portion of your artery  How can I manage PAD? · Do not smoke  Nicotine and other chemicals in cigarettes and cigars can worsen PAD  They can also increase your risk for a heart attack or stroke  Ask your healthcare provider for information if you currently smoke and need help to quit  E-cigarettes or smokeless tobacco still contain nicotine  Talk to your healthcare provider before you use these products  · Manage other health conditions  Take your medicines as directed  Follow your healthcare provider's instructions if you have high blood pressure or high cholesterol  Perform foot care and check your blood sugar levels as directed if you have diabetes  · Eat heart healthy foods  Eat whole grains, fruits, and vegetables every day  Limit salt and high-fat foods  Ask your healthcare provider for more information on a heart healthy diet  Ask if you need to lose weight  Your healthcare provider can help you create a healthy weight-loss plan  Call 911 for the following:   · You have any of the following signs of a heart attack:      ¨ Squeezing, pressure, or pain in your chest that lasts longer than 5 minutes or returns    ¨ Discomfort or pain in your back, neck, jaw, stomach, or arm     ¨ Trouble breathing    ¨ Nausea or vomiting    ¨ Lightheadedness or a sudden cold sweat, especially with chest pain or trouble breathing    · You have any of the following signs of a stroke:      ¨ Numbness or drooping on one side of your face     ¨ Weakness in an arm or leg    ¨ Confusion or difficulty speaking    ¨ Dizziness, a severe headache, or vision loss  When should I seek immediate care? · You have sores or wounds that will not heal      · You notice black or discolored skin on your arm or leg  · Your skin is cool to the touch  When should I contact my healthcare provider? · You have leg pain when you walk 1/8 mile (200 meters) or less, even with treatment  · Your legs are red, dry, or pale, even with treatment  · You have questions or concerns about your condition or care  CARE AGREEMENT:   You have the right to help plan your care  Learn about your health condition and how it may be treated  Discuss treatment options with your caregivers to decide what care you want to receive  You always have the right to refuse treatment  The above information is an  only  It is not intended as medical advice for individual conditions or treatments  Talk to your doctor, nurse or pharmacist before following any medical regimen to see if it is safe and effective for you  © 2017 [address] Information is for End User's use only and may not be sold, redistributed or otherwise used for commercial purposes  All illustrations and images included in CareNotes® are the copyrighted property of A D A M [employer]  or Jose King

## 2019-11-12 ENCOUNTER — OFFICE VISIT (OUTPATIENT)
Dept: CARDIOLOGY CLINIC | Facility: CLINIC | Age: 79
End: 2019-11-12
Payer: MEDICARE

## 2019-11-12 VITALS
BODY MASS INDEX: 33.97 KG/M2 | HEIGHT: 64 IN | HEART RATE: 80 BPM | SYSTOLIC BLOOD PRESSURE: 150 MMHG | DIASTOLIC BLOOD PRESSURE: 80 MMHG | WEIGHT: 199 LBS

## 2019-11-12 DIAGNOSIS — Z98.890 HISTORY OF EMBOLECTOMY: ICD-10-CM

## 2019-11-12 DIAGNOSIS — I48.11 LONGSTANDING PERSISTENT ATRIAL FIBRILLATION (HCC): ICD-10-CM

## 2019-11-12 DIAGNOSIS — E78.2 MIXED HYPERLIPIDEMIA: ICD-10-CM

## 2019-11-12 DIAGNOSIS — I74.4 PERIPHERAL ARTERY EMBOLISM OR THROMBOSIS (HCC): ICD-10-CM

## 2019-11-12 DIAGNOSIS — E66.9 OBESITY (BMI 35.0-39.9 WITHOUT COMORBIDITY): ICD-10-CM

## 2019-11-12 DIAGNOSIS — I48.91 ATRIAL FIBRILLATION WITH RAPID VENTRICULAR RESPONSE (HCC): ICD-10-CM

## 2019-11-12 DIAGNOSIS — Z09 HOSPITAL DISCHARGE FOLLOW-UP: Primary | ICD-10-CM

## 2019-11-12 DIAGNOSIS — I10 ESSENTIAL HYPERTENSION: ICD-10-CM

## 2019-11-12 DIAGNOSIS — Z95.0 HISTORY OF PERMANENT CARDIAC PACEMAKER PLACEMENT: ICD-10-CM

## 2019-11-12 DIAGNOSIS — I25.10 CORONARY ARTERY DISEASE INVOLVING NATIVE CORONARY ARTERY OF NATIVE HEART WITHOUT ANGINA PECTORIS: ICD-10-CM

## 2019-11-12 PROCEDURE — 99214 OFFICE O/P EST MOD 30 MIN: CPT | Performed by: INTERNAL MEDICINE

## 2019-11-12 RX ORDER — AMLODIPINE BESYLATE 5 MG/1
TABLET ORAL
Qty: 60 TABLET | Refills: 1 | Status: SHIPPED | OUTPATIENT
Start: 2019-11-12 | End: 2020-12-28 | Stop reason: ALTCHOICE

## 2019-11-12 RX ORDER — METOPROLOL SUCCINATE 50 MG/1
TABLET, EXTENDED RELEASE ORAL
Qty: 60 TABLET | Refills: 2 | Status: SHIPPED | OUTPATIENT
Start: 2019-11-12

## 2019-11-12 NOTE — LETTER
November 12, 2019     MD Kiah Little  [address] [id number] [facility] [id number]    Patient: [employer] Aurora Sandoval   YOB: 1940   Date of Visit: 11/12/2019     Dear Dr Havery Kussmaul      Thank you for referring Aixa Tuttle to me for evaluation  Below are the relevant portions of my assessment and plan of care  If you have questions, please do not hesitate to call me  I look forward to following Emil Gurrola along with you           Sincerely,        SHIRA Sánchez        CC: Ganesh Rose MD    Progress Notes:

## 2020-01-06 ENCOUNTER — HOSPITAL ENCOUNTER (OUTPATIENT)
Dept: NON INVASIVE DIAGNOSTICS | Age: 80
Discharge: HOME | End: 2020-01-06
Payer: MEDICARE

## 2020-01-06 DIAGNOSIS — I74.4 PERIPHERAL ARTERY EMBOLISM OR THROMBOSIS (HCC): ICD-10-CM

## 2020-01-06 DIAGNOSIS — Z98.890 HISTORY OF EMBOLECTOMY: ICD-10-CM

## 2020-01-06 PROCEDURE — 93925 LOWER EXTREMITY STUDY: CPT

## 2020-01-06 PROCEDURE — 93925 LOWER EXTREMITY STUDY: CPT | Performed by: SURGERY

## 2020-01-06 PROCEDURE — 93922 UPR/L XTREMITY ART 2 LEVELS: CPT | Performed by: SURGERY

## 2020-01-06 PROCEDURE — 93923 UPR/LXTR ART STDY 3+ LVLS: CPT

## 2020-01-07 ENCOUNTER — OFFICE VISIT (OUTPATIENT)
Dept: CARDIOLOGY CLINIC | Facility: CLINIC | Age: 80
End: 2020-01-07
Payer: MEDICARE

## 2020-01-07 VITALS
DIASTOLIC BLOOD PRESSURE: 64 MMHG | BODY MASS INDEX: 34.16 KG/M2 | SYSTOLIC BLOOD PRESSURE: 124 MMHG | HEART RATE: 72 BPM | HEIGHT: 64 IN

## 2020-01-07 DIAGNOSIS — Z95.0 HISTORY OF PERMANENT CARDIAC PACEMAKER PLACEMENT: ICD-10-CM

## 2020-01-07 DIAGNOSIS — I48.19 PERSISTENT ATRIAL FIBRILLATION (HCC): Primary | ICD-10-CM

## 2020-01-07 PROCEDURE — 99214 OFFICE O/P EST MOD 30 MIN: CPT | Performed by: INTERNAL MEDICINE

## 2020-01-07 RX ORDER — HYDROCHLOROTHIAZIDE 12.5 MG/1
12.5 TABLET ORAL DAILY
COMMUNITY

## 2020-01-07 RX ORDER — TRAZODONE HYDROCHLORIDE 50 MG/1
50 TABLET ORAL
COMMUNITY

## 2020-01-07 RX ORDER — FERROUS SULFATE 325(65) MG
325 TABLET ORAL
COMMUNITY

## 2020-01-07 NOTE — PROGRESS NOTES
Cardiology Follow Up    [address] [place] [address] [address]  1940  [id number]  [address] [facility] Turner Chavis  [address] [place] [id number] [phone]    No diagnosis found  Interval History: Followup for atrial fibrillation    Admitted for LE ischemia due to cardioembolism after not taking anticoagulation  Now on xarelto  She sometimes she has dyspnea at rest followed by anxiety  She is wearing compression stockings for edema  Her echo was reviewed  Normal LVEF and mild pulmonary HTN       Problem List     History of permanent cardiac pacemaker placement (Chronic)    Mixed hyperlipidemia (Chronic)    Generalized anxiety disorder (Chronic)    Gastroesophageal reflux disease with esophagitis (Chronic)    Depression (Chronic)    Benign familial tremor    Accelerated essential hypertension (Chronic)    Abnormal blood chemistry    Neuropathy    Primary insomnia    Essential hypertension    Coronary artery disease involving native coronary artery of native heart without angina pectoris    Sciatic nerve pain, right    Obesity (BMI 35 0-39 9 without comorbidity)    Globus sensation    Nocturnal enuresis    Ischemia of left lower extremity    Peripheral artery embolism or thrombosis (HCC)    Atrial fibrillation (HCC) (Chronic)    Onychomycosis (Chronic)    Left femoral embolectomy    Urinary incontinence (Chronic)        Past Medical History:   Diagnosis Date    Atrial fibrillation ([facility] )     Depression     Hypertension     Melanoma ([facility] )      Social History     Socioeconomic History    Marital status:      Spouse name: Not on file    Number of children: Not on file    Years of education: Not on file    Highest education level: Not on file   Occupational History    Not on file   Social Needs    Financial resource strain: Not on file    Food insecurity:     Worry: Not on file     Inability: Not on file    Transportation needs: Medical: Not on file     Non-medical: Not on file   Tobacco Use    Smoking status: Never Smoker    Smokeless tobacco: Never Used   Substance and Sexual Activity    Alcohol use: Not Currently     Alcohol/week: 0 0 standard drinks     Frequency: Never     Drinks per session: Patient refused     Binge frequency: Never    Drug use: No    Sexual activity: Not Currently   Lifestyle    Physical activity:     Days per week: Not on file     Minutes per session: Not on file    Stress: Not on file   Relationships    Social connections:     Talks on phone: Not on file     Gets together: Not on file     Attends Restorationism service: Not on file     Active member of club or organization: Not on file     Attends meetings of clubs or organizations: Not on file     Relationship status: Not on file    Intimate partner violence:     Fear of current or ex partner: Not on file     Emotionally abused: Not on file     Physically abused: Not on file     Forced sexual activity: Not on file   Other Topics Concern    Not on file   Social History Narrative    Not on file      Family History   Problem Relation Age of Onset    Hypertension Mother      Past Surgical History:   Procedure Laterality Date    AORTIC VALVE REPLACEMENT  12/2012    CARDIAC PACEMAKER PLACEMENT      dual chamber last assessed 10/14/13    CHOLECYSTECTOMY      IR LOWER EXTREMITY / INTERVENTION  10/6/2019    JOINT REPLACEMENT      B/L knee replacement    THROMBECTOMY W/ EMBOLECTOMY N/A 10/6/2019    Procedure: EMBOLECTOMY/THROMBECTOMY Left LOWER EXTREMITY; angiogram;  Surgeon: Rashel Lyon MD;  Location: [facility];  Service: Vascular       Current Outpatient Medications:     acetaminophen (TYLENOL) 500 mg tablet, Take 1 tablet (500 mg total) by mouth every 6 (six) hours as needed for mild pain, Disp: 120 tablet, Rfl: 3    aspirin (ECOTRIN LOW STRENGTH) 81 mg EC tablet, Take 1 tablet (81 mg total) by mouth daily, Disp: 60 tablet, Rfl: 0    atorvastatin (LIPITOR) 20 mg tablet, Take 1 tablet (20 mg total) by mouth daily after dinner, Disp: 60 tablet, Rfl: 0    ferrous sulfate 325 (65 Fe) mg tablet, Take 325 mg by mouth daily with breakfast, Disp: , Rfl:     FLUoxetine (PROzac) 40 MG capsule, Take 1 capsule (40 mg total) by mouth daily, Disp: 30 capsule, Rfl: 3    gabapentin (NEURONTIN) 100 mg capsule, Take 100 mg by mouth every morning, Disp: , Rfl:     gabapentin (NEURONTIN) 300 mg capsule, Take 300 mg by mouth daily at bedtime, Disp: , Rfl:     hydrochlorothiazide (HYDRODIURIL) 12 5 mg tablet, Take 12 5 mg by mouth daily, Disp: , Rfl:     magnesium hydroxide (MILK OF MAGNESIA) 400 mg/5 mL oral suspension, Take by mouth daily as needed for constipation, Disp: , Rfl:     melatonin 3 mg, Take 3 mg by mouth daily at bedtime as needed, Disp: , Rfl:     metoprolol succinate (TOPROL-XL) 50 mg 24 hr tablet, One tablet, 50 mg, every 12 hours, Disp: 60 tablet, Rfl: 2    omeprazole (PriLOSEC) 20 mg delayed release capsule, TAKE ONE CAPSULE BY MOUTH EVERY DAY, Disp: 30 capsule, Rfl: 5    rivaroxaban (XARELTO) 20 mg tablet, Take one (1) tablet PO with dinner after completion of starter pack, Disp: 30 tablet, Rfl: 3    senna (SENOKOT) 8 6 mg, Take 1 tablet (8 6 mg total) by mouth daily at bedtime, Disp: 120 each, Rfl: 0    traZODone (DESYREL) 50 mg tablet, Take 50 mg by mouth daily at bedtime, Disp: , Rfl:     triamcinolone (KENALOG) 0 1 % cream, Apply topically 2 (two) times a day, Disp: 30 g, Rfl: 0    amLODIPine (NORVASC) 5 mg tablet, BID (Patient not taking: Reported on 1/7/2020), Disp: 60 tablet, Rfl: 1  Allergies   Allergen Reactions    Penicillins Hives    Sulfa Antibiotics Hives    Medical Tape Rash       Labs:     Chemistry        Component Value Date/Time    K 4 2 10/11/2019 0544     10/11/2019 0544    CO2 28 10/11/2019 0544    CO2 23 10/06/2019 1941    BUN 20 10/11/2019 0544    CREATININE 0 81 10/11/2019 0544        Component Value Date/Time CALCIUM 9 3 10/11/2019 0544    ALKPHOS 150 (H) 10/06/2019 1327    AST 31 10/06/2019 1327    ALT 20 10/06/2019 1327            No results found for: CHOL  Lab Results   Component Value Date    HDL 32 (L) 10/09/2019    HDL 49 11/07/2018     Lab Results   Component Value Date    LDLCALC 104 (H) 10/09/2019    LDLCALC 195 (H) 11/07/2018     Lab Results   Component Value Date    TRIG 132 10/09/2019    TRIG 126 11/07/2018     No results found for: CHOLHDL    Imaging: Vas Lower Limb Arterial Duplex, Complete Bilateral    Result Date: 1/6/2020  Narrative:  [facility] REPORT CLINICAL: Indications:  Initial Post-op evaluation s/p revascularization  Patient had a left leg embolectomy on 10/6/2019  She reports she has no pain in her legs  She does minimal walking  Risk Factors The patient has history of HTN and HLD  Clinical Right Pressure:  155/ mm Hg, Left Pressure:  144/ mm Hg  FINDINGS:  Right                  Impression  Waveform  PSV  EDV  Common Femoral Artery              Biphasic   79    0  Prox Profunda                      Biphasic   93    0  Prox SFA               >75%        Biphasic  387    0  Mid SFA                            Biphasic   74    0  Dist SFA                           Biphasic   89    0  Proximal Pop           50-75%      Biphasic  134    0  Distal Pop                         Biphasic   67    0  Prox Post Tibial                   Biphasic   29    0  Dist Post Tibial                   Biphasic   31    0  Dist  Ant   Tibial                  Biphasic   16    0   Left                   Impression  Waveform    PSV  EDV  Common Femoral Artery              Biphasic     84    2  Prox Profunda          <50%        Biphasic    153    0  Prox SFA                           Biphasic    111    0  Mid SFA                <50%        Biphasic    113    0  Dist SFA                           Biphasic     63    0  Proximal Pop                       Biphasic     47    0  Distal Pop Biphasic     73    0  Prox Post Tibial                   Monophasic   25    0  Dist Post Tibial                   Monophasic   30    0  Dist  Ant  Tibial                  Monophasic   11    0     CONCLUSION:  Impression: RIGHT LOWER LIMB: This resting evaluation shows evidence of a >75% stenosis within the proximal superficial femoral artery and a 50-75% stenosis within the proximal popliteal artery  Ankle/Brachial index: 0 95 PVR/ PPG tracings are indeterminate due to patient's involuntary foot movements  Metatarsal pressure and great toe pressure were unable to be obtained due to patient's involuntary foot movements  LEFT LOWER LIMB: This resting evaluation shows evidence of <50% stenoses within the proximal profunda femoris artery and the mid superficial femoral artery  Ankle/Brachial index: 0 74 PVR/ PPG tracings are slightly dampened  Metatarsal pressure of 93mmHg Great toe pressure of 54mmHg, within the healing range Tech Note: There is an avascular anechoic area within the medial proximal thigh  There is no previous study for comparison  SIGNATURE: Electronically Signed by: Michael Nolen MD on 2020-01-06 04:00:40 PM          Review of Systems   Constitution: Negative  HENT: Negative  Eyes: Negative  Cardiovascular: Negative  Respiratory: Negative  Endocrine: Negative  Hematologic/Lymphatic: Negative  Skin: Negative  Musculoskeletal: Negative  Gastrointestinal: Negative  Genitourinary: Negative  Neurological: Negative  Psychiatric/Behavioral: Negative  Allergic/Immunologic: Negative  Vitals:    01/07/20 0952   BP: 124/64   Pulse: 72           Physical Exam   Constitutional: She is oriented to person, place, and time  No distress  HENT:   Mouth/Throat: No oropharyngeal exudate  Eyes: No scleral icterus  Neck: No JVD present  Cardiovascular: Normal rate and regular rhythm  Exam reveals no gallop and no friction rub  No murmur heard    Pulmonary/Chest: Effort normal and breath sounds normal  No stridor  No respiratory distress  She has no wheezes  Abdominal: Soft  Bowel sounds are normal  She exhibits no distension  There is no tenderness  There is no guarding  Musculoskeletal: She exhibits no edema  Neurological: She is alert and oriented to person, place, and time  Skin: Skin is warm and dry  She is not diaphoretic  Psychiatric: She has a normal mood and affect  Her behavior is normal        Discussion/Summary:    1  Persistent Atrial Fibrillation: She is rate controlled  She is now on Xarelto for anticoagulation       2  Hx of AS s/p AVR  Stable based on exam and prior echo      3  PPM implant: Last pacemaker check was last year  Will update this       4  CAD: According to prior records this involved the LAD which was a small vessel and she did not have bypass at the time of AVR      The patient was counseled regarding diagnostic results, instructions for management, risk factor reductions, impressions  total time of encounter was 25 minutes and 15 minutes was spent counseling

## 2020-01-13 ENCOUNTER — DOCUMENTATION (OUTPATIENT)
Dept: ADMINISTRATIVE | Facility: HOSPITAL | Age: 80
End: 2020-01-13

## 2020-01-13 ENCOUNTER — OFFICE VISIT (OUTPATIENT)
Dept: VASCULAR SURGERY | Facility: CLINIC | Age: 80
End: 2020-01-13
Payer: MEDICARE

## 2020-01-13 VITALS
TEMPERATURE: 97.1 F | DIASTOLIC BLOOD PRESSURE: 84 MMHG | RESPIRATION RATE: 16 BRPM | HEART RATE: 68 BPM | SYSTOLIC BLOOD PRESSURE: 132 MMHG

## 2020-01-13 DIAGNOSIS — I73.9 PAD (PERIPHERAL ARTERY DISEASE) (HCC): Primary | ICD-10-CM

## 2020-01-13 DIAGNOSIS — I99.8 ISCHEMIA OF LEFT LOWER EXTREMITY: ICD-10-CM

## 2020-01-13 DIAGNOSIS — I73.9 PERIPHERAL ARTERIAL DISEASE (HCC): Chronic | ICD-10-CM

## 2020-01-13 PROCEDURE — 99214 OFFICE O/P EST MOD 30 MIN: CPT | Performed by: SURGERY

## 2020-01-13 RX ORDER — BISACODYL 10 MG
10 SUPPOSITORY, RECTAL RECTAL DAILY
COMMUNITY

## 2020-01-13 NOTE — PATIENT INSTRUCTIONS
Ischemia of left lower extremity  Left lower extremity acute ischemia s/p left femoral embolectomy  Returns for follow-up with arterial duplex  Findings as expected with left ZAIN 0 75/MTP 93/GTP 54  Right ZAIN 0 9, unable to obtain MTP/GTP due to movement  Has no complaints  Has some intermittent paresthesias of her toes related to temperature changes  Incisions all completely healed  Left thigh hematoma resolved  Wearing ADELE stockings for left lower extremity swelling  Also wears ADELE stockings to right leg which is completely asymptomatic  Advised to wear left leg swelling only if needed for swelling, but can discontinue right leg stockings  She states they are uncomfortable and have no benefit       Will obtain repeat arterial duplex in 3 months for surveillance  Continue ASA, xarelto and statin  Left leg ADELE stocking as needed for swelling only     Peripheral arterial disease ([address] )  Peripheral arterial disease with recent LLE embolic event s/p embolectomy  Asymptomatic at baseline prior to event  Arterial duplex revealed right ZAIN 0 9 with >75% right SFA and 50-75% popliteal stenosis; left ZAIN 0 75 with <50% profunda and SFA stenosis  Will follow with 3 month surveillance duplex  Continue ASA, xarelto, statin

## 2020-01-13 NOTE — ASSESSMENT & PLAN NOTE
Peripheral arterial disease with recent LLE embolic event s/p embolectomy  Asymptomatic at baseline prior to event  Arterial duplex revealed right ZAIN 0 9 with >75% right SFA and 50-75% popliteal stenosis; left ZAIN 0 75 with <50% profunda and SFA stenosis  Will follow with 3 month surveillance duplex  Continue ASA, xarelto, statin

## 2020-01-13 NOTE — PROGRESS NOTES
Assessment/Plan:    Ischemia of left lower extremity  Left lower extremity acute ischemia s/p left femoral embolectomy  Returns for follow-up with arterial duplex  Findings as expected with left ZAIN 0 75/MTP 93/GTP 54  Right ZAIN 0 9, unable to obtain MTP/GTP due to movement  Has no complaints  Has some intermittent paresthesias of her toes related to temperature changes  Incisions all completely healed  Left thigh hematoma resolved  Wearing ADELE stockings for left lower extremity swelling  Also wears ADELE stockings to right leg which is completely asymptomatic  Advised to wear left leg swelling only if needed for swelling, but can discontinue right leg stockings  She states they are uncomfortable and have no benefit  Will obtain repeat arterial duplex in 3 months for surveillance  Continue ASA, xarelto and statin  Left leg ADELE stocking as needed for swelling only    Peripheral arterial disease ([facility] )  Peripheral arterial disease with recent LLE embolic event s/p embolectomy  Asymptomatic at baseline prior to event  Arterial duplex revealed right ZAIN 0 9 with >75% right SFA and 50-75% popliteal stenosis; left ZAIN 0 75 with <50% profunda and SFA stenosis  Will follow with 3 month surveillance duplex  Continue ASA, xarelto, statin  Diagnoses and all orders for this visit:    PAD (peripheral artery disease) ([facility] )  -     VAS lower limb arterial duplex, complete bilateral; Future    Peripheral arterial disease (HCC)    Ischemia of left lower extremity    Other orders  -     bisacodyl (DULCOLAX) 10 mg suppository; Insert 10 mg into the rectum daily        I have spent 25 minutes with Patient  today in which greater than 50% of this time was spent in counseling/coordination of care regarding Intructions for management, Importance of tx compliance and Impressions  Subjective:      Patient ID: Roberto Cr is a 79 y o  female  Pt is here for the results of his RADHA on 1/6/20    Pt c/o numbness in the toes of both feet  PT denies claudication with walking  Pt had a LLE Embolectomy/Thromectomy by Dr Sonam Parkinson Doctor on 10/6/19  Pt was last seen on 11/7/19 for S/P Embolectomy/Thrombectomy  Pt is currently taking ASA, Xarelto and Lipitor  HPI  Ms Kerry Meadows is an 80yo female who presents for follow-up after acute LLE ischemia s/p femoral embolectomy with Sonam Parkinson Doctor in October of last year  She is doing well without complaints  Her surgical sites have healed  She denies any significant lower extremity swelling and wears bilateral ADELE stockings  She states that the ADELE stocking has helped with her left leg swelling but she does not understand why she needs to wear compression on the right leg  She never had any symptoms in the right leg  She takes ASA, xarelto and statin  She does report intermittent paresthesias in her toes but they come and go  She does acknowledge that if often occurs when it is cold  Currently she has no pain and is fully motor/sensation intact  The following portions of the patient's history were reviewed and updated as appropriate: allergies, current medications, past family history, past medical history, past social history, past surgical history and problem list     Review of Systems   Constitutional: Positive for activity change (Uses Walker)  HENT: Negative  Eyes: Negative  Respiratory: Negative  Cardiovascular: Negative  Gastrointestinal: Negative  Endocrine: Negative  Musculoskeletal: Positive for gait problem  Skin: Negative  Allergic/Immunologic: Negative  Neurological: Positive for numbness (Toes)  Hematological: Bruises/bleeds easily  Psychiatric/Behavioral: Negative  I have personally reviewed the ROS entered by MA and agree as documented      Objective:      /84 (BP Location: Left arm, Patient Position: Sitting, Cuff Size: Adult)   Pulse 68   Temp (!) 97 1 °F (36 2 °C) (Tympanic)   Resp 16          Physical Exam   Constitutional: She is oriented to person, place, and time  She appears well-developed and well-nourished  HENT:   Head: Normocephalic and atraumatic  Eyes: EOM are normal    Neck: Normal range of motion  Neck supple  Cardiovascular: Normal rate and regular rhythm  Dopplerable bilateral DP/PT signals   Pulmonary/Chest: Effort normal    Abdominal: Soft  Musculoskeletal: Normal range of motion  Neurological: She is alert and oriented to person, place, and time  Skin: Skin is warm and dry  Capillary refill takes less than 2 seconds  No wounds, all surgical sites healed, no hematoma   Psychiatric: She has a normal mood and affect  Her behavior is normal  Judgment and thought content normal    Nursing note and vitals reviewed

## 2020-01-13 NOTE — ASSESSMENT & PLAN NOTE
Left lower extremity acute ischemia s/p left femoral embolectomy  Returns for follow-up with arterial duplex  Findings as expected with left ZAIN 0 75/MTP 93/GTP 54  Right ZAIN 0 9, unable to obtain MTP/GTP due to movement  Has no complaints  Has some intermittent paresthesias of her toes related to temperature changes  Incisions all completely healed  Left thigh hematoma resolved  Wearing ADELE stockings for left lower extremity swelling  Also wears ADELE stockings to right leg which is completely asymptomatic  Advised to wear left leg swelling only if needed for swelling, but can discontinue right leg stockings  She states they are uncomfortable and have no benefit       Will obtain repeat arterial duplex in 3 months for surveillance  Continue ASA, xarelto and statin  Left leg ADELE stocking as needed for swelling only

## 2020-01-13 NOTE — LETTER
January 13, 2020     Carlotta Palacios, [address]  [address] [place] [id number]    Patient: Delano Sandoval   YOB: 1940   Date of Visit: 1/13/2020       Dear Dr Bharath Jackson: Thank you for referring Frantz Sanchez to me for evaluation  Below are the relevant portions of my assessment and plan of care  Diagnoses and all orders for this visit:    Ischemia of left lower extremity  Left lower extremity acute ischemia s/p left femoral embolectomy  Returns for follow-up with arterial duplex  Findings as expected with left ZAIN 0 75/MTP 93/GTP 54  Right ZAIN 0 9, unable to obtain MTP/GTP due to movement  Has no complaints  Has some intermittent paresthesias of her toes related to temperature changes  Incisions all completely healed  Left thigh hematoma resolved  Wearing ADELE stockings for left lower extremity swelling  Also wears ADELE stockings to right leg which is completely asymptomatic  Advised to wear left leg swelling only if needed for swelling, but can discontinue right leg stockings  She states they are uncomfortable and have no benefit       Will obtain repeat arterial duplex in 3 months for surveillance  Continue ASA, xarelto and statin  Left leg ADELE stocking as needed for swelling only     Peripheral arterial disease ([address] )  Peripheral arterial disease with recent LLE embolic event s/p embolectomy  Asymptomatic at baseline prior to event  Arterial duplex revealed right ZAIN 0 9 with >75% right SFA and 50-75% popliteal stenosis; left ZAIN 0 75 with <50% profunda and SFA stenosis  Will follow with 3 month surveillance duplex  Continue ASA, xarelto, statin  If you have questions, please do not hesitate to call me  I look forward to following Chrissy Velasquez along with you           Sincerely,        Mercy Mclaughlin MD        CC: No Recipients

## 2020-01-15 ENCOUNTER — IN-CLINIC DEVICE VISIT (OUTPATIENT)
Dept: CARDIOLOGY CLINIC | Facility: CLINIC | Age: 80
End: 2020-01-15
Payer: MEDICARE

## 2020-01-15 DIAGNOSIS — Z95.0 PRESENCE OF CARDIAC PACEMAKER: Primary | ICD-10-CM

## 2020-01-15 PROCEDURE — 93280 PM DEVICE PROGR EVAL DUAL: CPT | Performed by: INTERNAL MEDICINE

## 2020-01-15 NOTE — PROGRESS NOTES
Results for orders placed or performed in visit on 01/15/20   Cardiac EP device report    Narrative    MDT DUAL PPM  [facility]: BATTERY VOLTAGE ADEQUATE (4 5 YRS)  AP: 0 2%  : 0 4%  ALL LEAD PARAMETERS WITHIN NORMAL LIMITS  8 AHR EPISODES W/ EGRAMS SHOWING AF W/ AF IN PROGRESS (HX OF SAME)  AF BURDEN: 45 5%  PT TAKES XARELTO, ASA 81MG, METOPROLOL SUCC  EF: 65% (ECHO 10/07/19)  NO PROGRAMMING CHANGES MADE TO DEVICE PARAMETERS  PACEMAKER FUNCTIONING APPROPRIATELY    [address] [place] [address]

## 2020-08-06 ENCOUNTER — REMOTE DEVICE CLINIC VISIT (OUTPATIENT)
Dept: CARDIOLOGY CLINIC | Facility: CLINIC | Age: 80
End: 2020-08-06
Payer: MEDICARE

## 2020-08-06 DIAGNOSIS — Z95.0 PRESENCE OF CARDIAC PACEMAKER: Primary | ICD-10-CM

## 2020-08-06 PROCEDURE — 93294 REM INTERROG EVL PM/LDLS PM: CPT | Performed by: INTERNAL MEDICINE

## 2020-08-06 PROCEDURE — 93296 REM INTERROG EVL PM/IDS: CPT | Performed by: INTERNAL MEDICINE

## 2020-08-06 NOTE — PROGRESS NOTES
Results for orders placed or performed in visit on 08/06/20   Cardiac EP device report    Narrative    MDT DUAL PPM  CARELINK TRANSMISSION: BATTERY VOLTAGE ADEQUATE (4 YRS)  AP 1%  3%  ALL LEAD PARAMETERS WITHIN NORMAL LIMITS  1 AHR EPISODES W/ EGRMS SHOWING AF W/ AF IN PROGRESS  AF BURDEN 95 2%  NO OTHER SIGNIFICANT HIGH RATE EPISODES  PT TAKES ASA 81, XARELTO, METOPROLOL SUCC  PACEMAKER FUNCTIONING APPROPRIATELY            EB

## 2020-08-20 ENCOUNTER — HOSPITAL ENCOUNTER (OUTPATIENT)
Dept: NON INVASIVE DIAGNOSTICS | Facility: HOSPITAL | Age: 80
Discharge: HOME | End: 2020-08-20
Attending: SURGERY
Payer: MEDICARE

## 2020-08-20 DIAGNOSIS — I73.9 PAD (PERIPHERAL ARTERY DISEASE) (HCC): ICD-10-CM

## 2020-08-20 PROCEDURE — 93925 LOWER EXTREMITY STUDY: CPT

## 2020-08-20 PROCEDURE — 93923 UPR/LXTR ART STDY 3+ LVLS: CPT

## 2020-08-21 PROCEDURE — 93925 LOWER EXTREMITY STUDY: CPT | Performed by: SURGERY

## 2020-08-21 PROCEDURE — 93922 UPR/L XTREMITY ART 2 LEVELS: CPT | Performed by: SURGERY

## 2020-11-09 ENCOUNTER — REMOTE DEVICE CLINIC VISIT (OUTPATIENT)
Dept: CARDIOLOGY CLINIC | Facility: CLINIC | Age: 80
End: 2020-11-09
Payer: MEDICARE

## 2020-11-09 DIAGNOSIS — Z95.0 PRESENCE OF PERMANENT CARDIAC PACEMAKER: Primary | ICD-10-CM

## 2020-11-09 PROCEDURE — 93294 REM INTERROG EVL PM/LDLS PM: CPT | Performed by: INTERNAL MEDICINE

## 2020-11-09 PROCEDURE — 93296 REM INTERROG EVL PM/IDS: CPT | Performed by: INTERNAL MEDICINE

## 2020-11-14 PROCEDURE — 87635 SARS-COV-2 COVID-19 AMP PRB: CPT | Performed by: FAMILY MEDICINE

## 2020-11-15 ENCOUNTER — LAB REQUISITION (OUTPATIENT)
Dept: LAB | Facility: HOSPITAL | Age: 80
End: 2020-11-15
Payer: MEDICARE

## 2020-11-15 DIAGNOSIS — U07.1 COVID-19: ICD-10-CM

## 2020-11-15 LAB — SARS-COV-2 RNA RESP QL NAA+PROBE: POSITIVE

## 2020-11-23 ENCOUNTER — NURSING HOME VISIT (OUTPATIENT)
Dept: FAMILY MEDICINE CLINIC | Facility: HOSPITAL | Age: 80
End: 2020-11-23
Payer: MEDICARE

## 2020-11-23 DIAGNOSIS — I48.11 LONGSTANDING PERSISTENT ATRIAL FIBRILLATION (HCC): Chronic | ICD-10-CM

## 2020-11-23 DIAGNOSIS — F41.1 GENERALIZED ANXIETY DISORDER: Chronic | ICD-10-CM

## 2020-11-23 DIAGNOSIS — I25.10 CORONARY ARTERY DISEASE INVOLVING NATIVE CORONARY ARTERY OF NATIVE HEART WITHOUT ANGINA PECTORIS: ICD-10-CM

## 2020-11-23 DIAGNOSIS — Z95.0 HISTORY OF PERMANENT CARDIAC PACEMAKER PLACEMENT: Chronic | ICD-10-CM

## 2020-11-23 DIAGNOSIS — U07.1 COVID-19 VIRUS INFECTION: Primary | ICD-10-CM

## 2020-11-23 PROCEDURE — 99309 SBSQ NF CARE MODERATE MDM 30: CPT | Performed by: FAMILY MEDICINE

## 2020-11-30 ENCOUNTER — NURSING HOME VISIT (OUTPATIENT)
Dept: FAMILY MEDICINE CLINIC | Facility: HOSPITAL | Age: 80
End: 2020-11-30
Payer: MEDICARE

## 2020-11-30 DIAGNOSIS — U07.1 COVID-19 VIRUS INFECTION: Primary | ICD-10-CM

## 2020-11-30 DIAGNOSIS — I25.10 CORONARY ARTERY DISEASE INVOLVING NATIVE CORONARY ARTERY OF NATIVE HEART WITHOUT ANGINA PECTORIS: ICD-10-CM

## 2020-11-30 DIAGNOSIS — I74.4 PERIPHERAL ARTERY EMBOLISM OR THROMBOSIS (HCC): ICD-10-CM

## 2020-11-30 DIAGNOSIS — I10 ESSENTIAL HYPERTENSION: ICD-10-CM

## 2020-11-30 PROCEDURE — 99309 SBSQ NF CARE MODERATE MDM 30: CPT | Performed by: FAMILY MEDICINE

## 2020-12-28 ENCOUNTER — TELEMEDICINE (OUTPATIENT)
Dept: CARDIOLOGY CLINIC | Facility: CLINIC | Age: 80
End: 2020-12-28
Payer: MEDICARE

## 2020-12-28 VITALS
BODY MASS INDEX: 34.19 KG/M2 | HEART RATE: 66 BPM | WEIGHT: 199.2 LBS | DIASTOLIC BLOOD PRESSURE: 79 MMHG | SYSTOLIC BLOOD PRESSURE: 142 MMHG

## 2020-12-28 DIAGNOSIS — I48.19 PERSISTENT ATRIAL FIBRILLATION (HCC): Primary | ICD-10-CM

## 2020-12-28 PROCEDURE — 99443 PR PHYS/QHP TELEPHONE EVALUATION 21-30 MIN: CPT | Performed by: INTERNAL MEDICINE

## 2020-12-28 RX ORDER — FLUOXETINE HYDROCHLORIDE 20 MG/1
20 CAPSULE ORAL DAILY
COMMUNITY
Start: 2020-11-29

## 2020-12-28 RX ORDER — OLMESARTAN MEDOXOMIL 20 MG/1
20 TABLET ORAL DAILY
COMMUNITY
Start: 2020-11-29

## 2020-12-28 RX ORDER — UBIDECARENONE 75 MG
CAPSULE ORAL
COMMUNITY
Start: 2020-11-13

## 2021-01-04 ENCOUNTER — NURSING HOME VISIT (OUTPATIENT)
Dept: FAMILY MEDICINE CLINIC | Facility: HOSPITAL | Age: 81
End: 2021-01-04
Payer: MEDICARE

## 2021-01-04 DIAGNOSIS — I48.19 PERSISTENT ATRIAL FIBRILLATION (HCC): Chronic | ICD-10-CM

## 2021-01-04 DIAGNOSIS — I25.10 CORONARY ARTERY DISEASE INVOLVING NATIVE CORONARY ARTERY OF NATIVE HEART WITHOUT ANGINA PECTORIS: ICD-10-CM

## 2021-01-04 DIAGNOSIS — F51.01 PRIMARY INSOMNIA: Primary | ICD-10-CM

## 2021-01-04 DIAGNOSIS — F32.1 CURRENT MODERATE EPISODE OF MAJOR DEPRESSIVE DISORDER, UNSPECIFIED WHETHER RECURRENT (HCC): Chronic | ICD-10-CM

## 2021-01-04 DIAGNOSIS — I10 ESSENTIAL HYPERTENSION: ICD-10-CM

## 2021-01-04 PROCEDURE — 99309 SBSQ NF CARE MODERATE MDM 30: CPT | Performed by: FAMILY MEDICINE

## 2021-01-04 NOTE — PROGRESS NOTES
Progress Notes        NAME: Enoc Sandoval  AGE: 79 y o  SEX: female    DATE OF ENCOUNTER: 1/4/2021    Code status:    Assessment     Problem List Items Addressed This Visit        Cardiovascular and Mediastinum    Atrial fibrillation ([address] ) (Chronic)    Coronary artery disease involving native coronary artery of native heart without angina pectoris    Essential hypertension       Other    Depression (Chronic)    Primary insomnia - Primary            Plan/discussion     Insomnia  Restart trazodone daily at HS  Continue to monitor  cotninue the rest of her medications  Chief Complaint     Followup visit     History of Present Illness     Pt seen for regular fu of chronic conditions  Known Afib, Cad, htn, insomnia, depression  Today reporting poor sleep  Requesting restart of her trazodone  Has done well in the past with this  Having more difficulty with sleep over the past few weeks  Otherwise doing well  The following portions of the patient's history were reviewed and updated as appropriate: current medications, past family history, past medical history, past social history, past surgical history and problem list     Allergies: Allergies   Allergen Reactions    Penicillins Hives    Sulfa Antibiotics Hives    Medical Tape Rash       Review of Systems     Review of Systems   Constitutional: Negative  Negative for activity change, appetite change, chills, diaphoresis, fatigue and fever  HENT: Negative for congestion, facial swelling and sore throat  Respiratory: Negative  Negative for apnea, cough, chest tightness and shortness of breath  Cardiovascular: Negative  Negative for chest pain and palpitations  Gastrointestinal: Negative  Negative for abdominal distention, abdominal pain, blood in stool, constipation, diarrhea and nausea  Genitourinary: Negative  Negative for difficulty urinating, dysuria, flank pain and frequency         Medications and orders     All medications reviewed and updated in Nursing Home EMR  Objective     Vitals: reviewed at [facility] EHR      Physical Exam  Vitals signs reviewed  Constitutional:       Appearance: She is obese  HENT:      Head: Normocephalic and atraumatic  Cardiovascular:      Rate and Rhythm: Normal rate and regular rhythm  Pulmonary:      Effort: Pulmonary effort is normal       Breath sounds: Normal breath sounds  Abdominal:      General: Bowel sounds are normal       Palpations: Abdomen is soft  Neurological:      Mental Status: She is alert and oriented to person, place, and time  Mental status is at baseline     Psychiatric:         Mood and Affect: Mood normal          Behavior: Behavior normal          Pertinent Laboratory/Diagnostic Studies:       Bo Taylor MD    1/4/2021 4:15 PM

## 2021-02-12 DIAGNOSIS — Z23 ENCOUNTER FOR IMMUNIZATION: ICD-10-CM

## 2021-02-16 ENCOUNTER — NURSING HOME VISIT (OUTPATIENT)
Dept: FAMILY MEDICINE CLINIC | Facility: HOSPITAL | Age: 81
End: 2021-02-16
Payer: MEDICARE

## 2021-02-16 DIAGNOSIS — I25.10 CORONARY ARTERY DISEASE INVOLVING NATIVE CORONARY ARTERY OF NATIVE HEART WITHOUT ANGINA PECTORIS: ICD-10-CM

## 2021-02-16 DIAGNOSIS — F41.1 GENERALIZED ANXIETY DISORDER: ICD-10-CM

## 2021-02-16 DIAGNOSIS — F32.1 CURRENT MODERATE EPISODE OF MAJOR DEPRESSIVE DISORDER, UNSPECIFIED WHETHER RECURRENT (HCC): ICD-10-CM

## 2021-02-16 DIAGNOSIS — I48.19 PERSISTENT ATRIAL FIBRILLATION (HCC): Primary | ICD-10-CM

## 2021-02-16 DIAGNOSIS — F51.01 PRIMARY INSOMNIA: ICD-10-CM

## 2021-02-16 PROCEDURE — 99309 SBSQ NF CARE MODERATE MDM 30: CPT | Performed by: FAMILY MEDICINE

## 2021-02-16 NOTE — PROGRESS NOTES
Progress Notes        NAME: Remberto Sandoval  AGE: 79 y o  SEX: female    DATE OF ENCOUNTER: 2/16/2021    Code status:    Assessment     Problem List Items Addressed This Visit        Cardiovascular and Mediastinum    Atrial fibrillation ([address] ) - Primary (Chronic)    Coronary artery disease involving native coronary artery of native heart without angina pectoris       Other    Depression (Chronic)    Generalized anxiety disorder (Chronic)    Primary insomnia            Plan/discussion     Chronic conditions are reviewed and are stable  Patient feeling well  No medication changes made today  Will update labs  Chief Complaint     Followup visit     History of Present Illness     Pt seen for fu of chronic conditions  Reports she has been doing well  She has no concerns  Noting she is now in a private room and enjoys it  Reports no issues joaquin covid-19 infection  The following portions of the patient's history were reviewed and updated as appropriate: current medications, past family history, past medical history, past social history, past surgical history and problem list     Allergies: Allergies   Allergen Reactions    Penicillins Hives    Sulfa Antibiotics Hives    Medical Tape Rash       Review of Systems     Review of Systems   Constitutional: Negative  Negative for activity change, appetite change, chills, diaphoresis, fatigue and fever  HENT: Negative for congestion, facial swelling and sore throat  Respiratory: Negative  Negative for apnea, cough, chest tightness and shortness of breath  Cardiovascular: Negative  Negative for chest pain and palpitations  Gastrointestinal: Negative  Negative for abdominal distention, abdominal pain, blood in stool, constipation, diarrhea and nausea  Genitourinary: Negative  Negative for difficulty urinating, dysuria, flank pain and frequency         Medications and orders     All medications reviewed and updated in Nursing Home EMR  Objective     Vitals: reviewed at [facility] EHR      Physical Exam  Vitals signs reviewed  Constitutional:       Appearance: Normal appearance  HENT:      Head: Normocephalic  Neck:      Musculoskeletal: Normal range of motion and neck supple  Cardiovascular:      Rate and Rhythm: Normal rate and regular rhythm  Pulses: Normal pulses  Heart sounds: Normal heart sounds  Pulmonary:      Effort: Pulmonary effort is normal       Breath sounds: Normal breath sounds  Abdominal:      General: Bowel sounds are normal       Palpations: Abdomen is soft  Skin:     Capillary Refill: Capillary refill takes less than 2 seconds  Neurological:      Mental Status: She is alert  Mental status is at baseline  Psychiatric:         Mood and Affect: Mood normal          Behavior: Behavior normal          Thought Content:  Thought content normal          Judgment: Judgment normal          Pertinent Laboratory/Diagnostic Studies:       Bo Taylor MD    2/16/2021 10:34 AM

## 2021-04-07 ENCOUNTER — NURSING HOME VISIT (OUTPATIENT)
Dept: FAMILY MEDICINE CLINIC | Facility: HOSPITAL | Age: 81
End: 2021-04-07
Payer: MEDICARE

## 2021-04-07 DIAGNOSIS — I48.11 LONGSTANDING PERSISTENT ATRIAL FIBRILLATION (HCC): ICD-10-CM

## 2021-04-07 DIAGNOSIS — K21.00 GASTROESOPHAGEAL REFLUX DISEASE WITH ESOPHAGITIS, UNSPECIFIED WHETHER HEMORRHAGE: ICD-10-CM

## 2021-04-07 DIAGNOSIS — F41.1 GENERALIZED ANXIETY DISORDER: ICD-10-CM

## 2021-04-07 DIAGNOSIS — F32.1 CURRENT MODERATE EPISODE OF MAJOR DEPRESSIVE DISORDER, UNSPECIFIED WHETHER RECURRENT (HCC): ICD-10-CM

## 2021-04-07 DIAGNOSIS — I10 ESSENTIAL HYPERTENSION: Primary | ICD-10-CM

## 2021-04-07 PROCEDURE — 99309 SBSQ NF CARE MODERATE MDM 30: CPT | Performed by: FAMILY MEDICINE

## 2021-04-07 NOTE — PROGRESS NOTES
Progress Notes        NAME: Ryan Sandoval  AGE: 79 y o  SEX: female    DATE OF ENCOUNTER: 4/7/2021    Code status:    Assessment     Problem List Items Addressed This Visit        Digestive    Gastroesophageal reflux disease with esophagitis (Chronic)       Cardiovascular and Mediastinum    Atrial fibrillation (HCC) (Chronic)    Essential hypertension - Primary       Other    Depression (Chronic)    Generalized anxiety disorder (Chronic)            Plan/discussion     Doing well  Chronic conditions are stable  Continue with the same  Chief Complaint     Followup visit     History of Present Illness     Pt seen for fu of chronic conditions at [facility]  Elizabeth Tenorio has no new complaints except ongoing Covid-19 restrictions  Feeling well otherwise  The following portions of the patient's history were reviewed and updated as appropriate: current medications, past family history, past medical history, past social history, past surgical history and problem list     Allergies: Allergies   Allergen Reactions    Penicillins Hives    Sulfa Antibiotics Hives    Medical Tape Rash       Review of Systems     Review of Systems   Constitutional: Negative  Negative for activity change, appetite change, chills, diaphoresis, fatigue and fever  HENT: Negative for congestion, facial swelling and sore throat  Respiratory: Negative  Negative for apnea, cough, chest tightness and shortness of breath  Cardiovascular: Negative  Negative for chest pain and palpitations  Gastrointestinal: Negative  Negative for abdominal distention, abdominal pain, blood in stool, constipation, diarrhea and nausea  Genitourinary: Negative  Negative for difficulty urinating, dysuria, flank pain and frequency  Medications and orders     All medications reviewed and updated in Nursing Home EMR  Objective     Vitals: reviewed at [facility] EHR      Physical Exam  Vitals signs and nursing note reviewed     Constitutional: Appearance: She is well-developed  HENT:      Head: Normocephalic and atraumatic  Right Ear: External ear normal       Left Ear: External ear normal       Nose: Nose normal    Eyes:      Conjunctiva/sclera: Conjunctivae normal       Pupils: Pupils are equal, round, and reactive to light  Neck:      Musculoskeletal: Normal range of motion and neck supple  Thyroid: No thyromegaly  Trachea: No tracheal deviation  Cardiovascular:      Rate and Rhythm: Normal rate and regular rhythm  Heart sounds: Normal heart sounds  No murmur  Pulmonary:      Effort: Pulmonary effort is normal  No respiratory distress  Breath sounds: Normal breath sounds  No wheezing  Abdominal:      General: Bowel sounds are normal       Palpations: Abdomen is soft  Musculoskeletal: Normal range of motion  Skin:     General: Skin is warm and dry  Neurological:      Mental Status: She is oriented to person, place, and time     Psychiatric:         Mood and Affect: Mood normal          Behavior: Behavior normal          Pertinent Laboratory/Diagnostic Studies:       Bo Taylor MD    4/7/2021 12:29 PM

## 2021-05-21 DIAGNOSIS — I73.9 PAD (PERIPHERAL ARTERY DISEASE) (HCC): Primary | ICD-10-CM

## 2021-05-24 ENCOUNTER — TELEPHONE (OUTPATIENT)
Dept: VASCULAR SURGERY | Facility: CLINIC | Age: 81
End: 2021-05-24

## 2021-05-24 NOTE — TELEPHONE ENCOUNTER
Attempted to contact patient to schedule appointment(s) listed below  Requested patient call [phone] option 3 to schedule appointment(s)  Patient's appointment(s) are past due, expected ASAP      Dopplers  [] Abdominal Aorta Iliac (AOIL)  [] Carotid (CV)   [] Celiac and/or Mesenteric  [] Endovascular Aortic Repair (EVAR)   [] Hemodialysis Access (HD)   [x] Lower Limb Arterial (RADHA)  [] Lower Limb Venous Duplex with Reflux (LEVDR)  [] Renal Artery  [] Upper Limb (UEA)    Advanced Imaging   [] CTA abdomen    [] CTA abdomen & pelvis    [] CT abdomen with/ without contrast  [] CT abdomen with contrast  [] CT abdomen without contrast    [] CT abdomen & pelvis with/ without contrast  [] CT abdomen & pelvis with contrast  [] CT abdomen & pelvis without contrast    Office Visit  OV due after RADHA is set  [] New patient, patient last seen over 3 years ago  [] Risk factor modification

## 2021-05-25 ENCOUNTER — NURSING HOME VISIT (OUTPATIENT)
Dept: FAMILY MEDICINE CLINIC | Facility: HOSPITAL | Age: 81
End: 2021-05-25
Payer: MEDICARE

## 2021-05-25 DIAGNOSIS — I73.9 PERIPHERAL ARTERIAL DISEASE (HCC): Chronic | ICD-10-CM

## 2021-05-25 DIAGNOSIS — I48.19 PERSISTENT ATRIAL FIBRILLATION (HCC): Chronic | ICD-10-CM

## 2021-05-25 DIAGNOSIS — I25.10 CORONARY ARTERY DISEASE INVOLVING NATIVE CORONARY ARTERY OF NATIVE HEART WITHOUT ANGINA PECTORIS: ICD-10-CM

## 2021-05-25 DIAGNOSIS — F41.1 GENERALIZED ANXIETY DISORDER: ICD-10-CM

## 2021-05-25 DIAGNOSIS — I10 ESSENTIAL HYPERTENSION: Primary | ICD-10-CM

## 2021-05-25 DIAGNOSIS — F32.1 CURRENT MODERATE EPISODE OF MAJOR DEPRESSIVE DISORDER, UNSPECIFIED WHETHER RECURRENT (HCC): Chronic | ICD-10-CM

## 2021-05-25 DIAGNOSIS — F51.01 PRIMARY INSOMNIA: ICD-10-CM

## 2021-05-25 PROCEDURE — 99309 SBSQ NF CARE MODERATE MDM 30: CPT | Performed by: FAMILY MEDICINE

## 2021-05-25 NOTE — PROGRESS NOTES
Progress Notes        NAME: Carrie Sandoval  AGE: 79 y o  SEX: female    DATE OF ENCOUNTER: 5/25/2021    Code status:    Assessment     Problem List Items Addressed This Visit        Cardiovascular and Mediastinum    Atrial fibrillation ([address] ) - Primary (Chronic)    Coronary artery disease involving native coronary artery of native heart without angina pectoris    Essential hypertension    Peripheral arterial disease (HCC) (Chronic)       Other    Depression (Chronic)    Generalized anxiety disorder (Chronic)    Primary insomnia            Plan/discussion   HTN uncontrolled  Increase benicar and hctz  Continue to monitor BP  Check BMP in 1-2 weeks  PAD  Has fu arterial duplex prior to upcoming Vascular apt  Other chronic conditions are reviewed and are stable  Patient is requesting Mammogram due to family history of breast cancer and is very worried about this  Reviewed risks vs benefit of doing this           Chief Complaint     Followup visit     History of Present Illness     Pt seen for fu  Reviewed EMR at [facility]  Patient with no concerns except wanting to get a mammogram    Reports her daughter had breast cancer and as a resuults has a double mastectomy  Reports no previous abnormalities of her mammograms and no personal history of breast CA  She has no other complaints  Sleeping and eating well  No current pain  Doing well with her medications  No issues per nursing staff  The following portions of the patient's history were reviewed and updated as appropriate: current medications, past family history, past medical history, past social history, past surgical history and problem list     Allergies: Allergies   Allergen Reactions    Penicillins Hives    Sulfa Antibiotics Hives    Medical Tape Rash       Review of Systems     Review of Systems   Constitutional: Negative  Negative for activity change, appetite change, chills, diaphoresis, fatigue and fever  HENT: Negative for congestion, facial swelling and sore throat  Respiratory: Negative  Negative for apnea, cough, chest tightness and shortness of breath  Cardiovascular: Negative  Negative for chest pain and palpitations  Gastrointestinal: Negative  Negative for abdominal distention, abdominal pain, blood in stool, constipation, diarrhea and nausea  Genitourinary: Negative  Negative for difficulty urinating, dysuria, flank pain and frequency  Medications and orders     All medications reviewed and updated in Nursing Home EMR  Objective     Vitals: reviewed at [facility] EHR      Physical Exam  Vitals signs and nursing note reviewed  Constitutional:       Appearance: Normal appearance  She is well-developed  She is obese  HENT:      Head: Normocephalic and atraumatic  Right Ear: External ear normal       Left Ear: External ear normal       Nose: Nose normal    Eyes:      Conjunctiva/sclera: Conjunctivae normal       Pupils: Pupils are equal, round, and reactive to light  Neck:      Musculoskeletal: Normal range of motion and neck supple  Thyroid: No thyromegaly  Trachea: No tracheal deviation  Cardiovascular:      Rate and Rhythm: Normal rate and regular rhythm  Heart sounds: Normal heart sounds  No murmur  Pulmonary:      Effort: Pulmonary effort is normal  No respiratory distress  Breath sounds: Normal breath sounds  No wheezing  Abdominal:      General: Bowel sounds are normal       Palpations: Abdomen is soft  Musculoskeletal: Normal range of motion  Skin:     General: Skin is warm and dry  Capillary Refill: Capillary refill takes less than 2 seconds  Neurological:      Mental Status: She is alert and oriented to person, place, and time  Mental status is at baseline     Psychiatric:         Mood and Affect: Mood normal          Behavior: Behavior normal          Pertinent Laboratory/Diagnostic Studies:       Bo Taylor MD    5/25/2021 11:42 AM

## 2021-06-16 ENCOUNTER — TELEPHONE (OUTPATIENT)
Dept: CARDIOLOGY CLINIC | Facility: CLINIC | Age: 81
End: 2021-06-16

## 2021-06-16 NOTE — TELEPHONE ENCOUNTER
Rec'd a call from [facility] in [facility]  Patient showed up on a gurney from [facility]  I called [facility] last week  Didn't get a name of who I spoke too  I re-scheduled today to 7/7 because there is no doctors in the office this afternoon  I called [facility] back to find out and Makayla at [facility] [place] said it's a group effort

## 2021-06-29 ENCOUNTER — NURSING HOME VISIT (OUTPATIENT)
Dept: FAMILY MEDICINE CLINIC | Facility: HOSPITAL | Age: 81
End: 2021-06-29
Payer: MEDICARE

## 2021-06-29 DIAGNOSIS — I48.19 PERSISTENT ATRIAL FIBRILLATION (HCC): Primary | ICD-10-CM

## 2021-06-29 DIAGNOSIS — I10 ESSENTIAL HYPERTENSION: ICD-10-CM

## 2021-06-29 DIAGNOSIS — F32.1 CURRENT MODERATE EPISODE OF MAJOR DEPRESSIVE DISORDER, UNSPECIFIED WHETHER RECURRENT (HCC): ICD-10-CM

## 2021-06-29 DIAGNOSIS — I25.10 CORONARY ARTERY DISEASE INVOLVING NATIVE CORONARY ARTERY OF NATIVE HEART WITHOUT ANGINA PECTORIS: ICD-10-CM

## 2021-06-29 PROCEDURE — 99309 SBSQ NF CARE MODERATE MDM 30: CPT | Performed by: FAMILY MEDICINE

## 2021-06-30 NOTE — PROGRESS NOTES
Progress Notes        NAME: Jesús Sandoval  AGE: 79 y o  SEX: female    DATE OF ENCOUNTER: 6/29/2021    Code status:    Assessment     Problem List Items Addressed This Visit        Cardiovascular and Mediastinum    Atrial fibrillation ([address] ) - Primary (Chronic)    Coronary artery disease involving native coronary artery of native heart without angina pectoris    Essential hypertension       Other    Depression (Chronic)            Plan/discussion   Patient doing well with  No new issues  Continue same medications  Chief Complaint     Followup visit     History of Present Illness     Pt seen for fu of chronic conditons     Reports she is doing well with  No complaints  No issues per SNF staff  The following portions of the patient's history were reviewed and updated as appropriate: current medications, past family history, past medical history, past social history, past surgical history and problem list     Allergies: Allergies   Allergen Reactions    Penicillins Hives    Sulfa Antibiotics Hives    Medical Tape Rash       Review of Systems     Review of Systems   Constitutional: Negative  Negative for activity change, appetite change, chills, diaphoresis, fatigue and fever  HENT: Negative for congestion, facial swelling and sore throat  Respiratory: Negative  Negative for apnea, cough, chest tightness and shortness of breath  Cardiovascular: Negative  Negative for chest pain and palpitations  Gastrointestinal: Negative  Negative for abdominal distention, abdominal pain, blood in stool, constipation, diarrhea and nausea  Genitourinary: Negative  Negative for difficulty urinating, dysuria, flank pain and frequency  Medications and orders     All medications reviewed and updated in Nursing Home EMR  Objective     Vitals: reviewed at [facility] EHR      Physical Exam  Vitals reviewed  Constitutional:       Appearance: Normal appearance  HENT:      Head: Normocephalic  Right Ear: External ear normal       Left Ear: External ear normal       Nose: Nose normal       Mouth/Throat:      Mouth: Mucous membranes are moist    Eyes:      Extraocular Movements: Extraocular movements intact  Pupils: Pupils are equal, round, and reactive to light  Cardiovascular:      Rate and Rhythm: Normal rate and regular rhythm  Heart sounds: Normal heart sounds  Pulmonary:      Effort: Pulmonary effort is normal       Breath sounds: Normal breath sounds  Abdominal:      General: Bowel sounds are normal       Palpations: Abdomen is soft  Skin:     Capillary Refill: Capillary refill takes less than 2 seconds  Neurological:      General: No focal deficit present  Mental Status: She is alert and oriented to person, place, and time  Mental status is at baseline     Psychiatric:         Mood and Affect: Mood normal          Behavior: Behavior normal          Pertinent Laboratory/Diagnostic Studies:       Bo Taylor MD    6/29/2021 9:28 PM

## 2021-07-08 ENCOUNTER — HOSPITAL ENCOUNTER (OUTPATIENT)
Dept: MAMMOGRAPHY | Facility: IMAGING CENTER | Age: 81
Discharge: HOME | End: 2021-07-08

## 2021-07-08 ENCOUNTER — TELEPHONE (OUTPATIENT)
Dept: FAMILY MEDICINE CLINIC | Facility: HOSPITAL | Age: 81
End: 2021-07-08

## 2021-07-08 DIAGNOSIS — Z12.31 VISIT FOR SCREENING MAMMOGRAM: ICD-10-CM

## 2021-07-08 DIAGNOSIS — N64.4 BREAST PAIN: Primary | ICD-10-CM

## 2021-07-12 ENCOUNTER — OFFICE VISIT (OUTPATIENT)
Dept: VASCULAR SURGERY | Facility: CLINIC | Age: 81
End: 2021-07-12
Payer: MEDICARE

## 2021-07-12 VITALS — DIASTOLIC BLOOD PRESSURE: 90 MMHG | SYSTOLIC BLOOD PRESSURE: 120 MMHG

## 2021-07-12 DIAGNOSIS — I73.9 PERIPHERAL ARTERIAL DISEASE (HCC): Primary | ICD-10-CM

## 2021-07-12 PROCEDURE — 99213 OFFICE O/P EST LOW 20 MIN: CPT | Performed by: NURSE PRACTITIONER

## 2021-07-12 NOTE — ASSESSMENT & PLAN NOTE
79year old female with HTN, CAD, Afib, s/p bioprosthetic AVR, s/p PPM, who developed acute lower extremity ischemia off anticoagulation for afib s/p L femoral embolectomy by Dr Halima Dorsey Doctor 10/6/19  She presents to the office for [facility] visit   -[facility] showed RLE monophasic/biphasic waveforms and LLE biphasic waveforms  -Ambulating minimally with walker    Mainly in wheelchair     -Chronic numbness in toes bilaterally   -No ischemic wounds  -Will evaluate with formal lower extremity arterial duplex at [address] [place] [address] vascular lab to include level disease, ZAIN and digital pressures  -Continue aspirin, Xarelto (AFib), atorvastatin  -Follow-up after testing to review

## 2021-07-12 NOTE — PROGRESS NOTES
Assessment/Plan:    Peripheral arterial disease ([address] )  79year old female with HTN, CAD, Afib, s/p bioprosthetic AVR, s/p PPM, who developed acute lower extremity ischemia off anticoagulation for afib s/p L femoral embolectomy by Dr Lizbet Mendez Doctor 10/6/19  She presents to the office for RFM visit   -Mobile X duplex done at [address] [facility] showed RLE monophasic/biphasic waveforms and LLE biphasic waveforms  -Ambulating minimally with walker  Mainly in wheelchair     -Chronic numbness in toes bilaterally   -No ischemic wounds  -Will evaluate with formal lower extremity arterial duplex at [facility] [place]note 2[place] vascular lab to include level disease, ZAIN and digital pressures  -Continue aspirin, Xarelto (AFib), atorvastatin  -Follow-up after testing to review       Diagnoses and all orders for this visit:    Peripheral arterial disease (HCC)  -     VAS lower limb arterial duplex, complete bilateral; Future          Subjective:      Patient ID: Tess Alcazar is a 79 y o  female  Patient presents today for review of RADHA  Patient states she has noticed increased selling in left leg  She states recently it was swollen to the point where she was unable to put a shoe on  Patient is currently taking ASA 81mg, Atorvastatin, and Xarelto  HPI  79year old female with HTN, CAD, Afib, s/p bioprosthetic AVR, s/p PPM, who developed acute lower extremity ischemia off anticoagulation for afib s/p L femoral embolectomy by Dr Lizbet Mendez Doctor 10/6/19  She presents to the office for RFM visit  Last seen in the office January 2020 by Dr Ballesteros Self  She had mobile X duplex at facility which showed right lower extremity monophasic/biphasic arterial waveforms and left lower extremity biphasic waveforms  She is in wheelchair today  She says that she does ambulate with a walker around her room at [facility]  She leaves the room 3 times a day for meals and mostly in the wheelchair  She does admit that facility encouraged her to walk more  No ischemic wounds or ischemic rest pain  She has chronic numbness in the toes bilaterally  She had previously worn Tu stockings for lower extremity swelling doses completely resolved  She noticed some puffiness in her left leg a few days ago  Currently no pitting edema  She is maintained on aspirin and Xarelto  She is also on atorvastatin  The following portions of the patient's history were reviewed and updated as appropriate: allergies, current medications, past family history, past medical history, past social history, past surgical history and problem list   Review of systems reviewed    Review of Systems   Constitutional: Negative  HENT: Negative  Eyes: Negative  Respiratory: Negative  Cardiovascular: Negative  Endocrine: Negative  Genitourinary: Negative  Musculoskeletal: Negative  Skin: Negative  Allergic/Immunologic: Negative  Neurological: Negative  Hematological: Negative  Psychiatric/Behavioral: Negative  Objective:  I have reviewed and made appropriate changes to the review of systems input by the medical assistant      Vitals:    07/12/21 1303   BP: 120/90   BP Location: Right arm   Patient Position: Sitting   Cuff Size: Standard       Patient Active Problem List   Diagnosis    History of permanent cardiac pacemaker placement    Mixed hyperlipidemia    Generalized anxiety disorder    Depression    Benign familial tremor    Neuropathy    Primary insomnia    Essential hypertension    Coronary artery disease involving native coronary artery of native heart without angina pectoris    Sciatic nerve pain, right    Obesity (BMI 35 0-39 9 without comorbidity)    Globus sensation    Nocturnal enuresis    Ischemia of left lower extremity    Peripheral artery embolism or thrombosis (HCC)    Atrial fibrillation (HCC)    Onychomycosis    Left femoral embolectomy    Urinary incontinence    Peripheral arterial disease (HCC)       Past Surgical History:   Procedure Laterality Date    AORTIC VALVE REPLACEMENT  12/2012    CARDIAC PACEMAKER PLACEMENT      dual chamber last assessed 10/14/13    CHOLECYSTECTOMY      IR LOWER EXTREMITY / INTERVENTION  10/6/2019    JOINT REPLACEMENT      B/L knee replacement    THROMBECTOMY W/ EMBOLECTOMY N/A 10/6/2019    Procedure: EMBOLECTOMY/THROMBECTOMY Left LOWER EXTREMITY; angiogram;  Surgeon: Tadeo Lyon MD;  [facility]: [facility];  Service: Vascular       Family History   Problem Relation Age of Onset    Hypertension Mother        Social History     Socioeconomic History    Marital status:      Spouse name: Not on file    Number of children: Not on file    Years of education: Not on file    Highest education level: Not on file   Occupational History    Not on file   Tobacco Use    Smoking status: Never Smoker    Smokeless tobacco: Never Used   Substance and Sexual Activity    Alcohol use: Not Currently     Alcohol/week: 0 0 standard drinks    Drug use: No    Sexual activity: Not Currently   Other Topics Concern    Not on file   Social History Narrative    Not on file     Social Determinants of Health     Financial Resource Strain:     Difficulty of Paying Living Expenses:    Food Insecurity:     Worried About Running Out of Food in the Last Year:     Ran Out of Food in the Last Year:    Transportation Needs:     Lack of Transportation (Medical):      Lack of Transportation (Non-Medical):    Physical Activity:     Days of Exercise per Week:     Minutes of Exercise per Session:    Stress:     Feeling of Stress :    Social Connections:     Frequency of Communication with Friends and Family:     Frequency of Social Gatherings with Friends and Family:     Attends Spiritism Services:     Active Member of Clubs or Organizations:     Attends Club or Organization Meetings:     Marital Status:    Intimate Partner Violence:     Fear of Current or Ex-Partner:     Emotionally Abused:     Physically Abused:     Sexually Abused:         Allergies   Allergen Reactions    Penicillins Hives    Sulfa Antibiotics Hives    Medical Tape Rash         Current Outpatient Medications:     acetaminophen (TYLENOL) 500 mg tablet, Take 1 tablet (500 mg total) by mouth every 6 (six) hours as needed for mild pain, Disp: 120 tablet, Rfl: 3    aspirin (ECOTRIN LOW STRENGTH) 81 mg EC tablet, Take 1 tablet (81 mg total) by mouth daily, Disp: 60 tablet, Rfl: 0    atorvastatin (LIPITOR) 20 mg tablet, Take 1 tablet (20 mg total) by mouth daily after dinner, Disp: 60 tablet, Rfl: 0    bisacodyl (DULCOLAX) 10 mg suppository, Insert 10 mg into the rectum daily, Disp: , Rfl:     cyanocobalamin (VITAMIN B-12) 100 mcg tablet, , Disp: , Rfl:     ferrous sulfate 325 (65 Fe) mg tablet, Take 325 mg by mouth daily with breakfast, Disp: , Rfl:     FLUoxetine (PROzac) 20 mg capsule, Take 20 mg by mouth daily , Disp: , Rfl:     gabapentin (NEURONTIN) 100 mg capsule, Take 100 mg by mouth every morning, Disp: , Rfl:     gabapentin (NEURONTIN) 300 mg capsule, Take 300 mg by mouth daily at bedtime, Disp: , Rfl:     hydrochlorothiazide (HYDRODIURIL) 12 5 mg tablet, Take 12 5 mg by mouth daily, Disp: , Rfl:     magnesium hydroxide (MILK OF MAGNESIA) 400 mg/5 mL oral suspension, Take by mouth daily as needed for constipation, Disp: , Rfl:     melatonin 3 mg, Take 3 mg by mouth daily at bedtime as needed, Disp: , Rfl:     metoprolol succinate (TOPROL-XL) 50 mg 24 hr tablet, One tablet, 50 mg, every 12 hours (Patient taking differently: Take 100 mg by mouth daily One tablet, 50 mg, every 12 hours), Disp: 60 tablet, Rfl: 2    olmesartan (BENICAR) 20 mg tablet, Take 20 mg by mouth daily , Disp: , Rfl:     omeprazole (PriLOSEC) 20 mg delayed release capsule, TAKE ONE CAPSULE BY MOUTH EVERY DAY, Disp: 30 capsule, Rfl: 5    rivaroxaban (XARELTO) 20 mg tablet, Take one (1) tablet PO with dinner after completion of starter pack, Disp: 30 tablet, Rfl: 3    senna (SENOKOT) 8 6 mg, Take 1 tablet (8 6 mg total) by mouth daily at bedtime, Disp: 120 each, Rfl: 0    traZODone (DESYREL) 50 mg tablet, Take 50 mg by mouth daily at bedtime, Disp: , Rfl:     triamcinolone (KENALOG) 0 1 % cream, Apply topically 2 (two) times a day, Disp: 30 g, Rfl: 0      /90 (BP Location: Right arm, Patient Position: Sitting, Cuff Size: Standard)          Physical Exam  Vitals and nursing note reviewed  Constitutional:       Appearance: Normal appearance  HENT:      Head: Normocephalic and atraumatic  Eyes:      Extraocular Movements: Extraocular movements intact  Cardiovascular:      Rate and Rhythm: Normal rate  Pulses:           Dorsalis pedis pulses are detected w/ Doppler on the right side and detected w/ Doppler on the left side  Posterior tibial pulses are 0 on the right side and detected w/ Doppler on the left side  Comments: AT doppler signal   Pulmonary:      Effort: Pulmonary effort is normal       Breath sounds: Normal breath sounds  Abdominal:      Palpations: Abdomen is soft  Musculoskeletal:      Comments: whleechair   Skin:     General: Skin is warm  Neurological:      General: No focal deficit present  Mental Status: She is alert and oriented to person, place, and time     Psychiatric:         Mood and Affect: Mood normal          Behavior: Behavior normal

## 2021-07-12 NOTE — PATIENT INSTRUCTIONS
79year old female with HTN, CAD, Afib, s/p bioprosthetic AVR, s/p PPM, who developed acute lower extremity ischemia off anticoagulation for afib s/p L femoral embolectomy by Dr Rasheeda Burnham Doctor 10/6/19  She presents to the office for RFM visit   -Mobile X duplex done at [address] [facility] showed RLE monophasic/biphasic waveforms and LLE biphasic waveforms  -Ambulating minimally with walker    Mainly in wheelchair     -Chronic numbness in toes bilaterally   -No ischemic wounds  -Will evaluate with formal lower extremity arterial duplex at [facility] vascular lab to include level disease, ZAIN and digital pressures  -Continue aspirin, Xarelto (AFib), atorvastatin  -Follow-up after testing to review

## 2021-07-23 ENCOUNTER — HOSPITAL ENCOUNTER (OUTPATIENT)
Dept: MAMMOGRAPHY | Facility: CLINIC | Age: 81
Discharge: HOME | End: 2021-07-23
Payer: MEDICARE

## 2021-07-23 ENCOUNTER — HOSPITAL ENCOUNTER (OUTPATIENT)
Dept: ULTRASOUND IMAGING | Facility: CLINIC | Age: 81
Discharge: HOME | End: 2021-07-23
Payer: MEDICARE

## 2021-07-23 VITALS — HEIGHT: 64 IN | BODY MASS INDEX: 33.97 KG/M2 | WEIGHT: 199 LBS

## 2021-07-23 DIAGNOSIS — N64.4 BREAST PAIN: ICD-10-CM

## 2021-07-23 PROCEDURE — 76642 ULTRASOUND BREAST LIMITED: CPT

## 2021-07-23 PROCEDURE — 77066 DX MAMMO INCL CAD BI: CPT

## 2021-07-23 PROCEDURE — G0279 TOMOSYNTHESIS, MAMMO: HCPCS

## 2021-08-12 ENCOUNTER — HOSPITAL ENCOUNTER (OUTPATIENT)
Dept: NON INVASIVE DIAGNOSTICS | Age: 81
Discharge: HOME | End: 2021-08-12
Payer: MEDICARE

## 2021-08-12 DIAGNOSIS — I73.9 PERIPHERAL ARTERIAL DISEASE (HCC): ICD-10-CM

## 2021-08-12 PROCEDURE — 93925 LOWER EXTREMITY STUDY: CPT

## 2021-08-12 PROCEDURE — 93922 UPR/L XTREMITY ART 2 LEVELS: CPT | Performed by: SURGERY

## 2021-08-12 PROCEDURE — 93925 LOWER EXTREMITY STUDY: CPT | Performed by: SURGERY

## 2021-08-12 PROCEDURE — 93923 UPR/LXTR ART STDY 3+ LVLS: CPT

## 2021-08-18 ENCOUNTER — IN-CLINIC DEVICE VISIT (OUTPATIENT)
Dept: CARDIOLOGY CLINIC | Facility: CLINIC | Age: 81
End: 2021-08-18
Payer: MEDICARE

## 2021-08-18 DIAGNOSIS — Z95.0 PRESENCE OF CARDIAC PACEMAKER: Primary | ICD-10-CM

## 2021-08-18 PROCEDURE — 93280 PM DEVICE PROGR EVAL DUAL: CPT | Performed by: INTERNAL MEDICINE

## 2021-08-18 NOTE — PROGRESS NOTES
Results for orders placed or performed in visit on 08/18/21   Cardiac EP device report    Narrative    MDT DUAL PPM  DEVICE INTERROGATED IN THE [facility] OFFICE: BATTERY VOLTAGE ADEQUATE (2 5 YRS)  AP: 2 6%  : 4 9%  ALL LEAD PARAMETERS WITHIN NORMAL LIMITS  AT/AF EPISODES W/ AF IN PROGRESS (HX OF SAME)  AF BURDEN: 33 4%  PT TAKES XARELTO, ASA 81MG, METOPROLOL SUCC  EF: 65% (ECHO 10/7/19)  NO PROGRAMMING CHANGES MADE TO DEVICE PARAMETERS  PACEMAKER FUNCTIONING APPROPRIATELY    [facility]

## 2021-09-09 ENCOUNTER — NURSING HOME VISIT (OUTPATIENT)
Dept: FAMILY MEDICINE CLINIC | Facility: HOSPITAL | Age: 81
End: 2021-09-09
Payer: MEDICARE

## 2021-09-09 DIAGNOSIS — I10 ESSENTIAL HYPERTENSION: Primary | ICD-10-CM

## 2021-09-09 DIAGNOSIS — I25.10 CORONARY ARTERY DISEASE INVOLVING NATIVE CORONARY ARTERY OF NATIVE HEART WITHOUT ANGINA PECTORIS: ICD-10-CM

## 2021-09-09 DIAGNOSIS — F32.1 CURRENT MODERATE EPISODE OF MAJOR DEPRESSIVE DISORDER, UNSPECIFIED WHETHER RECURRENT (HCC): ICD-10-CM

## 2021-09-09 DIAGNOSIS — I48.19 PERSISTENT ATRIAL FIBRILLATION (HCC): ICD-10-CM

## 2021-09-09 PROCEDURE — 99309 SBSQ NF CARE MODERATE MDM 30: CPT | Performed by: FAMILY MEDICINE

## 2021-09-09 NOTE — PROGRESS NOTES
Progress Notes        NAME: Hermelinda Sandoval  AGE: 79 y o  SEX: female    DATE OF ENCOUNTER: 9/9/2021    Code status:    Assessment     Problem List Items Addressed This Visit        Cardiovascular and Mediastinum    Atrial fibrillation ([address] ) (Chronic)    Coronary artery disease involving native coronary artery of native heart without angina pectoris    Essential hypertension - Primary       Other    Depression (Chronic)            Plan/discussion       Note created late  Patient was seen on August 23, 2021  Chronic conditions are   Reviewed and are stable  Continue same medication  Chief Complaint     Followup visit     History of Present Illness     Patient is seen at the nursing home  She is followed for chronic conditions  She offers no new complaints  Reviewed with nursing staff there and there are no new issues  Doing well with current regimen of her medications  The following portions of the patient's history were reviewed and updated as appropriate: current medications, past family history, past medical history, past social history, past surgical history and problem list     Allergies: Allergies   Allergen Reactions    Penicillins Hives    Sulfa Antibiotics Hives    Medical Tape Rash       Review of Systems     Review of Systems   Constitutional: Negative  Negative for activity change, appetite change, chills, diaphoresis, fatigue and fever  HENT: Negative for congestion, facial swelling and sore throat  Respiratory: Negative  Negative for apnea, cough, chest tightness and shortness of breath  Cardiovascular: Negative  Negative for chest pain and palpitations  Gastrointestinal: Negative  Negative for abdominal distention, abdominal pain, blood in stool, constipation, diarrhea and nausea  Genitourinary: Negative  Negative for difficulty urinating, dysuria, flank pain and frequency         Medications and orders     All medications reviewed and updated in [address] EMR       Objective     Vitals: reviewed at [facility] EHR      Physical Exam  Vitals and nursing note reviewed  Constitutional:       Appearance: Normal appearance  She is obese  HENT:      Head: Normocephalic  Nose: Nose normal    Eyes:      Extraocular Movements: Extraocular movements intact  Pupils: Pupils are equal, round, and reactive to light  Cardiovascular:      Rate and Rhythm: Normal rate and regular rhythm  Heart sounds: Normal heart sounds  Pulmonary:      Effort: Pulmonary effort is normal       Breath sounds: Normal breath sounds  Abdominal:      General: Bowel sounds are normal       Palpations: Abdomen is soft  Musculoskeletal:      Cervical back: Normal range of motion and neck supple  Skin:     Capillary Refill: Capillary refill takes less than 2 seconds  Neurological:      Mental Status: She is alert and oriented to person, place, and time  Mental status is at baseline     Psychiatric:         Mood and Affect: Mood normal          Behavior: Behavior normal          Pertinent Laboratory/Diagnostic Studies:       Bo Taylor MD    9/9/2021 9:56 AM

## 2021-09-14 ENCOUNTER — OFFICE VISIT (OUTPATIENT)
Dept: VASCULAR SURGERY | Facility: CLINIC | Age: 81
End: 2021-09-14
Payer: MEDICARE

## 2021-09-14 VITALS — SYSTOLIC BLOOD PRESSURE: 138 MMHG | DIASTOLIC BLOOD PRESSURE: 68 MMHG

## 2021-09-14 DIAGNOSIS — I73.9 PERIPHERAL ARTERIAL DISEASE (HCC): Primary | Chronic | ICD-10-CM

## 2021-09-14 PROCEDURE — 99213 OFFICE O/P EST LOW 20 MIN: CPT | Performed by: SURGERY

## 2021-09-14 NOTE — PROGRESS NOTES
Assessment/Plan:    Presents in follow-up of peripheral arterial disease  She is not experiencing any ischemic rest pain or tissue loss symptoms and is essentially wheelchair bound with ability to stand with help to go in and out of bed to chair etc  She has had recent lower extremity arterial study which does show adequate perfusion given her needs at this time  Plan:  Lower extremity arterial study in 1 year       Diagnoses and all orders for this visit:    Peripheral arterial disease (HCC)  -     VAS lower limb arterial duplex, complete bilateral; Future        Subjective:      Patient ID: Dede Scheuermann is a 79 y o  female  Patient presents today for review of RADHA done 8/12/21  She states her toes are numb  She denies any pain, swelling or numbness in BLE  Patient is currently taking ASA 81mg, Atorvastatin, and Xarelto  Patient is a non smoker  HPI    The following portions of the patient's history were reviewed and updated as appropriate: allergies, current medications, past family history, past medical history, past social history, past surgical history and problem list     Review of Systems   Constitutional: Negative  HENT: Negative  Eyes: Negative  Respiratory: Negative  Cardiovascular: Negative  Gastrointestinal: Negative  Endocrine: Negative  Genitourinary: Negative  Musculoskeletal: Negative  Skin: Negative  Allergic/Immunologic: Negative  Neurological: Positive for numbness  Hematological: Bruises/bleeds easily  Psychiatric/Behavioral: Negative  Objective: There were no vitals taken for this visit  Physical Exam      Oriented x3 no evidence of clinical depression  Eyes:  Sclera non-icteric    Skin: normal without evidence of inflammation    Neck is supple carotid pulses equal bilaterally no bruits heard    Chest lungs clear, heart regular rhythm  Abdomen soft nontender no evidence of pulsatile masses      Pulses are palpable bilateral Femoral   Neurological exam intact cranial nerves 2-12 grossly intact no gross motor sensory deficits detected  Imaging viewed and reviewed with Patient    I have reviewed and made appropriate changes to the review of systems input by the medical assistant      Vitals:    09/14/21 0957   BP: 138/68   BP Location: Right arm   Patient Position: Sitting   Cuff Size: Standard       Patient Active Problem List   Diagnosis    History of permanent cardiac pacemaker placement    Mixed hyperlipidemia    Generalized anxiety disorder    Depression    Benign familial tremor    Neuropathy    Primary insomnia    Essential hypertension    Coronary artery disease involving native coronary artery of native heart without angina pectoris    Sciatic nerve pain, right    Obesity (BMI 35 0-39 9 without comorbidity)    Globus sensation    Nocturnal enuresis    Ischemia of left lower extremity    Peripheral artery embolism or thrombosis (HCC)    Atrial fibrillation (HCC)    Onychomycosis    Left femoral embolectomy    Urinary incontinence    Peripheral arterial disease ([address] )       Past Surgical History:   Procedure Laterality Date    AORTIC VALVE REPLACEMENT  12/2012    BREAST BIOPSY Right     years ago -Benign    CARDIAC PACEMAKER PLACEMENT      dual chamber last assessed 10/14/13    CHOLECYSTECTOMY      IR LOWER EXTREMITY / INTERVENTION  10/6/2019    JOINT REPLACEMENT      B/L knee replacement    THROMBECTOMY W/ EMBOLECTOMY N/A 10/6/2019    Procedure: EMBOLECTOMY/THROMBECTOMY Left LOWER EXTREMITY; angiogram;  Surgeon: Ramos Lyon MD;  Location: [facility];  Service: Vascular       Family History   Problem Relation Age of Onset    Hypertension Mother     Breast cancer Mother     No Known Problems Father     Breast cancer Daughter 46    No Known Problems Maternal Grandmother     No Known Problems Maternal Grandfather     No Known Problems Paternal Grandmother     No Known Problems Paternal Grandfather     No Known Problems Son     No Known Problems Son     No Known Problems Daughter     No Known Problems Brother     No Known Problems Maternal Aunt     No Known Problems Maternal Aunt        Social History     Socioeconomic History    Marital status:      Spouse name: Not on file    Number of children: Not on file    Years of education: Not on file    Highest education level: Not on file   Occupational History    Not on file   Tobacco Use    Smoking status: Never Smoker    Smokeless tobacco: Never Used   Substance and Sexual Activity    Alcohol use: Not Currently     Alcohol/week: 0 0 standard drinks    Drug use: No    Sexual activity: Not Currently   Other Topics Concern    Not on file   Social History Narrative    Not on file     Social Determinants of Health     Financial Resource Strain:     Difficulty of Paying Living Expenses:    Food Insecurity:     Worried About Running Out of Food in the Last Year:     Ran Out of Food in the Last Year:    Transportation Needs:     Lack of Transportation (Medical):  Lack of Transportation (Non-Medical):    Physical Activity:     Days of Exercise per Week:     Minutes of Exercise per Session:    Stress:     Feeling of Stress :    Social Connections:     Frequency of Communication with Friends and Family:     Frequency of Social Gatherings with Friends and Family:     Attends Jehovah's witness Services:     Active Member of Clubs or Organizations:     Attends Club or Organization Meetings:     Marital Status:    Intimate Partner Violence:     Fear of Current or Ex-Partner:     Emotionally Abused:     Physically Abused:     Sexually Abused:         Allergies   Allergen Reactions    Penicillins Hives    Sulfa Antibiotics Hives    Medical Tape Rash         Current Outpatient Medications:     acetaminophen (TYLENOL) 500 mg tablet, Take 1 tablet (500 mg total) by mouth every 6 (six) hours as needed for mild pain, Disp: 120 tablet, Rfl: 3    aspirin (ECOTRIN LOW STRENGTH) 81 mg EC tablet, Take 1 tablet (81 mg total) by mouth daily, Disp: 60 tablet, Rfl: 0    atorvastatin (LIPITOR) 20 mg tablet, Take 1 tablet (20 mg total) by mouth daily after dinner, Disp: 60 tablet, Rfl: 0    bisacodyl (DULCOLAX) 10 mg suppository, Insert 10 mg into the rectum daily, Disp: , Rfl:     cyanocobalamin (VITAMIN B-12) 100 mcg tablet, , Disp: , Rfl:     ferrous sulfate 325 (65 Fe) mg tablet, Take 325 mg by mouth daily with breakfast, Disp: , Rfl:     FLUoxetine (PROzac) 20 mg capsule, Take 20 mg by mouth daily , Disp: , Rfl:     gabapentin (NEURONTIN) 100 mg capsule, Take 100 mg by mouth every morning, Disp: , Rfl:     gabapentin (NEURONTIN) 300 mg capsule, Take 300 mg by mouth daily at bedtime, Disp: , Rfl:     hydrochlorothiazide (HYDRODIURIL) 12 5 mg tablet, Take 12 5 mg by mouth daily, Disp: , Rfl:     magnesium hydroxide (MILK OF MAGNESIA) 400 mg/5 mL oral suspension, Take by mouth daily as needed for constipation, Disp: , Rfl:     melatonin 3 mg, Take 3 mg by mouth daily at bedtime as needed, Disp: , Rfl:     metoprolol succinate (TOPROL-XL) 50 mg 24 hr tablet, One tablet, 50 mg, every 12 hours (Patient taking differently: Take 100 mg by mouth daily One tablet, 50 mg, every 12 hours), Disp: 60 tablet, Rfl: 2    olmesartan (BENICAR) 20 mg tablet, Take 20 mg by mouth daily , Disp: , Rfl:     omeprazole (PriLOSEC) 20 mg delayed release capsule, TAKE ONE CAPSULE BY MOUTH EVERY DAY, Disp: 30 capsule, Rfl: 5    rivaroxaban (XARELTO) 20 mg tablet, Take one (1) tablet PO with dinner after completion of starter pack, Disp: 30 tablet, Rfl: 3    senna (SENOKOT) 8 6 mg, Take 1 tablet (8 6 mg total) by mouth daily at bedtime, Disp: 120 each, Rfl: 0    traZODone (DESYREL) 50 mg tablet, Take 50 mg by mouth daily at bedtime, Disp: , Rfl:     triamcinolone (KENALOG) 0 1 % cream, Apply topically 2 (two) times a day, Disp: 30 g, Rfl: 0

## 2021-09-14 NOTE — PATIENT INSTRUCTIONS
Presents in follow-up of peripheral arterial disease  She is not experiencing any ischemic rest pain or tissue loss symptoms and is essentially wheelchair bound with ability to stand with help to go in and out of bed to chair etc  She has had recent lower extremity arterial study which does show adequate perfusion given her needs at this time        Plan:  Lower extremity arterial study in 1 year

## 2021-10-05 ENCOUNTER — NURSING HOME VISIT (OUTPATIENT)
Dept: FAMILY MEDICINE CLINIC | Facility: HOSPITAL | Age: 81
End: 2021-10-05
Payer: MEDICARE

## 2021-10-05 DIAGNOSIS — I48.19 PERSISTENT ATRIAL FIBRILLATION (HCC): Chronic | ICD-10-CM

## 2021-10-05 DIAGNOSIS — F51.01 PRIMARY INSOMNIA: ICD-10-CM

## 2021-10-05 DIAGNOSIS — I25.10 CORONARY ARTERY DISEASE INVOLVING NATIVE CORONARY ARTERY OF NATIVE HEART WITHOUT ANGINA PECTORIS: Primary | ICD-10-CM

## 2021-10-05 DIAGNOSIS — F32.1 CURRENT MODERATE EPISODE OF MAJOR DEPRESSIVE DISORDER, UNSPECIFIED WHETHER RECURRENT (HCC): ICD-10-CM

## 2021-10-05 DIAGNOSIS — H91.93 BILATERAL HEARING LOSS, UNSPECIFIED HEARING LOSS TYPE: ICD-10-CM

## 2021-10-05 PROCEDURE — 99309 SBSQ NF CARE MODERATE MDM 30: CPT | Performed by: FAMILY MEDICINE

## 2021-11-29 ENCOUNTER — REMOTE DEVICE CLINIC VISIT (OUTPATIENT)
Dept: CARDIOLOGY CLINIC | Facility: CLINIC | Age: 81
End: 2021-11-29
Payer: MEDICARE

## 2021-11-29 DIAGNOSIS — Z95.0 PRESENCE OF CARDIAC PACEMAKER: Primary | ICD-10-CM

## 2021-11-29 PROCEDURE — 93294 REM INTERROG EVL PM/LDLS PM: CPT | Performed by: INTERNAL MEDICINE

## 2021-11-29 PROCEDURE — 93296 REM INTERROG EVL PM/IDS: CPT | Performed by: INTERNAL MEDICINE

## 2021-12-21 ENCOUNTER — NURSING HOME VISIT (OUTPATIENT)
Dept: FAMILY MEDICINE CLINIC | Facility: HOSPITAL | Age: 81
End: 2021-12-21
Payer: MEDICARE

## 2021-12-21 DIAGNOSIS — F32.1 CURRENT MODERATE EPISODE OF MAJOR DEPRESSIVE DISORDER, UNSPECIFIED WHETHER RECURRENT (HCC): ICD-10-CM

## 2021-12-21 DIAGNOSIS — I48.19 PERSISTENT ATRIAL FIBRILLATION (HCC): ICD-10-CM

## 2021-12-21 DIAGNOSIS — I74.4 PERIPHERAL ARTERY EMBOLISM OR THROMBOSIS (HCC): ICD-10-CM

## 2021-12-21 DIAGNOSIS — I25.10 CORONARY ARTERY DISEASE INVOLVING NATIVE CORONARY ARTERY OF NATIVE HEART WITHOUT ANGINA PECTORIS: Primary | ICD-10-CM

## 2021-12-21 PROCEDURE — 99309 SBSQ NF CARE MODERATE MDM 30: CPT | Performed by: FAMILY MEDICINE

## 2022-01-31 ENCOUNTER — NURSING HOME VISIT (OUTPATIENT)
Dept: FAMILY MEDICINE CLINIC | Facility: HOSPITAL | Age: 82
End: 2022-01-31
Payer: MEDICARE

## 2022-01-31 DIAGNOSIS — H61.21 EXCESSIVE CERUMEN IN RIGHT EAR CANAL: ICD-10-CM

## 2022-01-31 DIAGNOSIS — F32.1 CURRENT MODERATE EPISODE OF MAJOR DEPRESSIVE DISORDER, UNSPECIFIED WHETHER RECURRENT (HCC): Primary | Chronic | ICD-10-CM

## 2022-01-31 DIAGNOSIS — F41.1 GENERALIZED ANXIETY DISORDER: Chronic | ICD-10-CM

## 2022-01-31 DIAGNOSIS — I48.19 PERSISTENT ATRIAL FIBRILLATION (HCC): Chronic | ICD-10-CM

## 2022-01-31 DIAGNOSIS — I10 ESSENTIAL HYPERTENSION: ICD-10-CM

## 2022-01-31 PROCEDURE — 99309 SBSQ NF CARE MODERATE MDM 30: CPT | Performed by: FAMILY MEDICINE

## 2022-01-31 NOTE — PROGRESS NOTES
Progress Notes        NAME: Carrie Sandoval  AGE: 79 y o  SEX: female    DATE OF ENCOUNTER: 1/27/2022    Code status:    Assessment     Problem List Items Addressed This Visit        Cardiovascular and Mediastinum    Atrial fibrillation ([address] ) (Chronic)    Essential hypertension       Other    Depression - Primary (Chronic)    Generalized anxiety disorder (Chronic)      Other Visit Diagnoses     Excessive cerumen in right ear canal                Plan/discussion     Debrox started  May need irrigation  Otherwise she is doing well  Reports doing well with current medications  Reviewed psych consultations and current medication regimen  Chief Complaint     Followup visit     History of Present Illness     Pt seen for fu of chronic conditions  She is being managed by psych  Medications have been adjusted due to worsening depression  Reports doing well currently  She complains of right ear being clogged  The following portions of the patient's history were reviewed and updated as appropriate: current medications, past family history, past medical history, past social history, past surgical history and problem list     Allergies: Allergies   Allergen Reactions    Penicillins Hives    Sulfa Antibiotics Hives    Medical Tape Rash       Review of Systems     Review of Systems   Constitutional: Negative  Negative for activity change, appetite change, chills, diaphoresis, fatigue and fever  HENT: Negative for congestion, facial swelling and sore throat  Respiratory: Negative  Negative for apnea, cough, chest tightness and shortness of breath  Cardiovascular: Negative  Negative for chest pain and palpitations  Gastrointestinal: Negative  Negative for abdominal distention, abdominal pain, blood in stool, constipation, diarrhea and nausea  Genitourinary: Negative  Negative for difficulty urinating, dysuria, flank pain and frequency         Medications and orders     All medications reviewed and updated in Nursing Home EMR  Objective     Vitals: reviewed at [facility] EHR      Physical Exam  Vitals reviewed  HENT:      Head: Normocephalic  Nose: Nose normal       Mouth/Throat:      Mouth: Mucous membranes are moist    Cardiovascular:      Heart sounds: Normal heart sounds  Pulmonary:      Effort: Pulmonary effort is normal       Breath sounds: Normal breath sounds  Abdominal:      General: Bowel sounds are normal       Palpations: Abdomen is soft  Neurological:      General: No focal deficit present  Mental Status: She is alert and oriented to person, place, and time     Psychiatric:         Mood and Affect: Mood normal          Behavior: Behavior normal          Pertinent Laboratory/Diagnostic Studies:       Bo Taylor MD    1/31/2022 8:16 AM

## 2022-06-16 ENCOUNTER — HOSPITAL ENCOUNTER (EMERGENCY)
Facility: HOSPITAL | Age: 82
Discharge: NURSING, REHAB OR LONG-TERM CARE | End: 2022-06-17
Attending: EMERGENCY MEDICINE | Admitting: EMERGENCY MEDICINE
Payer: MEDICARE

## 2022-06-16 DIAGNOSIS — R04.0 RIGHT-SIDED EPISTAXIS: Primary | ICD-10-CM

## 2022-06-16 LAB
ALBUMIN SERPL BCP-MCNC: 3.4 G/DL (ref 3.5–5)
ALP SERPL-CCNC: 97 U/L (ref 46–116)
ALT SERPL W P-5'-P-CCNC: 18 U/L (ref 12–78)
ANION GAP SERPL CALCULATED.3IONS-SCNC: 8 MMOL/L (ref 4–13)
APTT PPP: 45 SECONDS (ref 23–37)
AST SERPL W P-5'-P-CCNC: 17 U/L (ref 5–45)
BASOPHILS # BLD AUTO: 0.07 THOUSANDS/ΜL (ref 0–0.1)
BASOPHILS NFR BLD AUTO: 1 % (ref 0–1)
BILIRUB SERPL-MCNC: 0.5 MG/DL (ref 0.2–1)
BUN SERPL-MCNC: 23 MG/DL (ref 5–25)
CALCIUM ALBUM COR SERPL-MCNC: 9.9 MG/DL (ref 8.3–10.1)
CALCIUM SERPL-MCNC: 9.4 MG/DL (ref 8.3–10.1)
CARDIAC TROPONIN I PNL SERPL HS: 11 NG/L
CHLORIDE SERPL-SCNC: 99 MMOL/L (ref 100–108)
CO2 SERPL-SCNC: 29 MMOL/L (ref 21–32)
CREAT SERPL-MCNC: 1.19 MG/DL (ref 0.6–1.3)
EOSINOPHIL # BLD AUTO: 0.35 THOUSAND/ΜL (ref 0–0.61)
EOSINOPHIL NFR BLD AUTO: 4 % (ref 0–6)
ERYTHROCYTE [DISTWIDTH] IN BLOOD BY AUTOMATED COUNT: 14.1 % (ref 11.6–15.1)
GFR SERPL CREATININE-BSD FRML MDRD: 42 ML/MIN/1.73SQ M
GLUCOSE SERPL-MCNC: 177 MG/DL (ref 65–140)
HCT VFR BLD AUTO: 45.5 % (ref 34.8–46.1)
HGB BLD-MCNC: 14.3 G/DL (ref 11.5–15.4)
IMM GRANULOCYTES # BLD AUTO: 0.09 THOUSAND/UL (ref 0–0.2)
IMM GRANULOCYTES NFR BLD AUTO: 1 % (ref 0–2)
INR PPP: 3.1 (ref 0.84–1.19)
LYMPHOCYTES # BLD AUTO: 1.99 THOUSANDS/ΜL (ref 0.6–4.47)
LYMPHOCYTES NFR BLD AUTO: 21 % (ref 14–44)
MCH RBC QN AUTO: 31.2 PG (ref 26.8–34.3)
MCHC RBC AUTO-ENTMCNC: 31.4 G/DL (ref 31.4–37.4)
MCV RBC AUTO: 99 FL (ref 82–98)
MONOCYTES # BLD AUTO: 0.93 THOUSAND/ΜL (ref 0.17–1.22)
MONOCYTES NFR BLD AUTO: 10 % (ref 4–12)
NEUTROPHILS # BLD AUTO: 6.26 THOUSANDS/ΜL (ref 1.85–7.62)
NEUTS SEG NFR BLD AUTO: 63 % (ref 43–75)
NRBC BLD AUTO-RTO: 0 /100 WBCS
PLATELET # BLD AUTO: 296 THOUSANDS/UL (ref 149–390)
PMV BLD AUTO: 10 FL (ref 8.9–12.7)
POTASSIUM SERPL-SCNC: 3.8 MMOL/L (ref 3.5–5.3)
PROT SERPL-MCNC: 7.2 G/DL (ref 6.4–8.2)
PROTHROMBIN TIME: 31 SECONDS (ref 11.6–14.5)
RBC # BLD AUTO: 4.58 MILLION/UL (ref 3.81–5.12)
SODIUM SERPL-SCNC: 136 MMOL/L (ref 136–145)
WBC # BLD AUTO: 9.69 THOUSAND/UL (ref 4.31–10.16)

## 2022-06-16 PROCEDURE — 93005 ELECTROCARDIOGRAM TRACING: CPT

## 2022-06-16 PROCEDURE — 80053 COMPREHEN METABOLIC PANEL: CPT | Performed by: EMERGENCY MEDICINE

## 2022-06-16 PROCEDURE — 85610 PROTHROMBIN TIME: CPT | Performed by: EMERGENCY MEDICINE

## 2022-06-16 PROCEDURE — 84484 ASSAY OF TROPONIN QUANT: CPT | Performed by: EMERGENCY MEDICINE

## 2022-06-16 PROCEDURE — 99285 EMERGENCY DEPT VISIT HI MDM: CPT | Performed by: EMERGENCY MEDICINE

## 2022-06-16 PROCEDURE — 36415 COLL VENOUS BLD VENIPUNCTURE: CPT | Performed by: EMERGENCY MEDICINE

## 2022-06-16 PROCEDURE — 99284 EMERGENCY DEPT VISIT MOD MDM: CPT

## 2022-06-16 PROCEDURE — 85025 COMPLETE CBC W/AUTO DIFF WBC: CPT | Performed by: EMERGENCY MEDICINE

## 2022-06-16 PROCEDURE — 85730 THROMBOPLASTIN TIME PARTIAL: CPT | Performed by: EMERGENCY MEDICINE

## 2022-06-16 RX ORDER — TRANEXAMIC ACID 100 MG/ML
1000 INJECTION, SOLUTION INTRAVENOUS ONCE
Status: COMPLETED | OUTPATIENT
Start: 2022-06-16 | End: 2022-06-16

## 2022-06-16 RX ADMIN — TRANEXAMIC ACID 1000 MG: 1 INJECTION, SOLUTION INTRAVENOUS at 22:37

## 2022-06-17 VITALS
TEMPERATURE: 98.4 F | SYSTOLIC BLOOD PRESSURE: 147 MMHG | BODY MASS INDEX: 36.8 KG/M2 | WEIGHT: 200 LBS | RESPIRATION RATE: 18 BRPM | DIASTOLIC BLOOD PRESSURE: 67 MMHG | OXYGEN SATURATION: 95 % | HEIGHT: 62 IN | HEART RATE: 68 BPM

## 2022-06-17 LAB
2HR DELTA HS TROPONIN: -1 NG/L
ATRIAL RATE: 42 BPM
CARDIAC TROPONIN I PNL SERPL HS: 10 NG/L
QRS AXIS: -21 DEGREES
QRSD INTERVAL: 84 MS
QT INTERVAL: 428 MS
QTC INTERVAL: 462 MS
T WAVE AXIS: -67 DEGREES
VENTRICULAR RATE: 70 BPM

## 2022-06-17 PROCEDURE — 93010 ELECTROCARDIOGRAM REPORT: CPT | Performed by: INTERNAL MEDICINE

## 2022-06-17 PROCEDURE — 36415 COLL VENOUS BLD VENIPUNCTURE: CPT | Performed by: EMERGENCY MEDICINE

## 2022-06-17 PROCEDURE — 84484 ASSAY OF TROPONIN QUANT: CPT | Performed by: EMERGENCY MEDICINE

## 2022-06-17 NOTE — ED PROVIDER NOTES
History  Chief Complaint   Patient presents with    Nose Bleed     Pt arrived to ED from [facility] via EMS with c/o nose bleed since 2000  Throwing up a few blood clots  28-year-old female with past medical history of atrial fibrillation on anticoagulants, hypertension hyperlipidemia presents for evaluation of right-sided nosebleed that started around 20:00, not controlled with pressure, similar history due to being on anticoagulants  Currently denies any chest pain shortness of breath or lightheadedness  Does have bleeding down the posterior oropharynx and has been coughing up and throwing up blood clots  Initially attempted to stop bleeding with pressure however continued to have bleeding down posterior oropharynx          Prior to Admission Medications   Prescriptions Last Dose Informant Patient Reported? Taking?    FLUoxetine (PROzac) 20 mg capsule  Outside Facility (Specify) Yes No   Sig: Take 20 mg by mouth daily    acetaminophen (TYLENOL) 500 mg tablet  Outside Facility (Specify) No No   Sig: Take 1 tablet (500 mg total) by mouth every 6 (six) hours as needed for mild pain   aspirin (ECOTRIN LOW STRENGTH) 81 mg EC tablet  Outside Facility (Specify) No No   Sig: Take 1 tablet (81 mg total) by mouth daily   atorvastatin (LIPITOR) 20 mg tablet  Outside Facility (Specify) No No   Sig: Take 1 tablet (20 mg total) by mouth daily after dinner   bisacodyl (DULCOLAX) 10 mg suppository  Outside Facility (Specify) Yes No   Sig: Insert 10 mg into the rectum daily   cyanocobalamin (VITAMIN B-12) 100 mcg tablet  Outside Facility (Specify) Yes No   ferrous sulfate 325 (65 Fe) mg tablet  Outside Facility (Specify) Yes No   Sig: Take 325 mg by mouth daily with breakfast   gabapentin (NEURONTIN) 100 mg capsule  Outside Facility (Specify) Yes No   Sig: Take 100 mg by mouth every morning   gabapentin (NEURONTIN) 300 mg capsule  Outside Facility (Specify) Yes No   Sig: Take 300 mg by mouth daily at bedtime hydrochlorothiazide (HYDRODIURIL) 12 5 mg tablet  Outside Facility (Specify) Yes No   Sig: Take 12 5 mg by mouth daily   magnesium hydroxide (MILK OF MAGNESIA) 400 mg/5 mL oral suspension  Outside Facility (Specify) Yes No   Sig: Take by mouth daily as needed for constipation   melatonin 3 mg  Outside Facility (Specify) Yes No   Sig: Take 3 mg by mouth daily at bedtime as needed   metoprolol succinate (TOPROL-XL) 50 mg 24 hr tablet  Outside Facility (Specify) No No   Sig: One tablet, 50 mg, every 12 hours   Patient taking differently: Take 100 mg by mouth daily One tablet, 50 mg, every 12 hours   olmesartan (BENICAR) 20 mg tablet  Outside Facility (Specify) Yes No   Sig: Take 20 mg by mouth daily    omeprazole (PriLOSEC) 20 mg delayed release capsule  Outside Facility (Specify) No No   Sig: TAKE ONE CAPSULE BY MOUTH EVERY DAY   rivaroxaban (XARELTO) 20 mg tablet  Outside Facility (Specify) No No   Sig: Take one (1) tablet PO with dinner after completion of starter pack   senna (SENOKOT) 8 6 mg  Outside Facility (Specify) No No   Sig: Take 1 tablet (8 6 mg total) by mouth daily at bedtime   traZODone (DESYREL) 50 mg tablet  Outside Facility (Specify) Yes No   Sig: Take 50 mg by mouth daily at bedtime   triamcinolone (KENALOG) 0 1 % cream  Outside Facility (Specify) No No   Sig: Apply topically 2 (two) times a day      Facility-Administered Medications: None       Past Medical History:   Diagnosis Date    Abnormal blood chemistry 5/10/2013    Accelerated essential hypertension 5/8/2012    Atrial fibrillation (HCC)     Depression     Hypertension     [address]anoma [address] [address] )     Skin cancer        Past Surgical History:   Procedure Laterality Date    AORTIC VALVE REPLACEMENT  12/2012    BREAST BIOPSY Right     years ago -Benign    CARDIAC PACEMAKER PLACEMENT      dual chamber last assessed 10/14/13    CHOLECYSTECTOMY      IR LOWER EXTREMITY / INTERVENTION  10/6/2019    JOINT REPLACEMENT      B/L knee replacement  THROMBECTOMY W/ EMBOLECTOMY N/A 10/6/2019    Procedure: EMBOLECTOMY/THROMBECTOMY Left LOWER EXTREMITY; angiogram;  Surgeon: Katlyn Lyon MD;  Location: [facility];  Service: Vascular       Family History   Problem Relation Age of Onset    Hypertension Mother     Breast cancer Mother     No Known Problems Father     Breast cancer Daughter 46    No Known Problems Maternal Grandmother     No Known Problems Maternal Grandfather     No Known Problems Paternal Grandmother     No Known Problems Paternal Grandfather     No Known Problems Son     No Known Problems Son     No Known Problems Daughter     No Known Problems Brother     No Known Problems Maternal Aunt     No Known Problems Maternal Aunt      I have reviewed and agree with the history as documented  E-Cigarette/Vaping    E-Cigarette Use Never User      E-Cigarette/Vaping Substances    Nicotine No     THC No     CBD No     Flavoring No     Other No     Unknown No      Social History     Tobacco Use    Smoking status: Never Smoker    Smokeless tobacco: Never Used   Vaping Use    Vaping Use: Never used   Substance Use Topics    Alcohol use: Not Currently     Alcohol/week: 0 0 standard drinks    Drug use: No       Review of Systems   Constitutional: Negative for appetite change and fever  HENT: Negative for rhinorrhea and sore throat  Right-sided nose bleed   Eyes: Negative for photophobia and visual disturbance  Respiratory: Negative for cough, chest tightness and wheezing  Cardiovascular: Negative for chest pain, palpitations and leg swelling  Gastrointestinal: Negative for abdominal distention, abdominal pain, blood in stool, constipation and diarrhea  Genitourinary: Negative for dysuria, flank pain, frequency, hematuria and urgency  Musculoskeletal: Negative for back pain  Skin: Negative for rash  Neurological: Negative for dizziness, weakness and headaches     All other systems reviewed and are negative  Physical Exam  Physical Exam  Vitals and nursing note reviewed  Constitutional:       Appearance: She is well-developed  HENT:      Head: Normocephalic and atraumatic  Nose:      Comments: Active bleeding bright red blood from right nare, also slow ooze down posterior oropharynx in no acute distress  Eyes:      Pupils: Pupils are equal, round, and reactive to light  Cardiovascular:      Rate and Rhythm: Normal rate and regular rhythm  Heart sounds: No murmur heard  No friction rub  No gallop  Pulmonary:      Effort: Pulmonary effort is normal       Breath sounds: No wheezing or rales  Chest:      Chest wall: No tenderness  Abdominal:      General: There is no distension  Palpations: Abdomen is soft  There is no mass  Tenderness: There is no guarding or rebound  Musculoskeletal:      Cervical back: Normal range of motion and neck supple  Skin:     General: Skin is warm and dry  Neurological:      Mental Status: She is alert and oriented to person, place, and time           Vital Signs  ED Triage Vitals [06/16/22 2218]   Temperature Pulse Respirations Blood Pressure SpO2   98 4 °F (36 9 °C) 91 18 (!) 218/102 97 %      Temp Source Heart Rate Source Patient Position - Orthostatic VS BP Location FiO2 (%)   Temporal Monitor Sitting Right arm --      Pain Score       No Pain           Vitals:    06/16/22 2218 06/16/22 2230 06/16/22 2330 06/17/22 0000   BP: (!) 218/102 (!) 176/79 139/75 144/85   Pulse: 91 70 68 64   Patient Position - Orthostatic VS: Sitting            Visual Acuity      ED Medications  Medications   tranexamic acid 100mg/mL (for epistaxis) 1,000 mg (1,000 mg Nasal Given by Other 6/16/22 2237)       Diagnostic Studies  Results Reviewed     Procedure Component Value Units Date/Time    HS Troponin I 2hr [[id number]]  (Normal) Collected: 06/17/22 0050    Lab Status: Final result Specimen: Blood from Line, Venous Updated: 06/17/22 0123     hs TnI 2hr 10 ng/L Delta 2hr hsTnI -1 ng/L     HS Troponin 0hr (reflex protocol) [[id number]]  (Normal) Collected: 06/16/22 2321    Lab Status: Final result Specimen: Blood from Line, Venous Updated: 06/16/22 2349     hs TnI 0hr 11 ng/L     Comprehensive metabolic panel [[id number]]  (Abnormal) Collected: 06/16/22 2243    Lab Status: Final result Specimen: Blood from Arm, Right Updated: 06/16/22 2311     Sodium 136 mmol/L      Potassium 3 8 mmol/L      Chloride 99 mmol/L      CO2 29 mmol/L      ANION GAP 8 mmol/L      BUN 23 mg/dL      Creatinine 1 19 mg/dL      Glucose 177 mg/dL      Calcium 9 4 mg/dL      Corrected Calcium 9 9 mg/dL      AST 17 U/L      ALT 18 U/L      Alkaline Phosphatase 97 U/L      Total Protein 7 2 g/dL      Albumin 3 4 g/dL      Total Bilirubin 0 50 mg/dL      eGFR 42 ml/min/1 73sq m     Narrative:      [facility] guidelines for Chronic Kidney Disease (CKD):     Stage 1 with normal or high GFR (GFR > 90 mL/min/1 73 square meters)    Stage 2 Mild CKD (GFR = 60-89 mL/min/1 73 square meters)    Stage 3A Moderate CKD (GFR = 45-59 mL/min/1 73 square meters)    Stage 3B Moderate CKD (GFR = 30-44 mL/min/1 73 square meters)    Stage 4 Severe CKD (GFR = 15-29 mL/min/1 73 square meters)    Stage 5 End Stage CKD (GFR <15 mL/min/1 73 square meters)  Note: GFR calculation is accurate only with a steady state creatinine    APTT [[id number]]  (Abnormal) Collected: 06/16/22 2243    Lab Status: Final result Specimen: Blood from Arm, Right Updated: 06/16/22 2307     PTT 45 seconds     Protime-INR [[id number]]  (Abnormal) Collected: 06/16/22 2243    Lab Status: Final result Specimen: Blood from Arm, Right Updated: 06/16/22 2307     Protime 31 0 seconds      INR 3 10    CBC and differential [[id number]]  (Abnormal) Collected: 06/16/22 2243    Lab Status: Final result Specimen: Blood from Arm, Right Updated: 06/16/22 2252     WBC 9 69 Thousand/uL      RBC 4 58 Million/uL      Hemoglobin 14 3 g/dL Hematocrit 45 5 %      MCV 99 fL      MCH 31 2 pg      MCHC 31 4 g/dL      RDW 14 1 %      MPV 10 0 fL      Platelets 279 Thousands/uL      nRBC 0 /100 WBCs      Neutrophils Relative 63 %      Immat GRANS % 1 %      Lymphocytes Relative 21 %      Monocytes Relative 10 %      Eosinophils Relative 4 %      Basophils Relative 1 %      Neutrophils Absolute 6 26 Thousands/µL      Immature Grans Absolute 0 09 Thousand/uL      Lymphocytes Absolute 1 99 Thousands/µL      Monocytes Absolute 0 93 Thousand/µL      Eosinophils Absolute 0 35 Thousand/µL      Basophils Absolute 0 07 Thousands/µL                  No orders to display              Procedures  Procedures         ED Course  ED Course as of 06/17/22 0138   Thu Jun 16, 2022   2239 Right sided nasal packing placed, no further bleeding down the posterior oropharynx   2305 Procedure Note: EKG  Date/Time: 06/16/22 11:05 PM   Performed by: Ivania Costa  Authorized by: Ivania Costa  Indications / Diagnosis: Nosebleed  ECG reviewed by me, the ED Provider: yes   The EKG demonstrates:  Rhythm:  intermittent paced rhythm  Intervals: normal intervals  Axis: normal axis  QRS/Blocks: normal QRS  ST Changes: No acute ST Changes, no STD/ALISHA    Inverted T waves in inferior leads on native beats    No chest pain, added troponin   Fri Jun 17, 2022   0021 Further bleeding currently in no distress will follow-up with ENT for packing removal                                             MDM  Number of Diagnoses or Management Options  Diagnosis management comments: 13-year-old female with right-sided nose bleed will check lab work given that symptoms have been going  On since 8 pm, packing placed will observe and re-evaluate      Disposition  Final diagnoses:   Right-sided epistaxis     Time reflects when diagnosis was documented in both MDM as applicable and the Disposition within this note     Time User Action Codes Description Comment    6/17/2022 12:22 AM Fatou Castellanos Add [R04 0] Right-sided epistaxis       ED Disposition     ED Disposition   Discharge    Condition   Stable    Date/Time   Fri Jun 17, 2022  1:38 AM    Comment   Sarai Sandoval discharge to home/self care  Follow-up Information     Follow up With Specialties Details Why Contact Info Additional Information    Shawna Lino MD Family Medicine Schedule an appointment as soon as possible for a visit   [address]  [address] [address] [facility],[address]        [facility] [address] Emergency Department Emergency Medicine  If symptoms worsen [address] [address]  [phone] [facility] [address] Emergency Department, [address] [place] [address] 10    [facility], Nose and Throat Otolaryngology Schedule an appointment as soon as possible for a visit in 1 week for packing removal [address] [place] [phone]  [facility] Hailee [address] and Throat, [address], [address] [place] [address]          Patient's Medications   Discharge Prescriptions    No medications on file       No discharge procedures on file      PDMP Review     None          ED Provider  Electronically Signed by           Corey Remy DO  06/17/22 8816

## 2022-06-20 ENCOUNTER — NURSING HOME VISIT (OUTPATIENT)
Dept: FAMILY MEDICINE CLINIC | Facility: HOSPITAL | Age: 82
End: 2022-06-20
Payer: MEDICARE

## 2022-06-20 DIAGNOSIS — I48.19 PERSISTENT ATRIAL FIBRILLATION (HCC): Primary | ICD-10-CM

## 2022-06-20 DIAGNOSIS — F41.1 GENERALIZED ANXIETY DISORDER: ICD-10-CM

## 2022-06-20 DIAGNOSIS — N39.0 URINARY TRACT INFECTION WITH HEMATURIA, SITE UNSPECIFIED: ICD-10-CM

## 2022-06-20 DIAGNOSIS — F32.1 CURRENT MODERATE EPISODE OF MAJOR DEPRESSIVE DISORDER, UNSPECIFIED WHETHER RECURRENT (HCC): ICD-10-CM

## 2022-06-20 DIAGNOSIS — R31.9 URINARY TRACT INFECTION WITH HEMATURIA, SITE UNSPECIFIED: ICD-10-CM

## 2022-06-20 DIAGNOSIS — I10 ESSENTIAL HYPERTENSION: ICD-10-CM

## 2022-06-20 DIAGNOSIS — I25.10 CORONARY ARTERY DISEASE INVOLVING NATIVE CORONARY ARTERY OF NATIVE HEART WITHOUT ANGINA PECTORIS: ICD-10-CM

## 2022-06-20 PROCEDURE — 99309 SBSQ NF CARE MODERATE MDM 30: CPT | Performed by: FAMILY MEDICINE

## 2022-06-20 NOTE — PROGRESS NOTES
Progress Notes        NAME: Remberto Sandoval  AGE: 79 y o  SEX: female    DATE OF ENCOUNTER: 6/6/2022    Code status:    Assessment     Problem List Items Addressed This Visit        Cardiovascular and Mediastinum    Atrial fibrillation ([address] ) - Primary (Chronic)    Coronary artery disease involving native coronary artery of native heart without angina pectoris    Essential hypertension       Other    Depression (Chronic)    Generalized anxiety disorder (Chronic)      Other Visit Diagnoses     Urinary tract infection with hematuria, site unspecified                Plan/discussion   Patient recovered from UTI  Associated with hematuria in the setting of being on Eliquis for afib  Overall doing well  No new issues or cocnerns  Chief Complaint     Followup visit     History of Present Illness     Pt seen for fu  Reports doing well  On review she did have hematuria due to UTI  She is also on eliquis  After treatment and holding eliquis she has not had any further bleeding  No new concerns  The following portions of the patient's history were reviewed and updated as appropriate: current medications, past family history, past medical history, past social history, past surgical history and problem list     Allergies: Allergies   Allergen Reactions    Penicillins Hives    Sulfa Antibiotics Hives    Medical Tape Rash       Review of Systems     Review of Systems   Constitutional: Negative  Negative for activity change, appetite change, chills, diaphoresis, fatigue and fever  HENT: Negative for congestion, facial swelling and sore throat  Respiratory: Negative  Negative for apnea, cough, chest tightness and shortness of breath  Cardiovascular: Negative  Negative for chest pain and palpitations  Gastrointestinal: Negative  Negative for abdominal distention, abdominal pain, blood in stool, constipation, diarrhea and nausea  Genitourinary: Negative    Negative for difficulty urinating, dysuria, flank pain and frequency  Medications and orders     All medications reviewed and updated in Nursing Home EMR  Objective     Vitals: reviewed at [facility] EHR      Physical Exam  Vitals and nursing note reviewed  Constitutional:       Appearance: Normal appearance  She is well-developed  HENT:      Head: Normocephalic and atraumatic  Right Ear: Tympanic membrane, ear canal and external ear normal       Left Ear: Tympanic membrane, ear canal and external ear normal       Nose: Nose normal    Eyes:      Conjunctiva/sclera: Conjunctivae normal       Pupils: Pupils are equal, round, and reactive to light  Neck:      Thyroid: No thyromegaly  Trachea: No tracheal deviation  Cardiovascular:      Rate and Rhythm: Normal rate and regular rhythm  Heart sounds: Normal heart sounds  No murmur heard  Pulmonary:      Effort: Pulmonary effort is normal  No respiratory distress  Breath sounds: Normal breath sounds  No wheezing  Abdominal:      General: Bowel sounds are normal       Palpations: Abdomen is soft  Musculoskeletal:         General: Normal range of motion  Cervical back: Normal range of motion and neck supple  Skin:     General: Skin is warm and dry  Neurological:      Mental Status: She is alert and oriented to person, place, and time           Pertinent Laboratory/Diagnostic Studies:       Bo Taylor MD    6/20/2022 2:20 PM

## 2022-07-28 ENCOUNTER — DOCUMENTATION (OUTPATIENT)
Dept: FAMILY MEDICINE CLINIC | Facility: HOSPITAL | Age: 82
End: 2022-07-28

## 2022-07-28 DIAGNOSIS — Z12.31 ENCOUNTER FOR SCREENING MAMMOGRAM FOR BREAST CANCER: Primary | ICD-10-CM

## 2022-11-02 ENCOUNTER — IN-CLINIC DEVICE VISIT (OUTPATIENT)
Dept: CARDIOLOGY CLINIC | Facility: CLINIC | Age: 82
End: 2022-11-02

## 2022-11-02 DIAGNOSIS — Z95.0 CARDIAC PACEMAKER IN SITU: Primary | ICD-10-CM

## 2022-11-02 NOTE — PROGRESS NOTES
Results for orders placed or performed in visit on 11/02/22   Cardiac EP device report    Narrative    MDT DUAL PPM  DEVICE INTERROGATED IN THE [place] OFFICE: BATTERY VOLTAGE ADEQUATE (9-45 MONS)  AP 5%  8%  ALL AVAILABLE LEAD PARAMETERS & TRENDS WITHIN NORMAL LIMITS  44% AT/AF BURDEN; MAY BE UNDERSENSING  FLAT HISTOGRAMS; CONSIDER VVIR VS MVP  PT ON [facility]  EF 65% (2019)  NO PROGRAMMING CHANGES MADE TO DEVICE PARAMETERS  NORMAL DEVICE FUNCTION   NC

## 2023-02-09 ENCOUNTER — REMOTE DEVICE CLINIC VISIT (OUTPATIENT)
Dept: CARDIOLOGY CLINIC | Facility: CLINIC | Age: 83
End: 2023-02-09

## 2023-02-09 DIAGNOSIS — Z95.0 CARDIAC PACEMAKER IN SITU: Primary | ICD-10-CM

## 2023-02-09 NOTE — PROGRESS NOTES
Results for orders placed or performed in visit on 02/09/23   Cardiac EP device report    Narrative    MDT DUAL PPM  CARELINK TRANSMISSION: BATTERY VOLTAGE ADEQUATE (23 MOS)  AP-3%, -5%  ALL AVAILABLE LEAD PARAMETERS WITHIN NORMAL LIMITS  2 AF EPISODES & CURRENTL IN AF  AF BURDEN-100%  HX: PERSISTENT AF & ON XARELTO, ASA 81MG & METOPROLOL  NORMAL DEVICE FUNCTION   GV

## 2023-03-14 NOTE — DISCHARGE INSTRUCTIONS
Please follow-up with ENT for further evaluation and packing removal, if symptoms worsen please return to the emergency department Post-Care Instructions: I reviewed with the patient in detail post-care instructions. Patient is to wear sunprotection, and avoid picking at any of the treated lesions. Pt may apply Vaseline to crusted or scabbing areas. Anesthesia Volume In Cc: 0.5 Detail Level: Zone Topical Anesthesia?: 30% lidocaine, plasticized base Consent: The patient's consent was obtained including but not limited to risks of crusting, scabbing, blistering, scarring, darker or lighter pigmentary change, recurrence, incomplete removal and infection. Price (Use Numbers Only, No Special Characters Or $): 200 Zygomaticofacial Flap Text: Given the location of the defect, shape of the defect and the proximity to free margins a zygomaticofacial flap was deemed most appropriate for repair.  Using a sterile surgical marker, the appropriate flap was drawn incorporating the defect and placing the expected incisions within the relaxed skin tension lines where possible. The area thus outlined was incised deep to adipose tissue with a #15 scalpel blade with preservation of a vascular pedicle.  The skin margins were undermined to an appropriate distance in all directions utilizing iris scissors.  The flap was then placed into the defect and anchored with interrupted buried subcutaneous sutures.

## 2023-05-11 ENCOUNTER — REMOTE DEVICE CLINIC VISIT (OUTPATIENT)
Dept: CARDIOLOGY CLINIC | Facility: CLINIC | Age: 83
End: 2023-05-11

## 2023-05-11 DIAGNOSIS — Z95.0 PRESENCE OF PERMANENT CARDIAC PACEMAKER: Primary | ICD-10-CM

## 2023-05-11 NOTE — PROGRESS NOTES
Results for orders placed or performed in visit on 05/11/23   Cardiac EP device report    Narrative    MDT DUAL PPM  CARELINK TRANSMISSION: BATTERY VOLTAGE ADEQUATE  (20 MONS, <4 - 34 MONTHS) AP 3%  6%  ALL AVAILABLE LEAD PARAMETERS WITHIN NORMAL LIMITS  NO SIGNIFICANT HIGH RATE EPISODES  CURRENTLY IN AF ( 100% OF TIME)  PATIENT IS ON XARELTO AND METOPROLOL  NORMAL DEVICE FUNCTION  ---VILLANUEVA

## 2023-07-27 ENCOUNTER — HOSPITAL ENCOUNTER (EMERGENCY)
Facility: HOSPITAL | Age: 83
Discharge: HOME | End: 2023-07-27
Attending: EMERGENCY MEDICINE
Payer: MEDICARE

## 2023-07-27 VITALS
RESPIRATION RATE: 20 BRPM | TEMPERATURE: 98.6 F | SYSTOLIC BLOOD PRESSURE: 116 MMHG | OXYGEN SATURATION: 95 % | DIASTOLIC BLOOD PRESSURE: 57 MMHG | HEART RATE: 81 BPM

## 2023-07-27 DIAGNOSIS — R04.0 EPISTAXIS: Primary | ICD-10-CM

## 2023-07-27 PROCEDURE — 99284 EMERGENCY DEPT VISIT MOD MDM: CPT

## 2023-07-27 PROCEDURE — 99283 EMERGENCY DEPT VISIT LOW MDM: CPT | Performed by: EMERGENCY MEDICINE

## 2023-07-27 RX ORDER — OXYMETAZOLINE HYDROCHLORIDE 0.05 G/100ML
2 SPRAY NASAL ONCE
Status: COMPLETED | OUTPATIENT
Start: 2023-07-27 | End: 2023-07-27

## 2023-07-27 RX ADMIN — OXYMETAZOLINE HCL 2 SPRAY: 0.05 SPRAY NASAL at 12:34

## 2023-07-27 NOTE — ED NOTES
2:30 pm transport time with Jeison Lechuga  07/27/23 2058
Medical Necessity and Patient Data sheet at bedside     Kandi Bennett  07/27/23 1643
Pt given a sandwich and snacks.       Dominic Carvalho  07/27/23 0747
Roundtrip Transport time changed to 3:30 pm.      Dominic Carvalho  07/27/23 6096
Roundtrip ride requested at this time      Erwin Jade RN  07/27/23 7187
50 feet

## 2023-07-27 NOTE — ED PROVIDER NOTES
History  Chief Complaint   Patient presents with   • Nose Bleed     Pt arrives via EMS from nursing facility, c/o multiple nosebleeds. Per facility she's had 6 nosebleeds In the last month. Prior to today patient had a nosebleed on Tuesday, unsure how long. Nursing facility stopped blood thinner. Patient was getting dressed today and her nose started bleeding. Patient has no other complaints at this time        Patient is an 59-year-old female who is brought in by ambulance due to nosebleed. Patient has history of frequent nosebleeds for which she was discontinued from her blood thinning medication. This nosebleed started earlier today and at the nursing facility they placed a Vaseline impregnated gauze in her nose and the bleeding stopped. Patient states that the bleeding was coming from the right nare. Prior to Admission Medications   Prescriptions Last Dose Informant Patient Reported? Taking?    FLUoxetine (PROzac) 20 mg capsule  Outside Facility (Specify) Yes No   Sig: Take 20 mg by mouth daily    acetaminophen (TYLENOL) 500 mg tablet  Outside Facility (Specify) No No   Sig: Take 1 tablet (500 mg total) by mouth every 6 (six) hours as needed for mild pain   aspirin (ECOTRIN LOW STRENGTH) 81 mg EC tablet  Outside Facility (Specify) No No   Sig: Take 1 tablet (81 mg total) by mouth daily   atorvastatin (LIPITOR) 20 mg tablet  Outside Facility (Specify) No No   Sig: Take 1 tablet (20 mg total) by mouth daily after dinner   bisacodyl (DULCOLAX) 10 mg suppository  Outside Facility (Specify) Yes No   Sig: Insert 10 mg into the rectum daily   cyanocobalamin (VITAMIN B-12) 100 mcg tablet  Outside Facility (Specify) Yes No   ferrous sulfate 325 (65 Fe) mg tablet  Outside Facility (Specify) Yes No   Sig: Take 325 mg by mouth daily with breakfast   gabapentin (NEURONTIN) 100 mg capsule  Outside Facility (Specify) Yes No   Sig: Take 100 mg by mouth every morning   gabapentin (NEURONTIN) 300 mg capsule  Outside Facility (Specify) Yes No   Sig: Take 300 mg by mouth daily at bedtime   hydrochlorothiazide (HYDRODIURIL) 12.5 mg tablet  Outside Facility (Specify) Yes No   Sig: Take 12.5 mg by mouth daily   magnesium hydroxide (MILK OF MAGNESIA) 400 mg/5 mL oral suspension  Outside Facility (Specify) Yes No   Sig: Take by mouth daily as needed for constipation   melatonin 3 mg  Outside Facility (Specify) Yes No   Sig: Take 3 mg by mouth daily at bedtime as needed   metoprolol succinate (TOPROL-XL) 50 mg 24 hr tablet  Outside Facility (Specify) No No   Sig: One tablet, 50 mg, every 12 hours   Patient taking differently: Take 100 mg by mouth daily One tablet, 50 mg, every 12 hours   olmesartan (BENICAR) 20 mg tablet  Outside Facility (Specify) Yes No   Sig: Take 20 mg by mouth daily    omeprazole (PriLOSEC) 20 mg delayed release capsule  Outside Facility (Specify) No No   Sig: TAKE ONE CAPSULE BY MOUTH EVERY DAY   rivaroxaban (XARELTO) 20 mg tablet  Outside Facility (Specify) No No   Sig: Take one (1) tablet PO with dinner after completion of starter pack   senna (SENOKOT) 8.6 mg  Outside Facility (Specify) No No   Sig: Take 1 tablet (8.6 mg total) by mouth daily at bedtime   traZODone (DESYREL) 50 mg tablet  Outside Facility (Specify) Yes No   Sig: Take 50 mg by mouth daily at bedtime   triamcinolone (KENALOG) 0.1 % cream  Outside Facility (Specify) No No   Sig: Apply topically 2 (two) times a day      Facility-Administered Medications: None       Past Medical History:   Diagnosis Date   • Abnormal blood chemistry 5/10/2013   • Accelerated essential hypertension 5/8/2012   • Atrial fibrillation (HCC)    • Depression    • Hypertension    • Melanoma ([address])    • Skin cancer        Past Surgical History:   Procedure Laterality Date   • AORTIC VALVE REPLACEMENT  12/2012   • BREAST BIOPSY Right     years ago -Benign   • CARDIAC PACEMAKER PLACEMENT      dual chamber last assessed 10/14/13   • CHOLECYSTECTOMY     • IR LOWER EXTREMITY / INTERVENTION  10/6/2019   • JOINT REPLACEMENT      B/L knee replacement   • THROMBECTOMY W/ EMBOLECTOMY N/A 10/6/2019    Procedure: EMBOLECTOMY/THROMBECTOMY Left LOWER EXTREMITY; angiogram;  Surgeon: Carolin Lyon MD;  Location: [facility];  Service: Vascular       Family History   Problem Relation Age of Onset   • Hypertension Mother    • Breast cancer Mother    • No Known Problems Father    • Breast cancer Daughter 46   • No Known Problems Maternal Grandmother    • No Known Problems Maternal Grandfather    • No Known Problems Paternal Grandmother    • No Known Problems Paternal Grandfather    • No Known Problems Son    • No Known Problems Son    • No Known Problems Daughter    • No Known Problems Brother    • No Known Problems Maternal Aunt    • No Known Problems Maternal Aunt      I have reviewed and agree with the history as documented. E-Cigarette/Vaping   • E-Cigarette Use Never User      E-Cigarette/Vaping Substances   • Nicotine No    • THC No    • CBD No    • Flavoring No    • Other No    • Unknown No      Social History     Tobacco Use   • Smoking status: Never   • Smokeless tobacco: Never   Vaping Use   • Vaping Use: Never used   Substance Use Topics   • Alcohol use: Not Currently     Alcohol/week: 0.0 standard drinks of alcohol   • Drug use: No       Review of Systems   HENT: Positive for nosebleeds. Respiratory: Negative for shortness of breath. Cardiovascular: Negative for chest pain and palpitations. Neurological: Negative for dizziness and light-headedness. Physical Exam  Physical Exam  Vitals and nursing note reviewed. Constitutional:       General: She is not in acute distress. Appearance: Normal appearance. She is not ill-appearing, toxic-appearing or diaphoretic. HENT:      Head: Normocephalic and atraumatic. Nose: No nasal tenderness. Right Nostril: Epistaxis present. No foreign body. Left Nostril: No foreign body or epistaxis.       Comments: Dried blood present in the right nare, but no active bleeding     Mouth/Throat:      Mouth: Mucous membranes are moist.   Eyes:      Extraocular Movements: Extraocular movements intact. Conjunctiva/sclera: Conjunctivae normal.      Pupils: Pupils are equal, round, and reactive to light. Cardiovascular:      Rate and Rhythm: Normal rate and regular rhythm. Pulses: Normal pulses. Heart sounds: Normal heart sounds. No murmur heard. Pulmonary:      Effort: Pulmonary effort is normal. No respiratory distress. Breath sounds: Normal breath sounds. No stridor. No wheezing, rhonchi or rales. Chest:      Chest wall: No tenderness. Skin:     General: Skin is warm and dry. Neurological:      General: No focal deficit present. Mental Status: She is alert and oriented to person, place, and time. Mental status is at baseline. Psychiatric:         Mood and Affect: Mood normal.         Behavior: Behavior normal.         Vital Signs  ED Triage Vitals [07/27/23 1220]   Temperature Pulse Respirations Blood Pressure SpO2   98.6 °F (37 °C) 81 20 116/57 95 %      Temp Source Heart Rate Source Patient Position - Orthostatic VS BP Location FiO2 (%)   Oral Monitor Sitting Right arm --      Pain Score       --           Vitals:    07/27/23 1220   BP: 116/57   Pulse: 81   Patient Position - Orthostatic VS: Sitting         Visual Acuity      ED Medications  Medications   oxymetazoline (AFRIN) 0.05 % nasal spray 2 spray (2 sprays Each Nare Given 7/27/23 1234)       Diagnostic Studies  Results Reviewed     None                 No orders to display              Procedures  Procedures         ED Course  ED Course as of 07/27/23 1309   u Jul 27, 2023   1303 Patient monitored for 20 minutes after the gauze was removed and Afrin sprayed. She has no rebleeding. Will have patient follow-up with ENT in the outpatient setting.   Recommended that if bleeding should start again to apply direct pressure for 15 minutes and if bleeding does not resolve, to come to the emergency department. Verbalized understanding. All questions answered. SBIRT 20yo+    Flowsheet Row Most Recent Value   Initial Alcohol Screen: US AUDIT-C     1. How often do you have a drink containing alcohol? 0 Filed at: 07/27/2023 1235   2. How many drinks containing alcohol do you have on a typical day you are drinking? 0 Filed at: 07/27/2023 1235   3a. Male UNDER 65: How often do you have five or more drinks on one occasion? 0 Filed at: 07/27/2023 1235   3b. FEMALE Any Age, or MALE 65+: How often do you have 4 or more drinks on one occassion? 0 Filed at: 07/27/2023 1235   Audit-C Score 0 Filed at: 07/27/2023 1235   TYSHAWN: How many times in the past year have you. .. Used an illegal drug or used a prescription medication for non-medical reasons? Never Filed at: 07/27/2023 1235                    Medical Decision Making  Assessment and Plan:   Resolved right sided epistaxis. 2 sprays of Afrin and monitoring for 20 minutes. If no rebleeding, will discharge with follow-up for outpatient ENT. Risk  OTC drugs. Disposition  Final diagnoses:   Epistaxis     Time reflects when diagnosis was documented in both MDM as applicable and the Disposition within this note     Time User Action Codes Description Comment    7/27/2023  1:04 PM Don Velasquez Add [R04.0] Epistaxis       ED Disposition     ED Disposition   Discharge    Condition   Stable    Date/Time   Thu Jul 27, 2023  1:04 PM    Comment   [address] discharge to home/self care.                Follow-up Information     Follow up With Specialties Details Why Contact Info Additional [address] [facility] Emergency Department Emergency Medicine Go to  As needed, If symptoms worsen, for re-evaluation [address] [facility] [id number]  [address] [facility] Emergency Department, [address] [address] [place], [address]    Robina Mckeon MD Otolaryngology Schedule an appointment as soon as possible for a visit in 3 days for re-evaluation [address] [facility]  [address]  [address] [facility]  [phone]             Patient's Medications   Discharge Prescriptions    No medications on file           PDMP Review     None          ED Provider  Electronically Signed by           Amie Chavarria DO  07/27/23 0354

## 2023-08-07 ENCOUNTER — APPOINTMENT (EMERGENCY)
Dept: RADIOLOGY | Facility: HOSPITAL | Age: 83
DRG: 271 | End: 2023-08-07
Payer: MEDICARE

## 2023-08-07 ENCOUNTER — HOSPITAL ENCOUNTER (EMERGENCY)
Facility: HOSPITAL | Age: 83
End: 2023-08-07
Attending: EMERGENCY MEDICINE
Payer: MEDICARE

## 2023-08-07 ENCOUNTER — ANESTHESIA (EMERGENCY)
Dept: PERIOP | Facility: HOSPITAL | Age: 83
DRG: 271 | End: 2023-08-07
Payer: MEDICARE

## 2023-08-07 ENCOUNTER — HOSPITAL ENCOUNTER (INPATIENT)
Facility: HOSPITAL | Age: 83
LOS: 6 days | Discharge: NURSING, REHAB OR LONG-TERM CARE | DRG: 271 | End: 2023-08-13
Attending: SURGERY | Admitting: SURGERY
Payer: MEDICARE

## 2023-08-07 ENCOUNTER — ANESTHESIA EVENT (EMERGENCY)
Dept: PERIOP | Facility: HOSPITAL | Age: 83
DRG: 271 | End: 2023-08-07
Payer: MEDICARE

## 2023-08-07 ENCOUNTER — APPOINTMENT (EMERGENCY)
Dept: NON INVASIVE DIAGNOSTICS | Facility: HOSPITAL | Age: 83
End: 2023-08-07
Payer: MEDICARE

## 2023-08-07 VITALS
BODY MASS INDEX: 36.58 KG/M2 | SYSTOLIC BLOOD PRESSURE: 194 MMHG | WEIGHT: 200 LBS | OXYGEN SATURATION: 94 % | DIASTOLIC BLOOD PRESSURE: 77 MMHG | HEART RATE: 64 BPM | TEMPERATURE: 98.8 F | RESPIRATION RATE: 20 BRPM

## 2023-08-07 DIAGNOSIS — F41.1 GENERALIZED ANXIETY DISORDER: ICD-10-CM

## 2023-08-07 DIAGNOSIS — I48.91 ATRIAL FIBRILLATION, UNSPECIFIED TYPE (HCC): ICD-10-CM

## 2023-08-07 DIAGNOSIS — I99.8 ACUTE LOWER LIMB ISCHEMIA: Primary | ICD-10-CM

## 2023-08-07 DIAGNOSIS — I99.8 PAIN OF LEFT LOWER EXTREMITY DUE TO ISCHEMIA: Primary | ICD-10-CM

## 2023-08-07 DIAGNOSIS — E78.2 MIXED HYPERLIPIDEMIA: ICD-10-CM

## 2023-08-07 DIAGNOSIS — M79.605 PAIN OF LEFT LOWER EXTREMITY DUE TO ISCHEMIA: Primary | ICD-10-CM

## 2023-08-07 DIAGNOSIS — I73.9 PERIPHERAL ARTERIAL DISEASE (HCC): Chronic | ICD-10-CM

## 2023-08-07 DIAGNOSIS — I10 ESSENTIAL HYPERTENSION: ICD-10-CM

## 2023-08-07 DIAGNOSIS — E66.9 OBESITY (BMI 35.0-39.9 WITHOUT COMORBIDITY): ICD-10-CM

## 2023-08-07 PROBLEM — I25.10 CORONARY ARTERY DISEASE INVOLVING NATIVE CORONARY ARTERY OF NATIVE HEART WITHOUT ANGINA PECTORIS: Status: ACTIVE | Noted: 2018-10-30

## 2023-08-07 PROBLEM — I48.19 PERSISTENT ATRIAL FIBRILLATION (HCC): Chronic | Status: ACTIVE | Noted: 2019-10-06

## 2023-08-07 LAB
ABO GROUP BLD: NORMAL
ALBUMIN SERPL BCP-MCNC: 3.6 G/DL (ref 3.5–5)
ALP SERPL-CCNC: 77 U/L (ref 34–104)
ALT SERPL W P-5'-P-CCNC: 11 U/L (ref 7–52)
ANION GAP SERPL CALCULATED.3IONS-SCNC: 8 MMOL/L
APTT PPP: 27 SECONDS (ref 23–37)
APTT PPP: 27 SECONDS (ref 23–37)
AST SERPL W P-5'-P-CCNC: 17 U/L (ref 13–39)
BASOPHILS # BLD AUTO: 0.06 THOUSANDS/ÂΜL (ref 0–0.1)
BASOPHILS NFR BLD AUTO: 1 % (ref 0–1)
BILIRUB SERPL-MCNC: 0.73 MG/DL (ref 0.2–1)
BLD GP AB SCN SERPL QL: NEGATIVE
BUN SERPL-MCNC: 27 MG/DL (ref 5–25)
CALCIUM SERPL-MCNC: 9.5 MG/DL (ref 8.4–10.2)
CHLORIDE SERPL-SCNC: 100 MMOL/L (ref 96–108)
CK SERPL-CCNC: 157 U/L (ref 26–192)
CO2 SERPL-SCNC: 29 MMOL/L (ref 21–32)
CREAT SERPL-MCNC: 0.94 MG/DL (ref 0.6–1.3)
EOSINOPHIL # BLD AUTO: 0.32 THOUSAND/ÂΜL (ref 0–0.61)
EOSINOPHIL NFR BLD AUTO: 4 % (ref 0–6)
ERYTHROCYTE [DISTWIDTH] IN BLOOD BY AUTOMATED COUNT: 13.4 % (ref 11.6–15.1)
GFR SERPL CREATININE-BSD FRML MDRD: 55 ML/MIN/1.73SQ M
GLUCOSE SERPL-MCNC: 157 MG/DL (ref 65–140)
GLUCOSE SERPL-MCNC: 161 MG/DL (ref 65–140)
HCT VFR BLD AUTO: 42.2 % (ref 34.8–46.1)
HGB BLD-MCNC: 13.5 G/DL (ref 11.5–15.4)
IMM GRANULOCYTES # BLD AUTO: 0.05 THOUSAND/UL (ref 0–0.2)
IMM GRANULOCYTES NFR BLD AUTO: 1 % (ref 0–2)
INR PPP: 1.07 (ref 0.84–1.19)
KCT BLD-ACNC: 220 SEC (ref 89–137)
KCT BLD-ACNC: 246 SEC (ref 89–137)
LACTATE SERPL-SCNC: 1.9 MMOL/L (ref 0.5–2)
LYMPHOCYTES # BLD AUTO: 1.86 THOUSANDS/ÂΜL (ref 0.6–4.47)
LYMPHOCYTES NFR BLD AUTO: 22 % (ref 14–44)
MCH RBC QN AUTO: 30.5 PG (ref 26.8–34.3)
MCHC RBC AUTO-ENTMCNC: 32 G/DL (ref 31.4–37.4)
MCV RBC AUTO: 96 FL (ref 82–98)
MONOCYTES # BLD AUTO: 0.61 THOUSAND/ÂΜL (ref 0.17–1.22)
MONOCYTES NFR BLD AUTO: 7 % (ref 4–12)
NEUTROPHILS # BLD AUTO: 5.72 THOUSANDS/ÂΜL (ref 1.85–7.62)
NEUTS SEG NFR BLD AUTO: 65 % (ref 43–75)
NRBC BLD AUTO-RTO: 0 /100 WBCS
PLATELET # BLD AUTO: 270 THOUSANDS/UL (ref 149–390)
PMV BLD AUTO: 9.6 FL (ref 8.9–12.7)
POTASSIUM SERPL-SCNC: 3.9 MMOL/L (ref 3.5–5.3)
PROT SERPL-MCNC: 6.5 G/DL (ref 6.4–8.4)
PROTHROMBIN TIME: 14.6 SECONDS (ref 11.6–14.5)
RBC # BLD AUTO: 4.42 MILLION/UL (ref 3.81–5.12)
RH BLD: POSITIVE
SODIUM SERPL-SCNC: 137 MMOL/L (ref 135–147)
SPECIMEN EXPIRATION DATE: NORMAL
SPECIMEN SOURCE: ABNORMAL
SPECIMEN SOURCE: ABNORMAL
WBC # BLD AUTO: 8.62 THOUSAND/UL (ref 4.31–10.16)

## 2023-08-07 PROCEDURE — 86850 RBC ANTIBODY SCREEN: CPT | Performed by: PHYSICIAN ASSISTANT

## 2023-08-07 PROCEDURE — 85610 PROTHROMBIN TIME: CPT | Performed by: PHYSICIAN ASSISTANT

## 2023-08-07 PROCEDURE — 86900 BLOOD TYPING SEROLOGIC ABO: CPT | Performed by: PHYSICIAN ASSISTANT

## 2023-08-07 PROCEDURE — NC001 PR NO CHARGE: Performed by: SURGERY

## 2023-08-07 PROCEDURE — 96374 THER/PROPH/DIAG INJ IV PUSH: CPT

## 2023-08-07 PROCEDURE — 85025 COMPLETE CBC W/AUTO DIFF WBC: CPT | Performed by: PHYSICIAN ASSISTANT

## 2023-08-07 PROCEDURE — 36415 COLL VENOUS BLD VENIPUNCTURE: CPT | Performed by: PHYSICIAN ASSISTANT

## 2023-08-07 PROCEDURE — 80053 COMPREHEN METABOLIC PANEL: CPT | Performed by: PHYSICIAN ASSISTANT

## 2023-08-07 PROCEDURE — 99285 EMERGENCY DEPT VISIT HI MDM: CPT

## 2023-08-07 PROCEDURE — 83605 ASSAY OF LACTIC ACID: CPT | Performed by: PHYSICIAN ASSISTANT

## 2023-08-07 PROCEDURE — 96361 HYDRATE IV INFUSION ADD-ON: CPT

## 2023-08-07 PROCEDURE — 96365 THER/PROPH/DIAG IV INF INIT: CPT

## 2023-08-07 PROCEDURE — 86923 COMPATIBILITY TEST ELECTRIC: CPT

## 2023-08-07 PROCEDURE — 86901 BLOOD TYPING SEROLOGIC RH(D): CPT | Performed by: PHYSICIAN ASSISTANT

## 2023-08-07 PROCEDURE — 82948 REAGENT STRIP/BLOOD GLUCOSE: CPT

## 2023-08-07 PROCEDURE — 04CL0ZZ EXTIRPATION OF MATTER FROM LEFT FEMORAL ARTERY, OPEN APPROACH: ICD-10-PCS | Performed by: SURGERY

## 2023-08-07 PROCEDURE — 34201 REMOVAL OF ARTERY CLOT: CPT | Performed by: SURGERY

## 2023-08-07 PROCEDURE — 99285 EMERGENCY DEPT VISIT HI MDM: CPT | Performed by: SURGERY

## 2023-08-07 PROCEDURE — G1004 CDSM NDSC: HCPCS

## 2023-08-07 PROCEDURE — 93926 LOWER EXTREMITY STUDY: CPT

## 2023-08-07 PROCEDURE — 85347 COAGULATION TIME ACTIVATED: CPT

## 2023-08-07 PROCEDURE — 04UL0KZ SUPPLEMENT LEFT FEMORAL ARTERY WITH NONAUTOLOGOUS TISSUE SUBSTITUTE, OPEN APPROACH: ICD-10-PCS | Performed by: SURGERY

## 2023-08-07 PROCEDURE — 85730 THROMBOPLASTIN TIME PARTIAL: CPT | Performed by: PHYSICIAN ASSISTANT

## 2023-08-07 PROCEDURE — 04CJ0ZZ EXTIRPATION OF MATTER FROM LEFT EXTERNAL ILIAC ARTERY, OPEN APPROACH: ICD-10-PCS | Performed by: SURGERY

## 2023-08-07 PROCEDURE — C1894 INTRO/SHEATH, NON-LASER: HCPCS | Performed by: SURGERY

## 2023-08-07 PROCEDURE — 93926 LOWER EXTREMITY STUDY: CPT | Performed by: SURGERY

## 2023-08-07 PROCEDURE — B41G1ZZ FLUOROSCOPY OF LEFT LOWER EXTREMITY ARTERIES USING LOW OSMOLAR CONTRAST: ICD-10-PCS | Performed by: SURGERY

## 2023-08-07 PROCEDURE — 99222 1ST HOSP IP/OBS MODERATE 55: CPT | Performed by: INTERNAL MEDICINE

## 2023-08-07 PROCEDURE — 82550 ASSAY OF CK (CPK): CPT | Performed by: PHYSICIAN ASSISTANT

## 2023-08-07 PROCEDURE — 75635 CT ANGIO ABDOMINAL ARTERIES: CPT

## 2023-08-07 DEVICE — XENOSURE BIOLOGIC PATCH, 0.8CM X 8CM, EIFU
Type: IMPLANTABLE DEVICE | Site: VEIN | Status: FUNCTIONAL
Brand: XENOSURE BIOLOGIC PATCH

## 2023-08-07 RX ORDER — NYSTATIN 100000 U/G
1 CREAM TOPICAL DAILY
COMMUNITY
End: 2023-08-20

## 2023-08-07 RX ORDER — TRAZODONE HYDROCHLORIDE 50 MG/1
75 TABLET ORAL
Status: DISCONTINUED | OUTPATIENT
Start: 2023-08-07 | End: 2023-08-13 | Stop reason: HOSPADM

## 2023-08-07 RX ORDER — BUPIVACAINE HYDROCHLORIDE 5 MG/ML
INJECTION, SOLUTION EPIDURAL; INTRACAUDAL AS NEEDED
Status: DISCONTINUED | OUTPATIENT
Start: 2023-08-07 | End: 2023-08-07 | Stop reason: HOSPADM

## 2023-08-07 RX ORDER — SUCCINYLCHOLINE/SOD CL,ISO/PF 100 MG/5ML
SYRINGE (ML) INTRAVENOUS AS NEEDED
Status: DISCONTINUED | OUTPATIENT
Start: 2023-08-07 | End: 2023-08-07

## 2023-08-07 RX ORDER — MAGNESIUM HYDROXIDE 1200 MG/15ML
LIQUID ORAL AS NEEDED
Status: DISCONTINUED | OUTPATIENT
Start: 2023-08-07 | End: 2023-08-07 | Stop reason: HOSPADM

## 2023-08-07 RX ORDER — OXYCODONE HYDROCHLORIDE 5 MG/1
2.5 TABLET ORAL EVERY 4 HOURS PRN
Status: DISCONTINUED | OUTPATIENT
Start: 2023-08-07 | End: 2023-08-13 | Stop reason: HOSPADM

## 2023-08-07 RX ORDER — SODIUM CHLORIDE 9 MG/ML
75 INJECTION, SOLUTION INTRAVENOUS CONTINUOUS
Status: DISCONTINUED | OUTPATIENT
Start: 2023-08-07 | End: 2023-08-08

## 2023-08-07 RX ORDER — SODIUM CHLORIDE 9 MG/ML
75 INJECTION, SOLUTION INTRAVENOUS CONTINUOUS
Status: DISCONTINUED | OUTPATIENT
Start: 2023-08-07 | End: 2023-08-07 | Stop reason: HOSPADM

## 2023-08-07 RX ORDER — FERROUS SULFATE 325(65) MG
325 TABLET ORAL EVERY OTHER DAY
Status: DISCONTINUED | OUTPATIENT
Start: 2023-08-07 | End: 2023-08-13 | Stop reason: HOSPADM

## 2023-08-07 RX ORDER — NYSTATIN 100000 U/G
1 CREAM TOPICAL DAILY
Status: DISCONTINUED | OUTPATIENT
Start: 2023-08-07 | End: 2023-08-13 | Stop reason: HOSPADM

## 2023-08-07 RX ORDER — ONDANSETRON 2 MG/ML
4 INJECTION INTRAMUSCULAR; INTRAVENOUS ONCE AS NEEDED
Status: DISCONTINUED | OUTPATIENT
Start: 2023-08-07 | End: 2023-08-07 | Stop reason: HOSPADM

## 2023-08-07 RX ORDER — ECHINACEA PURPUREA EXTRACT 125 MG
1 TABLET ORAL
COMMUNITY

## 2023-08-07 RX ORDER — DULOXETIN HYDROCHLORIDE 60 MG/1
60 CAPSULE, DELAYED RELEASE ORAL DAILY
COMMUNITY

## 2023-08-07 RX ORDER — HYDROMORPHONE HYDROCHLORIDE 2 MG/ML
INJECTION, SOLUTION INTRAMUSCULAR; INTRAVENOUS; SUBCUTANEOUS AS NEEDED
Status: DISCONTINUED | OUTPATIENT
Start: 2023-08-07 | End: 2023-08-07

## 2023-08-07 RX ORDER — ONDANSETRON 2 MG/ML
4 INJECTION INTRAMUSCULAR; INTRAVENOUS EVERY 6 HOURS PRN
Status: DISCONTINUED | OUTPATIENT
Start: 2023-08-07 | End: 2023-08-13 | Stop reason: HOSPADM

## 2023-08-07 RX ORDER — HEPARIN SODIUM 1000 [USP'U]/ML
INJECTION, SOLUTION INTRAVENOUS; SUBCUTANEOUS AS NEEDED
Status: DISCONTINUED | OUTPATIENT
Start: 2023-08-07 | End: 2023-08-07

## 2023-08-07 RX ORDER — SODIUM CHLORIDE/ALOE VERA
1 GEL (GRAM) NASAL 2 TIMES DAILY
Status: DISCONTINUED | OUTPATIENT
Start: 2023-08-07 | End: 2023-08-13 | Stop reason: HOSPADM

## 2023-08-07 RX ORDER — HEPARIN SODIUM 200 [USP'U]/100ML
INJECTION, SOLUTION INTRAVENOUS
Status: COMPLETED | OUTPATIENT
Start: 2023-08-07 | End: 2023-08-07

## 2023-08-07 RX ORDER — CHLORHEXIDINE GLUCONATE 0.12 MG/ML
15 RINSE ORAL ONCE
Status: DISCONTINUED | OUTPATIENT
Start: 2023-08-07 | End: 2023-08-08

## 2023-08-07 RX ORDER — PRAMIPEXOLE DIHYDROCHLORIDE 0.25 MG/1
0.12 TABLET ORAL
Status: DISCONTINUED | OUTPATIENT
Start: 2023-08-07 | End: 2023-08-13 | Stop reason: HOSPADM

## 2023-08-07 RX ORDER — HEPARIN SODIUM 10000 [USP'U]/100ML
3-30 INJECTION, SOLUTION INTRAVENOUS
Status: DISCONTINUED | OUTPATIENT
Start: 2023-08-07 | End: 2023-08-08

## 2023-08-07 RX ORDER — LABETALOL HYDROCHLORIDE 5 MG/ML
10 INJECTION, SOLUTION INTRAVENOUS
Status: DISCONTINUED | OUTPATIENT
Start: 2023-08-07 | End: 2023-08-08

## 2023-08-07 RX ORDER — AMOXICILLIN 250 MG
1 CAPSULE ORAL
Status: DISCONTINUED | OUTPATIENT
Start: 2023-08-07 | End: 2023-08-13 | Stop reason: HOSPADM

## 2023-08-07 RX ORDER — HYDROMORPHONE HCL/PF 1 MG/ML
0.5 SYRINGE (ML) INJECTION
Status: DISCONTINUED | OUTPATIENT
Start: 2023-08-07 | End: 2023-08-08

## 2023-08-07 RX ORDER — GLYCOPYRROLATE 0.2 MG/ML
INJECTION INTRAMUSCULAR; INTRAVENOUS AS NEEDED
Status: DISCONTINUED | OUTPATIENT
Start: 2023-08-07 | End: 2023-08-07

## 2023-08-07 RX ORDER — BISACODYL 10 MG
10 SUPPOSITORY, RECTAL RECTAL DAILY PRN
Status: DISCONTINUED | OUTPATIENT
Start: 2023-08-07 | End: 2023-08-13 | Stop reason: HOSPADM

## 2023-08-07 RX ORDER — TRIAMCINOLONE ACETONIDE 1 MG/G
CREAM TOPICAL 2 TIMES DAILY
Status: DISCONTINUED | OUTPATIENT
Start: 2023-08-07 | End: 2023-08-13 | Stop reason: HOSPADM

## 2023-08-07 RX ORDER — LIDOCAINE HYDROCHLORIDE 10 MG/ML
INJECTION, SOLUTION EPIDURAL; INFILTRATION; INTRACAUDAL; PERINEURAL AS NEEDED
Status: DISCONTINUED | OUTPATIENT
Start: 2023-08-07 | End: 2023-08-07

## 2023-08-07 RX ORDER — HYDRALAZINE HYDROCHLORIDE 20 MG/ML
INJECTION INTRAMUSCULAR; INTRAVENOUS AS NEEDED
Status: DISCONTINUED | OUTPATIENT
Start: 2023-08-07 | End: 2023-08-07

## 2023-08-07 RX ORDER — ECHINACEA PURPUREA EXTRACT 125 MG
1 TABLET ORAL
Status: DISCONTINUED | OUTPATIENT
Start: 2023-08-07 | End: 2023-08-13 | Stop reason: HOSPADM

## 2023-08-07 RX ORDER — CLINDAMYCIN PHOSPHATE 900 MG/50ML
INJECTION INTRAVENOUS AS NEEDED
Status: DISCONTINUED | OUTPATIENT
Start: 2023-08-07 | End: 2023-08-07

## 2023-08-07 RX ORDER — METOPROLOL SUCCINATE 100 MG/1
100 TABLET, EXTENDED RELEASE ORAL DAILY
Status: DISCONTINUED | OUTPATIENT
Start: 2023-08-08 | End: 2023-08-13 | Stop reason: HOSPADM

## 2023-08-07 RX ORDER — ONDANSETRON 2 MG/ML
INJECTION INTRAMUSCULAR; INTRAVENOUS AS NEEDED
Status: DISCONTINUED | OUTPATIENT
Start: 2023-08-07 | End: 2023-08-07

## 2023-08-07 RX ORDER — POLYETHYLENE GLYCOL 3350 17 G/17G
17 POWDER, FOR SOLUTION ORAL DAILY
COMMUNITY

## 2023-08-07 RX ORDER — METOPROLOL SUCCINATE 100 MG/1
100 TABLET, EXTENDED RELEASE ORAL DAILY
COMMUNITY

## 2023-08-07 RX ORDER — ROCURONIUM BROMIDE 10 MG/ML
INJECTION, SOLUTION INTRAVENOUS AS NEEDED
Status: DISCONTINUED | OUTPATIENT
Start: 2023-08-07 | End: 2023-08-07

## 2023-08-07 RX ORDER — FENTANYL CITRATE/PF 50 MCG/ML
25 SYRINGE (ML) INJECTION
Status: DISCONTINUED | OUTPATIENT
Start: 2023-08-07 | End: 2023-08-07 | Stop reason: HOSPADM

## 2023-08-07 RX ORDER — FENTANYL CITRATE 50 UG/ML
INJECTION, SOLUTION INTRAMUSCULAR; INTRAVENOUS AS NEEDED
Status: DISCONTINUED | OUTPATIENT
Start: 2023-08-07 | End: 2023-08-07

## 2023-08-07 RX ORDER — HEPARIN SODIUM 10000 [USP'U]/100ML
3-30 INJECTION, SOLUTION INTRAVENOUS
Status: DISCONTINUED | OUTPATIENT
Start: 2023-08-07 | End: 2023-08-07 | Stop reason: HOSPADM

## 2023-08-07 RX ORDER — PANTOPRAZOLE SODIUM 40 MG/1
40 TABLET, DELAYED RELEASE ORAL
Status: DISCONTINUED | OUTPATIENT
Start: 2023-08-08 | End: 2023-08-13 | Stop reason: HOSPADM

## 2023-08-07 RX ORDER — CLINDAMYCIN PHOSPHATE 900 MG/50ML
900 INJECTION INTRAVENOUS EVERY 8 HOURS
Status: COMPLETED | OUTPATIENT
Start: 2023-08-07 | End: 2023-08-08

## 2023-08-07 RX ORDER — HYDROMORPHONE HCL/PF 1 MG/ML
0.5 SYRINGE (ML) INJECTION
Status: DISCONTINUED | OUTPATIENT
Start: 2023-08-07 | End: 2023-08-07 | Stop reason: HOSPADM

## 2023-08-07 RX ORDER — PRAMIPEXOLE DIHYDROCHLORIDE 0.12 MG/1
0.12 TABLET ORAL
COMMUNITY

## 2023-08-07 RX ORDER — HEPARIN SODIUM 1000 [USP'U]/ML
7200 INJECTION, SOLUTION INTRAVENOUS; SUBCUTANEOUS EVERY 6 HOURS PRN
Status: DISCONTINUED | OUTPATIENT
Start: 2023-08-07 | End: 2023-08-08

## 2023-08-07 RX ORDER — ACETAMINOPHEN 325 MG/1
650 TABLET ORAL EVERY 4 HOURS PRN
COMMUNITY
End: 2023-08-13

## 2023-08-07 RX ORDER — POLYETHYLENE GLYCOL 3350 17 G/17G
17 POWDER, FOR SOLUTION ORAL DAILY
Status: DISCONTINUED | OUTPATIENT
Start: 2023-08-08 | End: 2023-08-13 | Stop reason: HOSPADM

## 2023-08-07 RX ORDER — HEPARIN SODIUM 1000 [USP'U]/ML
3600 INJECTION, SOLUTION INTRAVENOUS; SUBCUTANEOUS EVERY 6 HOURS PRN
Status: DISCONTINUED | OUTPATIENT
Start: 2023-08-07 | End: 2023-08-08

## 2023-08-07 RX ORDER — DULOXETIN HYDROCHLORIDE 60 MG/1
60 CAPSULE, DELAYED RELEASE ORAL DAILY
Status: DISCONTINUED | OUTPATIENT
Start: 2023-08-08 | End: 2023-08-13 | Stop reason: HOSPADM

## 2023-08-07 RX ORDER — CLINDAMYCIN PHOSPHATE 900 MG/50ML
900 INJECTION INTRAVENOUS ONCE
Status: CANCELLED | OUTPATIENT
Start: 2023-08-07 | End: 2023-08-07

## 2023-08-07 RX ORDER — DEXAMETHASONE SODIUM PHOSPHATE 10 MG/ML
INJECTION, SOLUTION INTRAMUSCULAR; INTRAVENOUS AS NEEDED
Status: DISCONTINUED | OUTPATIENT
Start: 2023-08-07 | End: 2023-08-07

## 2023-08-07 RX ORDER — ATORVASTATIN CALCIUM 20 MG/1
20 TABLET, FILM COATED ORAL
Status: DISCONTINUED | OUTPATIENT
Start: 2023-08-07 | End: 2023-08-13 | Stop reason: HOSPADM

## 2023-08-07 RX ORDER — HYDRALAZINE HYDROCHLORIDE 20 MG/ML
5 INJECTION INTRAMUSCULAR; INTRAVENOUS EVERY 6 HOURS PRN
Status: DISCONTINUED | OUTPATIENT
Start: 2023-08-07 | End: 2023-08-08

## 2023-08-07 RX ORDER — SODIUM CHLORIDE/ALOE VERA
1 SWAB, NON-MEDICATED NASAL 2 TIMES DAILY
COMMUNITY
End: 2023-08-13

## 2023-08-07 RX ORDER — HYDROCHLOROTHIAZIDE 25 MG/1
25 TABLET ORAL DAILY
Status: DISCONTINUED | OUTPATIENT
Start: 2023-08-08 | End: 2023-08-13 | Stop reason: HOSPADM

## 2023-08-07 RX ORDER — IODIXANOL 320 MG/ML
INJECTION, SOLUTION INTRAVASCULAR AS NEEDED
Status: DISCONTINUED | OUTPATIENT
Start: 2023-08-07 | End: 2023-08-07 | Stop reason: HOSPADM

## 2023-08-07 RX ORDER — ENEMA 19; 7 G/133ML; G/133ML
1 ENEMA RECTAL DAILY PRN
COMMUNITY
End: 2023-08-13

## 2023-08-07 RX ORDER — ACETAMINOPHEN 325 MG/1
650 TABLET ORAL EVERY 6 HOURS PRN
Status: DISCONTINUED | OUTPATIENT
Start: 2023-08-07 | End: 2023-08-13 | Stop reason: HOSPADM

## 2023-08-07 RX ADMIN — ONDANSETRON 4 MG: 2 INJECTION INTRAMUSCULAR; INTRAVENOUS at 15:10

## 2023-08-07 RX ADMIN — HEPARIN SODIUM 18 UNITS/KG/HR: 10000 INJECTION, SOLUTION INTRAVENOUS at 11:40

## 2023-08-07 RX ADMIN — HEPARIN SODIUM 2000 UNITS: 1000 INJECTION INTRAVENOUS; SUBCUTANEOUS at 15:55

## 2023-08-07 RX ADMIN — LIDOCAINE HYDROCHLORIDE 100 MG: 10 INJECTION, SOLUTION EPIDURAL; INFILTRATION; INTRACAUDAL; PERINEURAL at 14:23

## 2023-08-07 RX ADMIN — GLYCOPYRROLATE 0.1 MG: 0.2 INJECTION, SOLUTION INTRAMUSCULAR; INTRAVENOUS at 14:54

## 2023-08-07 RX ADMIN — HEPARIN SODIUM 18 UNITS/KG/HR: 10000 INJECTION, SOLUTION INTRAVENOUS at 13:45

## 2023-08-07 RX ADMIN — FENTANYL CITRATE 50 MCG: 50 INJECTION, SOLUTION INTRAMUSCULAR; INTRAVENOUS at 17:57

## 2023-08-07 RX ADMIN — FENTANYL CITRATE 50 MCG: 50 INJECTION, SOLUTION INTRAMUSCULAR; INTRAVENOUS at 15:18

## 2023-08-07 RX ADMIN — IOHEXOL 120 ML: 350 INJECTION, SOLUTION INTRAVENOUS at 13:30

## 2023-08-07 RX ADMIN — SUGAMMADEX 200 MG: 100 INJECTION, SOLUTION INTRAVENOUS at 18:18

## 2023-08-07 RX ADMIN — FERROUS SULFATE TAB 325 MG (65 MG ELEMENTAL FE) 325 MG: 325 (65 FE) TAB at 21:06

## 2023-08-07 RX ADMIN — ONDANSETRON 4 MG: 2 INJECTION INTRAMUSCULAR; INTRAVENOUS at 18:18

## 2023-08-07 RX ADMIN — SODIUM CHLORIDE 75 ML/HR: 0.9 INJECTION, SOLUTION INTRAVENOUS at 10:05

## 2023-08-07 RX ADMIN — ATORVASTATIN CALCIUM 20 MG: 20 TABLET, FILM COATED ORAL at 21:06

## 2023-08-07 RX ADMIN — HEPARIN SODIUM 4000 UNITS: 1000 INJECTION INTRAVENOUS; SUBCUTANEOUS at 15:45

## 2023-08-07 RX ADMIN — ROCURONIUM BROMIDE 50 MG: 10 INJECTION, SOLUTION INTRAVENOUS at 14:26

## 2023-08-07 RX ADMIN — SALINE NASAL SPRAY 1 SPRAY: 1.5 SOLUTION NASAL at 21:55

## 2023-08-07 RX ADMIN — GLYCOPYRROLATE 0.1 MG: 0.2 INJECTION, SOLUTION INTRAMUSCULAR; INTRAVENOUS at 15:10

## 2023-08-07 RX ADMIN — HYDRALAZINE HYDROCHLORIDE 5 MG: 20 INJECTION, SOLUTION INTRAMUSCULAR; INTRAVENOUS at 15:19

## 2023-08-07 RX ADMIN — Medication 1 APPLICATION: at 21:55

## 2023-08-07 RX ADMIN — NYSTATIN 1 APPLICATION: 100000 CREAM TOPICAL at 21:54

## 2023-08-07 RX ADMIN — SODIUM CHLORIDE: 0.9 INJECTION, SOLUTION INTRAVENOUS at 18:06

## 2023-08-07 RX ADMIN — MICONAZOLE NITRATE: 20 CREAM TOPICAL at 21:55

## 2023-08-07 RX ADMIN — Medication 10 MG: at 23:17

## 2023-08-07 RX ADMIN — SODIUM CHLORIDE 75 ML/HR: 0.9 INJECTION, SOLUTION INTRAVENOUS at 20:31

## 2023-08-07 RX ADMIN — DEXAMETHASONE SODIUM PHOSPHATE 10 MG: 10 INJECTION, SOLUTION INTRAMUSCULAR; INTRAVENOUS at 15:10

## 2023-08-07 RX ADMIN — CLINDAMYCIN IN 5 PERCENT DEXTROSE 900 MG: 18 INJECTION, SOLUTION INTRAVENOUS at 14:44

## 2023-08-07 RX ADMIN — SODIUM CHLORIDE: 0.9 INJECTION, SOLUTION INTRAVENOUS at 14:09

## 2023-08-07 RX ADMIN — SENNOSIDES AND DOCUSATE SODIUM 1 TABLET: 50; 8.6 TABLET ORAL at 21:06

## 2023-08-07 RX ADMIN — HYDROMORPHONE HYDROCHLORIDE 0.5 MG: 2 INJECTION, SOLUTION INTRAMUSCULAR; INTRAVENOUS; SUBCUTANEOUS at 14:29

## 2023-08-07 RX ADMIN — PHENYLEPHRINE HYDROCHLORIDE 20 MCG/MIN: 10 INJECTION INTRAVENOUS at 15:49

## 2023-08-07 RX ADMIN — PRAMIPEXOLE DIHYDROCHLORIDE 0.12 MG: 0.25 TABLET ORAL at 21:06

## 2023-08-07 RX ADMIN — TRAZODONE HYDROCHLORIDE 75 MG: 50 TABLET ORAL at 21:06

## 2023-08-07 RX ADMIN — FENTANYL CITRATE 50 MCG: 50 INJECTION, SOLUTION INTRAMUSCULAR; INTRAVENOUS at 14:22

## 2023-08-07 RX ADMIN — Medication 100 MG: at 14:22

## 2023-08-07 RX ADMIN — HYDRALAZINE HYDROCHLORIDE 5 MG: 20 INJECTION, SOLUTION INTRAMUSCULAR; INTRAVENOUS at 21:06

## 2023-08-07 RX ADMIN — FENTANYL CITRATE 50 MCG: 50 INJECTION, SOLUTION INTRAMUSCULAR; INTRAVENOUS at 17:55

## 2023-08-07 RX ADMIN — TRIAMCINOLONE ACETONIDE: 1 CREAM TOPICAL at 21:54

## 2023-08-07 NOTE — ANESTHESIA PREPROCEDURE EVALUATION
Procedure:  EMBOLECTOMY/THROMBECTOMY LOWER EXTREMITY (Left: Leg Lower)  ENDARTERECTOMY ARTERIAL FEMORAL (Left: Leg Lower)    Chriss Germain is a 79y.o. year old female with pmh for afib, HTN, HLD, PAD, CAD with prior history for a left LE femoral clot with evacuation who presents with symptoms of a cold left leg. 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 She states that her symptoms started Saturday evening. Her left LE is cold, painful and she cannot feel her foot. She states they had to evacuate a clot in the past for similar. She states she would prefer to be transferred to [facility] as she has had most of her care there. She denies any CP, SOB, no fevers, chills, and no abd pain.      39-year-old female with past medical history of chronic atrial fibrillation recently off of anticoagulation due to epistaxis and onset of lower extremity pain 39-year-old female with past medical history of chronic atrial fibrillation on Eliquis, peripheral vascular disease and arterial disease with historic occlusion of left lower extremity artery and thrombectomy of arterial embolization of left lower extremity. She states that she has had worsening pain ongoing since Saturday and now with worsening pain and lower extremity temperature and color change with lower extremity becoming purple. She presents to emergency department for evaluation from skilled nursing facility.       Relevant Problems   CARDIO   (+) Atrial fibrillation (HCC)   (+) Coronary artery disease involving native coronary artery of native heart without angina pectoris   (+) Essential hypertension   (+) Mixed hyperlipidemia      MUSCULOSKELETAL   (+) Sciatic nerve pain, right      NEURO/PSYCH   (+) Depression   (+) Generalized anxiety disorder        Physical Exam    Airway    Mallampati score: III  TM Distance: >3 FB  Neck ROM: full     Dental       Cardiovascular  Rhythm: irregular, Rate: abnormal,     Pulmonary  Pulmonary exam normal     Other Findings       History Comments History Comments   Depression  Melanoma ([address])    Hypertension  Atrial fibrillation (HCC)    Accelerated essential hypertension  Abnormal blood chemistry    Skin cancer        Obstetric History       4    Para   4    Term   4            AB        Living          SAB        IAB        Ectopic        Multiple        Live Births              Surgical History     Current as of 23 1409  AORTIC VALVE REPLACEMENT CARDIAC PACEMAKER PLACEMENT   JOINT REPLACEMENT CHOLECYSTECTOMY   THROMBECTOMY W/ EMBOLECTOMY IR LOWER EXTREMITY / INTERVENTION   BREAST BIOPSY      Substance History     Current as of 23 1409  Smoking Status: Never   Smokeless Tobacco Status: Never   Alcohol use: Not Currently   Drug use: No         Anesthesia Plan  ASA Score- 3 Emergent    Anesthesia Type- general with ASA Monitors. Additional Monitors: arterial line. Airway Plan: ETT. Plan Factors-Exercise tolerance (METS): <4 METS. Chart reviewed. EKG reviewed. Imaging results reviewed. Existing labs reviewed. Patient summary reviewed. Patient is not a current smoker. Induction- intravenous. Postoperative Plan- Plan for postoperative opioid use. Planned trial extubation    Informed Consent- Anesthetic plan and risks discussed with patient. I personally reviewed this patient with the CRNA. Discussed and agreed on the Anesthesia Plan with the CRNA. Marylin Edwards

## 2023-08-07 NOTE — ANESTHESIA POSTPROCEDURE EVALUATION
Post-Op Assessment Note    CV Status:  Stable  Pain Score: 0    Pain management: adequate     Mental Status:  Awake   Hydration Status:  Stable   PONV Controlled:  None   Airway Patency:  Patent      Post Op Vitals Reviewed: Yes      Staff: CRNA         No notable events documented.     /63 (08/07/23 1851)    Temp 97.5 °F (36.4 °C) (08/07/23 1851)    Pulse 75 (08/07/23 1851)   Resp 21 (08/07/23 1851)    SpO2 98 % (08/07/23 1851)

## 2023-08-07 NOTE — H&P
H&P - Vascular Surgery   Nicolas Sandoval 79 y.o. female MRN: [id number]  Unit/Bed#: OR POOL Encounter: [id number]        Assessment/ Plan:    Acute LLE ischemia-- recurrent episode  · H/o LLE thromboembolism (Cardiac embolus after mechanical fall) s/p L femoral embolectomy 10/6/2019 (Doctor) c/b incisional hematoma  --likely 2* thromboembolism in setting of intermittent hold of anticoagulation for Afib 2* recent epistaxis requiring cauterization on Chronic PAD  --CTA (p)-- Infrarenal abd aorta atheroma. Arterial occlusion from mid L EIA through CFA. Multifocal areas of occlusion through SFA w/ intermittent recon of flow via collaterals. 2V r/o.  --admit for > 2 midn stays w/ plan for operative intervention w/ LLE thrombectomy, poss CFAE  --Heparin gtt  --NPO  --IVF: NS @ 75  --Eliquis- held  --add ASA 81mg daily  --cont statin      Afib  --Eliquis held, Heparin gtt  --Metoprolol  HLD  --Lipitor  HTN  --Metoprolol, HCTZ  --Benicar held for DONALD risk reduction  Recent Epistaxis  --Ocean Nasal/ Nasal gel  --iron supplement, B12  Tremor  --Mirapex  Depression  --Cymbalta  Insomnia  --Trazadone  Urinary incontinence  GERD  --PPI  Bioprosthetic AVR  PPM        *d/w Dr. Cate Moses Doctor  ____________________________________________________________________  Chief Complaint:  Cold leg    HPI: eJrald Anders is a 79 y.o. female, NH resident, with history B/L TKR, hypertension, hyperlipidemia, GERD, bioprosthetic aortic valve replacement, PPM, tremor, insomnia, depression, and GERD who is known to the vascular center for history of atrial fibrillation and cardiac thrombus who suffered LLE thromboembolism in the setting of a fall s/p left lower extremity thrombectomy 10/6/2019 by Dr. Cate Moses Doctor c/b left groin hematoma. She was recommended lifelong anticoagulation. She is also been seen in the past for PAD.       Aminta Rhodes was transferred to [address] [place] [address] emergency department from [address] where she presented due to left lower extremity pain and numbness. There was concern for cold limb/thromboembolic event which prompted transfer for escalated vascular care. Prior to transfer, arterial duplex was performed and showed occlusion of the distal left EIA, CFA, SFA, popliteal, PT, AT. She was initiated on anticoagulation in the form of heparin drip    Upon arrival in the [facility] emergency department, the patient was seen and evaluated. She states symptoms initially started with some pain in the upper leg/thigh on Saturday (3days ago). This progressed to numbness in the foot. Subjectively, she states that she cannot feel the foot although for me, she is able to sense light touch. She is also able to wiggle the toes and flex at the ankle although not to the degree of her right lower extremity. Subjectively, her symptoms do seem mildly improved after receiving pain medication. She was started on heparin drip prior to transfer. Patient is minimally ambulatory although is able to bear weight to walk short distances with the assistance of a walker and for toileting. Primary mobility is with use of a wheelchair. She affirms having intermittently been on blood thinner which has been held recently due to intermittent episodes of epistaxis requiring ENT cauterization. She thinks her blood thinner may have been started a few days ago and was a different one from what she was taking. Record review reveals Eliquis 2.5 mg twice daily starting 8/6/2023. Review of Systems:  General: positive for  - as noted in HPI  Cardiovascular: no chest pain or dyspnea on exertion  Respiratory: no cough, shortness of breath, or wheezing  Gastrointestinal: no abdominal pain, change in bowel habits, or black or bloody stools  Genitourinary: positive for - urinary incontinence  Musculoskeletal: positive for - as noted in HPI  Neurological: no TIA or stroke symptoms.   Positive for memory loss  Hematological and Lymphatic: positive for - blood clots and intermittent blood thinner use  Dermatological: positive for dry skin  Psychological: positive for - depression  Ophthalmic: negative  ENT: positive for - recent nosebleeds requiring ENT cauterization    Past Medical History:  Past Medical History:   Diagnosis Date   • Abnormal blood chemistry 05/10/2013   • Accelerated essential hypertension 05/08/2012   • Atrial fibrillation (HCC)    • Depression    • Essential tremor    • GERD (gastroesophageal reflux disease)    • HLD (hyperlipidemia)    • Hypertension    • Melanoma ([address])    • Obesity    • Polyneuropathy    • Skin cancer    • Thromboembolism ([address]) 10/2019    LLE   • Tinea unguium    • Urinary incontinence        Past Surgical History:  Past Surgical History:   Procedure Laterality Date   • AORTIC VALVE REPLACEMENT  12/2012   • BREAST BIOPSY Right     years ago -Benign   • CARDIAC PACEMAKER PLACEMENT      dual chamber last assessed 10/14/13   • CHOLECYSTECTOMY     • IR LOWER EXTREMITY / INTERVENTION  10/6/2019   • JOINT REPLACEMENT      B/L knee replacement   • THROMBECTOMY W/ EMBOLECTOMY N/A 10/6/2019    Procedure: EMBOLECTOMY/THROMBECTOMY Left LOWER EXTREMITY; angiogram;  Surgeon: Shagufta Lyon MD;  Location: [facility];  Service: Vascular       Social History:  Social History     Substance and Sexual Activity   Alcohol Use Not Currently   • Alcohol/week: 0.0 standard drinks of alcohol     Social History     Substance and Sexual Activity   Drug Use No     Social History     Tobacco Use   Smoking Status Never   Smokeless Tobacco Never       Family History:  Family History   Problem Relation Age of Onset   • Hypertension Mother    • Breast cancer Mother    • No Known Problems Father    • Breast cancer Daughter 46   • No Known Problems Maternal Grandmother    • No Known Problems Maternal Grandfather    • No Known Problems Paternal Grandmother    • No Known Problems Paternal Grandfather    • No Known Problems Son    • No Known Problems Son    • No Known Problems Daughter    • No Known Problems Brother    • No Known Problems Maternal Aunt    • No Known Problems Maternal Aunt        Allergies: Allergies   Allergen Reactions   • Penicillins Hives   • Sulfa Antibiotics Hives   • Medical Tape Rash       Medications:  Home meds:   Prior to Admission medications    Medication Sig Start Date End Date Taking?  Authorizing Provider   acetaminophen (TYLENOL) 325 mg tablet Take 650 mg by mouth every 4 (four) hours as needed for mild pain, moderate pain, severe pain or fever   Yes Historical Provider, MD   Aloe-Sodium Chloride ([place] Saline Nasal Gel) SWAB 1 each into each nostril 2 (two) times a day 1 swab both nostrils q morning and bedtime   Yes Historical Provider, MD   apixaban (Eliquis) 2.5 mg Take 2.5 mg by mouth 2 (two) times a day   Yes Historical Provider, MD   DULoxetine (CYMBALTA) 60 mg delayed release capsule Take 60 mg by mouth daily   Yes Historical Provider, MD   metoprolol succinate (TOPROL-XL) 100 mg 24 hr tablet Take 100 mg by mouth daily   Yes Historical Provider, MD   miconazole (MICOTIN) 2 % powder Apply 1 Application topically every 8 (eight) hours Apply to inframammary crease b/l & abdominal fold   Yes Historical Provider, MD   nystatin (MYCOSTATIN) cream Apply 1 Application topically in the morning Abdominal fold   Yes Historical Provider, MD   polyethylene glycol (MIRALAX) 17 g packet Take 17 g by mouth daily   Yes Historical Provider, MD   pramipexole (MIRAPEX) 0.125 mg tablet Take 0.125 mg by mouth daily at bedtime   Yes Historical Provider, MD   Sennosides-Docusate Sodium 8.6-50 MG CAPS Take 1 tablet by mouth daily at bedtime   Yes Historical Provider, MD   sodium chloride (OCEAN) 0.65 % nasal spray 1 spray into each nostril daily at bedtime Both nostils   Yes Historical Provider, MD   sodium phosphate-biphosphate (FLEET) 7-19 g 118 mL enema Insert 1 enema into the rectum daily as needed (no responce to dulcolax)   Yes Historical Provider, MD   atorvastatin (LIPITOR) 20 mg tablet Take 1 tablet (20 mg total) by mouth daily after dinner 10/11/19   Mekhi Emmanuel MD   bisacodyl (DULCOLAX) 10 mg suppository Insert 10 mg into the rectum daily as needed for constipation    Historical Provider, MD   cyanocobalamin (VITAMIN B-12) 100 mcg tablet Take 100 mcg by mouth daily 11/13/20   Historical Provider, MD   ferrous sulfate 325 (65 Fe) mg tablet Take 325 mg by mouth every other day    Historical Provider, MD   hydrochlorothiazide (HYDRODIURIL) 25 mg tablet Take 25 mg by mouth daily    Historical Provider, MD   magnesium hydroxide (MILK OF MAGNESIA) 400 mg/5 mL oral suspension Take 30 mL by mouth daily as needed for constipation    Historical Provider, MD   melatonin 3 mg Take 3 mg by mouth daily at bedtime as needed    Historical Provider, MD   olmesartan (BENICAR) 40 mg tablet Take 20 mg by mouth daily  11/29/20   Historical Provider, MD   omeprazole (PriLOSEC) 20 mg delayed release capsule TAKE ONE CAPSULE BY MOUTH EVERY DAY 5/29/19   Sarkis Cormier MD   traZODone (DESYREL) 50 mg tablet Take 75 mg by mouth daily at bedtime    Historical Provider, MD   triamcinolone (KENALOG) 0.1 % cream Apply topically 2 (two) times a day 10/11/19   Mekhi Emmanuel MD   acetaminophen (TYLENOL) 500 mg tablet Take 1 tablet (500 mg total) by mouth every 6 (six) hours as needed for mild pain 11/7/18 8/7/23  Sarkis Cormier MD   aspirin (ECOTRIN LOW STRENGTH) 81 mg EC tablet Take 1 tablet (81 mg total) by mouth daily 10/11/19 8/7/23  Mekhi Emmanuel MD   FLUoxetine (PROzac) 20 mg capsule Take 20 mg by mouth daily  11/29/20 8/7/23  Historical Provider, MD   gabapentin (NEURONTIN) 100 mg capsule Take 100 mg by mouth every morning  8/7/23  Historical Provider, MD   gabapentin (NEURONTIN) 300 mg capsule Take 300 mg by mouth daily at bedtime  8/7/23  Historical Provider, MD   metoprolol succinate (TOPROL-XL) 50 mg 24 hr tablet One tablet, 50 mg, every 12 hours  Patient taking differently: Take 100 mg by mouth daily One tablet, 50 mg, every 12 hours 11/12/19 8/7/23  Ana SHIRA Rojas   rivaroxaban (XARELTO) 20 mg tablet Take one (1) tablet PO with dinner after completion of starter pack 10/11/19 8/7/23  Doretta Snellen, MD   senna (SENOKOT) 8.6 mg Take 1 tablet (8.6 mg total) by mouth daily at bedtime 10/11/19 8/7/23  Doretta Snellen, MD         Vitals:  BP (!) 177/72   Pulse 64   Temp 98.3 °F (36.8 °C) (Oral)   Resp 20   SpO2 96%   There is no height or weight on file to calculate BMI. Weight (last 2 days)     None          I/Os:  No intake or output data in the 24 hours ending 08/07/23 1426    Physical Exam:    General appearance: alert, appears stated age and cooperative. Elderly, obese, pleasant female. Head: Normocephalic, without obvious abnormality, atraumatic  Eyes: conjunctivae/corneas clear. PERRL, EOM's intact. Throat: lips, mucosa, and tongue normal; teeth and gums normal  Neck: no adenopathy, no carotid bruit, no JVD and supple, symmetrical, trachea midline  Lungs: clear to auscultation bilaterally, ant-lat  Chest wall: no tenderness. Stenotomy scar. L chest cardiac device  Heart: irregularly irregular rhythm and S1, S2 normal (-) m/r/g  Abdomen: soft, non-tender; bowel sounds normal; no masses,  no organomegaly. +Pannus  Genitalia: deferred-- adult protective garment/ diaper  Rectal: deferred  Extremities:   --RLE non-threatened  --LLE cool to touch ankle- foot w/ decreased motor and altered sensation. Mild rubor. L femoral surgical scar.  (-) Tissue loss   Skin: Skin color, texture, turgor normal. No rashes or lesions  Neurologic: Grossly normal      Pulse exam:  Radial: Right: 2+               Left: 2+  Femoral: Right: 2+                   Left: non-palpable  Popliteal:                   Left: doppler signal  DP: Right: non-palpable          Left: absent  PT: Right: 2+         Left: absent   Peroneal: Left: absent      Lab Results and Cultures: COVID:   Last COVID19 Screening Values     None         CBC with diff:   Lab Results   Component Value Date    WBC 8.62 2023    HGB 13.5 2023    HCT 42.2 2023    MCV 96 2023     2023    RBC 4.42 2023    MCH 30.5 2023    MCHC 32.0 2023    RDW 13.4 2023    MPV 9.6 2023    NRBC 0 2023      BMP/CMP:  Lab Results   Component Value Date    K 3.9 2023     2023    CO2 29 2023    BUN 27 (H) 2023    CREATININE 0.94 2023    GLUCOSE 203 (H) 10/06/2019    CALCIUM 9.5 2023    AST 17 2023    ALT 11 2023    ALKPHOS 77 2023    EGFR 55 2023   ,     Coags:   Lab Results   Component Value Date    PTT 27 2023    INR 1.07 2023   ,   Results from last 7 days   Lab Units 23  1002   PTT seconds 27   INR  1.07        Lipid Panel: No results found for: "CHOL"  Lab Results   Component Value Date    HDL 32 (L) 10/09/2019     Lab Results   Component Value Date    LDLCALC 104 (H) 10/09/2019     Lab Results   Component Value Date    TRIG 132 10/09/2019       HgbA1c:   Lab Results   Component Value Date    HGBA1C 7.8 (H) 10/07/2019         Imagin/7 LEAD L- occlusion of distal EIA, CFA, SFA, popl, PT, AT.      CTA -  Moderate eccentric atheroma involving the infrarenal abdominal aorta. HG stenosis versus focal occlusion of mid SMA, age indeterminant. -RLE: Moderate atherosclerotic narrowing through the right SFA  -LLE: Arterial occlusion extending from the mid L EIA through the CFA. Multifocal areas of occlusion through the SFA with intermittent recon of flow from collaterals.  2V r/o    EKG, Pathology, and Other Studies:    VTE Prophylaxis: Heparin gtt     Code Status: Level 1, Full code  Advance Directive and Living Will: Yes    Power of :    Nelda Berman PA-C  2023

## 2023-08-07 NOTE — EMTALA/ACUTE CARE TRANSFER
[facility] EMERGENCY DEPARTMENT  [address] [address]  Dept: [phone]      EMTALA TRANSFER CONSENT    NAME Flores GUERRERO 1940                              MRN [id number]    I have been informed of my rights regarding examination, treatment, and transfer   by Dr. Jackie Dougherty DO    Benefits:      Risks:        Consent for Transfer:  I acknowledge that my medical condition has been evaluated and explained to me by the emergency department physician or other qualified medical person and/or my attending physician, who has recommended that I be transferred to the service of    at  . The above potential benefits of such transfer, the potential risks associated with such transfer, and the probable risks of not being transferred have been explained to me, and I fully understand them. The doctor has explained that, in my case, the benefits of transfer outweigh the risks. I agree to be transferred. I authorize the performance of emergency medical procedures and treatments upon me in both transit and upon arrival at the receiving facility. Additionally, I authorize the release of any and all medical records to the receiving facility and request they be transported with me, if possible. I understand that the safest mode of transportation during a medical emergency is an ambulance and that the Hospital advocates the use of this mode of transport. Risks of traveling to the receiving facility by car, including absence of medical control, life sustaining equipment, such as oxygen, and medical personnel has been explained to me and I fully understand them. (BASSAM CORRECT BOX BELOW)  [  ]  I consent to the stated transfer and to be transported by ambulance/helicopter. [  ]  I consent to the stated transfer, but refuse transportation by ambulance and accept full responsibility for my transportation by car.   I understand the risks of non-ambulance transfers and I exonerate the Hospital and its staff from any deterioration in my condition that results from this refusal.    X___________________________________________    DATE  23  TIME________  Signature of patient or legally responsible individual signing on patient behalf           RELATIONSHIP TO PATIENT_________________________          Provider Certification    NAME Lorna Sandoval                                         1940                              MRN [id number]    A medical screening exam was performed on the above named patient. Based on the examination:    Condition Necessitating Transfer The encounter diagnosis was Pain of left lower extremity due to ischemia. Patient Condition:      Reason for Transfer:      Transfer Requirements: Facility     · Space available and qualified personnel available for treatment as acknowledged by    · Agreed to accept transfer and to provide appropriate medical treatment as acknowledged by          · Appropriate medical records of the examination and treatment of the patient are provided at the time of transfer   [address] _______  · Transfer will be performed by qualified personnel from    and appropriate transfer equipment as required, including the use of necessary and appropriate life support measures.     Provider Certification: I have examined the patient and explained the following risks and benefits of being transferred/refusing transfer to the patient/family:         Based on these reasonable risks and benefits to the patient and/or the unborn child(aydee), and based upon the information available at the time of the patient’s examination, I certify that the medical benefits reasonably to be expected from the provision of appropriate medical treatments at another medical facility outweigh the increasing risks, if any, to the individual’s medical condition, and in the case of labor to the unborn child, from effecting the transfer.     X____________________________________________ DATE 08/07/23        TIME_______      ORIGINAL - SEND TO MEDICAL RECORDS   COPY - SEND WITH PATIENT DURING TRANSFER

## 2023-08-07 NOTE — CONSULTS
Consultation - Tonia Sandoval 79 y.o. female MRN: [id number]    Unit/Bed#: ED 07 Encounter: [id number]      Assessment/Plan     Assessment/Plan:  Acute cold Left lower extremity  -pt with symptoms that began Saturday evening, h/o afib and prior left femoral clot  -was taking Xarelto but, stopped recently due to epistaxis  -no dopplerable pulses LE DP and PT, color changes noted mid shin and distal  -pt prefers [facility] as most of her care was done there  -discussed with ER and plan for immediate urgent transfer  -Discussed also with Dr. Stella Washington (Vascular)    History of Present Illness     HPI: Reyna Coleman is a 79y.o. year old female with pmh for afib, HTN, HLD, PAD, CAD with prior history for a left LE femoral clot with evacuation who presents with symptoms of a cold left leg. 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 She states that her symptoms started Saturday evening. Her left LE is cold, painful and she cannot feel her foot. She states they had to evacuate a clot in the past for similar. She states she would prefer to be transferred to [facility] as she has had most of her care there. She denies any CP, SOB, no fevers, chills, and no abd pain. Inpatient consult to Vascular Surgery  Consult performed by: Maryuri Edwards PA-C  Consult ordered by: Johnnie Tomas PA-C          Review of Systems   Constitutional: Negative for chills and fever. HENT: Negative for ear pain and sore throat. Eyes: Negative for pain and visual disturbance. Respiratory: Negative for cough and shortness of breath. Cardiovascular: Negative for chest pain and palpitations. Gastrointestinal: Negative for abdominal pain and vomiting. Genitourinary: Negative for dysuria and hematuria. Musculoskeletal: Positive for gait problem. Negative for arthralgias and back pain. Skin: Positive for pallor. Negative for color change, rash and wound. LLE pain and numbness from below ankle.  She notes the color change difference between the 2 legs as well. Neurological: Negative for seizures and syncope. All other systems reviewed and are negative. Historical Information   Past Medical History:   Diagnosis Date   • Abnormal blood chemistry 5/10/2013   • Accelerated essential hypertension 5/8/2012   • Atrial fibrillation (HCC)    • Depression    • Hypertension    • Melanoma ([address])    • Skin cancer      Past Surgical History:   Procedure Laterality Date   • AORTIC VALVE REPLACEMENT  12/2012   • BREAST BIOPSY Right     years ago -Benign   • CARDIAC PACEMAKER PLACEMENT      dual chamber last assessed 10/14/13   • CHOLECYSTECTOMY     • IR LOWER EXTREMITY / INTERVENTION  10/6/2019   • JOINT REPLACEMENT      B/L knee replacement   • THROMBECTOMY W/ EMBOLECTOMY N/A 10/6/2019    Procedure: EMBOLECTOMY/THROMBECTOMY Left LOWER EXTREMITY; angiogram;  Surgeon: Ricky Lyon MD;  Location: [facility];  Service: Vascular     Social History   Social History     Substance and Sexual Activity   Alcohol Use Not Currently   • Alcohol/week: 0.0 standard drinks of alcohol     Social History     Substance and Sexual Activity   Drug Use No     Social History     Tobacco Use   Smoking Status Never   Smokeless Tobacco Never     E-Cigarette/Vaping   • E-Cigarette Use Never User      E-Cigarette/Vaping Substances   • Nicotine No    • THC No    • CBD No    • Flavoring No    • Other No    • Unknown No       Family History: non-contributory    Meds/Allergies   all current active meds have been reviewed  Allergies   Allergen Reactions   • Penicillins Hives   • Sulfa Antibiotics Hives   • Medical Tape Rash       Objective   Vitals: Blood pressure (!) 180/75, pulse 74, temperature 98.8 °F (37.1 °C), temperature source Temporal, resp. rate 20, weight 90.7 kg (200 lb), SpO2 99 %.   No intake or output data in the 24 hours ending 08/07/23 1029  Invasive Devices     Peripheral Intravenous Line  Duration           Peripheral IV 06/16/22 Right Forearm 416 days                Physical Exam  Vitals and nursing note reviewed. Constitutional:       General: She is not in acute distress. Appearance: She is well-developed. HENT:      Head: Normocephalic and atraumatic. Eyes:      Conjunctiva/sclera: Conjunctivae normal.   Cardiovascular:      Rate and Rhythm: Normal rate and regular rhythm. Heart sounds: No murmur heard. Pulmonary:      Effort: Pulmonary effort is normal. No respiratory distress. Breath sounds: Normal breath sounds. Abdominal:      Palpations: Abdomen is soft. Tenderness: There is no abdominal tenderness. Musculoskeletal:         General: No swelling. Cervical back: Neck supple. Skin:     General: Skin is warm and dry. Capillary Refill: Capillary refill takes less than 2 seconds. Comments: LLE with light purple color which begins mid shin and goes distal. Foot is insensate and cold to touch. No palpable or dopplerable pulses noted in foot. She does have sensation intact above the ankle. Neurological:      General: No focal deficit present. Mental Status: She is alert and oriented to person, place, and time. Psychiatric:         Mood and Affect: Mood normal.         Behavior: Behavior normal.         Lab Results: I have personally reviewed pertinent reports. Imaging Studies: I have personally reviewed pertinent reports. EKG, Pathology, and Other Studies: I have personally reviewed pertinent reports. VTE Prophylaxis: Reason for no pharmacologic prophylaxis pt in ER    Code Status: Prior  Advance Directive and Living Will: Yes    Power of :    POLST:      Counseling / Coordination of Care  Total floor / unit time spent today 30 minutes. Greater than 50% of total time was spent with the patient and / or family counseling and / or coordination of care. A description of the counseling / coordination of care: in my consult note.

## 2023-08-07 NOTE — ED NOTES
Report given to Narcisa Das RN at [facility] AND [facility] ED. Pt leaving [facility] at this time via ambulance transport.       Kraig Sifuentes RN  08/07/23 2115

## 2023-08-07 NOTE — ED PROVIDER NOTES
History  Chief Complaint   Patient presents with   • Leg Pain     Saturday began with LLE pain, discoloration and tingling. 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 She states that she has had worsening pain ongoing since Saturday and now with worsening pain and lower extremity temperature and color change with lower extremity becoming purple. She presents to emergency department for evaluation from skilled nursing facility. Prior to Admission Medications   Prescriptions Last Dose Informant Patient Reported?  Taking?   atorvastatin (LIPITOR) 20 mg tablet  Outside Facility (Specify) No No   Sig: Take 1 tablet (20 mg total) by mouth daily after dinner   bisacodyl (DULCOLAX) 10 mg suppository  Outside Facility (Specify) Yes No   Sig: Insert 10 mg into the rectum daily as needed for constipation   cyanocobalamin (VITAMIN B-12) 100 mcg tablet  Outside Facility (Specify) Yes No   Sig: Take 100 mcg by mouth daily   ferrous sulfate 325 (65 Fe) mg tablet  Outside Facility (Specify) Yes No   Sig: Take 325 mg by mouth every other day   hydrochlorothiazide (HYDRODIURIL) 25 mg tablet  Outside Facility (Specify) Yes No   Sig: Take 25 mg by mouth daily   magnesium hydroxide (MILK OF MAGNESIA) 400 mg/5 mL oral suspension  Outside Facility (Specify) Yes No   Sig: Take 30 mL by mouth daily as needed for constipation   melatonin 3 mg  Outside Facility (Specify) Yes No   Sig: Take 3 mg by mouth daily at bedtime as needed   olmesartan (BENICAR) 40 mg tablet  Outside Facility (Specify) Yes No   Sig: Take 20 mg by mouth daily    omeprazole (PriLOSEC) 20 mg delayed release capsule  Outside Facility (Specify) No No   Sig: TAKE ONE CAPSULE BY MOUTH EVERY DAY   traZODone (DESYREL) 50 mg tablet  Outside Facility (Specify) Yes No   Sig: Take 75 mg by mouth daily at bedtime   triamcinolone (KENALOG) 0.1 % cream  Outside Facility (Specify) No No   Sig: Apply topically 2 (two) times a day      Facility-Administered Medications: None       Past Medical History:   Diagnosis Date   • Abnormal blood chemistry 05/10/2013   • Accelerated essential hypertension 05/08/2012   • Atrial fibrillation (HCC)    • Depression    • Essential tremor    • GERD (gastroesophageal reflux disease)    • HLD (hyperlipidemia)    • Hypertension    • Melanoma ([address])    • Obesity    • Polyneuropathy    • Skin cancer    • Thromboembolism ([address]) 10/2019    LLE   • Tinea unguium    • Urinary incontinence        Past Surgical History:   Procedure Laterality Date   • AORTIC VALVE REPLACEMENT  12/2012   • BREAST BIOPSY Right     years ago -Benign   • CARDIAC PACEMAKER PLACEMENT      dual chamber last assessed 10/14/13   • CHOLECYSTECTOMY     • IR LOWER EXTREMITY / INTERVENTION  10/6/2019   • JOINT REPLACEMENT      B/L knee replacement   • THROMBECTOMY W/ EMBOLECTOMY N/A 10/6/2019    Procedure: EMBOLECTOMY/THROMBECTOMY Left LOWER EXTREMITY; angiogram;  Surgeon: Silvano Lyon MD;  Location: [facility];  Service: Vascular       Family History   Problem Relation Age of Onset   • Hypertension Mother    • Breast cancer Mother    • No Known Problems Father    • Breast cancer Daughter 46   • No Known Problems Maternal Grandmother    • No Known Problems Maternal Grandfather    • No Known Problems Paternal Grandmother    • No Known Problems Paternal Grandfather    • No Known Problems Son    • No Known Problems Son    • No Known Problems Daughter    • No Known Problems Brother    • No Known Problems Maternal Aunt    • No Known Problems Maternal Aunt      I have reviewed and agree with the history as documented. E-Cigarette/Vaping   • E-Cigarette Use Never User      E-Cigarette/Vaping Substances   • Nicotine No    • THC No    • CBD No    • Flavoring No    • Other No    • Unknown No      Social History     Tobacco Use   • Smoking status: Never   • Smokeless tobacco: Never   Vaping Use   • Vaping Use: Never used   Substance Use Topics   • Alcohol use: Not Currently     Alcohol/week: 0.0 standard drinks of alcohol   • Drug use: No       Review of Systems   Constitutional: Negative for chills and fever. HENT: Negative for ear pain and sore throat. Eyes: Negative for pain and visual disturbance. Respiratory: Negative for cough and shortness of breath. Cardiovascular: Positive for leg swelling. Negative for chest pain and palpitations. Left lower extremity pain   Gastrointestinal: Negative for abdominal pain and vomiting. Genitourinary: Negative for dysuria and hematuria. Musculoskeletal: Negative for arthralgias and back pain. Skin: Positive for color change and pallor. Negative for rash. Neurological: Negative for seizures and syncope. All other systems reviewed and are negative. Physical Exam  Physical Exam  Vitals and nursing note reviewed. Constitutional:       General: She is not in acute distress. Appearance: She is well-developed. HENT:      Head: Normocephalic and atraumatic. Eyes:      Conjunctiva/sclera: Conjunctivae normal.   Cardiovascular:      Rate and Rhythm: Normal rate and regular rhythm. Pulses:           Femoral pulses are 2+ on the right side. Popliteal pulses are 2+ on the right side and 0 on the left side. Dorsalis pedis pulses are 2+ on the right side and 0 on the left side. Posterior tibial pulses are 2+ on the right side and 0 on the left side. Heart sounds: No murmur heard. Pulmonary:      Effort: Pulmonary effort is normal. No respiratory distress. Breath sounds: Normal breath sounds.    Abdominal: Palpations: Abdomen is soft. Tenderness: There is no abdominal tenderness. Musculoskeletal:         General: No swelling. Cervical back: Neck supple. Legs:       Comments: Lower extremity cold and pulseless, purple   Skin:     General: Skin is warm and dry. Capillary Refill: Capillary refill takes less than 2 seconds. Neurological:      Mental Status: She is alert.    Psychiatric:         Mood and Affect: Mood normal.         Vital Signs  ED Triage Vitals [08/07/23 0945]   Temperature Pulse Respirations Blood Pressure SpO2   98.8 °F (37.1 °C) 74 20 (!) 180/75 99 %      Temp Source Heart Rate Source Patient Position - Orthostatic VS BP Location FiO2 (%)   Temporal Monitor Sitting Left arm --      Pain Score       9           Vitals:    08/07/23 0945 08/07/23 0955 08/07/23 1125 08/07/23 1129   BP: (!) 180/75 (!) 180/75 (!) 187/72 (!) 194/77   Pulse: 74 69 66 64   Patient Position - Orthostatic VS: Sitting Sitting Sitting          Visual Acuity      ED Medications  Medications - No data to display    Diagnostic Studies  Results Reviewed     Procedure Component Value Units Date/Time    APTT [[id number]]  (Normal) Collected: 08/07/23 1120    Lab Status: Final result Specimen: Blood from Arm, Right Updated: 08/07/23 1506     PTT 27 seconds     Protime-INR [[id number]]  (Abnormal) Collected: 08/07/23 1002    Lab Status: Final result Specimen: Blood from Arm, Right Updated: 08/07/23 1103     Protime 14.6 seconds      INR 1.07    APTT [[id number]]  (Normal) Collected: 08/07/23 1002    Lab Status: Final result Specimen: Blood from Arm, Right Updated: 08/07/23 1103     PTT 27 seconds     CK [[id number]]  (Normal) Collected: 08/07/23 1002    Lab Status: Final result Specimen: Blood from Arm, Right Updated: 08/07/23 1030     Total  U/L     Comprehensive metabolic panel [[id number]]  (Abnormal) Collected: 08/07/23 1002    Lab Status: Final result Specimen: Blood from Arm, Right Updated: 08/07/23 1030 Sodium 137 mmol/L      Potassium 3.9 mmol/L      Chloride 100 mmol/L      CO2 29 mmol/L      ANION GAP 8 mmol/L      BUN 27 mg/dL      Creatinine 0.94 mg/dL      Glucose 157 mg/dL      Calcium 9.5 mg/dL      AST 17 U/L      ALT 11 U/L      Alkaline Phosphatase 77 U/L      Total Protein 6.5 g/dL      Albumin 3.6 g/dL      Total Bilirubin 0.73 mg/dL      eGFR 55 ml/min/1.73sq m     Narrative:      [facility] guidelines for Chronic Kidney Disease (CKD):   •  Stage 1 with normal or high GFR (GFR > 90 mL/min/1.73 square meters)  •  Stage 2 Mild CKD (GFR = 60-89 mL/min/1.73 square meters)  •  Stage 3A Moderate CKD (GFR = 45-59 mL/min/1.73 square meters)  •  Stage 3B Moderate CKD (GFR = 30-44 mL/min/1.73 square meters)  •  Stage 4 Severe CKD (GFR = 15-29 mL/min/1.73 square meters)  •  Stage 5 End Stage CKD (GFR <15 mL/min/1.73 square meters)  Note: GFR calculation is accurate only with a steady state creatinine    Lactic acid, plasma (w/reflex if result > 2.0) [[id number]]  (Normal) Collected: 08/07/23 1004    Lab Status: Final result Specimen: Blood from Arm, Right Updated: 08/07/23 1028     LACTIC ACID 1.9 mmol/L     Narrative:      Result may be elevated if tourniquet was used during collection.     CBC and differential [[id number]] Collected: 08/07/23 1002    Lab Status: Final result Specimen: Blood from Arm, Right Updated: 08/07/23 1012     WBC 8.62 Thousand/uL      RBC 4.42 Million/uL      Hemoglobin 13.5 g/dL      Hematocrit 42.2 %      MCV 96 fL      MCH 30.5 pg      MCHC 32.0 g/dL      RDW 13.4 %      MPV 9.6 fL      Platelets 425 Thousands/uL      nRBC 0 /100 WBCs      Neutrophils Relative 65 %      Immat GRANS % 1 %      Lymphocytes Relative 22 %      Monocytes Relative 7 %      Eosinophils Relative 4 %      Basophils Relative 1 %      Neutrophils Absolute 5.72 Thousands/µL      Immature Grans Absolute 0.05 Thousand/uL      Lymphocytes Absolute 1.86 Thousands/µL      Monocytes Absolute 0.61 Thousand/µL      Eosinophils Absolute 0.32 Thousand/µL      Basophils Absolute 0.06 Thousands/µL                  VAS lower limb arterial duplex, limited, unilateral   Final Result by Yuliana Russ MD (08/07 1212)             [facility] REPORT  CLINICAL:  Indications:  Patient presents with left leg pain , weakness, cold, and  purplish discoloration that started Saturday evening (8/5/2023). Operative History:  2019-10-06 Left  embolectomy/thrombectomy of femoral artery  Risk Factors  The patient has history of HTN and Hyperlipidemia. FINDINGS:     Left                   Impression  PSV (cm/s)    Dist EIA               Occluded             0    Common Femoral Artery  Occluded             0    Prox Profunda          Occluded             0    Prox SFA               Occluded             0    Mid SFA                Occluded             0    Dist SFA               Occluded             0    Proximal Pop           Occluded             0    Distal Pop             Occluded             0    Dist Post Tibial       Occluded             0    Dist. Ant. Tibial      Occluded             0             CONCLUSION:     Impression:  LEFT LOWER LIMB:  This resting evaluation shows occlusion of the distal external iliac, common  femoral, superficial femoral, popliteal, posterior tibial, and anterior tibial  arteries. Unable to visualize further proximally due to bowel gas. Unable to obtain a Ankle/Brachial index, metatarsal pressure, and a great toe  pressure due to machine malfunction. Compared to previous study on 8/12/2021 , there is progression of disease in  the left leg. Technical findings were given to [address] [place] [address] on 8/7/2023 @ 1055.      SIGNATURE:  Electronically Signed by: Alayna Akers on 2023-08-07 12:12:46 PM    Procedures  CriticalCare Time    Date/Time: 8/7/2023 9:52 AM    Performed by: Raquel Redmond PA-C  Authorized by: Raquel Redmond PA-C    Critical care provider statement: Critical care time (minutes):  62    Critical care start time:  8/7/2023 9:52 AM    Critical care end time:  8/7/2023 10:55 AM    Critical care was necessary to treat or prevent imminent or life-threatening deterioration of the following conditions:  Circulatory failure    Critical care was time spent personally by me on the following activities:  Blood draw for specimens, ordering and performing treatments and interventions, obtaining history from patient or surrogate, development of treatment plan with patient or surrogate, ordering and review of laboratory studies, ordering and review of radiographic studies, discussions with consultants, re-evaluation of patient's condition, review of old charts and examination of patient  Comments:      Critical care provided for patient for management disposition of ischemic lower extremity. Trauma surgery for evaluation as well as transfer for high-grade occlusion left lower extremity arterial.  Transfer initiated to facility for higher level of care. Patient understood time dependent urgent nature for evaluation of left lower extremity. ED Course  ED Course as of 08/07/23 1607   Mon Aug 07, 2023   1000 Vascular surgery paged/consult placed for eval  Workup initiated. 1020 Transfer initiated. Pt would like to go to [facility]. 1041 Pending discussion w/ [facility] for transfer for vascular surgery. 1041 U/S Performing Arterial duplex. 1056 Discussed w/ patient pending approval for transfer to [facility] per discuss w/ vascular surgery @ [facility] and discussion through PACs. At this point patinent changes her mind to remain in [facility] based on time / urgency dependency. 1107 In discussion with vascular team for transfer. [id number] Vascular team accepts patient for ED > ED transfer. SBIRT 20yo+    Flowsheet Row Most Recent Value   Initial Alcohol Screen: US AUDIT-C     1.  How often do you have a drink containing alcohol? 0 Filed at: 08/07/2023 1021 2. How many drinks containing alcohol do you have on a typical day you are drinking? 0 Filed at: 08/07/2023 1025   3a. Male UNDER 65: How often do you have five or more drinks on one occasion? 0 Filed at: 08/07/2023 1025   3b. FEMALE Any Age, or MALE 65+: How often do you have 4 or more drinks on one occassion? 0 Filed at: 08/07/2023 1025   Audit-C Score 0 Filed at: 08/07/2023 1025   TYSHAWN: How many times in the past year have you. .. Used an illegal drug or used a prescription medication for non-medical reasons? Never Filed at: 08/07/2023 1025                    Medical Decision Making  Left lower extremity ischemia and pain. Priority 1 transfer to [facility] ordered and initiated as discussed with vascular fellow for ED to ED transfer for urgent evaluation. Heparin drip initiated. Patient is on Eliquis in the outpatient setting. Discontinued at this time in favor of heparin infusion. Patient be transferred for vascular/IR intervention at [facility]      Pain of left lower extremity due to ischemia: acute illness or injury  Amount and/or Complexity of Data Reviewed  Labs: ordered. Disposition  Final diagnoses:   Pain of left lower extremity due to ischemia     Time reflects when diagnosis was documented in both MDM as applicable and the Disposition within this note     Time User Action Codes Description Comment    8/7/2023 10:18 AM Leighann Crhistianson Add [Z46.054,  I99.8] Pain of left lower extremity due to ischemia       ED Disposition     ED Disposition   Transfer to Another Facility-In Network    Condition   --    Date/Time   Mon Aug 7, 2023 11:43 AM    Comment   Frann Alpers should be transferred out to [facility] ED for vascular surgery evaluation.            Follow-up Information    None         Discharge Medication List as of 8/7/2023 12:00 PM      CONTINUE these medications which have NOT CHANGED    Details   atorvastatin (LIPITOR) 20 mg tablet Take 1 tablet (20 mg total) by mouth daily after dinner, Starting Fri 10/11/2019, Print      bisacodyl (DULCOLAX) 10 mg suppository Insert 10 mg into the rectum daily, Historical Med      cyanocobalamin (VITAMIN B-12) 100 mcg tablet Starting Fri 11/13/2020, Historical Med      ferrous sulfate 325 (65 Fe) mg tablet Take 325 mg by mouth daily with breakfast, Historical Med      hydrochlorothiazide (HYDRODIURIL) 25 mg tablet Take 12.5 mg by mouth daily, Historical Med      magnesium hydroxide (MILK OF MAGNESIA) 400 mg/5 mL oral suspension Take by mouth daily as needed for constipation, Historical Med      melatonin 3 mg Take 3 mg by mouth daily at bedtime as needed, Historical Med      olmesartan (BENICAR) 40 mg tablet Take 20 mg by mouth daily , Starting Sun 11/29/2020, Historical Med      omeprazole (PriLOSEC) 20 mg delayed release capsule TAKE ONE CAPSULE BY MOUTH EVERY DAY, Normal      traZODone (DESYREL) 50 mg tablet Take 50 mg by mouth daily at bedtime, Historical Med      triamcinolone (KENALOG) 0.1 % cream Apply topically 2 (two) times a day, Starting Fri 10/11/2019, Print      acetaminophen (TYLENOL) 500 mg tablet Take 1 tablet (500 mg total) by mouth every 6 (six) hours as needed for mild pain, Starting Wed 11/7/2018, Normal      aspirin (ECOTRIN LOW STRENGTH) 81 mg EC tablet Take 1 tablet (81 mg total) by mouth daily, Starting Fri 10/11/2019, Print      FLUoxetine (PROzac) 20 mg capsule Take 20 mg by mouth daily , Starting Sun 11/29/2020, Historical Med      !! gabapentin (NEURONTIN) 100 mg capsule Take 100 mg by mouth every morning, Historical Med      !! gabapentin (NEURONTIN) 300 mg capsule Take 300 mg by mouth daily at bedtime, Historical Med      metoprolol succinate (TOPROL-XL) 50 mg 24 hr tablet One tablet, 50 mg, every 12 hours, Normal      rivaroxaban (XARELTO) 20 mg tablet Take one (1) tablet PO with dinner after completion of starter pack, Print      senna (SENOKOT) 8.6 mg Take 1 tablet (8.6 mg total) by mouth daily at bedtime, Starting Fri 10/11/2019, Print       !! - Potential duplicate medications found. Please discuss with provider.           Outpatient Discharge Orders   Transfer to other facility       PDMP Review     None          ED Provider  Electronically Signed by           Lety Chapman PA-C  08/07/23 8798

## 2023-08-07 NOTE — INTERIM OP NOTE
Left femoral exposure Left iliac, femoral, embolectomy wtih #3 and #4 Dieudonne catheters Left lower extremity angiogram Left femoral arteriotomy with bovine patch angioplasty, ENDARTERECTOMY ARTERIAL FEMORAL WITH BOVINE PATCH ANGIOPLASTY  Postoperative Note  PATIENT NAME: Graciela Sanz  : 1940  MRN: [id number]  [facility] RO[facility]    Surgery Date: 2023    Preop Diagnosis:  Acute left lower extremity ischemia  Afib    Post-Op Diagnosis Codes:  same    Procedure(s) (LRB):  Left femoral exposure   Left iliac, femoral, embolectomy wtih #3 and #4 Dieudonne catheters   Left lower extremity angiogram   Left femoral arteriotomy with bovine patch angioplasty    Surgeon(s) and Role:     Luis Felipe Lyon MD - Primary     * Lolis Díaz DO - Vascular Fellow  *Toni Dong DPM - Podiatry Fellow    Specimens:  none    Estimated Blood Loss:   200 mL    Contrast: 33cc  DAP: 15.4  Fluoro time: 1.5min    Anesthesia Type:   GETA     Findings:   Fresh appearing thrombus within the left external iliac artery, common femoral artery, proximal profunda femoral artery, superficial femoral artery  There were stenotic areas in the SFA and poplitea which appeared more chronic than acute  There was PT and peroneal runoff    Complications:   None    PT signal at completion    SIGNATURE: Luis Felipe Lyon MD   DATE: 2023   TIME: 6:34 PM

## 2023-08-08 ENCOUNTER — APPOINTMENT (INPATIENT)
Dept: RADIOLOGY | Facility: HOSPITAL | Age: 83
DRG: 271 | End: 2023-08-08
Attending: INTERNAL MEDICINE
Payer: MEDICARE

## 2023-08-08 LAB
ABO GROUP BLD: NORMAL
ALBUMIN SERPL BCP-MCNC: 2.8 G/DL (ref 3.5–5)
ALP SERPL-CCNC: 76 U/L (ref 46–116)
ALT SERPL W P-5'-P-CCNC: 21 U/L (ref 12–78)
ANION GAP SERPL CALCULATED.3IONS-SCNC: 9 MMOL/L
APTT PPP: >210 SECONDS (ref 23–37)
APTT PPP: >210 SECONDS (ref 23–37)
AST SERPL W P-5'-P-CCNC: 18 U/L (ref 5–45)
BILIRUB SERPL-MCNC: 0.49 MG/DL (ref 0.2–1)
BLD GP AB SCN SERPL QL: NEGATIVE
BUN SERPL-MCNC: 24 MG/DL (ref 5–25)
CALCIUM ALBUM COR SERPL-MCNC: 9.8 MG/DL (ref 8.3–10.1)
CALCIUM SERPL-MCNC: 8.8 MG/DL (ref 8.3–10.1)
CARDIAC TROPONIN I PNL SERPL HS: 14 NG/L
CHLORIDE SERPL-SCNC: 109 MMOL/L (ref 96–108)
CO2 SERPL-SCNC: 23 MMOL/L (ref 21–32)
CREAT SERPL-MCNC: 0.9 MG/DL (ref 0.6–1.3)
ERYTHROCYTE [DISTWIDTH] IN BLOOD BY AUTOMATED COUNT: 13.5 % (ref 11.6–15.1)
GFR SERPL CREATININE-BSD FRML MDRD: 58 ML/MIN/1.73SQ M
GLUCOSE SERPL-MCNC: 193 MG/DL (ref 65–140)
HCT VFR BLD AUTO: 29.4 % (ref 34.8–46.1)
HCT VFR BLD AUTO: 33.5 % (ref 34.8–46.1)
HGB BLD-MCNC: 10.6 G/DL (ref 11.5–15.4)
HGB BLD-MCNC: 9 G/DL (ref 11.5–15.4)
KCT BLD-ACNC: 239 SEC (ref 89–137)
MAGNESIUM SERPL-MCNC: 1.9 MG/DL (ref 1.6–2.6)
MCH RBC QN AUTO: 30.9 PG (ref 26.8–34.3)
MCHC RBC AUTO-ENTMCNC: 31.6 G/DL (ref 31.4–37.4)
MCV RBC AUTO: 98 FL (ref 82–98)
PHOSPHATE SERPL-MCNC: 3.7 MG/DL (ref 2.3–4.1)
PLATELET # BLD AUTO: 281 THOUSANDS/UL (ref 149–390)
PMV BLD AUTO: 10.4 FL (ref 8.9–12.7)
POTASSIUM SERPL-SCNC: 3.8 MMOL/L (ref 3.5–5.3)
PROT SERPL-MCNC: 5.9 G/DL (ref 6.4–8.4)
RBC # BLD AUTO: 3.43 MILLION/UL (ref 3.81–5.12)
RH BLD: POSITIVE
SODIUM SERPL-SCNC: 141 MMOL/L (ref 135–147)
SPECIMEN EXPIRATION DATE: NORMAL
SPECIMEN SOURCE: ABNORMAL
WBC # BLD AUTO: 15.91 THOUSAND/UL (ref 4.31–10.16)

## 2023-08-08 PROCEDURE — 85018 HEMOGLOBIN: CPT | Performed by: INTERNAL MEDICINE

## 2023-08-08 PROCEDURE — 85027 COMPLETE CBC AUTOMATED: CPT | Performed by: STUDENT IN AN ORGANIZED HEALTH CARE EDUCATION/TRAINING PROGRAM

## 2023-08-08 PROCEDURE — 86901 BLOOD TYPING SEROLOGIC RH(D): CPT | Performed by: SURGERY

## 2023-08-08 PROCEDURE — 85014 HEMATOCRIT: CPT | Performed by: INTERNAL MEDICINE

## 2023-08-08 PROCEDURE — 85730 THROMBOPLASTIN TIME PARTIAL: CPT | Performed by: SURGERY

## 2023-08-08 PROCEDURE — 71045 X-RAY EXAM CHEST 1 VIEW: CPT

## 2023-08-08 PROCEDURE — 86900 BLOOD TYPING SEROLOGIC ABO: CPT | Performed by: SURGERY

## 2023-08-08 PROCEDURE — 80053 COMPREHEN METABOLIC PANEL: CPT | Performed by: STUDENT IN AN ORGANIZED HEALTH CARE EDUCATION/TRAINING PROGRAM

## 2023-08-08 PROCEDURE — NC001 PR NO CHARGE: Performed by: PHYSICIAN ASSISTANT

## 2023-08-08 PROCEDURE — 99233 SBSQ HOSP IP/OBS HIGH 50: CPT | Performed by: INTERNAL MEDICINE

## 2023-08-08 PROCEDURE — 99024 POSTOP FOLLOW-UP VISIT: CPT | Performed by: SURGERY

## 2023-08-08 PROCEDURE — 97163 PT EVAL HIGH COMPLEX 45 MIN: CPT

## 2023-08-08 PROCEDURE — 84484 ASSAY OF TROPONIN QUANT: CPT | Performed by: INTERNAL MEDICINE

## 2023-08-08 PROCEDURE — 83735 ASSAY OF MAGNESIUM: CPT | Performed by: PHYSICIAN ASSISTANT

## 2023-08-08 PROCEDURE — 93005 ELECTROCARDIOGRAM TRACING: CPT

## 2023-08-08 PROCEDURE — 97167 OT EVAL HIGH COMPLEX 60 MIN: CPT

## 2023-08-08 PROCEDURE — 86850 RBC ANTIBODY SCREEN: CPT | Performed by: SURGERY

## 2023-08-08 PROCEDURE — 84100 ASSAY OF PHOSPHORUS: CPT | Performed by: PHYSICIAN ASSISTANT

## 2023-08-08 RX ORDER — ALBUMIN (HUMAN) 12.5 G/50ML
25 SOLUTION INTRAVENOUS ONCE
Status: COMPLETED | OUTPATIENT
Start: 2023-08-08 | End: 2023-08-08

## 2023-08-08 RX ADMIN — HYDROCHLOROTHIAZIDE 25 MG: 25 TABLET ORAL at 08:11

## 2023-08-08 RX ADMIN — HYDRALAZINE HYDROCHLORIDE 5 MG: 20 INJECTION, SOLUTION INTRAMUSCULAR; INTRAVENOUS at 05:33

## 2023-08-08 RX ADMIN — SODIUM CHLORIDE 250 ML: 0.9 INJECTION, SOLUTION INTRAVENOUS at 11:16

## 2023-08-08 RX ADMIN — PANTOPRAZOLE SODIUM 40 MG: 40 TABLET, DELAYED RELEASE ORAL at 05:36

## 2023-08-08 RX ADMIN — NYSTATIN 1 APPLICATION: 100000 CREAM TOPICAL at 08:30

## 2023-08-08 RX ADMIN — CLINDAMYCIN IN 5 PERCENT DEXTROSE 900 MG: 18 INJECTION, SOLUTION INTRAVENOUS at 09:46

## 2023-08-08 RX ADMIN — Medication 1 APPLICATION: at 21:46

## 2023-08-08 RX ADMIN — APIXABAN 10 MG: 5 TABLET, FILM COATED ORAL at 10:45

## 2023-08-08 RX ADMIN — TRAZODONE HYDROCHLORIDE 75 MG: 50 TABLET ORAL at 21:43

## 2023-08-08 RX ADMIN — SENNOSIDES AND DOCUSATE SODIUM 1 TABLET: 50; 8.6 TABLET ORAL at 21:43

## 2023-08-08 RX ADMIN — TRIAMCINOLONE ACETONIDE: 1 CREAM TOPICAL at 08:30

## 2023-08-08 RX ADMIN — VITAM B12 100 MCG: 100 TAB at 08:11

## 2023-08-08 RX ADMIN — ACETAMINOPHEN 650 MG: 325 TABLET, FILM COATED ORAL at 19:29

## 2023-08-08 RX ADMIN — ACETAMINOPHEN 650 MG: 325 TABLET, FILM COATED ORAL at 12:25

## 2023-08-08 RX ADMIN — SALINE NASAL SPRAY 1 SPRAY: 1.5 SOLUTION NASAL at 21:46

## 2023-08-08 RX ADMIN — APIXABAN 10 MG: 5 TABLET, FILM COATED ORAL at 21:47

## 2023-08-08 RX ADMIN — METOPROLOL SUCCINATE 100 MG: 100 TABLET, EXTENDED RELEASE ORAL at 08:11

## 2023-08-08 RX ADMIN — POLYETHYLENE GLYCOL 3350 17 G: 17 POWDER, FOR SOLUTION ORAL at 08:11

## 2023-08-08 RX ADMIN — ONDANSETRON 4 MG: 2 INJECTION INTRAMUSCULAR; INTRAVENOUS at 00:05

## 2023-08-08 RX ADMIN — Medication 1 APPLICATION: at 08:30

## 2023-08-08 RX ADMIN — SODIUM CHLORIDE 75 ML/HR: 0.9 INJECTION, SOLUTION INTRAVENOUS at 09:53

## 2023-08-08 RX ADMIN — ATORVASTATIN CALCIUM 20 MG: 20 TABLET, FILM COATED ORAL at 17:11

## 2023-08-08 RX ADMIN — OXYCODONE HYDROCHLORIDE 2.5 MG: 5 TABLET ORAL at 21:43

## 2023-08-08 RX ADMIN — DULOXETINE HYDROCHLORIDE 60 MG: 60 CAPSULE, DELAYED RELEASE ORAL at 08:11

## 2023-08-08 RX ADMIN — CLINDAMYCIN IN 5 PERCENT DEXTROSE 900 MG: 18 INJECTION, SOLUTION INTRAVENOUS at 00:00

## 2023-08-08 RX ADMIN — MICONAZOLE NITRATE: 20 CREAM TOPICAL at 08:30

## 2023-08-08 RX ADMIN — PRAMIPEXOLE DIHYDROCHLORIDE 0.12 MG: 0.25 TABLET ORAL at 21:43

## 2023-08-08 RX ADMIN — HEPARIN SODIUM 15 UNITS/KG/HR: 10000 INJECTION, SOLUTION INTRAVENOUS at 04:21

## 2023-08-08 RX ADMIN — ALBUMIN (HUMAN) 25 G: 0.25 INJECTION, SOLUTION INTRAVENOUS at 14:04

## 2023-08-08 RX ADMIN — OXYCODONE HYDROCHLORIDE 2.5 MG: 5 TABLET ORAL at 15:55

## 2023-08-08 RX ADMIN — OXYCODONE HYDROCHLORIDE 2.5 MG: 5 TABLET ORAL at 00:02

## 2023-08-08 NOTE — PLAN OF CARE
Problem: MOBILITY - ADULT  Goal: Maintain or return to baseline ADL function  Description: INTERVENTIONS:  -  Assess patient's ability to carry out ADLs; assess patient's baseline for ADL function and identify physical deficits which impact ability to perform ADLs (bathing, care of mouth/teeth, toileting, grooming, dressing, etc.)  - Assess/evaluate cause of self-care deficits   - Assess range of motion  - Assess patient's mobility; develop plan if impaired  - Assess patient's need for assistive devices and provide as appropriate  - Encourage maximum independence but intervene and supervise when necessary  - Involve family in performance of ADLs  - Assess for home care needs following discharge   - Consider OT consult to assist with ADL evaluation and planning for discharge  - Provide patient education as appropriate  Outcome: Progressing  Goal: Maintains/Returns to pre admission functional level  Description: INTERVENTIONS:  - Perform BMAT or MOVE assessment daily.   - Set and communicate daily mobility goal to care team and patient/family/caregiver. - Collaborate with rehabilitation services on mobility goals if consulted  - Perform Range of Motion 3 times a day. - Reposition patient every 3 hours.   - Dangle patient 3 times a day  - Stand patient 3 times a day  - Ambulate patient 3 times a day  - Out of bed to chair 3 times a day   - Out of bed for meals 3 times a day  - Out of bed for toileting  - Record patient progress and toleration of activity level   Outcome: Progressing     Problem: Prexisting or High Potential for Compromised Skin Integrity  Goal: Skin integrity is maintained or improved  Description: INTERVENTIONS:  - Identify patients at risk for skin breakdown  - Assess and monitor skin integrity  - Assess and monitor nutrition and hydration status  - Monitor labs   - Assess for incontinence   - Turn and reposition patient  - Assist with mobility/ambulation  - Relieve pressure over bony prominences  - Avoid friction and shearing  - Provide appropriate hygiene as needed including keeping skin clean and dry  - Evaluate need for skin moisturizer/barrier cream  - Collaborate with interdisciplinary team   - Patient/family teaching  - Consider wound care consult   Outcome: Progressing

## 2023-08-08 NOTE — PROGRESS NOTES
Assessment/ Plan:    Acute LLE ischemia-- recurrent episode  · H/o LLE thromboembolism (Cardiac embolus after mechanical fall) s/p L femoral embolectomy 10/6/2019 (Doctor) c/b incisional hematoma  --likely 2* thromboembolism in setting of intermittent hold of anticoagulation for Afib 2* recent epistaxis requiring cauterization on Chronic PAD  --CTA (p)-- Infrarenal abd aorta atheroma. Arterial occlusion from mid L EIA through CFA. Multifocal areas of occlusion through SFA w/ intermittent recon of flow via collaterals. 2V r/o.  --8/7/23 s/p LLE iliac, fem embolectomy, LLE angiogram, L fem arteriotomy with bovine patch angioplasty  --PT/OT c/s, OOB to ambulate  --d/c bonnie d/c A-line    --Diet: reg house  --IVF: NS @ 74  --transition to oral AC, xarelto price check  --c/w hep gtt  --add ASA 81mg daily  --cont statin  --C/w home meds  --C/w pain control as needed    Afib  --Eliquis held, Heparin gtt  --Metoprolol  HLD  --Lipitor  HTN  --Metoprolol, HCTZ  --Benicar held for DONALD risk reduction  Recent Epistaxis  --Ocean Nasal/ Nasal gel  --iron supplement, B12  Tremor  --Mirapex  Depression  --Cymbalta  Insomnia  --Trazadone  Urinary incontinence  GERD  --PPI  Bioprosthetic AVR  PPM    ____________________________________________________________________  SUBJECTIVE:    Pt seen in bed this morning. Rexine Mutters. VSS stable. 8/7/23 s/p LLE iliac, fem embolectomy, LLE angiogram, L fem arteriotomy with bovine patch angioplasty    HPI:    Chief Complaint:  Cold leg     Alcides Kelley is a 79 y.o. female, NH resident, with history B/L TKR, hypertension, hyperlipidemia, GERD, bioprosthetic aortic valve replacement, PPM, tremor, insomnia, depression, and GERD who is known to the vascular center for history of atrial fibrillation and cardiac thrombus who suffered LLE thromboembolism in the setting of a fall s/p left lower extremity thrombectomy 10/6/2019 by Dr. Yanet Elliott Doctor c/b left groin hematoma.  She was recommended lifelong anticoagulation. She is also been seen in the past for PAD. Noris Santana was transferred to [facility] emergency department from [address] where she presented due to left lower extremity pain and numbness. There was concern for cold limb/thromboembolic event which prompted transfer for escalated vascular care. Prior to transfer, arterial duplex was performed and showed occlusion of the distal left EIA, CFA, SFA, popliteal, PT, AT. She was initiated on anticoagulation in the form of heparin drip    Upon arrival in the [facility] emergency department, the patient was seen and evaluated. She states symptoms initially started with some pain in the upper leg/thigh on Saturday (3days ago). This progressed to numbness in the foot. Subjectively, she states that she cannot feel the foot although for me, she is able to sense light touch. She is also able to wiggle the toes and flex at the ankle although not to the degree of her right lower extremity. Subjectively, her symptoms do seem mildly improved after receiving pain medication. She was started on heparin drip prior to transfer. Patient is minimally ambulatory although is able to bear weight to walk short distances with the assistance of a walker and for toileting. Primary mobility is with use of a wheelchair. She affirms having intermittently been on blood thinner which has been held recently due to intermittent episodes of epistaxis requiring ENT cauterization. She thinks her blood thinner may have been started a few days ago and was a different one from what she was taking. Record review reveals Eliquis 2.5 mg twice daily starting 8/6/2023.       Review of Systems:  General: positive for  - as noted in HPI  Cardiovascular: no chest pain or dyspnea on exertion  Respiratory: no cough, shortness of breath, or wheezing  Gastrointestinal: no abdominal pain, change in bowel habits, or black or bloody stools  Genitourinary: positive for - urinary incontinence  Musculoskeletal: positive for - as noted in HPI  Neurological: no TIA or stroke symptoms.   Positive for memory loss  Hematological and Lymphatic: s/p embolectomy  Dermatological: positive for dry skin  Psychological: positive for - depression  Ophthalmic: negative  ENT: positive for - recent nosebleeds requiring ENT cauterization    Past Medical History:  Past Medical History:   Diagnosis Date   • Abnormal blood chemistry 05/10/2013   • Accelerated essential hypertension 05/08/2012   • Atrial fibrillation (HCC)    • Depression    • Essential tremor    • GERD (gastroesophageal reflux disease)    • HLD (hyperlipidemia)    • Hypertension    • Melanoma ([address])    • Obesity    • Polyneuropathy    • Skin cancer    • Thromboembolism ([address]) 10/2019    LLE   • Tinea unguium    • Urinary incontinence        Past Surgical History:  Past Surgical History:   Procedure Laterality Date   • AORTIC VALVE REPLACEMENT  12/2012   • BREAST BIOPSY Right     years ago -Benign   • CARDIAC PACEMAKER PLACEMENT      dual chamber last assessed 10/14/13   • CHOLECYSTECTOMY     • IR LOWER EXTREMITY / INTERVENTION  10/6/2019   • JOINT REPLACEMENT      B/L knee replacement   • THROMBECTOMY W/ EMBOLECTOMY N/A 10/6/2019    Procedure: EMBOLECTOMY/THROMBECTOMY Left LOWER EXTREMITY; angiogram;  Surgeon: Nicki Lyon MD;  [facility];  Service: Vascular       Social History:  Social History     Substance and Sexual Activity   Alcohol Use Not Currently   • Alcohol/week: 0.0 standard drinks of alcohol     Social History     Substance and Sexual Activity   Drug Use No     Social History     Tobacco Use   Smoking Status Never   Smokeless Tobacco Never       Family History:  Family History   Problem Relation Age of Onset   • Hypertension Mother    • Breast cancer Mother    • No Known Problems Father    • Breast cancer Daughter 46   • No Known Problems Maternal Grandmother    • No Known Problems Maternal Grandfather phosphate-biphosphate (FLEET) 7-19 g 118 mL enema Insert 1 enema into the rectum daily as needed (no responce to dulcolax)   Yes Historical Provider, MD   atorvastatin (LIPITOR) 20 mg tablet Take 1 tablet (20 mg total) by mouth daily after dinner 10/11/19   Lucita Glez MD   bisacodyl (DULCOLAX) 10 mg suppository Insert 10 mg into the rectum daily as needed for constipation    Historical Provider, MD   cyanocobalamin (VITAMIN B-12) 100 mcg tablet Take 100 mcg by mouth daily 11/13/20   Historical Provider, MD   ferrous sulfate 325 (65 Fe) mg tablet Take 325 mg by mouth every other day    Historical Provider, MD   hydrochlorothiazide (HYDRODIURIL) 25 mg tablet Take 25 mg by mouth daily    Historical Provider, MD   magnesium hydroxide (MILK OF MAGNESIA) 400 mg/5 mL oral suspension Take 30 mL by mouth daily as needed for constipation    Historical Provider, MD   melatonin 3 mg Take 3 mg by mouth daily at bedtime as needed    Historical Provider, MD   olmesartan (BENICAR) 40 mg tablet Take 20 mg by mouth daily  11/29/20   Historical Provider, MD   omeprazole (PriLOSEC) 20 mg delayed release capsule TAKE ONE CAPSULE BY MOUTH EVERY DAY 5/29/19   Stephanie Sampson MD   traZODone (DESYREL) 50 mg tablet Take 75 mg by mouth daily at bedtime    Historical Provider, MD   triamcinolone (KENALOG) 0.1 % cream Apply topically 2 (two) times a day 10/11/19   Lucita Glez MD   acetaminophen (TYLENOL) 500 mg tablet Take 1 tablet (500 mg total) by mouth every 6 (six) hours as needed for mild pain 11/7/18 8/7/23  Stephanie Sampson MD   aspirin (ECOTRIN LOW STRENGTH) 81 mg EC tablet Take 1 tablet (81 mg total) by mouth daily 10/11/19 8/7/23  Lucita Glez MD   FLUoxetine (PROzac) 20 mg capsule Take 20 mg by mouth daily  11/29/20 8/7/23  Historical Provider, MD   gabapentin (NEURONTIN) 100 mg capsule Take 100 mg by mouth every morning  8/7/23  Historical Provider, MD   gabapentin (NEURONTIN) 300 mg capsule Take 300 mg by mouth daily at bedtime  8/7/23  Historical Provider, MD   metoprolol succinate (TOPROL-XL) 50 mg 24 hr tablet One tablet, 50 mg, every 12 hours  Patient taking differently: Take 100 mg by mouth daily One tablet, 50 mg, every 12 hours 11/12/19 8/7/23  SHIRA Terrell   rivaroxaban (XARELTO) 20 mg tablet Take one (1) tablet PO with dinner after completion of starter pack 10/11/19 8/7/23  Kimberly Winter MD   senna (SENOKOT) 8.6 mg Take 1 tablet (8.6 mg total) by mouth daily at bedtime 10/11/19 8/7/23  Kimberly Winter MD         Vitals:  BP (!) 178/57   Pulse 70   Temp (!) 97.4 °F (36.3 °C) (Oral)   Resp 17   Wt 100 kg (221 lb 1.9 oz)   SpO2 98%   BMI 40.44 kg/m²   Body mass index is 40.44 kg/m². Weight (last 2 days)     Date/Time Weight    08/08/23 0536 100 (221.12)          I/Os:    Intake/Output Summary (Last 24 hours) at 8/8/2023 0705  Last data filed at 8/8/2023 2928  Gross per 24 hour   Intake 2560.02 ml   Output 1710 ml   Net 850.02 ml       Physical Exam:    General appearance: alert, appears stated age and cooperative. Elderly, obese, pleasant female. Head: Normocephalic, without obvious abnormality, atraumatic  Eyes: conjunctivae/corneas clear. PERRL, EOM's intact. Throat: lips, mucosa, and tongue normal; teeth and gums normal  Neck: no adenopathy, no carotid bruit, no JVD and supple, symmetrical, trachea midline  Lungs: clear to auscultation bilaterally, ant-lat  Chest wall: no tenderness. Stenotomy scar. L chest cardiac device  Heart: irregularly irregular rhythm and S1, S2 normal (-) m/r/g  Abdomen: soft, non-tender; bowel sounds normal; no masses,  no organomegaly. +Pannus  Genitalia: deferred-- adult protective garment/ diaper  Rectal: deferred  Extremities:   --RLE non-threatened  --LLE warm to touch ankle- foot w/ improvement of motor and light touch sensation. Mild rubor.  L fem incision with dressing c,d,i. (-) Tissue loss   Skin: Skin color, texture, turgor normal. No rashes or lesions  Neurologic: Grossly normal    Pulse exam:  Radial: Right: 2+               Left: 2+  Femoral: Right: 2+                   Left: deferred 2/2 dressings  Popliteal:                   Left: doppler signal  DP: Right: non-palpable          Left: absent  PT: Right: 2+         Left: dopperable   Peroneal: Left: absent    Lab Results and Cultures:   COVID:   Last COVID19 Screening Values     None         CBC with diff:   Lab Results   Component Value Date    WBC 15.91 (H) 2023    HGB 10.6 (L) 2023    HCT 33.5 (L) 2023    MCV 98 2023     2023    RBC 3.43 (L) 2023    MCH 30.9 2023    MCHC 31.6 2023    RDW 13.5 2023    MPV 10.4 2023    NRBC 0 2023      BMP/CMP:  Lab Results   Component Value Date    K 3.9 2023     2023    CO2 29 2023    BUN 27 (H) 2023    CREATININE 0.94 2023    GLUCOSE 203 (H) 10/06/2019    CALCIUM 9.5 2023    AST 17 2023    ALT 11 2023    ALKPHOS 77 2023    EGFR 55 2023   ,     Coags:   Lab Results   Component Value Date    PTT >210 (HH) 2023    INR 1.07 2023   ,   Results from last 7 days   Lab Units 23  0038 23  1120 23  1002   PTT seconds >210* 27 27   INR   --   --  1.07        Lipid Panel: No results found for: "CHOL"  Lab Results   Component Value Date    HDL 32 (L) 10/09/2019     Lab Results   Component Value Date    LDLCALC 104 (H) 10/09/2019     Lab Results   Component Value Date    TRIG 132 10/09/2019       HgbA1c:   Lab Results   Component Value Date    HGBA1C 7.8 (H) 10/07/2019       Imagin/7 LEAD L- occlusion of distal EIA, CFA, SFA, popl, PT, AT.      CTA -  Moderate eccentric atheroma involving the infrarenal abdominal aorta. HG stenosis versus focal occlusion of mid SMA, age indeterminant. -RLE: Moderate atherosclerotic narrowing through the right SFA  -LLE: Arterial occlusion extending from the mid L EIA through the CFA. Multifocal areas of occlusion through the SFA with intermittent recon of flow from collaterals.  2V r/o    EKG, Pathology, and Other Studies:    VTE Prophylaxis: Heparin gtt     Code Status: Level 1, Full code  Advance Directive and Living Will: Yes    Power of :    POLST:      Kari Dong DPM  8/8/2023

## 2023-08-08 NOTE — PHYSICAL THERAPY NOTE
Physical Therapy Evaluation    Patient's Name: Jerald Anders    Admitting Diagnosis  Leg pain [M79.606]  Acute lower limb ischemia [I99.8]    Problem List  Patient Active Problem List   Diagnosis    History of permanent cardiac pacemaker placement    Mixed hyperlipidemia    Generalized anxiety disorder    Depression    Benign familial tremor    Neuropathy    Primary insomnia    Essential hypertension    Coronary artery disease involving native coronary artery of native heart without angina pectoris    Sciatic nerve pain, right    Obesity (BMI 35.0-39.9 without comorbidity)    Globus sensation    Nocturnal enuresis    Acute lower limb ischemia    Peripheral artery embolism or thrombosis (HCC)    Persistent atrial fibrillation ([address])    Onychomycosis    Left femoral embolectomy    Urinary incontinence    Peripheral arterial disease ([address])       Past Medical History  Past Medical History:   Diagnosis Date    Abnormal blood chemistry 05/10/2013    Accelerated essential hypertension 05/08/2012    Atrial fibrillation (HCC)     Depression     Essential tremor     GERD (gastroesophageal reflux disease)     HLD (hyperlipidemia)     Hypertension     Melanoma ([address])     Obesity     Polyneuropathy     Skin cancer     Thromboembolism ([address]) 10/2019    LLE    Tinea unguium     Urinary incontinence        Past Surgical History  Past Surgical History:   Procedure Laterality Date    AORTIC VALVE REPLACEMENT  12/2012    BREAST BIOPSY Right     years ago -Benign    CARDIAC PACEMAKER PLACEMENT      dual chamber last assessed 10/14/13    CHOLECYSTECTOMY      IR LOWER EXTREMITY / INTERVENTION  10/6/2019    JOINT REPLACEMENT      B/L knee replacement    AZ TEAEC W/WO PATCH GRAFT COMMON FEMORAL Left 8/7/2023    Procedure: ENDARTERECTOMY ARTERIAL FEMORAL WITH BOVINE PATCH ANGIOPLASTY;  Surgeon: Cleveland Lyon MD;  [facility];  Service: Vascular    THROMBECTOMY W/ EMBOLECTOMY N/A 10/6/2019    Procedure: EMBOLECTOMY/THROMBECTOMY Left LOWER EXTREMITY; angiogram;  Surgeon: Lotus Jesus MD;  [facility];  Service: Vascular    THROMBECTOMY W/ EMBOLECTOMY Left 8/7/2023    Procedure: Left femoral exposure Left iliac, femoral, embolectomy wtih #3 and #4 Dieudonne catheters Left lower extremity angiogram Left femoral arteriotomy with bovine patch angioplasty;  Surgeon: Didier Lyon MD;  [facility];  Service: Vascular        08/08/23 1115   PT Last Visit   PT Visit Date 08/08/23   Note Type   Note type Evaluation   Pain Assessment   Pain Assessment Tool 0-10   Pain Score 6   Pain Location/Orientation Orientation: Left; Location: Groin   Hospital Pain Intervention(s) Repositioned   Restrictions/Precautions   Weight Bearing Precautions Per Order No   Other Precautions Cognitive; Chair Alarm; Bed Alarm;Multiple lines;Telemetry;Pain; Fall Risk;Hard of hearing   Home Living   Type of Home SNF  ([facility])   Home Layout One level   [address]   Prior Function   Level of [place] Modified independent with wheelchair  (I w/ stand pivot t/f to W/C)   Lives With Facility staff   Receives Help From Other (Comment)  (facility staff)   General   Family/Caregiver Present No   Cognition   Arousal/Participation Alert   Orientation Level Oriented to person;Oriented to place  ("June" initially for time, correct year, able to recall correct date several minutes after being told)   Comments overall pleasant + cooperative   Subjective   Subjective Pt agreeable to mobilize out to chair. RLE Assessment   RLE Assessment   (grossly 4-/5)   LLE Assessment   LLE Assessment   (grossly 3+/5)   Bed Mobility   Supine to Sit 3  Moderate assistance   Additional items Assist x 1; Increased time required;Verbal cues;LE management; Bedrails;HOB elevated  (extended time required)   Sit to Supine Unable to assess   Additional Comments Pt greeted in supine.  Used smooth  to get back to bed from drop-arm recliner due to syptomatic hypotension. Transfers   Sit to Stand 3  Moderate assistance   Additional items Assist x 2; Increased time required;Verbal cues   Stand to Sit 3  Moderate assistance   Additional items Assist x 2; Increased time required;Verbal cues   Additional Comments B HHA   Ambulation/Elevation   Gait pattern Excessively slow; Short stride; Improper Weight shift; Antalgic;Decreased foot clearance   Gait Assistance 3  Moderate assist   Additional items Assist x 2; Tactile cues; Verbal cues   Assistive Device Other (Comment)  (B HHA)   Distance 3' bed>chair   Balance   Static Sitting Fair   Dynamic Sitting Fair -   Static Standing Poor +   Dynamic Standing Poor   Ambulatory Poor  (B HHA)   Endurance Deficit   Endurance Deficit Yes   Endurance Deficit Description hypotension, dizziness (BP 90/57 upon first sitting in chair, increased dizziness + BP went down to 76/44 w/ increased time in chair; therefore alerted nsg assisted pt back to bed)   Activity Tolerance   Activity Tolerance Treatment limited secondary to medical complications (Comment)  (symptomatic hypotension)   Medical Staff Made Aware Italo Slider, Dr Sreekanth Rubio during rounds   Nurse Made Aware yes - cleared + updated, present at end of session   Assessment   Prognosis Fair   Problem List Decreased strength;Decreased endurance; Impaired balance;Decreased mobility; Impaired hearing;Obesity;Pain; Impaired sensation   Assessment Pt is 79 y.o. female seen for a high complexity PT evaluation s/p admit to [facility] on 8/7/2023. Pt presenting w/ acute LLE ischemia; 8/7/23 s/p LLE iliac, fem embolectomy, LLE angiogram, L fem arteriotomy with bovine patch angioplasty. Please see above for other active problem list / PMH. PT now consulted to assess functional mobility and needs for safe d/c planning. Prior to admission, pt was I for t/f from surface<>W/C + Christopher w/ W/C mobility; pt is a resident at [address] [place] [address].      Currently pt requires ModAx1 for bed skills; ModAx2 for functional transfers; ModAx2 for limited ambulation w/ B HHA. Pt presents functioning below baseline and w/ overall mobility deficits 2* to: decreased LE strength; generalized weakness/deconditioning; decreased endurance; impaired balance; gait deviations; dizziness; fatigue; multiple lines. These impairments place pt at risk for falls. Pt will continue to benefit from skilled PT interventions to address stated impairments; to maximize functional potential; for ongoing pt/ family training; and DME needs. PT is currently recommending return to LTC w/ PT services + increased assistance. Goals   Patient Goals feel better   STG Expiration Date 08/22/23   Short Term Goal #1 In 14 days pt will complete: 1) Bed mobility skills with S to facilitate safe return to previous living environment. 2) Functional transfers with S to facilitate safe return to previous living environment. 3) Pt will be able to self-propel W/C 150' w/ Christopher to increase functional mobility. 4) Improve balance scores by 1 grade to decrease fall risk. 5) Improve LE strength grades by 1 to increase independence w/ all functional mobility, transfers and gait. 6) PT for ongoing pt and family education; DME needs and D/C planning to promote highest level of function in least restrictive environment. PT Treatment Day 0   Plan   Treatment/Interventions Functional transfer training;LE strengthening/ROM; Therapeutic exercise; Endurance training;Patient/family training;Equipment eval/education; Bed mobility; Compensatory technique education;Spoke to nursing;OT;Spoke to MD;Spoke to case management  (balance training, W/C mobility training)   PT Frequency 2-3x/wk   Recommendation   PT Discharge Recommendation Return to facility with rehabilitation services  (+ increased assistance from staff)   Equipment Recommended   (pt already has W/C)   AM-PAC Basic Mobility Inpatient   Turning in Flat Bed Without Bedrails 3   Lying on Back to Sitting on Edge of Flat Bed Without Bedrails 2   Moving Bed to Chair 1   Standing Up From Chair Using Arms 1   Walk in Room 1   Climb 3-5 Stairs With Railing 1   Basic Mobility Inpatient Raw Score 9   Highest Level Of Mobility   -HLM Goal 3: Sit at edge of bed   -HLM Achieved 4: Move to chair/commode   End of Consult   Patient Position at End of Consult Supine; All needs within reach  (all lines in tact, nsg at bedside)     Genevieve Edmonds, PT, DPT

## 2023-08-08 NOTE — PLAN OF CARE
Problem: OCCUPATIONAL THERAPY ADULT  Goal: Performs self-care activities at highest level of function for planned discharge setting. See evaluation for individualized goals. Description: Treatment Interventions: ADL retraining, Functional transfer training, Endurance training, Patient/family training, Equipment evaluation/education, Compensatory technique education, Energy conservation, Activityengagement          See flowsheet documentation for full assessment, interventions and recommendations. Note: Limitation: Decreased ADL status, Decreased Safe judgement during ADL, Decreased endurance, Decreased self-care trans, Decreased high-level ADLs, Decreased cognition  Prognosis: Good  Assessment: Pt is a 79 y.o. female admitted 8/7/23 w LLE pain, coldness and numbness in the LLE found to have acute lower limb ischemia. Pt underwent LLE iliac, fem embolectomy, LLE angiogram, L fem arteriotomy w bovine patch angioplasty 8/7/23. Pod #1 w active OT eval and treat orders. PMH includes  has a past medical history of Abnormal blood chemistry, Accelerated essential hypertension, Atrial fibrillation (HCC), Depression, Essential tremor, GERD (gastroesophageal reflux disease), HLD (hyperlipidemia), Hypertension, Melanoma ([address] [facility]), Obesity, Polyneuropathy, Skin cancer, Thromboembolism ([address] [facility]), Tinea unguium, and Urinary incontinence. Pt lives at [place], where she received A for bathing, was I for dressing, completed transfers to and from [facility] w/ I, was able to self propel WC. Reports her facility manages meal prep/laundary/IADL Tasks, she was not driving. Currently, pt is Min Ax1 for UB ADL, Max Ax1 for LB ADL, and completed transfers/FM w Mod Ax2. Pt is limited at this time 2* decreased endurance/activtiy tolerance, decreased cognition, decreased ADL/High-level ADL status, decreased self-care trans, decreased safety awareness, limited home support and is a fall risk.  This impacts pt's ability to complete UB and LB dressing and bathing, toileting, transfers, functional mobility, community mobility, home and health maintenance, and safe engagement in typical daily routine. The patient's raw score on the -PAC Daily Activity inpatient short form is 16, standardized score is 35.96, less than 39.4. Patients at this level are likely to benefit from discharge to post-acute rehabilitation services. Please refer to the recommendation of the Occupational Therapist for safe discharge planning. From OT standpoint, pt should D/C to STR when medically stable. Pt will benefit from continued acute OT services 2-3 x/wk for 10-14 days to meet goals.      OT Discharge Recommendation: Post acute rehabilitation services

## 2023-08-08 NOTE — DISCHARGE INSTR - OTHER ORDERS
Incisional care: Shower daily. Wash incision daily w/ soap and water. Pat dry thoroughly. Place clean, dry gauze to cover groin incision to keep area clean and dry and to prevent skin- skin contact/ soilage of wound.

## 2023-08-08 NOTE — PROGRESS NOTES
[address] [place] [address]  Progress Note  Name: Demarcus Anton  MRN: [id number]  Unit/Bed#: [facility] 525-01 I Date of Admission: 8/7/2023   Date of Service: 8/8/2023 I Hospital Day: 1  Addendum: Please note patient with low blood pressure transiently. Improved with albumin transfusion. Patient also had transient chest discomfort with low blood pressure. Troponin appears to be stable. No further symptoms of dizziness or chest discomfort currently. Assessment/Plan   * Acute lower limb ischemia  Assessment & Plan  · Presented to [facility] ED today from her nursing home with a 3 day history of progressively worsening pain of her left lower extremity with numbness later and color change to purple  · H/o Afib - anticoagulation had been held intermittently due to epistaxis. 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 There are multifocal areas of occlusion through the superficial femoral artery with intermittent reconstitution of flow from collaterals. Two-vessel tibial vessel runoff persists. · Begun on a heparin drip and  transferred to [facility] for embolectomy. · Management per vascular. Patient with increased pain from surgical site. Reached out to vascular surgery. They will reassess left lower extremity surgical site.     Persistent atrial fibrillation (HCC)  Assessment & Plan  · Anticoagulation held intermittently for epistaxis  · Had been on Xarelto  · Per record latest anticoagulation was Eliquis 2.5 mg twice daily  · Currently on heparin drip for acute left lower limb ischemia  · Continue PTA med Toprol- mg daily    Coronary artery disease involving native coronary artery of native heart without angina pectoris  Assessment & Plan  · Continue statin, BB  · Confirm with facility in a.m. if she was on ASA     Essential hypertension  Assessment & Plan  · Continue PTA meds Toprol- mg daily, HCTZ 25 mg daily  · BP increased postprocedure  · Hydralazine prn   · Monitor BP    Depression  Assessment & Plan  · Continue PTA meds Duloxetine 60 mg daily, Trazodone 75 mg at bedtime    Mixed hyperlipidemia  Assessment & Plan  · Continue PTA med Atorvastatin 20 mg daily    History of permanent cardiac pacemaker placement  Assessment & Plan  · Noted             VTE Pharmacologic Prophylaxis:   Pharmacologic: Apixaban (Eliquis)  Mechanical VTE Prophylaxis in Place: No      Time Spent for Care: 54. More than 50% of total time spent on counseling and coordination of care as described above. Current Length of Stay: 1 day(s)    Current Patient Status: Inpatient       Code Status: Level 1 - Full Code      Subjective:   nad    Objective:     Vitals:   Temp (24hrs), Av.7 °F (36.5 °C), Min:97.4 °F (36.3 °C), Max:98.5 °F (36.9 °C)    Temp:  [97.4 °F (36.3 °C)-98.5 °F (36.9 °C)] 97.9 °F (36.6 °C)  HR:  [] 86  Resp:  [12-30] 20  BP: ()/() 109/46  SpO2:  [90 %-99 %] 91 %  Body mass index is 40.44 kg/m². Input and Output Summary (last 24 hours): Intake/Output Summary (Last 24 hours) at 2023 1647  Last data filed at 2023 1443  Gross per 24 hour   Intake 3826.77 ml   Output 1710 ml   Net 2116.77 ml       Physical Exam:     Physical Exam  Constitutional:       Appearance: Normal appearance. Cardiovascular:      Rate and Rhythm: Normal rate and regular rhythm. Pulmonary:      Effort: Pulmonary effort is normal.      Breath sounds: Normal breath sounds. Abdominal:      General: Abdomen is flat. Palpations: Abdomen is soft. Musculoskeletal:         General: No swelling. Normal range of motion. Skin:     General: Skin is warm and dry. Neurological:      General: No focal deficit present. Mental Status: She is alert and oriented to person, place, and time.          Additional Data:     Labs:    Results from last 7 days   Lab Units 08/08/23  1403 08/08/23  0520 08/07/23  1002   WBC Thousand/uL  --  15.91* 8.62   HEMOGLOBIN g/dL 9.0* 10.6* 13.5   HEMATOCRIT % 29.4* 33.5* 42.2   PLATELETS Thousands/uL  --  281 270   NEUTROS PCT %  --   --  65   LYMPHS PCT %  --   --  22   MONOS PCT %  --   --  7   EOS PCT %  --   --  4     Results from last 7 days   Lab Units 08/08/23  0520   POTASSIUM mmol/L 3.8   CHLORIDE mmol/L 109*   CO2 mmol/L 23   BUN mg/dL 24   CREATININE mg/dL 0.90   CALCIUM mg/dL 8.8   ALK PHOS U/L 76   ALT U/L 21   AST U/L 18     Results from last 7 days   Lab Units 08/07/23  1002   INR  1.07       * I Have Reviewed All Lab Data Listed Above. * Additional Pertinent Lab Tests Reviewed:  All Labs Within Last 24 Hours Reviewed      Recent Cultures (last 7 days):           Last 24 Hours Medication List:   Current Facility-Administered Medications   Medication Dose Route Frequency Provider Last Rate   • acetaminophen  650 mg Oral Q6H PRN Neel Rodriguez PA-C     • apixaban  10 mg Oral BID Neel Rodriguez PA-C     • [START ON 8/15/2023] apixaban  5 mg Oral BID Neel Rodriguez PA-C     • atorvastatin  20 mg Oral After Gilmar Diaz PA-C     • bisacodyl  10 mg Rectal Daily PRN Neel Rodriguez PA-C     • cyanocobalamin  100 mcg Oral Daily Neel Rodriguez PA-C     • DULoxetine  60 mg Oral Daily Neel Rodriguez PA-C     • ferrous sulfate  325 mg Oral Every Other Day Neel Rodriguez PA-C     • hydrochlorothiazide  25 mg Oral Daily Neel Rodriguez PA-C     • magnesium hydroxide  30 mL Oral Daily PRN Neel Rodriguez PA-C     • metoprolol succinate  100 mg Oral Daily Neel Rodriguez PA-C     • miconazole   Topical BID Neel Rodriguez PA-C     • nystatin  1 Application Topical Daily Neel Rodriguez PA-C     • ondansetron  4 mg Intravenous Q6H PRN Neel Rodriguez PA-C     • oxyCODONE  2.5 mg Oral Q4H PRN Yasmine Postal, PA-C     • pantoprazole  40 mg Oral Early Morning Yasmine Postal, PA-C     • polyethylene glycol  17 g Oral Daily Yasmine Postal, PA-C     • pramipexole  0.125 mg Oral HS Yasmine Postal, PA-C     • senna-docusate sodium  1 tablet Oral HS Yasmine Postal, PA-C     • sodium chloride  1 Application Nasal BID Yasmine Postal, PA-C     • sodium chloride  1 spray Each Nare HS Yasmine Postal, PA-C     • traZODone  75 mg Oral HS Yasmine Postal, PA-C     • triamcinolone   Topical BID Yasmine Postal, PA-C          Today, Patient Was Seen By: Nuno Jeffries DO    ** Please Note: Dictation voice to text software may have been used in the creation of this document.  **

## 2023-08-08 NOTE — OCCUPATIONAL THERAPY NOTE
Occupational Therapy Evaluation     Patient Name: Júnior HOFFMANNOER'S Date: 8/8/2023  Problem List  Principal Problem:    Acute lower limb ischemia  Active Problems:    History of permanent cardiac pacemaker placement    Mixed hyperlipidemia    Depression    Essential hypertension    Coronary artery disease involving native coronary artery of native heart without angina pectoris    Persistent atrial fibrillation [facility])    Past Medical History  Past Medical History:   Diagnosis Date    Abnormal blood chemistry 05/10/2013    Accelerated essential hypertension 05/08/2012    Atrial fibrillation (HCC)     Depression     Essential tremor     GERD (gastroesophageal reflux disease)     HLD (hyperlipidemia)     Hypertension     Melanoma ([address])     Obesity     Polyneuropathy     Skin cancer     Thromboembolism ([address]) 10/2019    LLE    Tinea unguium     Urinary incontinence      Past Surgical History  Past Surgical History:   Procedure Laterality Date    AORTIC VALVE REPLACEMENT  12/2012    BREAST BIOPSY Right     years ago -Benign    CARDIAC PACEMAKER PLACEMENT      dual chamber last assessed 10/14/13    CHOLECYSTECTOMY      IR LOWER EXTREMITY / INTERVENTION  10/6/2019    JOINT REPLACEMENT      B/L knee replacement    ME TEAEC W/WO PATCH GRAFT COMMON FEMORAL Left 8/7/2023    Procedure: ENDARTERECTOMY ARTERIAL FEMORAL WITH BOVINE PATCH ANGIOPLASTY;  Surgeon: Aida Lyon MD;  [facility]: [facility];  Service: Vascular    THROMBECTOMY W/ EMBOLECTOMY N/A 10/6/2019    Procedure: EMBOLECTOMY/THROMBECTOMY Left LOWER EXTREMITY; angiogram;  Surgeon: Edita Lyon MD;  [facility];  Service: Vascular    THROMBECTOMY W/ EMBOLECTOMY Left 8/7/2023    Procedure: Left femoral exposure Left iliac, femoral, embolectomy wtih #3 and #4 Dieudonne catheters Left lower extremity angiogram Left femoral arteriotomy with bovine patch angioplasty;  Surgeon: Aida Lyon MD;  Location: [facility];  Service: Vascular         08/08/23 5 [facility] Visit   OT Visit Date 08/08/23   Note Type   Note type Evaluation   Pain Assessment   Pain Assessment Tool 0-10   Pain Score 6   Pain Location/Orientation Orientation: Left; Location: Groin   Patient's Stated Pain Goal No pain   Hospital Pain Intervention(s) Repositioned; Ambulation/increased activity; Emotional support   Restrictions/Precautions   Weight Bearing Precautions Per Order No   Other Precautions Cognitive; Chair Alarm; Bed Alarm;Multiple lines;Telemetry; Fall Risk;Hard of hearing   Home Living   Type of Home SNF  ([facility])   Home Layout One level   Bathroom Shower/Tub Walk-in shower   Bathroom Toilet Raised   Bathroom Equipment Grab bars in shower; Shower chair;Grab bars around toilet   [facility]   (is able to stand pivot to wc, then propel)   Prior Function   Level of [place] Modified independent with wheelchair  (I w stand pivot to and from [facility])   Lives With Facility staff   Receives Help From   (staff)   IADLs Family/Friend/Other provides transportation; Family/Friend/Other provides meals; Family/Friend/Other provides medication management   Falls in the last 6 months 0   Vocational Retired   Lifestyle   Autonomy pta, pt rq A for bathing, I for dressing, stand pivot to wc, was able to self propel wc. - . staff manages meals and meds   Reciprocal Relationships supportive staff who assist her as needed.    Service to Others retired   Intrinsic Gratification spending time w her friends   ADL   Where Assessed Edge of bed   Eating Assistance 6  Modified independent   Grooming Assistance 5  Supervision/Setup   UB Bathing Assistance 4  Minimal Assistance    [address] 2  Maximal Assistance   [address]  [address] [facility] 2  Maximal [address]  2  Maximal [facility] 2  Maximal Assistance   Bed Mobility   Supine to Sit 3  Moderate assistance   Additional items Assist x 1; Increased time required;Verbal cues;LE management   Additional Comments found and left in bed w all needs in reach and RN present. did transfer pt to bedside chair, however she reported inc dizziness when in chair and per RN request, pt returned to bed. see below for further detail. Transfers   Sit to Stand 3  Moderate assistance   Additional items Assist x 2; Increased time required;Verbal cues   Stand to Sit 3  Moderate assistance   Additional items Assist x 2; Increased time required;Verbal cues   Additional Comments c b/l HHA and knee blocking. once in chair, pt reported dizziness, BP 90/57. After rest, pt reported her dizziness had not resolved, BP taken again and was 76/44. RN present and requested we transfer pt back to bed, RN contacted MD. Pt was alert, repsonsive and overall in G spirits at the end of OT session. Functional Mobility   Functional Mobility 3  Moderate assistance   Additional Comments ax2, EOB>chair. Additional items Hand hold assistance   Balance   Static Sitting Fair   Dynamic Sitting Fair -   Static Standing Poor +   Dynamic Standing Poor   Ambulatory Poor   Activity Tolerance   Activity Tolerance Treatment limited secondary to medical complications (Comment)  (inc dizziness/low BP- see above.)   Medical Staff Made Aware DPSOFÍA Navas MD   Nurse Made Aware SOFÍA Dunn   RUE Assessment   RUE Assessment [facility]   LU Assessment   LUE Assessment WFL   Hand Function   Gross Motor Coordination Functional   Fine Motor Coordination Functional   Psychosocial   Psychosocial (WDL) WDL   Cognition   Overall Cognitive Status Impaired   Arousal/Participation Alert; Responsive; Cooperative   Attention Attends with cues to redirect   Orientation Level Oriented to person;Oriented to place;Oriented to situation;Disoriented to time   Memory Decreased recall of precautions   Following Commands Follows one step commands with increased time or repetition Comments pt pleasant and cooperative, overall fair safety awareness and insight to condition t/o session. Assessment   Limitation Decreased ADL status; Decreased Safe judgement during ADL;Decreased endurance;Decreased self-care trans;Decreased high-level ADLs; Decreased cognition   Prognosis Good   Assessment Pt is a 79 y.o. female admitted 8/7/23 w LLE pain, coldness and numbness in the LLE found to have acute lower limb ischemia. Pt underwent LLE iliac, fem embolectomy, LLE angiogram, L fem arteriotomy w bovine patch angioplasty 8/7/23. Pod #1 w active OT eval and treat orders. PMH includes  has a past medical history of Abnormal blood chemistry, Accelerated essential hypertension, Atrial fibrillation (HCC), Depression, Essential tremor, GERD (gastroesophageal reflux disease), HLD (hyperlipidemia), Hypertension, Melanoma ([address]), Obesity, Polyneuropathy, Skin cancer, Thromboembolism ([address]), Tinea unguium, and Urinary incontinence. Pt lives at [facility], where she received A for bathing, was I for dressing, completed transfers to and from [facility] w/ I, was able to self propel WC. Reports her facility manages meal prep/laundary/IADL Tasks, she was not driving. Currently, pt is Min Ax1 for UB ADL, Max Ax1 for LB ADL, and completed transfers/FM w Mod Ax2. Pt is limited at this time 2* decreased endurance/activtiy tolerance, decreased cognition, decreased ADL/High-level ADL status, decreased self-care trans, decreased safety awareness, limited home support and is a fall risk. This impacts pt's ability to complete UB and LB dressing and bathing, toileting, transfers, functional mobility, community mobility, home and health maintenance, and safe engagement in typical daily routine. The patient's raw score on the AM-PAC Daily Activity inpatient short form is 16, standardized score is 35.96, less than 39.4. Patients at this level are likely to benefit from discharge to post-acute rehabilitation services.  Please refer to the recommendation of the Occupational Therapist for safe discharge planning. From OT standpoint, pt should D/C to STR when medically stable. Pt will benefit from continued acute OT services 2-3 x/wk for 10-14 days to meet goals. Goals   Patient Goals get better   LTG Time Frame 10-14   Long Term Goal #1 see below   Plan   Treatment Interventions ADL retraining;Functional transfer training; Endurance training;Patient/family training;Equipment evaluation/education; Compensatory technique education; Energy conservation; Activityengagement   Goal Expiration Date 08/22/23   OT Frequency 2-3x/wk   Recommendation   OT Discharge Recommendation Post acute rehabilitation services   AM-PAC Daily Activity Inpatient   Lower Body Dressing 2   Bathing 2   Toileting 2   Upper Body Dressing 3   Grooming 3   Eating 4   Daily Activity Raw Score 16   Daily Activity Standardized Score (Calc for Raw Score >=11) 35.96   AM-PAC Applied Cognition Inpatient   Following a Speech/Presentation 4   Understanding Ordinary Conversation 4   Taking Medications 3   Remembering Where Things Are Placed or Put Away 2   Remembering List of 4-5 Errands 2   Taking Care of Complicated Tasks 2   Applied Cognition Raw Score 17   Applied Cognition Standardized Score 36.52   Modified Bradford Scale   Modified Bradford Scale 4   End of Consult   Education Provided Yes   Patient Position at End of Consult Supine; All needs within reach;Bed/Chair alarm activated   Nurse Communication Nurse aware of consult     Pt will complete functional mobility with Mod I using appropriate DME as needed. Pt will complete UB dressing and bathing with Mod I using appropriate DME as needed. Pt will complete LB dressing and bathing with Mod I using appropriate DME as needed. Pt will complete transfers with Mod I using appropriate DME as needed. Pt will complete toileting with Mod I using appropriate DME as needed.      Pt will utilize energy conservation techniques throughout functional activity/ADL s/p skilled education. Pt will demonstrate increased safety awareness during functional tasks/ADL's s/p skilled education. Pt will increase activity tolerance to 30 minutes in order to complete ADL's/ functional tasks, using appropriate DME as needed. Pt will engage in ongoing cog assessment w/ G participation to assist with safe d/c planning.          Willa Hess, ALICE, OTR/L

## 2023-08-08 NOTE — ASSESSMENT & PLAN NOTE
· Presented to [facility] ED today from her nursing home with a 3 day history of progressively worsening pain of her left lower extremity with numbness later and color change to purple  · H/o Afib - anticoagulation had been held intermittently due to epistaxis. 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 There are multifocal areas of occlusion through the superficial femoral artery with intermittent reconstitution of flow from collaterals. Two-vessel tibial vessel runoff persists.   · Begun on a heparin drip and transferred to B today and underwent embolectomy  · Management per vascular

## 2023-08-08 NOTE — DISCHARGE INSTR - AVS FIRST PAGE
DISCHARGE INSTRUCTIONS  LEG/BYPASS SURGERY    ACTIVITY:   Limit your physical activity to walking for the first week and then increase your activity as tolerated. If you become short of breath or tired, stop and rest.  You may require help with walking or feel more secure with something to lean on. Walking up steps and normal activities may be resumed as you feel ready. Most people tire easily for the first few weeks following leg surgery. This improves as conditioning returns. Avoid strenuous activity such as vigorous exercise. Avoid heavy lifting (do not lift more than 15 pounds) for the first four weeks after surgery. You should not drive a car for at least two weeks following discharge from the hospital and you are off all narcotic pain medication. You may ride in a car. DIET:  Resume your normal diet. Good nutrition is important for healing of your incision. If you are discharged on narcotics for pain control, continue taking your stool softeners until you are having regular bowel movements. INCISION:  You should shower daily. Wash incision daily with soap and water, but do not rub or scrub the incision; rinse thoroughly and pat dry. You may have stitches or staples to close your incision and it is okay for these to get wet. Do not bathe in a tub or swim for the first 4 week following surgery or if you have any open wounds. It is normal to have swelling or discoloration around the incision. If increasing redness or pain develops, call our office immediately. Numbness in the region of the incision may occur following the surgery. This normally improves over six to twelve months. You may have surgical glue over your incisions. There are stitches present under the skin which will absorb on their own. The glue is used to cover the access, assist in closure, and prevent contamination. This adhesive will darken and peel away on its own within one to two weeks. Do not pick at it.     If you have a groin wound/incision, place a clean dry piece of gauze to cover your groin incision to keep incision clean and dry and prevent your skin from sticking together. Change gauze daily. You may have staples or stitches at your incisions. These will be removed at your follow-up appointment or when they are ready to come out. If you have a dressing over your surgical site, remove this on the second day after surgery. If you have foot or leg wounds, please follow your podiatrist/wound care doctor's instructions for care. If any of your incisions are open and require dressing changes, you will be given instructions for your daily incision care. If you are not able to change the dressings, a visiting nurse will be arranged. DO NOT put any powders, creams, ointments, or lotions on your incision. LEG SWELLING: Most patients have noticeable leg swelling after leg surgery. This usually improves within a few weeks. If swelling is present, elevate the leg whenever possible. Avoid sitting with the leg hanging down for prolonged periods of time. Walking is beneficial.  An ACE bandage or support stocking may be helpful, but this should be discussed with your physician prior to use if you have a bypass. FOLLOW UP STUDIES:  Doppler ultrasound studies are very important for long-term management. Your surgeon will arrange this at your first postoperative visit. Repeat studies are then scheduled every three months for the first year and periodically after this. FOLLOW UP APPOINTMENTS:  Making and keeping follow up appointments and ultrasound tests are important to your recovery. If you have difficulty making it to or keeping your follow up appointments, call the office. If you have increased pain, fever >101.5, increased drainage, redness or a bad smell at your surgery site, new coldness/numbness of your arm or leg, please call us immediately and GO directly to the ER.     Sonam mohamud/ SHIRA Rajan: 8/21/2023 at 11:15am, Sue Lowery office      PLEASE CALL THE OFFICE IF Sonya  [phone]  [phone]  1 [facility]., [address], [place] [facility], [address] [facility]  [address] [facility], [facility], [facility], [address] [facility], [address]  [address] [facility], [address] [facility], [place], [address]  [address] [facility],Sixth Floor, 1st Floor, [place], [address]  [address], [address], [address], [facility], [address]  [address] , [place] [facility], 1200 [facility]  1501 [facility], [address] [facility], [address] [place] [address] [address] [place] [address] [address] [place] [address] [facility]  400 [facility], [address]

## 2023-08-08 NOTE — CONSULTS
[address] [facility]  Consult  Name: Graciela Sanz 79 y.o. female I MRN: [id number]  Unit/Bed#: [facility] 525-01 I Date of Admission: 8/7/2023   Date of Service: 8/7/2023 I Hospital Day: 0    Inpatient consult to Internal Medicine  Consult performed by: Kika Hurd MD  Consult ordered by: Neel Rodriguez PA-C          Assessment/Plan   * Acute lower limb ischemia  Assessment & Plan  · Presented to [address] [place] [address] ED today from her nursing home with a 3 day history of progressively worsening pain of her left lower extremity with numbness later and color change to purple  · H/o Afib - anticoagulation had been held intermittently due to epistaxis. 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 There are multifocal areas of occlusion through the superficial femoral artery with intermittent reconstitution of flow from collaterals. Two-vessel tibial vessel runoff persists.   · Begun on a heparin drip and transferred to [facility] today and underwent embolectomy  · Management per vascular    Persistent atrial fibrillation (HCC)  Assessment & Plan  · Anticoagulation held intermittently for epistaxis  · Had been on Xarelto  · Per record latest anticoagulation was Eliquis 2.5 mg twice daily  · Currently on heparin drip for acute left lower limb ischemia  · Continue PTA med Toprol- mg daily    Coronary artery disease involving native coronary artery of native heart without angina pectoris  Assessment & Plan  · Continue statin, BB  · Confirm with facility in a.m. if she was on ASA     Essential hypertension  Assessment & Plan  · Continue PTA meds Toprol- mg daily, HCTZ 25 mg daily  · BP increased postprocedure  · Hydralazine prn   · Monitor BP    Mixed hyperlipidemia  Assessment & Plan  · Continue PTA med Atorvastatin 20 mg daily    Depression  Assessment & Plan  · Continue PTA meds Duloxetine 60 mg daily, Trazodone 75 mg at bedtime    History of permanent cardiac pacemaker placement  Assessment & Plan  · Noted        VTE Prophylaxis: VTE Score: 4 Moderate Risk (Score 3-4) - Pharmacological DVT Prophylaxis Ordered: heparin drip. Total Time Spent on Date of Encounter in care of patient: 45 minutes This time was spent on one or more of the following: performing physical exam; counseling and coordination of care; obtaining or reviewing history; documenting in the medical record; reviewing/ordering tests, medications or procedures; communicating with other healthcare professionals and discussing with patient's family/caregivers. History of Present Illness:  Júnior Daniels is a 79 y.o. female who is originally admitted to the vascular service due to acute left lower limb ischemia. We are consulted for medical management. She presented to [facility] ED today from her nursing facility with a 3-day history of left lower extremity pain followed by numbness and color change to purple. She has a history of persistent atrial fibrillation. Her anticoagulation had been held intermittently due to epistaxis. She has a history of left lower extremity embolectomy in 2019. CTA showed arterial occlusion extending from the mid left external iliac artery through the common femoral artery. She was transferred to [facility] today and underwent embolectomy by vascular surgery. No chest pain or shortness of breath. No fever or chills. No pain in her left lower extremity at present. Review of Systems:  Review of Systems   Musculoskeletal:        Left lower extremity pain   Skin: Positive for color change. Neurological: Positive for numbness. All other systems reviewed and are negative.       Past Medical and Surgical History:   Past Medical History:   Diagnosis Date   • Abnormal blood chemistry 05/10/2013 • Accelerated essential hypertension 05/08/2012   • Atrial fibrillation (HCC)    • Depression    • Essential tremor    • GERD (gastroesophageal reflux disease)    • HLD (hyperlipidemia)    • Hypertension    • Melanoma ([address])    • Obesity    • Polyneuropathy    • Skin cancer    • Thromboembolism ([address]) 10/2019    LLE   • Tinea unguium    • Urinary incontinence        Past Surgical History:   Procedure Laterality Date   • AORTIC VALVE REPLACEMENT  12/2012   • BREAST BIOPSY Right     years ago -Benign   • CARDIAC PACEMAKER PLACEMENT      dual chamber last assessed 10/14/13   • CHOLECYSTECTOMY     • IR LOWER EXTREMITY / INTERVENTION  10/6/2019   • JOINT REPLACEMENT      B/L knee replacement   • THROMBECTOMY W/ EMBOLECTOMY N/A 10/6/2019    Procedure: EMBOLECTOMY/THROMBECTOMY Left LOWER EXTREMITY; angiogram;  Surgeon: Carolin Lyon MD;  Location: [facility];  Service: Vascular       Meds/Allergies:  all medications and allergies reviewed    Allergies:    Allergies   Allergen Reactions   • Penicillins Hives   • Sulfa Antibiotics Hives   • Medical Tape Rash       Social History:  Marital Status:   Substance Use History:   Social History     Substance and Sexual Activity   Alcohol Use Not Currently   • Alcohol/week: 0.0 standard drinks of alcohol     Social History     Tobacco Use   Smoking Status Never   Smokeless Tobacco Never     Social History     Substance and Sexual Activity   Drug Use No       Family History:  Family History   Problem Relation Age of Onset   • Hypertension Mother    • Breast cancer Mother    • No Known Problems Father    • Breast cancer Daughter 46   • No Known Problems Maternal Grandmother    • No Known Problems Maternal Grandfather    • No Known Problems Paternal Grandmother    • No Known Problems Paternal Grandfather    • No Known Problems Son    • No Known Problems Son    • No Known Problems Daughter    • No Known Problems Brother    • No Known Problems Maternal Aunt    • No Known Problems Maternal Aunt        Physical Exam:   Vitals:   Blood Pressure: 122/59 (08/07/23 2200)  Pulse: 74 (08/07/23 2200)  Temperature: 97.5 °F (36.4 °C) (08/07/23 1930)  Temp Source: Skin (08/07/23 1851)  Respirations: 17 (08/07/23 2200)  SpO2: 97 % (08/07/23 2200)    Physical Exam  Vitals reviewed. HENT:      Head: Normocephalic. Nose: Nose normal.      Mouth/Throat:      Mouth: Mucous membranes are moist.   Eyes:      Extraocular Movements: Extraocular movements intact. Cardiovascular:      Rate and Rhythm: Rhythm irregular. Pulmonary:      Effort: Pulmonary effort is normal. No respiratory distress. Breath sounds: Normal breath sounds. No wheezing. Abdominal:      General: Bowel sounds are normal. There is no distension. Palpations: Abdomen is soft. Tenderness: There is no abdominal tenderness. Musculoskeletal:         General: No swelling. Cervical back: Neck supple. Skin:     General: Skin is dry. Neurological:      General: No focal deficit present. Mental Status: She is alert and oriented to person, place, and time.    Psychiatric:         Mood and Affect: Mood normal.         Behavior: Behavior normal.          Additional Data:   Lab Results:    Results from last 7 days   Lab Units 08/07/23  1002   WBC Thousand/uL 8.62   HEMOGLOBIN g/dL 13.5   HEMATOCRIT % 42.2   PLATELETS Thousands/uL 270   NEUTROS PCT % 65   LYMPHS PCT % 22   MONOS PCT % 7   EOS PCT % 4     Results from last 7 days   Lab Units 08/07/23  1002   SODIUM mmol/L 137   POTASSIUM mmol/L 3.9   CHLORIDE mmol/L 100   CO2 mmol/L 29   BUN mg/dL 27*   CREATININE mg/dL 0.94   ANION GAP mmol/L 8   CALCIUM mg/dL 9.5   ALBUMIN g/dL 3.6   TOTAL BILIRUBIN mg/dL 0.73   ALK PHOS U/L 77   ALT U/L 11   AST U/L 17   GLUCOSE RANDOM mg/dL 157*     Results from last 7 days   Lab Units 08/07/23  1002   INR  1.07         Lab Results   Component Value Date/Time    HGBA1C 7.8 (H) 10/07/2019 04:17 AM     Results from last 7 days   Lab Units 08/07/23  1846   POC GLUCOSE mg/dl 161*     Results from last 7 days   Lab Units 08/07/23  1004   LACTIC ACID mmol/L 1.9       Imaging: Reviewed radiology reports from this admission including: abdominal/pelvic CT  CTA ABDOMEN W RUN OFF W WO CONTRAST   Final Result by Diomedes Ribeiro MD (08/07 1423)      Vascular:   Mesenteric:   1. High-grade stenosis versus focal occlusion of the mid superior mesenteric artery, age-indeterminate. No CT findings to suggest bowel ischemia within the visualized bowel. 2.  Moderate eccentric atheroma involving the infrarenal abdominal aorta. Left lower extremity:   Arterial occlusion extends from the mid left external iliac artery through the common femoral artery. There are multifocal areas of occlusion through the superficial femoral artery with intermittent reconstitution of flow from collaterals. Two-vessel    tibial vessel runoff persists. Right lower extremity:   Moderate atherosclerotic narrowing through the right superficial femoral artery. The study was marked in [facility] for immediate notification. Workstation performed: [id number]         IR lower extremity angiogram    (Results Pending)       ** Please Note: This note may have been constructed using a voice recognition system.  **

## 2023-08-08 NOTE — PROGRESS NOTES
Vascular Surgery  Progress Note    TT from RN, Mirian Fraser, regarding SBP still 80s, but asymptomatic. Concern that L groin more swollen, tender to touch & ecchymotic. S/W CATIE, Dr. Mireille Wheeler-- plan for Albumin, Rpt H/H    Pt seen. Conversive  C/o feeling congested and now squeezing in L arm and pain across her chest.    L groin w/ extensive pannus. Nicole-incisional area soft. Incision w/ interrupted nylon sutures. (redressed w/ mepilex Ag). C/f slight hematoma at inferior lateral aspect. Minimal ecchymosis.     --cont serial exams  --EKG  --Trop  --RN notified CATIE, Dr Reginald Dodd-Scaff.  8/8/2023

## 2023-08-08 NOTE — PLAN OF CARE
Problem: PHYSICAL THERAPY ADULT  Goal: Performs mobility at highest level of function for planned discharge setting. See evaluation for individualized goals. Description: Treatment/Interventions: Functional transfer training, LE strengthening/ROM, Therapeutic exercise, Endurance training, Patient/family training, Equipment eval/education, Bed mobility, Compensatory technique education, Spoke to nursing, OT, Spoke to MD, Spoke to case management (balance training, W/C mobility training)  Equipment Recommended:  (pt already has W/C)       See flowsheet documentation for full assessment, interventions and recommendations. Note: Prognosis: Fair  Problem List: Decreased strength, Decreased endurance, Impaired balance, Decreased mobility, Impaired hearing, Obesity, Pain, Impaired sensation  Assessment: Pt is 79 y.o. female seen for a high complexity PT evaluation s/p admit to [facility] on 8/7/2023. Pt presenting w/ acute LLE ischemia; 8/7/23 s/p LLE iliac, fem embolectomy, LLE angiogram, L fem arteriotomy with bovine patch angioplasty. Please see above for other active problem list / PMH. PT now consulted to assess functional mobility and needs for safe d/c planning. Prior to admission, pt was I for t/f from surface<>W/C + Christopher w/ W/C mobility; pt is a resident at [address] [place] [address]. Currently pt requires ModAx1 for bed skills; ModAx2 for functional transfers; ModAx2 for limited ambulation w/ B HHA. Pt presents functioning below baseline and w/ overall mobility deficits 2* to: decreased LE strength; generalized weakness/deconditioning; decreased endurance; impaired balance; gait deviations; dizziness; fatigue; multiple lines. These impairments place pt at risk for falls. Pt will continue to benefit from skilled PT interventions to address stated impairments; to maximize functional potential; for ongoing pt/ family training; and DME needs.  PT is currently recommending return to LTC w/ PT services + increased assistance. PT Discharge Recommendation: Return to facility with rehabilitation services (+ increased assistance from staff)    See flowsheet documentation for full assessment.

## 2023-08-08 NOTE — ASSESSMENT & PLAN NOTE
· Anticoagulation held intermittently for epistaxis  · Had been on Xarelto  · Per record latest anticoagulation was Eliquis 2.5 mg twice daily  · Currently on heparin drip for acute left lower limb ischemia  · Continue PTA med Toprol- mg daily

## 2023-08-08 NOTE — ASSESSMENT & PLAN NOTE
· Presented to [address] [place] [address] ED today from her nursing home with a 3 day history of progressively worsening pain of her left lower extremity with numbness later and color change to purple  · H/o Afib - anticoagulation had been held intermittently due to epistaxis. 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 There are multifocal areas of occlusion through the superficial femoral artery with intermittent reconstitution of flow from collaterals. Two-vessel tibial vessel runoff persists. · Begun on a heparin drip and  transferred to [facility] for embolectomy. · Management per vascular. Patient with increased pain from surgical site. Reached out to vascular surgery. They will reassess left lower extremity surgical site.

## 2023-08-08 NOTE — PROGRESS NOTES
Vascular Surgery  Progress Note      TT from RN, Timbo Mason-- Pt OOB to chair w/ PT, c/o dizziness. BP 90/52 then 76/44. Pt seen and examined. RN @ bedside. Rpt   Pt conversive and jovial. Notes dizziness at baseline. Denies pain. L groin gauze dressing w/ minimal soilage. (-) hematoma   LLE well perfused. Warm. Unchanged from this am on attending rounds. Hypotensive epidose, likely dehydration/ hypovolemia  --trial NS 250ml bolus over 2 hours (EF 65% by echo 2019, h/o bioAVR, maintained on HCTZ). --cont to monitor  --SLIM, Dr. Yash Potts, notified.         Federico Green  8/8/2023

## 2023-08-08 NOTE — ASSESSMENT & PLAN NOTE
· Continue PTA meds Toprol- mg daily, HCTZ 25 mg daily  · BP increased postprocedure  · Hydralazine prn   · Monitor BP

## 2023-08-08 NOTE — QUICK NOTE
Post Op Check Note   Rosina Sanodval 79 y.o. female MRN: [id number]  Unit/Bed#: [facility] 525-01 Encounter: [id number]    ASSESSMENT:  Shawna More is a 79 y.o. female who is status post left fem cutdown, left iliac and fem embolectomy, LLE angiogram, L fem arteriotomy with patch angioplasty. Plan: continue nicole, PRN anti-hypertensives. Subjective: Patient reports no complaints, states her head feels foggy. Physical Exam:  GEN: NAD  CV: RRR  Lung: Normal effort  Ab: Soft, NT/ND  Neuro: A+Ox3  Incisions: c/d/i  Pulses: LLE + PT and AT signals, RLE DP/PT 1+    Blood pressure 128/72, pulse 72, temperature 97.5 °F (36.4 °C), resp. rate 12, SpO2 92 %. ,There is no height or weight on file to calculate BMI. Intake/Output Summary (Last 24 hours) at 8/7/2023 2044  Last data filed at 8/7/2023 2000  Gross per 24 hour   Intake 1100 ml   Output 860 ml   Net 240 ml       Invasive Devices     Peripheral Intravenous Line  Duration           Peripheral IV 06/16/22 Right Forearm 416 days    Peripheral IV 08/07/23 Left Forearm <1 day          Arterial Line  Duration           Arterial Line 08/07/23 Right Radial <1 day          Drain  Duration           Urethral Catheter Double-lumen; Latex 16 Fr. <1 day                VTE Pharmacologic Prophylaxis: Reason for no pharmacologic prophylaxis heparin gtt

## 2023-08-09 ENCOUNTER — APPOINTMENT (INPATIENT)
Dept: RADIOLOGY | Facility: HOSPITAL | Age: 83
DRG: 271 | End: 2023-08-09
Payer: MEDICARE

## 2023-08-09 ENCOUNTER — DOCUMENTATION (OUTPATIENT)
Dept: VASCULAR SURGERY | Facility: CLINIC | Age: 83
End: 2023-08-09

## 2023-08-09 PROBLEM — I95.9 HYPOTENSION: Status: ACTIVE | Noted: 2023-08-09

## 2023-08-09 LAB
2HR DELTA HS TROPONIN: -2 NG/L
4HR DELTA HS TROPONIN: 15 NG/L
ABO GROUP BLD BPU: NORMAL
ABO GROUP BLD BPU: NORMAL
ABO GROUP BLD: NORMAL
ALBUMIN SERPL BCP-MCNC: 2.9 G/DL (ref 3.5–5)
ALP SERPL-CCNC: 58 U/L (ref 46–116)
ALT SERPL W P-5'-P-CCNC: 16 U/L (ref 12–78)
ANION GAP SERPL CALCULATED.3IONS-SCNC: 5 MMOL/L
ANION GAP SERPL CALCULATED.3IONS-SCNC: 7 MMOL/L
APTT PPP: 33 SECONDS (ref 23–37)
AST SERPL W P-5'-P-CCNC: 13 U/L (ref 5–45)
ATRIAL RATE: 72 BPM
ATRIAL RATE: 77 BPM
BASE EX.OXY STD BLDV CALC-SCNC: 45.3 % (ref 60–80)
BASE EXCESS BLDV CALC-SCNC: -0.6 MMOL/L
BASOPHILS # BLD AUTO: 0.04 THOUSANDS/ÂΜL (ref 0–0.1)
BASOPHILS NFR BLD AUTO: 0 % (ref 0–1)
BILIRUB SERPL-MCNC: 0.46 MG/DL (ref 0.2–1)
BLD GP AB SCN SERPL QL: NEGATIVE
BPU ID: NORMAL
BPU ID: NORMAL
BUN SERPL-MCNC: 32 MG/DL (ref 5–25)
BUN SERPL-MCNC: 33 MG/DL (ref 5–25)
CALCIUM ALBUM COR SERPL-MCNC: 9.8 MG/DL (ref 8.3–10.1)
CALCIUM SERPL-MCNC: 8.9 MG/DL (ref 8.3–10.1)
CALCIUM SERPL-MCNC: 9.3 MG/DL (ref 8.3–10.1)
CARDIAC TROPONIN I PNL SERPL HS: 30 NG/L
CARDIAC TROPONIN I PNL SERPL HS: 32 NG/L
CARDIAC TROPONIN I PNL SERPL HS: 47 NG/L
CHLORIDE SERPL-SCNC: 108 MMOL/L (ref 96–108)
CHLORIDE SERPL-SCNC: 108 MMOL/L (ref 96–108)
CO2 SERPL-SCNC: 24 MMOL/L (ref 21–32)
CO2 SERPL-SCNC: 25 MMOL/L (ref 21–32)
CREAT SERPL-MCNC: 1.25 MG/DL (ref 0.6–1.3)
CREAT SERPL-MCNC: 1.47 MG/DL (ref 0.6–1.3)
CROSSMATCH: NORMAL
CROSSMATCH: NORMAL
EOSINOPHIL # BLD AUTO: 0.04 THOUSAND/ÂΜL (ref 0–0.61)
EOSINOPHIL NFR BLD AUTO: 0 % (ref 0–6)
ERYTHROCYTE [DISTWIDTH] IN BLOOD BY AUTOMATED COUNT: 14.1 % (ref 11.6–15.1)
ERYTHROCYTE [DISTWIDTH] IN BLOOD BY AUTOMATED COUNT: 14.2 % (ref 11.6–15.1)
GFR SERPL CREATININE-BSD FRML MDRD: 32 ML/MIN/1.73SQ M
GFR SERPL CREATININE-BSD FRML MDRD: 39 ML/MIN/1.73SQ M
GLUCOSE SERPL-MCNC: 156 MG/DL (ref 65–140)
GLUCOSE SERPL-MCNC: 196 MG/DL (ref 65–140)
GLUCOSE SERPL-MCNC: 237 MG/DL (ref 65–140)
HCO3 BLDV-SCNC: 26.1 MMOL/L (ref 24–30)
HCT VFR BLD AUTO: 22.9 % (ref 34.8–46.1)
HCT VFR BLD AUTO: 26.5 % (ref 34.8–46.1)
HCT VFR BLD AUTO: 29.2 % (ref 34.8–46.1)
HGB BLD-MCNC: 7.3 G/DL (ref 11.5–15.4)
HGB BLD-MCNC: 8.5 G/DL (ref 11.5–15.4)
HGB BLD-MCNC: 9.3 G/DL (ref 11.5–15.4)
IMM GRANULOCYTES # BLD AUTO: 0.14 THOUSAND/UL (ref 0–0.2)
IMM GRANULOCYTES NFR BLD AUTO: 1 % (ref 0–2)
INR PPP: 2.45 (ref 0.84–1.19)
LACTATE SERPL-SCNC: 1.6 MMOL/L (ref 0.5–2)
LACTATE SERPL-SCNC: 3 MMOL/L (ref 0.5–2)
LYMPHOCYTES # BLD AUTO: 1.88 THOUSANDS/ÂΜL (ref 0.6–4.47)
LYMPHOCYTES NFR BLD AUTO: 14 % (ref 14–44)
MCH RBC QN AUTO: 31.5 PG (ref 26.8–34.3)
MCH RBC QN AUTO: 31.5 PG (ref 26.8–34.3)
MCHC RBC AUTO-ENTMCNC: 31.9 G/DL (ref 31.4–37.4)
MCHC RBC AUTO-ENTMCNC: 32.1 G/DL (ref 31.4–37.4)
MCV RBC AUTO: 98 FL (ref 82–98)
MCV RBC AUTO: 99 FL (ref 82–98)
MONOCYTES # BLD AUTO: 1.41 THOUSAND/ÂΜL (ref 0.17–1.22)
MONOCYTES NFR BLD AUTO: 11 % (ref 4–12)
NEUTROPHILS # BLD AUTO: 9.84 THOUSANDS/ÂΜL (ref 1.85–7.62)
NEUTS SEG NFR BLD AUTO: 74 % (ref 43–75)
NRBC BLD AUTO-RTO: 0 /100 WBCS
O2 CT BLDV-SCNC: 5.3 ML/DL
PCO2 BLDV: 54.6 MM HG (ref 42–50)
PH BLDV: 7.3 [PH] (ref 7.3–7.4)
PLATELET # BLD AUTO: 246 THOUSANDS/UL (ref 149–390)
PLATELET # BLD AUTO: 279 THOUSANDS/UL (ref 149–390)
PMV BLD AUTO: 10.2 FL (ref 8.9–12.7)
PMV BLD AUTO: 9.9 FL (ref 8.9–12.7)
PO2 BLDV: 27.8 MM HG (ref 35–45)
POTASSIUM SERPL-SCNC: 3.6 MMOL/L (ref 3.5–5.3)
POTASSIUM SERPL-SCNC: 4.5 MMOL/L (ref 3.5–5.3)
PROT SERPL-MCNC: 5.4 G/DL (ref 6.4–8.4)
PROTHROMBIN TIME: 26.9 SECONDS (ref 11.6–14.5)
QRS AXIS: -1 DEGREES
QRS AXIS: 2 DEGREES
QRSD INTERVAL: 84 MS
QRSD INTERVAL: 86 MS
QT INTERVAL: 396 MS
QT INTERVAL: 406 MS
QTC INTERVAL: 427 MS
QTC INTERVAL: 477 MS
RBC # BLD AUTO: 2.32 MILLION/UL (ref 3.81–5.12)
RBC # BLD AUTO: 2.7 MILLION/UL (ref 3.81–5.12)
RH BLD: POSITIVE
SODIUM SERPL-SCNC: 138 MMOL/L (ref 135–147)
SODIUM SERPL-SCNC: 139 MMOL/L (ref 135–147)
SPECIMEN EXPIRATION DATE: NORMAL
T WAVE AXIS: 239 DEGREES
T WAVE AXIS: 254 DEGREES
UNIT DISPENSE STATUS: NORMAL
UNIT DISPENSE STATUS: NORMAL
UNIT PRODUCT CODE: NORMAL
UNIT PRODUCT CODE: NORMAL
UNIT PRODUCT VOLUME: 350 ML
UNIT PRODUCT VOLUME: 350 ML
UNIT RH: NORMAL
UNIT RH: NORMAL
VENTRICULAR RATE: 70 BPM
VENTRICULAR RATE: 83 BPM
WBC # BLD AUTO: 13.35 THOUSAND/UL (ref 4.31–10.16)
WBC # BLD AUTO: 15.72 THOUSAND/UL (ref 4.31–10.16)

## 2023-08-09 PROCEDURE — 93005 ELECTROCARDIOGRAM TRACING: CPT

## 2023-08-09 PROCEDURE — NC001 PR NO CHARGE: Performed by: PHARMACIST

## 2023-08-09 PROCEDURE — 85027 COMPLETE CBC AUTOMATED: CPT | Performed by: PHYSICIAN ASSISTANT

## 2023-08-09 PROCEDURE — P9016 RBC LEUKOCYTES REDUCED: HCPCS

## 2023-08-09 PROCEDURE — 87081 CULTURE SCREEN ONLY: CPT | Performed by: INTERNAL MEDICINE

## 2023-08-09 PROCEDURE — 99024 POSTOP FOLLOW-UP VISIT: CPT | Performed by: SURGERY

## 2023-08-09 PROCEDURE — 82805 BLOOD GASES W/O2 SATURATION: CPT | Performed by: PHYSICIAN ASSISTANT

## 2023-08-09 PROCEDURE — 85018 HEMOGLOBIN: CPT | Performed by: INTERNAL MEDICINE

## 2023-08-09 PROCEDURE — 85610 PROTHROMBIN TIME: CPT | Performed by: PHYSICIAN ASSISTANT

## 2023-08-09 PROCEDURE — 75635 CT ANGIO ABDOMINAL ARTERIES: CPT

## 2023-08-09 PROCEDURE — 84484 ASSAY OF TROPONIN QUANT: CPT | Performed by: INTERNAL MEDICINE

## 2023-08-09 PROCEDURE — 80053 COMPREHEN METABOLIC PANEL: CPT | Performed by: PHYSICIAN ASSISTANT

## 2023-08-09 PROCEDURE — 99233 SBSQ HOSP IP/OBS HIGH 50: CPT | Performed by: INTERNAL MEDICINE

## 2023-08-09 PROCEDURE — 86900 BLOOD TYPING SEROLOGIC ABO: CPT | Performed by: PHYSICIAN ASSISTANT

## 2023-08-09 PROCEDURE — 86901 BLOOD TYPING SEROLOGIC RH(D): CPT | Performed by: PHYSICIAN ASSISTANT

## 2023-08-09 PROCEDURE — 85014 HEMATOCRIT: CPT | Performed by: INTERNAL MEDICINE

## 2023-08-09 PROCEDURE — 85730 THROMBOPLASTIN TIME PARTIAL: CPT | Performed by: PHYSICIAN ASSISTANT

## 2023-08-09 PROCEDURE — 80048 BASIC METABOLIC PNL TOTAL CA: CPT | Performed by: PHYSICIAN ASSISTANT

## 2023-08-09 PROCEDURE — 86850 RBC ANTIBODY SCREEN: CPT | Performed by: PHYSICIAN ASSISTANT

## 2023-08-09 PROCEDURE — 82948 REAGENT STRIP/BLOOD GLUCOSE: CPT

## 2023-08-09 PROCEDURE — 83605 ASSAY OF LACTIC ACID: CPT | Performed by: PHYSICIAN ASSISTANT

## 2023-08-09 PROCEDURE — 85025 COMPLETE CBC W/AUTO DIFF WBC: CPT | Performed by: PHYSICIAN ASSISTANT

## 2023-08-09 PROCEDURE — 93010 ELECTROCARDIOGRAM REPORT: CPT | Performed by: INTERNAL MEDICINE

## 2023-08-09 RX ORDER — ALBUMIN (HUMAN) 12.5 G/50ML
SOLUTION INTRAVENOUS
Status: COMPLETED
Start: 2023-08-09 | End: 2023-08-09

## 2023-08-09 RX ORDER — ALBUMIN, HUMAN INJ 5% 5 %
SOLUTION INTRAVENOUS
Status: COMPLETED | OUTPATIENT
Start: 2023-08-09 | End: 2023-08-09

## 2023-08-09 RX ORDER — ALBUMIN (HUMAN) 12.5 G/50ML
12.5 SOLUTION INTRAVENOUS ONCE
Status: DISCONTINUED | OUTPATIENT
Start: 2023-08-09 | End: 2023-08-09

## 2023-08-09 RX ORDER — ALBUMIN, HUMAN INJ 5% 5 %
25 SOLUTION INTRAVENOUS ONCE
Status: COMPLETED | OUTPATIENT
Start: 2023-08-09 | End: 2023-08-09

## 2023-08-09 RX ORDER — SODIUM CHLORIDE 9 MG/ML
75 INJECTION, SOLUTION INTRAVENOUS CONTINUOUS
Status: DISCONTINUED | OUTPATIENT
Start: 2023-08-09 | End: 2023-08-12

## 2023-08-09 RX ADMIN — ALBUMIN (HUMAN): 0.25 INJECTION, SOLUTION INTRAVENOUS at 11:35

## 2023-08-09 RX ADMIN — VITAM B12 100 MCG: 100 TAB at 08:40

## 2023-08-09 RX ADMIN — APIXABAN 10 MG: 5 TABLET, FILM COATED ORAL at 08:40

## 2023-08-09 RX ADMIN — NYSTATIN 1 APPLICATION: 100000 CREAM TOPICAL at 08:43

## 2023-08-09 RX ADMIN — FERROUS SULFATE TAB 325 MG (65 MG ELEMENTAL FE) 325 MG: 325 (65 FE) TAB at 08:40

## 2023-08-09 RX ADMIN — TRAZODONE HYDROCHLORIDE 75 MG: 50 TABLET ORAL at 21:46

## 2023-08-09 RX ADMIN — MICONAZOLE NITRATE: 20 CREAM TOPICAL at 17:11

## 2023-08-09 RX ADMIN — SODIUM CHLORIDE 75 ML/HR: 0.9 INJECTION, SOLUTION INTRAVENOUS at 16:29

## 2023-08-09 RX ADMIN — MICONAZOLE NITRATE: 20 CREAM TOPICAL at 08:43

## 2023-08-09 RX ADMIN — TRIAMCINOLONE ACETONIDE: 1 CREAM TOPICAL at 08:43

## 2023-08-09 RX ADMIN — SALINE NASAL SPRAY 1 SPRAY: 1.5 SOLUTION NASAL at 21:49

## 2023-08-09 RX ADMIN — SODIUM CHLORIDE 75 ML/HR: 0.9 INJECTION, SOLUTION INTRAVENOUS at 11:02

## 2023-08-09 RX ADMIN — PANTOPRAZOLE SODIUM 40 MG: 40 TABLET, DELAYED RELEASE ORAL at 05:02

## 2023-08-09 RX ADMIN — PRAMIPEXOLE DIHYDROCHLORIDE 0.12 MG: 0.25 TABLET ORAL at 21:45

## 2023-08-09 RX ADMIN — Medication 1 APPLICATION: at 08:42

## 2023-08-09 RX ADMIN — ALBUMIN (HUMAN) 25 G: 12.5 INJECTION, SOLUTION INTRAVENOUS at 11:01

## 2023-08-09 RX ADMIN — APIXABAN 10 MG: 5 TABLET, FILM COATED ORAL at 21:45

## 2023-08-09 RX ADMIN — ATORVASTATIN CALCIUM 20 MG: 20 TABLET, FILM COATED ORAL at 17:11

## 2023-08-09 RX ADMIN — POLYETHYLENE GLYCOL 3350 17 G: 17 POWDER, FOR SOLUTION ORAL at 08:40

## 2023-08-09 RX ADMIN — IOHEXOL 120 ML: 350 INJECTION, SOLUTION INTRAVENOUS at 11:16

## 2023-08-09 RX ADMIN — Medication 1 APPLICATION: at 21:46

## 2023-08-09 RX ADMIN — SENNOSIDES AND DOCUSATE SODIUM 1 TABLET: 50; 8.6 TABLET ORAL at 21:46

## 2023-08-09 RX ADMIN — TRIAMCINOLONE ACETONIDE: 1 CREAM TOPICAL at 17:11

## 2023-08-09 RX ADMIN — DULOXETINE HYDROCHLORIDE 60 MG: 60 CAPSULE, DELAYED RELEASE ORAL at 08:40

## 2023-08-09 NOTE — OP NOTE
OPERATIVE REPORT  PATIENT NAME: Frann Alpers    :  1940  MRN: [id number]  Pt Location: [facility] OR ROOM 02    SURGERY DATE: 2023    Surgeon(s) and Role:     Suleiman Lyon MD - Primary     * Chavez Ghosh DO - Fellow     * Reggie Dong DPM - Podiatry Fellow    Preop Diagnosis:  Acute left lower extremity ischemia  afib    Post-Op Diagnosis Codes:     * Acute lower limb ischemia [I99.8]    Procedure(s):  Left femoral exposure   Left iliac and femoral embolectomy with #3 and #4 Dieudonne catheters   Left lower extremity angiogram   Left femoral arteriotomy with bovine patch angioplasty    Specimen(s):  none    Estimated Blood Loss:   200 mL    Drains:  External Urinary Catheter (Active)   Interventions Device changed;Suction canister changed;Suction tubing changed 23 0446   Output (mL) 300 mL 23 1301   Number of days: 1       Anesthesia Type:   GETA    Operative Indications:  Pt is an 78 yo F w/ HLD, s/p pacer, HTN, CAD, afib, hx of LLE ischemia s/p embolectomy ' Doctor, presents now with LLE pain, coldness, numbness x2days. Patient was off anticoagulation after recent nosebleed. Operative Findings:  Fresh appearing thrombus within the left external iliac artery, common femoral artery, proximal profunda femoral artery, superficial femoral artery  There were stenotic areas in the SFA and popliteal artery which appeared more chronic than acute  There was PT and peroneal runoff    Complications:   None    Procedure and Technique:  After informed consent was obtained, the patient was brought to the operating room and placed in the supine position. Perioperative IV antibiotics were given with clindamycin. She was given anesthesia and endotracheally intubated. She was then prepped and draped in the usual sterile fashion exposing the left groin. A timeout was performed.      A marking pen was used to rani longitudinal incision over the femoral artery, below the level of the inguinal ligament, at the site of her prior incision. A 15-blade was used to make the incision. Cautery was used to dissect through the soft tissue. There was significant lymphadenopathy noted. The inguinal ligament was identified and freed medially and laterally. The femoral sheath was incised and the common femoral artery was identified and freed proximally. The crossing vein was identified, freed, and ligated with silk ties and clips and transected. A vessel loop was placed around the circumflex arteries as well as the proximal common femoral artery. The dissection was continued distally along the superficial femoral artery which was freed circumferentially and a vessel loop was placed. Lastly, the profunda femoral artery was identified and dissected free to the second branch point. The main branch divided just after its origin and were directed laterally. There was also a large posterior branch, coming off more medially. A vessel loop was placed distally around all branches. 4000 units of IV heparin were given in addition to the heparin gtt which was running at 18u/kg/hr. After this was given time to circulate, ACT was performed and found to be 220s. An additional 2000 units was given. The vessel loops were pulled taught. An 11-blade was used to make a longitudinal arteriotomy in the common femoral artery and this was extended distally along the proximal profunda femoral artery with Gray scissors. Next, a #4 Dieudonne catheter was used to perform thrombectomy of the iliac vessels. Fresh thrombus was returned. The catheter was passed until there was no further return of debris and pulsatile flow was noted. The artery was flushed and reoccluded. Next we performed thrombectomy of the superficial femoral artery using a #3 and then a #4 Dieudonne catheter. These did return fresh thrombus but could not be passed beyond the 30cm rani.   Lastly, the catheter was passed down all three branches of the profunda femoral artery with small return of thrombus from the lateral vessels. All vessels were flushed with heparinized saline and reoccluded. We then performed angiography of the left lower extremity via injection directly into the superficial femoral artery through the arteriotomy site without antegrade flow. This showed and area of stenosis proximally, about 8cm from the origin. There was occlusion of the superficial femoral artery at its most distal segment. There was reconstitution of the peroneal artery which collateralized to a large posterior tibial artery. We then performed additional thrombectomy of the superficial femoral artery with small return. Given the patients wheelchair-bound status and diseased popliteal on her prior imaging in '19, further attempts to recannulize the SFA were not attempted. The arteriotomy site was evaluated. There was wall thickening but did not appear to have flow limiting stenosis. A Xenosure bovine pericardial patch was selected and was sewn in using two 6-0 prolene sutures, tacked down at either end and run to the center of the patch. Prior to completion, the arteries were bled and back-bled, and the endarterectomy bed was flushed copiously with heparinized saline. The patch was completed and flow was restored. In a retrograde fashion, the common femoral artery was accessed with a micropuncture needle. The microwire was inserted and the needle was exchanged for a micropuncture catheter. This was used to perform left lower extremity runoff, now with antegrade flow. This revealed persistent proximal SFA stenosis, but resolution of the previously seen SFA occlusion. There was inline flow through the SFA and popliteal arteries with questionable flow in the TP trunk; However there were multiple large collaterals from the superior geniculates to the peroneal artery which was patent to the ankle and collateralized to fill a large PT artery.   The catheter was removed and the access site was repaired with a 5-0 prolene figure of eight suture. The wound was then copiously irrigated with saline. It was then checked for hemostasis. Surgicel was used to aid with hemostasis. The wound was closed with two layers of running 2-0 vicryl suture for the femoral sheath and shannon's fascia, and two layers of running 3-0 vicryl for the soft tissue and deep dermal layers. Inturrupted mattressed 3-0 nylons were used for the skin. The incision was dressed with gauze and tegaderm. The patient was allowed to awaken and was extubated. She was then transferred to the PACU for postoperative care. I was present for the entire procedure.     Patient Disposition:  PACU     SIGNATURE: Ruthie Lyon MD  DATE: August 9, 2023  TIME: 4:21 PM

## 2023-08-09 NOTE — ASSESSMENT & PLAN NOTE
Rapid response called. Patient with noted drop in hemoglobin down to 7. Stat CT imaging was ordered by critical care. Note expanding hematoma. Message vascular surgery. Will order 2 units of blood now. Updated daughter via telephone. Patient currently comfortable.

## 2023-08-09 NOTE — RAPID RESPONSE
Rapid Response Note  Jia Sandoval 79 y.o. female MRN: [id number]  Unit/Bed#: [facility] 525-01 Encounter: [id number]    Rapid Response Notification(s):   Response called date/time:  8/9/2023 10:39 AM  Response team arrival date/time:  8/9/2023 10:40 AM  Response end date/time:  8/9/2023 11:00 AM  Level of care:  [facility]  Rapid response location:  [facility] unit  Primary reason for rapid response call:  Acute change in O2 sat and acute change in BP    Rapid Response Intervention(s):   Airway:  None  Breathing:  Oxygen  Circulation:  None  Fluids administered:  Normal saline and other (comment) (Albumin)  Medications administered:  None       Assessment:   · Hypoxia  · Hypotension  · Downtrending hgb to 7.3    Plan:   · NC 6L  · Albumin 500ml and continuous fluids  · Labs including coag ordered  · CTA abdominal with run off - pending read  · Vascular notified  · Patient is hemodynamically stable and may stay on [facility]     Rapid Response Outcome:   Transfer:  Remain on floor  Code Status: Level 1 (Full Code)        Family member contacted: Paul De La Torre - Daughter     Background/Situation:   Dayami Berkowitz is a 79 y.o. female who was called rapid after being found with hypoxia and hypotension. Patient was aroused with sternal rub by tech. Upon arrival she was on 6L NC and SBP 90s. Patient was alert and oriented. She complain of left groin pain from recent procedure. Repeat SBP 120s prior to fluids given. Patient was taken for CTA abdominal with run off. Vascular team notified. Hgb returned 7.3 from 8.5 and 9 prior. Blood was ordered. Called daughter who had already been contacted. Updated on situation. She will be coming in later today. Review of Systems   Respiratory: Negative for cough and shortness of breath. Cardiovascular: Negative for chest pain and palpitations. Gastrointestinal: Negative for abdominal pain, nausea and vomiting. Genitourinary: Negative for difficulty urinating and flank pain. Left groin pain   Neurological: Positive for tremors (baseline). Negative for seizures, syncope and speech difficulty. Objective:   Vitals:    08/09/23 1041 08/09/23 1044 08/09/23 1048 08/09/23 1128   BP:  (!) 92/49 115/64 (!) 128/48   BP Location:       Pulse: 69 78  68   Resp:       Temp:       TempSrc:       SpO2: 100% 100%  100%   Weight:       Height:         Physical Exam  Constitutional:       General: She is not in acute distress. Eyes:      Extraocular Movements: Extraocular movements intact. Cardiovascular:      Rate and Rhythm: Normal rate and regular rhythm. Pulses: Normal pulses. Heart sounds: Normal heart sounds. No murmur heard. Pulmonary:      Effort: Pulmonary effort is normal. No respiratory distress. Breath sounds: Normal breath sounds. No wheezing or rales. Abdominal:      General: There is no distension. Palpations: Abdomen is soft. Tenderness: There is no abdominal tenderness. Comments: Left groin tenderness, firm to touch, no bruising within site of procedure but bruising below site in inner thigh   Musculoskeletal:      Right lower leg: No edema. Left lower leg: No edema. Skin:     General: Skin is warm and dry. Capillary Refill: Capillary refill takes less than 2 seconds. Neurological:      General: No focal deficit present. Mental Status: She is alert and oriented to person, place, and time. Portions of the record may have been created with voice recognition software. Occasional wrong word or "sound a like" substitutions may have occurred due to the inherent limitations of voice recognition software. Read the chart carefully and recognize, using context, where substitutions have occurred.     Daphne Baxter PA-C

## 2023-08-09 NOTE — ASSESSMENT & PLAN NOTE
· Presented to [facility] ED today from her nursing home with a 3 day history of progressively worsening pain of her left lower extremity with numbness later and color change to purple  · H/o Afib - anticoagulation had been held intermittently due to epistaxis. 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 There are multifocal areas of occlusion through the superficial femoral artery with intermittent reconstitution of flow from collaterals. Two-vessel tibial vessel runoff persists. · Begun on a heparin drip and  transferred to [facility] for embolectomy. · Management per vascular. Patient with increased pain from surgical site. Reached out to vascular surgery. They will reassess left lower extremity surgical site.

## 2023-08-09 NOTE — PROGRESS NOTES
Vascular Nurse Navigator Post Op Education    Met with patient at bedside to introduce myself as Vascular Nurse Navigator and explained my role. Patient is appropriate and accepting to education. Patient was educated with Review of written materials provided, Teachback, Explanation, Demonstration and Question & Answer on expectations of post op care and recovery on LLE iliac, fem embolectomy, LLE angiogram, L fem arteriotomy with bovine patch angioplasty. Patient is a non-smoker. Education provided to patient on infection prevention, activity limitations, when to call the office, importance of follow up, and incisional care. Discharge instruction handout provided to patient to review. Provided patient with a pack of disposable washcloths, a pack of guaze, and a bottle of chlorhexidine for incision care upon discharge.

## 2023-08-09 NOTE — PLAN OF CARE
Problem: MOBILITY - ADULT  Goal: Maintain or return to baseline ADL function  Description: INTERVENTIONS:  -  Assess patient's ability to carry out ADLs; assess patient's baseline for ADL function and identify physical deficits which impact ability to perform ADLs (bathing, care of mouth/teeth, toileting, grooming, dressing, etc.)  - Assess/evaluate cause of self-care deficits   - Assess range of motion  - Assess patient's mobility; develop plan if impaired  - Assess patient's need for assistive devices and provide as appropriate  - Encourage maximum independence but intervene and supervise when necessary  - Involve family in performance of ADLs  - Assess for home care needs following discharge   - Consider OT consult to assist with ADL evaluation and planning for discharge  - Provide patient education as appropriate  Outcome: Progressing  Goal: Maintains/Returns to pre admission functional level  Description: INTERVENTIONS:  - Perform BMAT or MOVE assessment daily.   - Set and communicate daily mobility goal to care team and patient/family/caregiver. - Collaborate with rehabilitation services on mobility goals if consulted  - Perform Range of Motion  times a day. - Reposition patient every  hours.   - Dangle patient  times a day  - Stand patient  times a day  - Ambulate patient  times a day  - Out of bed to chair  times a day   - Out of bed for meal times a day  - Out of bed for toileting  - Record patient progress and toleration of activity level   Outcome: Progressing     Problem: Prexisting or High Potential for Compromised Skin Integrity  Goal: Skin integrity is maintained or improved  Description: INTERVENTIONS:  - Identify patients at risk for skin breakdown  - Assess and monitor skin integrity  - Assess and monitor nutrition and hydration status  - Monitor labs   - Assess for incontinence   - Turn and reposition patient  - Assist with mobility/ambulation  - Relieve pressure over bony prominences  - Avoid friction and shearing  - Provide appropriate hygiene as needed including keeping skin clean and dry  - Evaluate need for skin moisturizer/barrier cream  - Collaborate with interdisciplinary team   - Patient/family teaching  - Consider wound care consult   Outcome: Progressing

## 2023-08-09 NOTE — OCCUPATIONAL THERAPY NOTE
Occupational Therapy cx        Patient Name: Reyna Coleman  IKMMG'H Date: 8/9/2023 08/09/23 1418   OT Last Visit   OT Visit Date 08/09/23   Note Type   Note Type Treatment   Cancel Reasons Medical status  (per Rn, pt with low BP, was rapid this AM and overall is not medically approrpriate for OT treatment at this time.  will cont to address as clinical course allows.)         Willa Hess, MOT, OTR/L

## 2023-08-09 NOTE — PROGRESS NOTES
Assessment/ Plan:    Acute LLE ischemia-- recurrent episode  · H/o LLE thromboembolism (Cardiac embolus after mechanical fall) s/p L femoral embolectomy 10/6/2019 (Doctor) c/b incisional hematoma  --likely 2/2 thromboembolism in setting of intermittent hold of anticoagulation for Afib 2/2 recent epistaxis requiring cauterization on Chronic PAD  --CTA (p)-- Infrarenal abd aorta atheroma. Arterial occlusion from mid L EIA through CFA. Multifocal areas of occlusion through SFA w/ intermittent recon of flow via collaterals. 2V r/o.  --8/7/23 s/p LLE iliac, fem embolectomy, LLE angiogram, L fem arteriotomy with bovine patch angioplasty    -No change in pulse exam  -Will continue to monitor closely  -f/u Hgb trend, if Hgb continues to decrease or clinical condition worsens, then consider further workup     --c/w Eliquis, statin  --PT/OT    ____________________________________________________________________  SUBJECTIVE:    Pt seen in bed this morning. Vadim Hill. VSS stable. Pt with no change in edema, ecchymosis at L groin incision, soft on palpation. Hgb 9.0->8.5    8/7/23 s/p LLE iliac, fem embolectomy, LLE angiogram, L fem arteriotomy with bovine patch angioplasty    HPI:    Chief Complaint:  Cold leg     Michaela Person is a 79 y.o. female, [facility] resident, with history B/L TKR, hypertension, hyperlipidemia, GERD, bioprosthetic aortic valve replacement, PPM, tremor, insomnia, depression, and GERD who is known to the vascular center for history of atrial fibrillation and cardiac thrombus who suffered LLE thromboembolism in the setting of a fall s/p left lower extremity thrombectomy 10/6/2019 by Dr. Nicki Lyon c/b left groin hematoma. She was recommended lifelong anticoagulation. She is also been seen in the past for PAD. Gayle Tineo was transferred to [facility] emergency department from [address] where she presented due to left lower extremity pain and numbness.  There was concern for cold limb/thromboembolic event which prompted transfer for escalated vascular care. Prior to transfer, arterial duplex was performed and showed occlusion of the distal left EIA, CFA, SFA, popliteal, PT, AT. She was initiated on anticoagulation in the form of heparin drip    Upon arrival in the [address] [place] [address] emergency department, the patient was seen and evaluated. She states symptoms initially started with some pain in the upper leg/thigh on Saturday (3days ago). This progressed to numbness in the foot. Subjectively, she states that she cannot feel the foot although for me, she is able to sense light touch. She is also able to wiggle the toes and flex at the ankle although not to the degree of her right lower extremity. Subjectively, her symptoms do seem mildly improved after receiving pain medication. She was started on heparin drip prior to transfer. Patient is minimally ambulatory although is able to bear weight to walk short distances with the assistance of a walker and for toileting. Primary mobility is with use of a wheelchair. She affirms having intermittently been on blood thinner which has been held recently due to intermittent episodes of epistaxis requiring ENT cauterization. She thinks her blood thinner may have been started a few days ago and was a different one from what she was taking. Record review reveals Eliquis 2.5 mg twice daily starting 8/6/2023. Review of Systems:  General: positive for  - as noted in HPI  Cardiovascular: no chest pain or dyspnea on exertion  Respiratory: no cough, shortness of breath, or wheezing  Gastrointestinal: no abdominal pain, change in bowel habits, or black or bloody stools  Genitourinary: positive for - urinary incontinence  Musculoskeletal: positive for - as noted in HPI  Neurological: no TIA or stroke symptoms.   Positive for memory loss  Hematological and Lymphatic: s/p embolectomy  Dermatological: positive for dry skin  Psychological: positive for - depression  Ophthalmic: negative  ENT: positive for - recent nosebleeds requiring ENT cauterization    Past Medical History:  Past Medical History:   Diagnosis Date   • Abnormal blood chemistry 05/10/2013   • Accelerated essential hypertension 05/08/2012   • Atrial fibrillation (HCC)    • Depression    • Essential tremor    • GERD (gastroesophageal reflux disease)    • HLD (hyperlipidemia)    • Hypertension    • Melanoma ([address])    • Obesity    • Polyneuropathy    • Skin cancer    • Thromboembolism ([address]) 10/2019    LLE   • Tinea unguium    • Urinary incontinence        Past Surgical History:  Past Surgical History:   Procedure Laterality Date   • AORTIC VALVE REPLACEMENT  12/2012   • BREAST BIOPSY Right     years ago -Benign   • CARDIAC PACEMAKER PLACEMENT      dual chamber last assessed 10/14/13   • CHOLECYSTECTOMY     • IR LOWER EXTREMITY / INTERVENTION  10/6/2019   • JOINT REPLACEMENT      B/L knee replacement   • MA TEAEC W/WO PATCH GRAFT COMMON FEMORAL Left 8/7/2023    Procedure: ENDARTERECTOMY ARTERIAL FEMORAL WITH BOVINE PATCH ANGIOPLASTY;  Surgeon: Maribell Lyon MD;  Location: [facility];  Service: Vascular   • THROMBECTOMY W/ EMBOLECTOMY N/A 10/6/2019    Procedure: EMBOLECTOMY/THROMBECTOMY Left LOWER EXTREMITY; angiogram;  Surgeon: Km Lyon MD;  Location: [facility];  Service: Vascular   • THROMBECTOMY W/ EMBOLECTOMY Left 8/7/2023    Procedure: Left femoral exposure Left iliac, femoral, embolectomy wtih #3 and #4 Dieudonne catheters Left lower extremity angiogram Left femoral arteriotomy with bovine patch angioplasty;  Surgeon: Maribell Lyon MD;  Location: [facility];  Service: Vascular       Social History:  Social History     Substance and Sexual Activity   Alcohol Use Not Currently   • Alcohol/week: 0.0 standard drinks of alcohol     Social History     Substance and Sexual Activity   Drug Use No     Social History     Tobacco Use   Smoking Status Never   Smokeless Tobacco Never       Family History:  Family History   Problem Relation Age of Onset   • Hypertension Mother    • Breast cancer Mother    • No Known Problems Father    • Breast cancer Daughter 46   • No Known Problems Maternal Grandmother    • No Known Problems Maternal Grandfather    • No Known Problems Paternal Grandmother    • No Known Problems Paternal Grandfather    • No Known Problems Son    • No Known Problems Son    • No Known Problems Daughter    • No Known Problems Brother    • No Known Problems Maternal Aunt    • No Known Problems Maternal Aunt        Allergies: Allergies   Allergen Reactions   • Penicillins Hives   • Sulfa Antibiotics Hives   • Medical Tape Rash       Medications:  Home meds:   Prior to Admission medications    Medication Sig Start Date End Date Taking?  Authorizing Provider   acetaminophen (TYLENOL) 325 mg tablet Take 650 mg by mouth every 4 (four) hours as needed for mild pain, moderate pain, severe pain or fever   Yes Historical Provider, MD   Aloe-Sodium Chloride ([place] Saline Nasal Gel) SWAB 1 each into each nostril 2 (two) times a day 1 swab both nostrils q morning and bedtime   Yes Historical Provider, MD   apixaban (Eliquis) 2.5 mg Take 2.5 mg by mouth 2 (two) times a day   Yes Historical Provider, MD   DULoxetine (CYMBALTA) 60 mg delayed release capsule Take 60 mg by mouth daily   Yes Historical Provider, MD   metoprolol succinate (TOPROL-XL) 100 mg 24 hr tablet Take 100 mg by mouth daily   Yes Historical Provider, MD   miconazole (MICOTIN) 2 % powder Apply 1 Application topically every 8 (eight) hours Apply to inframammary crease b/l & abdominal fold   Yes Historical Provider, MD   nystatin (MYCOSTATIN) cream Apply 1 Application topically in the morning Abdominal fold   Yes Historical Provider, MD   polyethylene glycol (MIRALAX) 17 g packet Take 17 g by mouth daily   Yes Historical Provider, MD   pramipexole (MIRAPEX) 0.125 mg tablet Take 0.125 mg by mouth daily at bedtime   Yes Historical Provider, MD Sennosides-Docusate Sodium 8.6-50 MG CAPS Take 1 tablet by mouth daily at bedtime   Yes Historical Provider, MD   sodium chloride (OCEAN) 0.65 % nasal spray 1 spray into each nostril daily at bedtime Both nostils   Yes Historical Provider, MD   sodium phosphate-biphosphate (FLEET) 7-19 g 118 mL enema Insert 1 enema into the rectum daily as needed (no responce to dulcolax)   Yes Historical Provider, MD   atorvastatin (LIPITOR) 20 mg tablet Take 1 tablet (20 mg total) by mouth daily after dinner 10/11/19   Felipa Rubalcava MD   bisacodyl (DULCOLAX) 10 mg suppository Insert 10 mg into the rectum daily as needed for constipation    Historical Provider, MD   cyanocobalamin (VITAMIN B-12) 100 mcg tablet Take 100 mcg by mouth daily 11/13/20   Historical Provider, MD   ferrous sulfate 325 (65 Fe) mg tablet Take 325 mg by mouth every other day    Historical Provider, MD   hydrochlorothiazide (HYDRODIURIL) 25 mg tablet Take 25 mg by mouth daily    Historical Provider, MD   magnesium hydroxide (MILK OF MAGNESIA) 400 mg/5 mL oral suspension Take 30 mL by mouth daily as needed for constipation    Historical Provider, MD   melatonin 3 mg Take 3 mg by mouth daily at bedtime as needed    Historical Provider, MD   olmesartan (BENICAR) 40 mg tablet Take 20 mg by mouth daily  11/29/20   Historical Provider, MD   omeprazole (PriLOSEC) 20 mg delayed release capsule TAKE ONE CAPSULE BY MOUTH EVERY DAY 5/29/19   Lewis Main MD   traZODone (DESYREL) 50 mg tablet Take 75 mg by mouth daily at bedtime    Historical Provider, MD   triamcinolone (KENALOG) 0.1 % cream Apply topically 2 (two) times a day 10/11/19   Felipa Rubalcava MD   acetaminophen (TYLENOL) 500 mg tablet Take 1 tablet (500 mg total) by mouth every 6 (six) hours as needed for mild pain 11/7/18 8/7/23  Lewis Main MD   aspirin (ECOTRIN LOW STRENGTH) 81 mg EC tablet Take 1 tablet (81 mg total) by mouth daily 10/11/19 8/7/23  Felipa Rubalcava MD   FLUoxetine (PROzac) 20 mg capsule Take 20 mg by mouth daily  11/29/20 8/7/23  Historical Provider, MD   gabapentin (NEURONTIN) 100 mg capsule Take 100 mg by mouth every morning  8/7/23  Historical Provider, MD   gabapentin (NEURONTIN) 300 mg capsule Take 300 mg by mouth daily at bedtime  8/7/23  Historical Provider, MD   metoprolol succinate (TOPROL-XL) 50 mg 24 hr tablet One tablet, 50 mg, every 12 hours  Patient taking differently: Take 100 mg by mouth daily One tablet, 50 mg, every 12 hours 11/12/19 8/7/23  SHIRA Rubio   rivaroxaban (XARELTO) 20 mg tablet Take one (1) tablet PO with dinner after completion of starter pack 10/11/19 8/7/23  Joe Ghosh MD   senna (SENOKOT) 8.6 mg Take 1 tablet (8.6 mg total) by mouth daily at bedtime 10/11/19 8/7/23  Joe Ghosh MD         Vitals:  BP (!) 119/45 (BP Location: Right arm)   Pulse 77   Temp 98.3 °F (36.8 °C) (Oral)   Resp 18   Ht 5' 2" (1.575 m)   Wt 102 kg (223 lb 15.8 oz)   SpO2 96%   BMI 40.97 kg/m²   Body mass index is 40.97 kg/m². Weight (last 2 days)     Date/Time Weight    08/09/23 0500 102 (223.99)    08/08/23 0536 100 (221.12)          I/Os:    Intake/Output Summary (Last 24 hours) at 8/9/2023 0742  Last data filed at 8/9/2023 0446  Gross per 24 hour   Intake 1148.75 ml   Output 368 ml   Net 780.75 ml       Physical Exam:    General appearance: alert, appears stated age and cooperative. Elderly, obese, pleasant female. Head: Normocephalic, without obvious abnormality, atraumatic  Eyes: conjunctivae/corneas clear. PERRL, EOM's intact. Throat: lips, mucosa, and tongue normal; teeth and gums normal  Neck: no adenopathy, no carotid bruit, no JVD and supple, symmetrical, trachea midline  Lungs: clear to auscultation bilaterally, ant-lat  Chest wall: no tenderness. Stenotomy scar. L chest cardiac device  Heart: irregularly irregular rhythm and S1, S2 normal (-) m/r/g  Abdomen: soft, non-tender; bowel sounds normal; no masses,  no organomegaly.  +Pannus  Genitalia: deferred-- adult protective garment/ diaper  Rectal: deferred  Extremities:   --RLE non-threatened  --LLE warm to touch ankle- foot w/ improvement of motor and light touch sensation. Mild rubor.  L fem incision well-coapted with dressing c,d,i. (-) Tissue loss   Skin: Skin color, texture, turgor normal. No rashes or lesions  Neurologic: Grossly normal    Pulse exam:  Radial: Right: 2+               Left: 2+  Femoral: Right: 2+                   Left: deferred 2/2 dressings  Popliteal:                   Left: doppler signal  DP: Right: non-palpable          Left: absent  PT: Right: 2+         Left: dopperable   Peroneal: Left: absent    Lab Results and Cultures:   COVID:   Last COVID19 Screening Values     None         CBC with diff:   Lab Results   Component Value Date    WBC 15.72 (H) 08/09/2023    HGB 8.5 (L) 08/09/2023    HCT 26.5 (L) 08/09/2023    MCV 98 08/09/2023     08/09/2023    RBC 2.70 (L) 08/09/2023    MCH 31.5 08/09/2023    MCHC 32.1 08/09/2023    RDW 14.2 08/09/2023    MPV 9.9 08/09/2023    NRBC 0 08/07/2023      BMP/CMP:  Lab Results   Component Value Date    K 3.9 08/07/2023     08/07/2023    CO2 29 08/07/2023    BUN 27 (H) 08/07/2023    CREATININE 0.94 08/07/2023    GLUCOSE 203 (H) 10/06/2019    CALCIUM 9.5 08/07/2023    AST 17 08/07/2023    ALT 11 08/07/2023    ALKPHOS 77 08/07/2023    EGFR 55 08/07/2023   ,     Coags:   Lab Results   Component Value Date    PTT >210 (HH) 08/08/2023    INR 1.07 08/07/2023   ,   Results from last 7 days   Lab Units 08/08/23  0822 08/08/23  0038 08/07/23  1120 08/07/23  1002   PTT seconds >210* >210* 27 27   INR   --   --   --  1.07        Lipid Panel: No results found for: "CHOL"  Lab Results   Component Value Date    HDL 32 (L) 10/09/2019     Lab Results   Component Value Date    LDLCALC 104 (H) 10/09/2019     Lab Results   Component Value Date    TRIG 132 10/09/2019       HgbA1c:   Lab Results   Component Value Date    HGBA1C 7.8 (H) 10/07/2019 Imagin/7 LEAD L- occlusion of distal EIA, CFA, SFA, popl, PT, AT.      CTA -  Moderate eccentric atheroma involving the infrarenal abdominal aorta. HG stenosis versus focal occlusion of mid SMA, age indeterminant. -RLE: Moderate atherosclerotic narrowing through the right SFA  -LLE: Arterial occlusion extending from the mid L EIA through the CFA. Multifocal areas of occlusion through the SFA with intermittent recon of flow from collaterals.  2V r/o    EKG, Pathology, and Other Studies:    VTE Prophylaxis: Eliquis     Code Status: Level 1, Full code  Advance Directive and Living Will: Yes    Power of :    POLST:      Fuad Dong DPM  2023

## 2023-08-09 NOTE — PROGRESS NOTES
[facility]  Progress Note  Name: Christian Miguel  MRN: [id number]  Unit/Bed#: [facility] 525-01 I Date of Admission: 8/7/2023   Date of Service: 8/9/2023 I Hospital Day: 2    Assessment/Plan   Acute lower limb ischemia  Assessment & Plan  · Presented to [address] [place] [address] ED today from her nursing home with a 3 day history of progressively worsening pain of her left lower extremity with numbness later and color change to purple  · H/o Afib - anticoagulation had been held intermittently due to epistaxis. 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 There are multifocal areas of occlusion through the superficial femoral artery with intermittent reconstitution of flow from collaterals. Two-vessel tibial vessel runoff persists. · Begun on a heparin drip and  transferred to [facility] for embolectomy. · Management per vascular. Patient with increased pain from surgical site. Reached out to vascular surgery. They will reassess left lower extremity surgical site. * Hypotension  Assessment & Plan  Rapid response called. Patient with noted drop in hemoglobin down to 7. Stat CT imaging was ordered by critical care. Note expanding hematoma. Message vascular surgery. Will order 2 units of blood now. Updated daughter via telephone. Patient currently comfortable.     Coronary artery disease involving native coronary artery of native heart without angina pectoris  Assessment & Plan  · Continue statin, BB  · Confirm with facility in a.m. if she was on ASA     Essential hypertension  Assessment & Plan  · Continue PTA meds Toprol- mg daily, HCTZ 25 mg daily  · BP increased postprocedure  · Hydralazine prn   · Monitor BP    Depression  Assessment & Plan  · Continue PTA meds Duloxetine 60 mg daily, Trazodone 75 mg at bedtime    History of permanent cardiac pacemaker placement  Assessment & Plan  · Noted         VTE Pharmacologic Prophylaxis:   Pharmacologic: Apixaban (Eliquis)  Mechanical VTE Prophylaxis in Place: No    Patient Centered Rounds: I have performed bedside rounds with nursing staff today. Time Spent for Care: 54. More than 50% of total time spent on counseling and coordination of care as described above. Current Length of Stay: 2 day(s)    Current Patient Status: Inpatient     Code Status: Level 1 - Full Code      Subjective:   nad    Objective:     Vitals:   Temp (24hrs), Av °F (36.7 °C), Min:97.4 °F (36.3 °C), Max:98.3 °F (36.8 °C)    Temp:  [97.4 °F (36.3 °C)-98.3 °F (36.8 °C)] 97.4 °F (36.3 °C)  HR:  [] 68  Resp:  [17-25] 25  BP: ()/(44-92) 128/48  SpO2:  [89 %-100 %] 100 %  Body mass index is 40.97 kg/m². Input and Output Summary (last 24 hours): Intake/Output Summary (Last 24 hours) at 2023 1202  Last data filed at 2023 0900  Gross per 24 hour   Intake 690 ml   Output 368 ml   Net 322 ml       Physical Exam:     Physical Exam  Constitutional:       Appearance: Normal appearance. HENT:      Head: Normocephalic and atraumatic. Cardiovascular:      Rate and Rhythm: Normal rate and regular rhythm. Pulmonary:      Effort: Pulmonary effort is normal.      Breath sounds: Normal breath sounds. Neurological:      General: No focal deficit present. Mental Status: She is alert and oriented to person, place, and time.    Psychiatric:         Mood and Affect: Mood normal.         Additional Data:     Labs:    Results from last 7 days   Lab Units 23  1051   WBC Thousand/uL 13.35*   HEMOGLOBIN g/dL 7.3*   HEMATOCRIT % 22.9*   PLATELETS Thousands/uL 246   NEUTROS PCT % 74   LYMPHS PCT % 14   MONOS PCT % 11   EOS PCT % 0     Results from last 7 days   Lab Units 23  1051   POTASSIUM mmol/L 3.6   CHLORIDE mmol/L 108   CO2 mmol/L 24   BUN mg/dL 33*   CREATININE mg/dL 1.47*   CALCIUM mg/dL 8.9   ALK PHOS U/L 58   ALT U/L 16   AST U/L 13     Results from last 7 days   Lab Units 08/09/23  1051   INR  2.45*       * I Have Reviewed All Lab Data Listed Above. * Additional Pertinent Lab Tests Reviewed:  All Labs Within Last 24 Hours Reviewed        Recent Cultures (last 7 days):           Last 24 Hours Medication List:   Current Facility-Administered Medications   Medication Dose Route Frequency Provider Last Rate   • acetaminophen  650 mg Oral Q6H PRN Claressa Hazard, PA-C     • apixaban  10 mg Oral BID Claressa Hazard, PA-C     • [START ON 8/15/2023] apixaban  5 mg Oral BID Claressa Hazard, PA-C     • atorvastatin  20 mg Oral After Arthur Dodd-Silvino, PA-C     • bisacodyl  10 mg Rectal Daily PRN Claressa Hazard, PA-C     • cyanocobalamin  100 mcg Oral Daily Claressa Hazard, PA-C     • DULoxetine  60 mg Oral Daily Claressa Hazard, PA-C     • ferrous sulfate  325 mg Oral Every Other Day Claressa Hazard, PA-C     • hydrochlorothiazide  25 mg Oral Daily Claressa Hazard, PA-C     • magnesium hydroxide  30 mL Oral Daily PRN Claressa Hazard, PA-C     • metoprolol succinate  100 mg Oral Daily Claressa Hazard, PA-C     • miconazole   Topical BID Claressa Hazard, PA-C     • nystatin  1 Application Topical Daily Claressa Hazard, PA-C     • ondansetron  4 mg Intravenous Q6H PRN Claressa Hazard, PA-C     • oxyCODONE  2.5 mg Oral Q4H PRN Claressa Hazard, PA-C     • pantoprazole  40 mg Oral Early Morning Claressa Hazard, PA-C     • polyethylene glycol  17 g Oral Daily Claressa Hazard, PA-C     • pramipexole  0.125 mg Oral HS Claressa Hazard, PA-C     • senna-docusate sodium  1 tablet Oral HS Claressa Hazard, PA-C     • sodium chloride  1 Application Nasal BID Claressa Hazard, PA-C     • sodium chloride  1 spray Each Nare HS Claressa Hazard, PA-C     • sodium chloride  75 mL/hr Intravenous Continuous [facility] (08/09/23 1102)   • traZODone  75 mg Oral HS Rafaela Jay PA-C     • triamcinolone   Topical BID Rafaela Jay PA-C          Today, Patient Was Seen By: Mario Darden DO    ** Please Note: Dictation voice to text software may have been used in the creation of this document.  **

## 2023-08-09 NOTE — PROGRESS NOTES
-- Patient:  -- MRN: [id number]  -- Aidin Request ID: [id number]  -- Level of care reserved: [address]  -- Partner Reserved: [facility], [address] [phone]  -- Clinical needs requested:  -- Geography searched: 10 miles around [phone]  -- Start of Service:  -- Request sent: 3:33pm EDT on 8/8/2023 by Lorette Pump  -- Partner reserved: 10:52am EDT on 8/9/2023 by Lorette Pump  -- Choice list shared: 10:52am EDT on 8/9/2023 by Radha Pump

## 2023-08-09 NOTE — ED PROVIDER NOTES
Rapid response called for hypotension. On arrival, airway is intact and patient is not in any acute respiratory distress. Patient does not require any acute airway management at this time.        Elizabeth Campbell MD  08/09/23 1048

## 2023-08-10 LAB
ABO GROUP BLD BPU: NORMAL
ABO GROUP BLD BPU: NORMAL
ANION GAP SERPL CALCULATED.3IONS-SCNC: 2 MMOL/L
BASOPHILS # BLD AUTO: 0.07 THOUSANDS/ÂΜL (ref 0–0.1)
BASOPHILS NFR BLD AUTO: 1 % (ref 0–1)
BPU ID: NORMAL
BPU ID: NORMAL
BUN SERPL-MCNC: 25 MG/DL (ref 5–25)
CALCIUM SERPL-MCNC: 8.9 MG/DL (ref 8.3–10.1)
CHLORIDE SERPL-SCNC: 111 MMOL/L (ref 96–108)
CO2 SERPL-SCNC: 25 MMOL/L (ref 21–32)
CREAT SERPL-MCNC: 0.98 MG/DL (ref 0.6–1.3)
CROSSMATCH: NORMAL
CROSSMATCH: NORMAL
EOSINOPHIL # BLD AUTO: 0.13 THOUSAND/ÂΜL (ref 0–0.61)
EOSINOPHIL NFR BLD AUTO: 1 % (ref 0–6)
ERYTHROCYTE [DISTWIDTH] IN BLOOD BY AUTOMATED COUNT: 16.2 % (ref 11.6–15.1)
GFR SERPL CREATININE-BSD FRML MDRD: 53 ML/MIN/1.73SQ M
GLUCOSE SERPL-MCNC: 134 MG/DL (ref 65–140)
HCT VFR BLD AUTO: 28.6 % (ref 34.8–46.1)
HGB BLD-MCNC: 9 G/DL (ref 11.5–15.4)
IMM GRANULOCYTES # BLD AUTO: 0.26 THOUSAND/UL (ref 0–0.2)
IMM GRANULOCYTES NFR BLD AUTO: 2 % (ref 0–2)
LYMPHOCYTES # BLD AUTO: 1.71 THOUSANDS/ÂΜL (ref 0.6–4.47)
LYMPHOCYTES NFR BLD AUTO: 15 % (ref 14–44)
MCH RBC QN AUTO: 30.3 PG (ref 26.8–34.3)
MCHC RBC AUTO-ENTMCNC: 31.5 G/DL (ref 31.4–37.4)
MCV RBC AUTO: 96 FL (ref 82–98)
MONOCYTES # BLD AUTO: 1.48 THOUSAND/ÂΜL (ref 0.17–1.22)
MONOCYTES NFR BLD AUTO: 13 % (ref 4–12)
MRSA NOSE QL CULT: NORMAL
NEUTROPHILS # BLD AUTO: 8.12 THOUSANDS/ÂΜL (ref 1.85–7.62)
NEUTS SEG NFR BLD AUTO: 68 % (ref 43–75)
NRBC BLD AUTO-RTO: 0 /100 WBCS
PLATELET # BLD AUTO: 195 THOUSANDS/UL (ref 149–390)
PMV BLD AUTO: 10.2 FL (ref 8.9–12.7)
POTASSIUM SERPL-SCNC: 3.8 MMOL/L (ref 3.5–5.3)
RBC # BLD AUTO: 2.97 MILLION/UL (ref 3.81–5.12)
SODIUM SERPL-SCNC: 138 MMOL/L (ref 135–147)
UNIT DISPENSE STATUS: NORMAL
UNIT DISPENSE STATUS: NORMAL
UNIT PRODUCT CODE: NORMAL
UNIT PRODUCT CODE: NORMAL
UNIT PRODUCT VOLUME: 350 ML
UNIT PRODUCT VOLUME: 350 ML
UNIT RH: NORMAL
UNIT RH: NORMAL
WBC # BLD AUTO: 11.77 THOUSAND/UL (ref 4.31–10.16)

## 2023-08-10 PROCEDURE — 97110 THERAPEUTIC EXERCISES: CPT

## 2023-08-10 PROCEDURE — 99233 SBSQ HOSP IP/OBS HIGH 50: CPT | Performed by: INTERNAL MEDICINE

## 2023-08-10 PROCEDURE — NC001 PR NO CHARGE: Performed by: SURGERY

## 2023-08-10 PROCEDURE — 97535 SELF CARE MNGMENT TRAINING: CPT

## 2023-08-10 PROCEDURE — 85025 COMPLETE CBC W/AUTO DIFF WBC: CPT | Performed by: INTERNAL MEDICINE

## 2023-08-10 PROCEDURE — 80048 BASIC METABOLIC PNL TOTAL CA: CPT | Performed by: PHYSICIAN ASSISTANT

## 2023-08-10 PROCEDURE — 97112 NEUROMUSCULAR REEDUCATION: CPT

## 2023-08-10 PROCEDURE — 97530 THERAPEUTIC ACTIVITIES: CPT

## 2023-08-10 RX ORDER — ASPIRIN 81 MG/1
81 TABLET, CHEWABLE ORAL DAILY
Status: DISCONTINUED | OUTPATIENT
Start: 2023-08-10 | End: 2023-08-13 | Stop reason: HOSPADM

## 2023-08-10 RX ADMIN — TRIAMCINOLONE ACETONIDE: 1 CREAM TOPICAL at 08:34

## 2023-08-10 RX ADMIN — APIXABAN 10 MG: 5 TABLET, FILM COATED ORAL at 21:12

## 2023-08-10 RX ADMIN — TRIAMCINOLONE ACETONIDE: 1 CREAM TOPICAL at 17:23

## 2023-08-10 RX ADMIN — HYDROCHLOROTHIAZIDE 25 MG: 25 TABLET ORAL at 08:15

## 2023-08-10 RX ADMIN — POLYETHYLENE GLYCOL 3350 17 G: 17 POWDER, FOR SOLUTION ORAL at 08:15

## 2023-08-10 RX ADMIN — Medication 1 APPLICATION: at 21:18

## 2023-08-10 RX ADMIN — TRAZODONE HYDROCHLORIDE 75 MG: 50 TABLET ORAL at 21:14

## 2023-08-10 RX ADMIN — PRAMIPEXOLE DIHYDROCHLORIDE 0.12 MG: 0.25 TABLET ORAL at 21:13

## 2023-08-10 RX ADMIN — MICONAZOLE NITRATE: 20 CREAM TOPICAL at 17:23

## 2023-08-10 RX ADMIN — ATORVASTATIN CALCIUM 20 MG: 20 TABLET, FILM COATED ORAL at 17:22

## 2023-08-10 RX ADMIN — APIXABAN 10 MG: 5 TABLET, FILM COATED ORAL at 08:14

## 2023-08-10 RX ADMIN — MICONAZOLE NITRATE: 20 CREAM TOPICAL at 08:34

## 2023-08-10 RX ADMIN — VITAM B12 100 MCG: 100 TAB at 08:15

## 2023-08-10 RX ADMIN — SALINE NASAL SPRAY 1 SPRAY: 1.5 SOLUTION NASAL at 21:18

## 2023-08-10 RX ADMIN — SODIUM CHLORIDE 75 ML/HR: 0.9 INJECTION, SOLUTION INTRAVENOUS at 04:59

## 2023-08-10 RX ADMIN — PANTOPRAZOLE SODIUM 40 MG: 40 TABLET, DELAYED RELEASE ORAL at 05:00

## 2023-08-10 RX ADMIN — METOPROLOL SUCCINATE 100 MG: 100 TABLET, EXTENDED RELEASE ORAL at 08:14

## 2023-08-10 RX ADMIN — NYSTATIN 1 APPLICATION: 100000 CREAM TOPICAL at 08:35

## 2023-08-10 RX ADMIN — ASPIRIN 81 MG CHEWABLE TABLET 81 MG: 81 TABLET CHEWABLE at 08:15

## 2023-08-10 RX ADMIN — SENNOSIDES AND DOCUSATE SODIUM 1 TABLET: 50; 8.6 TABLET ORAL at 21:13

## 2023-08-10 RX ADMIN — Medication 1 APPLICATION: at 08:35

## 2023-08-10 RX ADMIN — DULOXETINE HYDROCHLORIDE 60 MG: 60 CAPSULE, DELAYED RELEASE ORAL at 08:14

## 2023-08-10 NOTE — PLAN OF CARE
Problem: PHYSICAL THERAPY ADULT  Goal: Performs mobility at highest level of function for planned discharge setting. See evaluation for individualized goals. Description: Treatment/Interventions: Functional transfer training, LE strengthening/ROM, Therapeutic exercise, Endurance training, Patient/family training, Equipment eval/education, Bed mobility, Compensatory technique education, Spoke to nursing, OT, Spoke to MD, Spoke to case management (balance training, W/C mobility training)  Equipment Recommended:  (pt already has W/C)       See flowsheet documentation for full assessment, interventions and recommendations. Outcome: Not Progressing  Note: Prognosis: Fair  Problem List: Decreased strength, Decreased endurance, Impaired balance, Decreased mobility, Impaired hearing, Obesity, Pain, Impaired sensation  Assessment: Pt seen for PT treatment session w/ focus on bed mobility training, t/f training, ther ex instruction, + limited gait training. Pt continues to be functioning below her baseline, requiring ModAx2 for all levels of mobility. Once seated in chair pt w/ one instance of decreased alertness but quickly recovered. Pt in NAD at end of session. PT Discharge Recommendation: Return to facility with rehabilitation services (+ increased assistance from staff)    See flowsheet documentation for full assessment.

## 2023-08-10 NOTE — PROGRESS NOTES
Progress Note - Vascular Surgery  : BRAIN Vascular Surgery on Jeanne Sandoval 79 y.o. female MRN: [id number]  Unit/Bed#: [facility] 525-01 Encounter: [id number]    Assessment:  79 y.o. female s/p 8/7 LLE femoral cutdown, iliofemoral thromboembolectomy, L fem endart/angioplasty     CT yesterday showed L groin hematoma w/out extrav, required RBC x2 for hypotension, stable overnight      Plan:  AC w/ Eliquis  ASA Monitor pulse/signal exam  Trend hgb, transfuse PRN   No indication for vascular surgery intervention    Subjective/Objective     Subjective: No complaints      Objective:     Pulse exam:  L PT/AT multiphasic    L groin soft, dressing CDI    Physical Exam:  GEN: NAD  HEENT: MMM  CV: RRR  Lung: Normal effort  Ab: Soft, ND/NT   Neuro: A+Ox3         Vitals:  /79   Pulse 80   Temp 99.3 °F (37.4 °C)   Resp 22   Ht 5' 2" (1.575 m)   Wt 95.4 kg (210 lb 5.1 oz)   SpO2 98%   BMI 38.47 kg/m²     I/Os:  I/O last 3 completed shifts: In: 1850.1 [P.O.:520; I.V.:446.8; Blood:483.3; IV Piggyback:400]  Out: 668 [Urine:668]  I/O this shift:  In: 1811.3 [I.V.:1423.8;  Blood:387.5]  Out: 750 [Urine:750]    Lab Results and Cultures:   Lab Results   Component Value Date    WBC 11.77 (H) 08/10/2023    HGB 9.0 (L) 08/10/2023    HCT 28.6 (L) 08/10/2023    MCV 96 08/10/2023     08/10/2023     Lab Results   Component Value Date    GLUCOSE 203 (H) 10/06/2019    CALCIUM 8.9 08/10/2023    K 3.8 08/10/2023    CO2 25 08/10/2023     (H) 08/10/2023    BUN 25 08/10/2023    CREATININE 0.98 08/10/2023     Lab Results   Component Value Date    INR 2.45 (H) 08/09/2023    INR 1.07 08/07/2023    INR 3.10 (H) 06/16/2022    PROTIME 26.9 (H) 08/09/2023    PROTIME 14.6 (H) 08/07/2023    PROTIME 31.0 (H) 06/16/2022        Blood Culture: No results found for: "Karyna Couch",   Urinalysis:   Lab Results   Component Value Date    COLORU Yellow 10/10/2019    CLARITYU Cloudy 10/10/2019    SPECGRAV 1.006 10/10/2019 PHUR 6.0 10/10/2019    LEUKOCYTESUR Large (A) 10/10/2019    NITRITE Negative 10/10/2019    GLUCOSEU Negative 10/10/2019    KETONESU Negative 10/10/2019    BILIRUBINUR Negative 10/10/2019    BLOODU Large (A) 10/10/2019   ,   Urine Culture:   Lab Results   Component Value Date    URINECX >100,000 cfu/ml Escherichia coli (A) 10/10/2019   ,   Wound Culure: No results found for: "WOUNDCULT"    Medications:  Current Facility-Administered Medications   Medication Dose Route Frequency   • acetaminophen (TYLENOL) tablet 650 mg  650 mg Oral Q6H PRN   • apixaban (ELIQUIS) tablet 10 mg  10 mg Oral BID   • [START ON 8/15/2023] apixaban (ELIQUIS) tablet 5 mg  5 mg Oral BID   • atorvastatin (LIPITOR) tablet 20 mg  20 mg Oral After Dinner   • bisacodyl (DULCOLAX) rectal suppository 10 mg  10 mg Rectal Daily PRN   • cyanocobalamin (VITAMIN B-12) tablet 100 mcg  100 mcg Oral Daily   • DULoxetine (CYMBALTA) delayed release capsule 60 mg  60 mg Oral Daily   • ferrous sulfate tablet 325 mg  325 mg Oral Every Other Day   • hydrochlorothiazide (HYDRODIURIL) tablet 25 mg  25 mg Oral Daily   • magnesium hydroxide (MILK OF MAGNESIA) oral suspension 30 mL  30 mL Oral Daily PRN   • metoprolol succinate (TOPROL-XL) 24 hr tablet 100 mg  100 mg Oral Daily   • miconazole 2 % cream   Topical BID   • nystatin (MYCOSTATIN) cream 1 Application  1 Application Topical Daily   • ondansetron (ZOFRAN) injection 4 mg  4 mg Intravenous Q6H PRN   • oxyCODONE (ROXICODONE) IR tablet 2.5 mg  2.5 mg Oral Q4H PRN   • pantoprazole (PROTONIX) EC tablet 40 mg  40 mg Oral Early Morning   • polyethylene glycol (MIRALAX) packet 17 g  17 g Oral Daily   • pramipexole (MIRAPEX) tablet 0.125 mg  0.125 mg Oral HS   • senna-docusate sodium (SENOKOT S) 8.6-50 mg per tablet 1 tablet  1 tablet Oral HS   • sodium chloride (AYR SALINE NASAL) nasal gel 1 Application  1 Application Nasal BID   • sodium chloride (OCEAN) 0.65 % nasal spray 1 spray  1 spray Each Nare HS   • sodium chloride 0.9 % infusion  75 mL/hr Intravenous Continuous   • traZODone (DESYREL) tablet 75 mg  75 mg Oral HS   • triamcinolone (KENALOG) 0.1 % cream   Topical BID           Tristen Frost MD  8/10/2023

## 2023-08-10 NOTE — PHYSICAL THERAPY NOTE
Physical Therapy Treatment Note    Patient's Name: Blanca White  : 1940     08/10/23 1010   PT Last Visit   PT Visit Date 08/10/23   Note Type   Note Type Treatment   Pain Assessment   Pain Assessment Tool 0-10   Pain Score No Pain   Restrictions/Precautions   Weight Bearing Precautions Per Order No   Other Precautions Cognitive;Multiple lines;Telemetry; Fall Risk   General   Chart Reviewed Yes   Additional Pertinent History Vitals assessed sitting EOB (/86, HR 75), sitting EOB after 15 min (/66, HR 83) and sitting in chair (/49, HR 78). Pt reported lightheadedness and had one instance of decreased responsiveness but quickly recovered. Response to Previous Treatment Patient with no complaints from previous session. Family/Caregiver Present No   Subjective   Subjective Pt agreeable to mobilize. Bed Mobility   Supine to Sit 3  Moderate assistance   Additional items Assist x 2; Increased time required;Verbal cues;LE management   Sit to Supine Unable to assess   Additional Comments Pt greeted in supine. Transfers   Sit to Stand 3  Moderate assistance   Additional items Assist x 2; Increased time required;Verbal cues   Stand to Sit 3  Moderate assistance   Additional items Assist x 2; Increased time required;Verbal cues   Additional Comments B HHA   Ambulation/Elevation   Gait pattern Decreased foot clearance; Improper Weight shift; Excessively slow; Short stride   Gait Assistance 3  Moderate assist   Additional items Assist x 2;Verbal cues; Tactile cues   Assistive Device   (B HHA)   Distance 3' bed>chair   Balance   Static Sitting Fair   Dynamic Sitting Fair -   Static Standing Poor +   Dynamic Standing Poor   Ambulatory Poor  (B HHA)   Endurance Deficit   Endurance Deficit Yes   Endurance Deficit Description lightheadedness, weakness, fatigue   Activity Tolerance   Activity Tolerance Patient limited by fatigue   Medical Staff Made Aware KIERRA Carlson   Nurse Made Aware yes - cleared + updated   Exercises   Hip Abduction Sitting;10 reps;AROM; Bilateral   Hip Adduction Sitting;10 reps;AROM; Bilateral   Knee AROM Long Arc Quad Sitting;10 reps;AROM; Bilateral   Ankle Pumps Sitting;20 reps;AROM; Bilateral   Marching Sitting;10 reps;AROM; Bilateral   Neuro re-ed Sitting EOB unsupported, multidirectional reaching. Assessment   Prognosis Fair   Problem List Decreased strength;Decreased endurance; Impaired balance;Decreased mobility; Impaired hearing;Obesity;Pain; Impaired sensation   Assessment Pt seen for PT treatment session w/ focus on bed mobility training, t/f training, ther ex instruction, + limited gait training. Pt continues to be functioning below her baseline, requiring ModAx2 for all levels of mobility. Once seated in chair pt w/ one instance of decreased alertness but quickly recovered. Pt in NAD at end of session. Goals   Patient Goals get up to chair   PT Treatment Day 1   Plan   Treatment/Interventions Functional transfer training;LE strengthening/ROM; Therapeutic exercise; Endurance training;Patient/family training;Equipment eval/education; Bed mobility;Gait training; Compensatory technique education;Spoke to nursing;OT  (balance training, W/C mobility training)   Progress Slow progress, decreased activity tolerance   PT Frequency 2-3x/wk   Recommendation   PT Discharge Recommendation Return to facility with rehabilitation services  (+ increased assistance from staff)   [address] in Flat Bed Without Bedrails 3   Lying on Back to Sitting on Edge of Flat Bed Without Bedrails 2   Moving Bed to Chair 1   Standing Up From Chair Using Arms 1   Walk in Room 1   Climb 3-5 Stairs With Railing 1   Basic Mobility Inpatient Raw Score 9   Highest Level Of Mobility   JH-HLM Goal 3: Sit at edge of bed   JH-HLM Achieved 4: Move to chair/commode   Education   Education Provided Mobility training;Home exercise program   Patient Demonstrates acceptance/verbal understanding;Reinforcement needed   End of Consult   Patient Position at End of Consult Bedside chair; All needs within reach  (on waffle cushion, BLE elevated, all lines in tact)     Diya Cesar, PT, DPT

## 2023-08-10 NOTE — ASSESSMENT & PLAN NOTE
· Continue statin, BB  · Continue with aspirin therapy 81 mg   · Continue with anticoagulation per vascular surgery for thromboembolism x 2.

## 2023-08-10 NOTE — ASSESSMENT & PLAN NOTE
· Presented to [address] [place] [address] ED today from her nursing home with a 3 day history of progressively worsening pain of her left lower extremity with numbness later and color change to purple  · H/o Afib - anticoagulation had been held intermittently due to epistaxis. 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 There are multifocal areas of occlusion through the superficial femoral artery with intermittent reconstitution of flow from collaterals. Two-vessel tibial vessel runoff persists. · Begun on a heparin drip and  transferred to [facility] for embolectomy. · Note vascular surgery input regarding CT findings. No urgent intervention indicated at this particular juncture. Will continue to monitor hemoglobin closely.

## 2023-08-10 NOTE — OCCUPATIONAL THERAPY NOTE
Occupational Therapy Progress Note     Patient Name: Donovan Purdy  YXOOV'R Date: 8/10/2023  Problem List  Principal Problem:    Hypotension  Active Problems:    History of permanent cardiac pacemaker placement    Mixed hyperlipidemia    Depression    Essential hypertension    Coronary artery disease involving native coronary artery of native heart without angina pectoris    Acute lower limb ischemia    Persistent atrial fibrillation ([address])            08/10/23 0931   OT Last Visit   OT Visit Date 08/10/23   Note Type   Note Type Treatment   Pain Assessment   Pain Assessment Tool 0-10   Pain Score No Pain   Hospital Pain Intervention(s) Repositioned; Ambulation/increased activity; Emotional support   Restrictions/Precautions   Weight Bearing Precautions Per Order No   Other Precautions Cognitive;Multiple lines;Telemetry;O2;Fall Risk;Pain   Lifestyle   Autonomy pta, pt rq A for bathing, I for dressing, stand pivot to wc, was able to self propel wc. - . staff manages meals and meds   Reciprocal Relationships supportive staff who assist her as needed. Service to Others retired   Intrinsic Gratification spending time w her friends   ADL   Where Assessed Edge of bed   Eating Assistance 5  Supervision/Setup   Eating Deficit Setup   Grooming Assistance 5  Supervision/Setup   Grooming Deficit Steadying; Wash/dry face   UB Bathing Assistance 3  Moderate Assistance   UB Bathing Deficit Setup;Right arm;Left arm; Chest   UB Dressing Assistance 3  Moderate Assistance   UB Dressing Deficit Setup;Steadying; Thread RUE; Thread LUE;Pull over head;Pull around back;Pull down in back   Toileting Assistance  2  Maximal Assistance   Toileting Deficit Clothing management up;Clothing management down;Perineal hygiene   Bed Mobility   Supine to Sit 3  Moderate assistance   Additional items Assist x 2;HOB elevated; Bedrails; Increased time required;Verbal cues;LE management   Sit to Supine Unable to assess   Additional Comments Pt performed supine <> sit with Mod A x2 for UB postural support/LE management in which pt sat EOB for approx 20 minutes w/ close supervision to perform seated ADLs; @ end of session, pt left sitting upright in recliner with all functional needs in reach   Transfers   Sit to Stand 3  Moderate assistance   Additional items Assist x 2; Increased time required;Verbal cues   Stand to Sit 3  Moderate assistance   Additional items Assist x 2; Increased time required;Verbal cues   Additional Comments w/ b/l HHA for safety and support   Functional Mobility   Functional Mobility 3  Moderate assistance   Additional Comments Pt completed few small steps from EOB <> dorp arm recliner with Mod A x2 w/ b/l HHA for safety and support   Additional items Hand hold assistance   Subjective   Subjective "I am just so cold."   Cognition   Overall Cognitive Status Impaired   Arousal/Participation Arousable; Cooperative   Attention Attends with cues to redirect   Memory Decreased recall of precautions   Following Commands Follows one step commands with increased time or repetition   Comments Pt pleasant and cooperative; @ times, decreased safety awareness/insight, requiring verbal cues for safety/proper technique   Activity Tolerance   Activity Tolerance Patient limited by fatigue   Medical Staff [address] [place] [address] Sw, DPT; RN cleared   Assessment   Assessment Pt is a 78 yo female who participated in skilled OT session on 8/10/2023. Pt seen with PT to increase safety, decrease fall risk, and maximize functional/occupational performance 2* medical complexity which is a regression from pt's functional baseline. Treatment focused to improve functional transfers with fall prevention strategies, static/dynamic balance, postural/trunk control, proper body mechanics, functional use of b/l UE's, higher level cognitive functions, safety awareness, and overall increased activity tolerance in ADL/IADL/leisure tasks.  Upon arrival, pt found lying supine in bed and was agreeable to OT session. Pt completed supine <> sit with Mod A x2 for UB postural support/LE management and sat EOB with close supervision for approx 20 minutes to complete seated ADLs. Pt performed grooming tasks w/ setup and UB bathing/dressing tasks w/ Mod A. Pt completed STS w/ Mod A x2 w/ b/l HHA and completed few small steps from EOB <> drop arm recliner with Mod A x2 w/ b/l HHA. At the end of the session, pt was left sitting upright in recliner with all functional needs in reach. Pt demonstrates gradual functional improvements towards OT goals however continues to be functioning below occupational baseline and is still limited by the following limitations/impairments which were addressed through skilled instruction: cognition, generalized weakness, balance, endurance/activity tolerance, postural/trunk control, strength, pain, and safety awareness. At this time, recommend discharge to post-acute rehab when medically stable. The patient's raw score on the AM-PAC Daily Activity Inpatient Short Form is 15. A raw score of less than 19 suggests the patient may benefit from discharge to post-acute rehabilitation services. Please refer to the recommendation of the Occupational Therapist for safe discharge planning. Established OT goals will be continued 2-3x/wk to address immediate acute care needs and underlying performance skills to maximize occupational performance and safety to return to [facility]. Plan   Treatment Interventions ADL retraining;Functional transfer training;UE strengthening/ROM; Endurance training;Cognitive reorientation;Patient/family training;Equipment evaluation/education; Compensatory technique education;Continued evaluation; Energy conservation; Activityengagement   Goal Expiration Date 08/22/23   OT Treatment Day 1   OT Frequency 2-3x/wk   Recommendation   OT Discharge Recommendation Post acute rehabilitation services   [facility] Daily Activity Inpatient   Lower Body Dressing 2   Bathing 2 Toileting 2   Upper Body Dressing 2   Grooming 3   Eating 4   Daily Activity Raw Score 15   Daily Activity Standardized Score (Calc for Raw Score >=11) 34.69   AM-PAC Applied Cognition Inpatient   Following a Speech/Presentation 2   Understanding Ordinary Conversation 4   Taking Medications 3   Remembering Where Things Are Placed or Put Away 3   Remembering List of 4-5 Errands 3   Taking Care of Complicated Tasks 2   Applied Cognition Raw Score 17   Applied Cognition Standardized Score 36.52   End of Consult   Education Provided Yes   Patient Position at End of Consult Bedside chair; All needs within reach   Nurse Communication Nurse aware of consult       Lzibeth Pineda MS, OTR/L

## 2023-08-10 NOTE — ASSESSMENT & PLAN NOTE
Rapid response called. Patient with noted drop in hemoglobin down to 7. Stat CT imaging was ordered by critical care. Note expanding hematoma. Message vascular surgery. Will order 2 units of blood now. Updated daughter via telephone.

## 2023-08-10 NOTE — PROGRESS NOTES
[facility]  Progress Note  Name: Jone Melvin  MRN: [id number]  Unit/Bed#: [facility] 525-01 I Date of Admission: 8/7/2023   Date of Service: 8/10/2023 I Hospital Day: 3    Assessment/Plan   Acute lower limb ischemia  Assessment & Plan  · Presented to [facility] ED today from her nursing home with a 3 day history of progressively worsening pain of her left lower extremity with numbness later and color change to purple  · H/o Afib - anticoagulation had been held intermittently due to epistaxis. 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 There are multifocal areas of occlusion through the superficial femoral artery with intermittent reconstitution of flow from collaterals. Two-vessel tibial vessel runoff persists. · Begun on a heparin drip and  transferred to [facility] for embolectomy. · Note vascular surgery input regarding CT findings. No urgent intervention indicated at this particular juncture. Will continue to monitor hemoglobin closely. * Hypotension  Assessment & Plan  Rapid response called. Patient with noted drop in hemoglobin down to 7. Stat CT imaging was ordered by critical care. Note expanding hematoma. Message vascular surgery. Will order 2 units of blood now. Updated daughter via telephone. Persistent atrial fibrillation (HCC)  Assessment & Plan  · Anticoagulation held intermittently for epistaxis  · Had been on Xarelto  ·     Coronary artery disease involving native coronary artery of native heart without angina pectoris  Assessment & Plan  · Continue statin, BB  · Continue with aspirin therapy 81 mg   · Continue with anticoagulation per vascular surgery for thromboembolism x 2.       Essential hypertension  Assessment & Plan  · Continue PTA meds Toprol- mg daily, HCTZ 25 mg daily  · BP increased postprocedure  · Hydralazine prn   · Monitor BP    Depression  Assessment & Plan  · Continue PTA meds Duloxetine 60 mg daily, Trazodone 75 mg at bedtime    Mixed hyperlipidemia  Assessment & Plan  · Continue PTA med Atorvastatin 20 mg daily    History of permanent cardiac pacemaker placement  Assessment & Plan  · Noted             VTE Pharmacologic Prophylaxis:   Pharmacologic: Apixaban (Eliquis)  Mechanical VTE Prophylaxis in Place: No    Patient Centered Rounds: I have performed bedside rounds with nursing staff today. Time Spent for Care: 54. More than 50% of total time spent on counseling and coordination of care as described above. Current Length of Stay: 3 day(s)    Current Patient Status: Inpatient       Code Status: Level 1 - Full Code      Subjective:   nad    Objective:     Vitals:   Temp (24hrs), Av.9 °F (37.2 °C), Min:97.8 °F (36.6 °C), Max:100.2 °F (37.9 °C)    Temp:  [97.8 °F (36.6 °C)-100.2 °F (37.9 °C)] 98.6 °F (37 °C)  HR:  [66-88] 83  Resp:  [16-22] 16  BP: ()/(47-86) 138/62  SpO2:  [95 %-100 %] 95 %  Body mass index is 38.47 kg/m². Input and Output Summary (last 24 hours): Intake/Output Summary (Last 24 hours) at 8/10/2023 1246  Last data filed at 8/10/2023 1108  Gross per 24 hour   Intake 2414.58 ml   Output 1050 ml   Net 1364.58 ml       Physical Exam:     Physical Exam  HENT:      Head: Normocephalic and atraumatic. Cardiovascular:      Rate and Rhythm: Normal rate and regular rhythm. Pulmonary:      Effort: Pulmonary effort is normal.   Abdominal:      General: Abdomen is flat. Palpations: Abdomen is soft. Skin:     General: Skin is warm and dry. Neurological:      General: No focal deficit present. Mental Status: She is alert and oriented to person, place, and time.    Psychiatric:         Mood and Affect: Mood normal.         Additional Data:     Labs:    Results from last 7 days   Lab Units 08/10/23  0447   WBC Thousand/uL 11.77*   HEMOGLOBIN g/dL 9.0*   HEMATOCRIT % 28.6*   PLATELETS Thousands/uL 195   NEUTROS PCT % 68   LYMPHS PCT % 15   MONOS PCT % 13*   EOS PCT % 1     Results from last 7 days   Lab Units 08/10/23  0447 08/09/23  1051   POTASSIUM mmol/L 3.8 3.6   CHLORIDE mmol/L 111* 108   CO2 mmol/L 25 24   BUN mg/dL 25 33*   CREATININE mg/dL 0.98 1.47*   CALCIUM mg/dL 8.9 8.9   ALK PHOS U/L  --  58   ALT U/L  --  16   AST U/L  --  13     Results from last 7 days   Lab Units 08/09/23  1051   INR  2.45*       * I Have Reviewed All Lab Data Listed Above. * Additional Pertinent Lab Tests Reviewed:  All Labs Within Last 24 Hours Reviewed        Recent Cultures (last 7 days):           Last 24 Hours Medication List:   Current Facility-Administered Medications   Medication Dose Route Frequency Provider Last Rate   • acetaminophen  650 mg Oral Q6H PRN Isaac Means, PA-C     • apixaban  10 mg Oral BID Isaac Means, PA-C     • [START ON 8/15/2023] apixaban  5 mg Oral BID Isaac Means, PA-C     • aspirin  81 mg Oral Daily Ladan Handy MD     • atorvastatin  20 mg Oral After Mook Diaz, PA-C     • bisacodyl  10 mg Rectal Daily PRN Isaac Means, PA-C     • cyanocobalamin  100 mcg Oral Daily Isaac Means, PA-C     • DULoxetine  60 mg Oral Daily Isaac Means, PA-C     • ferrous sulfate  325 mg Oral Every Other Day Isaac Means, PA-C     • hydrochlorothiazide  25 mg Oral Daily Isaac Means, PA-C     • magnesium hydroxide  30 mL Oral Daily PRN Isaac Means, PA-C     • metoprolol succinate  100 mg Oral Daily Isaac Means, PA-C     • miconazole   Topical BID Isaac Means, PA-C     • nystatin  1 Application Topical Daily Isaac Means, PA-C     • ondansetron  4 mg Intravenous Q6H PRN Isaac Means, PA-C     • oxyCODONE  2.5 mg Oral Q4H PRN Isaac Means, PA-C     • pantoprazole  40 mg Oral Early Morning Jessica Schaeffer PA-C     • polyethylene glycol  17 g Oral Daily Jessica Schaeffer PA-C     • pramipexole  0.125 mg Oral HS Jessica Schaeffer PA-C     • senna-docusate sodium  1 tablet Oral HS Jessica Schaeffer PA-C     • sodium chloride  1 Application Nasal BID Jessica Schaeffer PA-C     • sodium chloride  1 spray Each Nare HS Jessica Schaeffer PA-C     • sodium chloride  75 mL/hr Intravenous Continuous Celestpedro Lee PA-C 75 mL/hr (08/10/23 0459)   • traZODone  75 mg Oral HS Jessica Schaeffer PA-C     • triamcinolone   Topical BID Jessica Schaeffer PA-C          Today, Patient Was Seen By: Ada Wright DO    ** Please Note: Dictation voice to text software may have been used in the creation of this document.  **

## 2023-08-11 LAB
ANION GAP SERPL CALCULATED.3IONS-SCNC: 9 MMOL/L
BASOPHILS # BLD AUTO: 0.06 THOUSANDS/ÂΜL (ref 0–0.1)
BASOPHILS NFR BLD AUTO: 1 % (ref 0–1)
BUN SERPL-MCNC: 18 MG/DL (ref 5–25)
CALCIUM SERPL-MCNC: 9.4 MG/DL (ref 8.3–10.1)
CHLORIDE SERPL-SCNC: 108 MMOL/L (ref 96–108)
CO2 SERPL-SCNC: 21 MMOL/L (ref 21–32)
CREAT SERPL-MCNC: 0.92 MG/DL (ref 0.6–1.3)
EOSINOPHIL # BLD AUTO: 0.24 THOUSAND/ÂΜL (ref 0–0.61)
EOSINOPHIL NFR BLD AUTO: 2 % (ref 0–6)
ERYTHROCYTE [DISTWIDTH] IN BLOOD BY AUTOMATED COUNT: 15.3 % (ref 11.6–15.1)
GFR SERPL CREATININE-BSD FRML MDRD: 57 ML/MIN/1.73SQ M
GLUCOSE SERPL-MCNC: 154 MG/DL (ref 65–140)
HCT VFR BLD AUTO: 31.7 % (ref 34.8–46.1)
HGB BLD-MCNC: 10.3 G/DL (ref 11.5–15.4)
IMM GRANULOCYTES # BLD AUTO: 0.16 THOUSAND/UL (ref 0–0.2)
IMM GRANULOCYTES NFR BLD AUTO: 1 % (ref 0–2)
LYMPHOCYTES # BLD AUTO: 1.61 THOUSANDS/ÂΜL (ref 0.6–4.47)
LYMPHOCYTES NFR BLD AUTO: 13 % (ref 14–44)
MCH RBC QN AUTO: 31.2 PG (ref 26.8–34.3)
MCHC RBC AUTO-ENTMCNC: 32.5 G/DL (ref 31.4–37.4)
MCV RBC AUTO: 96 FL (ref 82–98)
MONOCYTES # BLD AUTO: 0.89 THOUSAND/ÂΜL (ref 0.17–1.22)
MONOCYTES NFR BLD AUTO: 7 % (ref 4–12)
NEUTROPHILS # BLD AUTO: 9.36 THOUSANDS/ÂΜL (ref 1.85–7.62)
NEUTS SEG NFR BLD AUTO: 76 % (ref 43–75)
NRBC BLD AUTO-RTO: 0 /100 WBCS
PLATELET # BLD AUTO: 266 THOUSANDS/UL (ref 149–390)
PMV BLD AUTO: 10.5 FL (ref 8.9–12.7)
POTASSIUM SERPL-SCNC: 3.7 MMOL/L (ref 3.5–5.3)
RBC # BLD AUTO: 3.3 MILLION/UL (ref 3.81–5.12)
SODIUM SERPL-SCNC: 138 MMOL/L (ref 135–147)
WBC # BLD AUTO: 12.32 THOUSAND/UL (ref 4.31–10.16)

## 2023-08-11 PROCEDURE — 99233 SBSQ HOSP IP/OBS HIGH 50: CPT | Performed by: INTERNAL MEDICINE

## 2023-08-11 PROCEDURE — 85025 COMPLETE CBC W/AUTO DIFF WBC: CPT | Performed by: INTERNAL MEDICINE

## 2023-08-11 PROCEDURE — 80048 BASIC METABOLIC PNL TOTAL CA: CPT | Performed by: INTERNAL MEDICINE

## 2023-08-11 PROCEDURE — 99024 POSTOP FOLLOW-UP VISIT: CPT | Performed by: SURGERY

## 2023-08-11 RX ADMIN — NYSTATIN 1 APPLICATION: 100000 CREAM TOPICAL at 09:43

## 2023-08-11 RX ADMIN — TRAZODONE HYDROCHLORIDE 75 MG: 50 TABLET ORAL at 21:36

## 2023-08-11 RX ADMIN — PANTOPRAZOLE SODIUM 40 MG: 40 TABLET, DELAYED RELEASE ORAL at 04:52

## 2023-08-11 RX ADMIN — METOPROLOL SUCCINATE 100 MG: 100 TABLET, EXTENDED RELEASE ORAL at 09:43

## 2023-08-11 RX ADMIN — POLYETHYLENE GLYCOL 3350 17 G: 17 POWDER, FOR SOLUTION ORAL at 09:42

## 2023-08-11 RX ADMIN — TRIAMCINOLONE ACETONIDE: 1 CREAM TOPICAL at 09:43

## 2023-08-11 RX ADMIN — APIXABAN 10 MG: 5 TABLET, FILM COATED ORAL at 21:35

## 2023-08-11 RX ADMIN — Medication 1 APPLICATION: at 09:44

## 2023-08-11 RX ADMIN — DULOXETINE HYDROCHLORIDE 60 MG: 60 CAPSULE, DELAYED RELEASE ORAL at 09:43

## 2023-08-11 RX ADMIN — PRAMIPEXOLE DIHYDROCHLORIDE 0.12 MG: 0.25 TABLET ORAL at 21:35

## 2023-08-11 RX ADMIN — ASPIRIN 81 MG CHEWABLE TABLET 81 MG: 81 TABLET CHEWABLE at 09:43

## 2023-08-11 RX ADMIN — VITAM B12 100 MCG: 100 TAB at 09:43

## 2023-08-11 RX ADMIN — HYDROCHLOROTHIAZIDE 25 MG: 25 TABLET ORAL at 09:42

## 2023-08-11 RX ADMIN — APIXABAN 10 MG: 5 TABLET, FILM COATED ORAL at 09:43

## 2023-08-11 RX ADMIN — TRIAMCINOLONE ACETONIDE: 1 CREAM TOPICAL at 17:32

## 2023-08-11 RX ADMIN — FERROUS SULFATE TAB 325 MG (65 MG ELEMENTAL FE) 325 MG: 325 (65 FE) TAB at 09:43

## 2023-08-11 RX ADMIN — MICONAZOLE NITRATE 1 APPLICATION: 20 CREAM TOPICAL at 17:32

## 2023-08-11 RX ADMIN — SODIUM CHLORIDE 75 ML/HR: 0.9 INJECTION, SOLUTION INTRAVENOUS at 04:53

## 2023-08-11 RX ADMIN — SENNOSIDES AND DOCUSATE SODIUM 1 TABLET: 50; 8.6 TABLET ORAL at 21:35

## 2023-08-11 RX ADMIN — OXYCODONE HYDROCHLORIDE 2.5 MG: 5 TABLET ORAL at 14:13

## 2023-08-11 RX ADMIN — MAGNESIUM HYDROXIDE 30 ML: 400 SUSPENSION ORAL at 09:43

## 2023-08-11 RX ADMIN — SODIUM CHLORIDE 75 ML/HR: 0.9 INJECTION, SOLUTION INTRAVENOUS at 17:31

## 2023-08-11 RX ADMIN — ATORVASTATIN CALCIUM 20 MG: 20 TABLET, FILM COATED ORAL at 17:31

## 2023-08-11 NOTE — RESTORATIVE TECHNICIAN NOTE
Restorative Technician Note      Patient Name: Theresa Sandoval     Note Type: Mobility  Patient Position Upon Consult: Supine  Activity Performed: Transferred; Dangled; Stood  Assistive Device: Roller walker  Patient Position at [employer] of Consult: Bedside chair;  All needs within reach

## 2023-08-11 NOTE — CASE MANAGEMENT
Case Management Progress Note    Patient name Shawna More  Location [facility] 356-74 MRN [id number]  : 1940 Date 2023       LOS (days): 4  Geometric Mean LOS (GMLOS) (days): 1.80  Days to GMLOS:-2.2        Pt's LOS is 4 days. Pt is not yet medically stable for d/c. Pt was admitted for Ischemia of her Left Leg. Pt was admitted from [facility] located in [address] where pt resides for long-term care. The pt and her daughter have both reported they are not happy w/ the care at this SNF, and have requested placement at another SNF for long-term care. CM made [facility] referrals in no order of preference to [facility], [facility], [facility], and [facility]. CM to follow up w/ pt's daughter Sanjeev Wang (phone# [phone]) regarding which SNF she and the pt would like to accept placement at.

## 2023-08-11 NOTE — ASSESSMENT & PLAN NOTE
· Presented to [address] [place] [address] ED today from her nursing home with a 3 day history of progressively worsening pain of her left lower extremity with numbness later and color change to purple  · H/o Afib - anticoagulation had been held intermittently due to epistaxis. 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 There are multifocal areas of occlusion through the superficial femoral artery with intermittent reconstitution of flow from collaterals. Two-vessel tibial vessel runoff persists. · Begun on a heparin drip and  transferred to [facility] for embolectomy. · Note vascular surgery input regarding CT findings. No urgent intervention indicated at this particular juncture. Will continue to monitor hemoglobin closely.

## 2023-08-11 NOTE — PROGRESS NOTES
[facility]  Progress Note  Name: Jone Melvin  MRN: [id number]  Unit/Bed#: [facility] 525-01 I Date of Admission: 8/7/2023   Date of Service: 8/11/2023 I Hospital Day: 4    Assessment/Plan   Acute lower limb ischemia  Assessment & Plan  · Presented to [address] [place] [address] ED today from her nursing home with a 3 day history of progressively worsening pain of her left lower extremity with numbness later and color change to purple  · H/o Afib - anticoagulation had been held intermittently due to epistaxis. 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 There are multifocal areas of occlusion through the superficial femoral artery with intermittent reconstitution of flow from collaterals. Two-vessel tibial vessel runoff persists. · Begun on a heparin drip and  transferred to [facility] for embolectomy. · Note vascular surgery input regarding CT findings. No urgent intervention indicated at this particular juncture. Will continue to monitor hemoglobin closely. * Hypotension  Assessment & Plan  Rapid response called. Patient with noted drop in hemoglobin down to 7. Stat CT imaging was ordered by critical care. Note expanding hematoma. Message vascular surgery. Will order 2 units of blood now. Updated daughter via telephone. Coronary artery disease involving native coronary artery of native heart without angina pectoris  Assessment & Plan  · Continue statin, BB  · Continue with aspirin therapy 81 mg   · Continue with anticoagulation per vascular surgery for thromboembolism x 2.       Essential hypertension  Assessment & Plan  · Continue PTA meds Toprol- mg daily, HCTZ 25 mg daily  · BP increased postprocedure  · Hydralazine prn   · Monitor BP    Depression  Assessment & Plan  · Continue PTA meds Duloxetine 60 mg daily, Trazodone 75 mg at bedtime    Mixed hyperlipidemia  Assessment & Plan  · Continue PTA med Atorvastatin 20 mg daily    History of permanent cardiac pacemaker placement  Assessment & Plan  · Noted             VTE Pharmacologic Prophylaxis:   Pharmacologic: Apixaban (Eliquis)  Mechanical VTE Prophylaxis in Place: No    Patient Centered Rounds: I have performed bedside rounds with nursing staff today. Time Spent for Care: 54. More than 50% of total time spent on counseling and coordination of care as described above. Current Length of Stay: 4 day(s)    Current Patient Status: Inpatient       Code Status: Level 1 - Full Code      Subjective:   nad    Objective:     Vitals:   Temp (24hrs), Av.5 °F (36.9 °C), Min:98 °F (36.7 °C), Max:98.9 °F (37.2 °C)    Temp:  [98 °F (36.7 °C)-98.9 °F (37.2 °C)] 98 °F (36.7 °C)  HR:  [78-84] 82  Resp:  [16-20] 19  BP: (102-152)/() 142/74  SpO2:  [91 %-97 %] 97 %  Body mass index is 38.47 kg/m². Input and Output Summary (last 24 hours): Intake/Output Summary (Last 24 hours) at 2023 0953  Last data filed at 2023 0453  Gross per 24 hour   Intake 2315 ml   Output 900 ml   Net 1415 ml       Physical Exam:     Physical Exam  HENT:      Head: Atraumatic. Cardiovascular:      Rate and Rhythm: Normal rate and regular rhythm. Heart sounds: No murmur heard. Pulmonary:      Effort: Pulmonary effort is normal.      Breath sounds: Normal breath sounds. Abdominal:      General: Abdomen is flat. Palpations: Abdomen is soft. Neurological:      General: No focal deficit present. Mental Status: She is alert and oriented to person, place, and time.    Psychiatric:         Mood and Affect: Mood normal.         Additional Data:     Labs:    Results from last 7 days   Lab Units 08/10/23  0447   WBC Thousand/uL 11.77*   HEMOGLOBIN g/dL 9.0*   HEMATOCRIT % 28.6*   PLATELETS Thousands/uL 195   NEUTROS PCT % 68   LYMPHS PCT % 15 MONOS PCT % 13*   EOS PCT % 1     Results from last 7 days   Lab Units 08/10/23  0447 08/09/23  1051   POTASSIUM mmol/L 3.8 3.6   CHLORIDE mmol/L 111* 108   CO2 mmol/L 25 24   BUN mg/dL 25 33*   CREATININE mg/dL 0.98 1.47*   CALCIUM mg/dL 8.9 8.9   ALK PHOS U/L  --  58   ALT U/L  --  16   AST U/L  --  13     Results from last 7 days   Lab Units 08/09/23  1051   INR  2.45*       * I Have Reviewed All Lab Data Listed Above. * Additional Pertinent Lab Tests Reviewed:  All Labs Within Last 24 Hours Reviewed        Recent Cultures (last 7 days):           Last 24 Hours Medication List:   Current Facility-Administered Medications   Medication Dose Route Frequency Provider Last Rate   • acetaminophen  650 mg Oral Q6H PRN Nicole Blackburn PA-C     • apixaban  10 mg Oral BID Nicole Blackburn PA-C     • [START ON 8/15/2023] apixaban  5 mg Oral BID Nicole Blackburn PA-C     • aspirin  81 mg Oral Daily Phuc Silvestre MD     • atorvastatin  20 mg Oral After Seema Diaz PA-C     • bisacodyl  10 mg Rectal Daily PRN Nicole Blackburn PA-C     • cyanocobalamin  100 mcg Oral Daily Nicole Blackburn PA-C     • DULoxetine  60 mg Oral Daily Nicole Blackburn PA-C     • ferrous sulfate  325 mg Oral Every Other Day Nicole Blackburn PA-C     • hydrochlorothiazide  25 mg Oral Daily Nicole Blackburn PA-C     • magnesium hydroxide  30 mL Oral Daily PRN Nicole Blackburn PA-C     • metoprolol succinate  100 mg Oral Daily Nicole Blackburn PA-C     • miconazole   Topical BID Nicole Blackburn PA-C     • nystatin  1 Application Topical Daily Nicole Blackburn PA-C     • ondansetron  4 mg Intravenous Q6H PRN Nicole Blackburn PA-C     • oxyCODONE  2.5 mg Oral Q4H PRN Nicole Blackburn PA-C     • pantoprazole  40 mg Oral Early Morning Nicole Blackburn PA-C     • polyethylene glycol  17 g Oral Daily Nicole Blackburn PA-C     • pramipexole 0.125 mg Oral HS Yareli Messer PA-C     • senna-docusate sodium  1 tablet Oral HS Yareli Messer PA-C     • sodium chloride  1 Application Nasal BID Yareli Messer PA-C     • sodium chloride  1 spray Each Nare HS Yareli Messer PA-C     • sodium chloride  75 mL/hr Intravenous Continuous Justice Bass PA-C 75 mL/hr (08/11/23 0453)   • traZODone  75 mg Oral HS Yareli Messer PA-C     • triamcinolone   Topical BID Yareli Messer PA-C          Today, Patient Was Seen By: Mata Gleason DO    ** Please Note: Dictation voice to text software may have been used in the creation of this document.  **

## 2023-08-11 NOTE — PROGRESS NOTES
Progress Note - Vascular Surgery  : BRAIN Vascular Surgery on Domenico Sandoval 79 y.o. female MRN: [id number]  Unit/Bed#: [facility] 525-01 Encounter: [id number]    Assessment:  79 y.o. female s/p 8/7 LLE femoral cutdown, iliofemoral thromboembolectomy, L fem endart/angioplasty     Plan:  · C/w  Eliquis, ASA  · C/w monitor pulse/signal exam  · Trend Hgb, transfuse PRN  · No indication for vascular surgery intervention at this time  · Hgb 9.0 s/p 8/9 transfusion     Subjective/Objective   Pt seen bedside this morning. Nabeel Lam. No acute concerns this visit. Objective:     Pulse exam:  L PT/AT multiphasic    L groin soft, dressing CDI    Physical Exam:  GEN: NAD  HEENT: MMM  CV: RRR  Lung: Normal effort  Ab: Soft, ND/NT   Neuro: A+Ox3     Vitals:  /73   Pulse 81   Temp 98.2 °F (36.8 °C) (Oral)   Resp 20   Ht 5' 2" (1.575 m)   Wt 95.4 kg (210 lb 5.1 oz)   SpO2 91%   BMI 38.47 kg/m²     I/Os:  I/O last 3 completed shifts: In: 4126.3 [P.O.:600; I.V.:3138.8; Blood:387.5]  Out: 1650 [Urine:1650]  No intake/output data recorded.     Lab Results and Cultures:   Lab Results   Component Value Date    WBC 11.77 (H) 08/10/2023    HGB 9.0 (L) 08/10/2023    HCT 28.6 (L) 08/10/2023    MCV 96 08/10/2023     08/10/2023     Lab Results   Component Value Date    GLUCOSE 203 (H) 10/06/2019    CALCIUM 8.9 08/10/2023    K 3.8 08/10/2023    CO2 25 08/10/2023     (H) 08/10/2023    BUN 25 08/10/2023    CREATININE 0.98 08/10/2023     Lab Results   Component Value Date    INR 2.45 (H) 08/09/2023    INR 1.07 08/07/2023    INR 3.10 (H) 06/16/2022    PROTIME 26.9 (H) 08/09/2023    PROTIME 14.6 (H) 08/07/2023    PROTIME 31.0 (H) 06/16/2022        Blood Culture: No results found for: "BLOODCX",   Urinalysis:   Lab Results   Component Value Date    COLORU Yellow 10/10/2019    CLARITYU Cloudy 10/10/2019    SPECGRAV 1.006 10/10/2019    PHUR 6.0 10/10/2019    LEUKOCYTESUR Large (A) 10/10/2019 NITRITE Negative 10/10/2019    GLUCOSEU Negative 10/10/2019    KETONESU Negative 10/10/2019    BILIRUBINUR Negative 10/10/2019    BLOODU Large (A) 10/10/2019   ,   Urine Culture:   Lab Results   Component Value Date    URINECX >100,000 cfu/ml Escherichia coli (A) 10/10/2019   ,   Wound Culure: No results found for: "WOUNDCULT"    Medications:  Current Facility-Administered Medications   Medication Dose Route Frequency   • acetaminophen (TYLENOL) tablet 650 mg  650 mg Oral Q6H PRN   • apixaban (ELIQUIS) tablet 10 mg  10 mg Oral BID   • [START ON 8/15/2023] apixaban (ELIQUIS) tablet 5 mg  5 mg Oral BID   • aspirin chewable tablet 81 mg  81 mg Oral Daily   • atorvastatin (LIPITOR) tablet 20 mg  20 mg Oral After Dinner   • bisacodyl (DULCOLAX) rectal suppository 10 mg  10 mg Rectal Daily PRN   • cyanocobalamin (VITAMIN B-12) tablet 100 mcg  100 mcg Oral Daily   • DULoxetine (CYMBALTA) delayed release capsule 60 mg  60 mg Oral Daily   • ferrous sulfate tablet 325 mg  325 mg Oral Every Other Day   • hydrochlorothiazide (HYDRODIURIL) tablet 25 mg  25 mg Oral Daily   • magnesium hydroxide (MILK OF MAGNESIA) oral suspension 30 mL  30 mL Oral Daily PRN   • metoprolol succinate (TOPROL-XL) 24 hr tablet 100 mg  100 mg Oral Daily   • miconazole 2 % cream   Topical BID   • nystatin (MYCOSTATIN) cream 1 Application  1 Application Topical Daily   • ondansetron (ZOFRAN) injection 4 mg  4 mg Intravenous Q6H PRN   • oxyCODONE (ROXICODONE) IR tablet 2.5 mg  2.5 mg Oral Q4H PRN   • pantoprazole (PROTONIX) EC tablet 40 mg  40 mg Oral Early Morning   • polyethylene glycol (MIRALAX) packet 17 g  17 g Oral Daily   • pramipexole (MIRAPEX) tablet 0.125 mg  0.125 mg Oral HS   • senna-docusate sodium (SENOKOT S) 8.6-50 mg per tablet 1 tablet  1 tablet Oral HS   • sodium chloride (AYR SALINE NASAL) nasal gel 1 Application  1 Application Nasal BID   • sodium chloride (OCEAN) 0.65 % nasal spray 1 spray  1 spray Each Nare HS   • sodium chloride 0.9 % infusion  75 mL/hr Intravenous Continuous   • traZODone (DESYREL) tablet 75 mg  75 mg Oral HS   • triamcinolone (KENALOG) 0.1 % cream   Topical BID       Ilda Dong DPM  8/11/2023

## 2023-08-11 NOTE — PROGRESS NOTES
All tele leads replaced as some were becoming unglued, pt repositioned in bed and new bag IVF hung, offers no complaints of pain or discomfort and will continue to monitor

## 2023-08-11 NOTE — PROGRESS NOTES
-- Patient:  -- MRN: [id number]  -- Aidin Request ID: [id number]  -- Level of care reserved: [address]  -- Partner Reserved: [place], [place], [address] [place] [address] [phone]  -- Clinical needs requested:  -- Geography searched: 10 miles around [phone]  -- Start of Service:  -- Request sent: 3:05pm EDT on 8/9/2023 by Sasha Pike  -- Partner reserved: 4:29pm EDT on 8/11/2023 by Sasha Pike  -- Choice list shared: 4:28pm EDT on 8/11/2023 by Sasha Pike

## 2023-08-12 LAB
ANION GAP SERPL CALCULATED.3IONS-SCNC: 5 MMOL/L
BASOPHILS # BLD AUTO: 0.06 THOUSANDS/ÂΜL (ref 0–0.1)
BASOPHILS NFR BLD AUTO: 1 % (ref 0–1)
BUN SERPL-MCNC: 13 MG/DL (ref 5–25)
CALCIUM SERPL-MCNC: 8.8 MG/DL (ref 8.3–10.1)
CHLORIDE SERPL-SCNC: 108 MMOL/L (ref 96–108)
CO2 SERPL-SCNC: 26 MMOL/L (ref 21–32)
CREAT SERPL-MCNC: 1 MG/DL (ref 0.6–1.3)
EOSINOPHIL # BLD AUTO: 0.43 THOUSAND/ÂΜL (ref 0–0.61)
EOSINOPHIL NFR BLD AUTO: 3 % (ref 0–6)
ERYTHROCYTE [DISTWIDTH] IN BLOOD BY AUTOMATED COUNT: 15.1 % (ref 11.6–15.1)
GFR SERPL CREATININE-BSD FRML MDRD: 51 ML/MIN/1.73SQ M
GLUCOSE SERPL-MCNC: 212 MG/DL (ref 65–140)
HCT VFR BLD AUTO: 31.9 % (ref 34.8–46.1)
HGB BLD-MCNC: 9.9 G/DL (ref 11.5–15.4)
IMM GRANULOCYTES # BLD AUTO: 0.19 THOUSAND/UL (ref 0–0.2)
IMM GRANULOCYTES NFR BLD AUTO: 2 % (ref 0–2)
LYMPHOCYTES # BLD AUTO: 1.68 THOUSANDS/ÂΜL (ref 0.6–4.47)
LYMPHOCYTES NFR BLD AUTO: 13 % (ref 14–44)
MCH RBC QN AUTO: 30.5 PG (ref 26.8–34.3)
MCHC RBC AUTO-ENTMCNC: 31 G/DL (ref 31.4–37.4)
MCV RBC AUTO: 98 FL (ref 82–98)
MONOCYTES # BLD AUTO: 0.99 THOUSAND/ÂΜL (ref 0.17–1.22)
MONOCYTES NFR BLD AUTO: 8 % (ref 4–12)
NEUTROPHILS # BLD AUTO: 9.42 THOUSANDS/ÂΜL (ref 1.85–7.62)
NEUTS SEG NFR BLD AUTO: 73 % (ref 43–75)
NRBC BLD AUTO-RTO: 0 /100 WBCS
PLATELET # BLD AUTO: 310 THOUSANDS/UL (ref 149–390)
PMV BLD AUTO: 10 FL (ref 8.9–12.7)
POTASSIUM SERPL-SCNC: 3.8 MMOL/L (ref 3.5–5.3)
RBC # BLD AUTO: 3.25 MILLION/UL (ref 3.81–5.12)
SODIUM SERPL-SCNC: 139 MMOL/L (ref 135–147)
WBC # BLD AUTO: 12.77 THOUSAND/UL (ref 4.31–10.16)

## 2023-08-12 PROCEDURE — 99233 SBSQ HOSP IP/OBS HIGH 50: CPT | Performed by: INTERNAL MEDICINE

## 2023-08-12 PROCEDURE — 80048 BASIC METABOLIC PNL TOTAL CA: CPT | Performed by: INTERNAL MEDICINE

## 2023-08-12 PROCEDURE — 85025 COMPLETE CBC W/AUTO DIFF WBC: CPT | Performed by: INTERNAL MEDICINE

## 2023-08-12 RX ADMIN — ACETAMINOPHEN 650 MG: 325 TABLET, FILM COATED ORAL at 14:34

## 2023-08-12 RX ADMIN — DULOXETINE HYDROCHLORIDE 60 MG: 60 CAPSULE, DELAYED RELEASE ORAL at 08:26

## 2023-08-12 RX ADMIN — APIXABAN 10 MG: 5 TABLET, FILM COATED ORAL at 21:33

## 2023-08-12 RX ADMIN — ASPIRIN 81 MG CHEWABLE TABLET 81 MG: 81 TABLET CHEWABLE at 08:26

## 2023-08-12 RX ADMIN — Medication 1 APPLICATION: at 08:31

## 2023-08-12 RX ADMIN — TRAZODONE HYDROCHLORIDE 75 MG: 50 TABLET ORAL at 21:33

## 2023-08-12 RX ADMIN — SALINE NASAL SPRAY 1 SPRAY: 1.5 SOLUTION NASAL at 21:35

## 2023-08-12 RX ADMIN — APIXABAN 10 MG: 5 TABLET, FILM COATED ORAL at 08:26

## 2023-08-12 RX ADMIN — NYSTATIN 1 APPLICATION: 100000 CREAM TOPICAL at 08:30

## 2023-08-12 RX ADMIN — VITAM B12 100 MCG: 100 TAB at 08:26

## 2023-08-12 RX ADMIN — PANTOPRAZOLE SODIUM 40 MG: 40 TABLET, DELAYED RELEASE ORAL at 05:58

## 2023-08-12 RX ADMIN — SENNOSIDES AND DOCUSATE SODIUM 1 TABLET: 50; 8.6 TABLET ORAL at 21:34

## 2023-08-12 RX ADMIN — PRAMIPEXOLE DIHYDROCHLORIDE 0.12 MG: 0.25 TABLET ORAL at 21:33

## 2023-08-12 RX ADMIN — OXYCODONE HYDROCHLORIDE 2.5 MG: 5 TABLET ORAL at 21:48

## 2023-08-12 RX ADMIN — HYDROCHLOROTHIAZIDE 25 MG: 25 TABLET ORAL at 08:26

## 2023-08-12 RX ADMIN — ATORVASTATIN CALCIUM 20 MG: 20 TABLET, FILM COATED ORAL at 17:43

## 2023-08-12 RX ADMIN — METOPROLOL SUCCINATE 100 MG: 100 TABLET, EXTENDED RELEASE ORAL at 08:26

## 2023-08-12 NOTE — PROGRESS NOTES
[address] [facility]  Progress Note  Name: Danita Campbell  MRN: [id number]  Unit/Bed#: [facility] 525-01 I Date of Admission: 8/7/2023   Date of Service: 8/12/2023 I Hospital Day: 5    Assessment/Plan   Acute lower limb ischemia  Assessment & Plan  · Presented to [facility] ED today from her nursing home with a 3 day history of progressively worsening pain of her left lower extremity with numbness later and color change to purple  · H/o Afib - anticoagulation had been held intermittently due to epistaxis. 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 There are multifocal areas of occlusion through the superficial femoral artery with intermittent reconstitution of flow from collaterals. Two-vessel tibial vessel runoff persists. · Begun on a heparin drip and  transferred to [facility] for embolectomy. · Note vascular surgery input regarding CT findings. No urgent intervention indicated at this particular juncture. Will continue to monitor hemoglobin closely. · Patient's anemia has stabilized. · We will plan on discharge tentatively tomorrow to [place]. ·     * Hypotension  Assessment & Plan  Rapid response called. Patient with noted drop in hemoglobin down to 7. Stat CT imaging was ordered by critical care. Note expanding hematoma. Message vascular surgery. Will order 2 units of blood now. Updated daughter via telephone. Coronary artery disease involving native coronary artery of native heart without angina pectoris  Assessment & Plan  · Continue statin, BB  · Continue with aspirin therapy 81 mg   · Continue with anticoagulation per vascular surgery for thromboembolism x 2.       Essential hypertension  Assessment & Plan  · Continue PTA meds Toprol- mg daily, HCTZ 25 mg daily  · BP increased postprocedure  · Hydralazine prn   · Monitor BP    Depression  Assessment & Plan  · Continue PTA meds Duloxetine 60 mg daily, Trazodone 75 mg at bedtime    History of permanent cardiac pacemaker placement  Assessment & Plan  · Noted           VTE Pharmacologic Prophylaxis:   Pharmacologic: Apixaban (Eliquis)  Mechanical VTE Prophylaxis in Place: No    Patient Centered Rounds: I have performed bedside rounds with nursing staff today. Time Spent for Care: 54. More than 50% of total time spent on counseling and coordination of care as described above. Current Length of Stay: 5 day(s)    Current Patient Status: Inpatient       Code Status: Level 1 - Full Code      Subjective:   nad    Objective:     Vitals:   Temp (24hrs), Av.3 °F (36.8 °C), Min:98.1 °F (36.7 °C), Max:98.5 °F (36.9 °C)    Temp:  [98.1 °F (36.7 °C)-98.5 °F (36.9 °C)] 98.5 °F (36.9 °C)  HR:  [74-78] 74  Resp:  [18] 18  BP: (154-164)/(63-66) 164/66  SpO2:  [90 %-98 %] 98 %  Body mass index is 37.74 kg/m². Input and Output Summary (last 24 hours): Intake/Output Summary (Last 24 hours) at 2023 1039  Last data filed at 2023 0602  Gross per 24 hour   Intake 2365 ml   Output 1400 ml   Net 965 ml       Physical Exam:     Physical Exam  Constitutional:       Appearance: Normal appearance. HENT:      Head: Normocephalic and atraumatic. Cardiovascular:      Rate and Rhythm: Normal rate and regular rhythm. Pulmonary:      Effort: Pulmonary effort is normal.      Breath sounds: Normal breath sounds. Abdominal:      General: Abdomen is flat. Palpations: Abdomen is soft. Neurological:      Mental Status: She is alert.          Additional Data:     Labs:    Results from last 7 days   Lab Units 23  1221   WBC Thousand/uL 12.32*   HEMOGLOBIN g/dL 10.3*   HEMATOCRIT % 31.7*   PLATELETS Thousands/uL 266   NEUTROS PCT % 76*   LYMPHS PCT % 13*   MONOS PCT % 7   EOS PCT % 2     Results from last 7 days   Lab Units 08/11/23  1221 08/10/23  0447 08/09/23  1051   POTASSIUM mmol/L 3.7   < > 3.6   CHLORIDE mmol/L 108   < > 108   CO2 mmol/L 21   < > 24   BUN mg/dL 18   < > 33*   CREATININE mg/dL 0.92   < > 1.47*   CALCIUM mg/dL 9.4   < > 8.9   ALK PHOS U/L  --   --  58   ALT U/L  --   --  16   AST U/L  --   --  13    < > = values in this interval not displayed. Results from last 7 days   Lab Units 08/09/23  1051   INR  2.45*       * I Have Reviewed All Lab Data Listed Above. * Additional Pertinent Lab Tests Reviewed:  All Labs Within Last 24 Hours Reviewed        Recent Cultures (last 7 days):           Last 24 Hours Medication List:   Current Facility-Administered Medications   Medication Dose Route Frequency Provider Last Rate   • acetaminophen  650 mg Oral Q6H PRN Yasmine Postal, PA-C     • apixaban  10 mg Oral BID Yasmine Postal, PA-C     • [START ON 8/15/2023] apixaban  5 mg Oral BID Yasmine Postal, PA-C     • aspirin  81 mg Oral Daily Vangie Harkins MD     • atorvastatin  20 mg Oral After Ananya Diaz PAlAbertC     • bisacodyl  10 mg Rectal Daily PRN Yasmine Postal, PAAlbertC     • cyanocobalamin  100 mcg Oral Daily Yasmine Postal, PA-C     • DULoxetine  60 mg Oral Daily Yasmine Postal, PA-C     • ferrous sulfate  325 mg Oral Every Other Day Yasmine Postal, PA-C     • hydrochlorothiazide  25 mg Oral Daily Yasmine Postal, PA-C     • magnesium hydroxide  30 mL Oral Daily PRN Yasmine Postal, PA-C     • metoprolol succinate  100 mg Oral Daily Yasmine Postal, PA-C     • miconazole   Topical BID Yasmine Postal, PA-C     • nystatin  1 Application Topical Daily Yasmine Postal, PAAlbertC     • ondansetron  4 mg Intravenous Q6H PRN Yasmine Postal, PA-C     • oxyCODONE  2.5 mg Oral Q4H PRN Yasmine Postal, PA-C     • pantoprazole  40 mg Oral Early Morning Yasmine Postal, PA-C     • polyethylene glycol  17 g Oral Daily Ellen Amaya NOLAN Diaz     • pramipexole  0.125 mg Oral HS Lydia Bolaños PA-C     • senna-docusate sodium  1 tablet Oral HS Lydia Bolaños PA-C     • sodium chloride  1 Application Nasal BID Lydia Bolaños PA-C     • sodium chloride  1 spray Each Nare HS Lydia Bolaños PA-C     • sodium chloride  75 mL/hr Intravenous Continuous Jasson Ortiz PA-C 75 mL/hr (08/11/23 8632)   • traZODone  75 mg Oral HS Lydia Bolaños PA-C     • triamcinolone   Topical BID Lydia Bolaños PA-C          Today, Patient Was Seen By: Fredy Brown DO    ** Please Note: Dictation voice to text software may have been used in the creation of this document.  **

## 2023-08-12 NOTE — ASSESSMENT & PLAN NOTE
· Presented to [facility] ED today from her nursing home with a 3 day history of progressively worsening pain of her left lower extremity with numbness later and color change to purple  · H/o Afib - anticoagulation had been held intermittently due to epistaxis. 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 There are multifocal areas of occlusion through the superficial femoral artery with intermittent reconstitution of flow from collaterals. Two-vessel tibial vessel runoff persists. · Begun on a heparin drip and  transferred to [facility] for embolectomy. · Note vascular surgery input regarding CT findings. No urgent intervention indicated at this particular juncture. Will continue to monitor hemoglobin closely.   · .  We will repeat today

## 2023-08-13 VITALS
WEIGHT: 199.74 LBS | SYSTOLIC BLOOD PRESSURE: 169 MMHG | HEART RATE: 71 BPM | RESPIRATION RATE: 20 BRPM | BODY MASS INDEX: 36.76 KG/M2 | OXYGEN SATURATION: 95 % | HEIGHT: 62 IN | DIASTOLIC BLOOD PRESSURE: 86 MMHG | TEMPERATURE: 98.2 F

## 2023-08-13 PROCEDURE — 99239 HOSP IP/OBS DSCHRG MGMT >30: CPT | Performed by: INTERNAL MEDICINE

## 2023-08-13 RX ORDER — OXYCODONE HYDROCHLORIDE 5 MG/1
2.5 TABLET ORAL EVERY 4 HOURS PRN
Qty: 20 TABLET | Refills: 0 | Status: SHIPPED | OUTPATIENT
Start: 2023-08-13 | End: 2023-08-23

## 2023-08-13 RX ORDER — ASPIRIN 81 MG/1
81 TABLET, CHEWABLE ORAL DAILY
Qty: 30 TABLET | Refills: 0 | Status: SHIPPED | OUTPATIENT
Start: 2023-08-14

## 2023-08-13 RX ORDER — SODIUM CHLORIDE/ALOE VERA
1 GEL (GRAM) NASAL 2 TIMES DAILY
Qty: 14.1 G | Refills: 0 | Status: SHIPPED | OUTPATIENT
Start: 2023-08-13

## 2023-08-13 RX ADMIN — NYSTATIN 1 APPLICATION: 100000 CREAM TOPICAL at 08:21

## 2023-08-13 RX ADMIN — APIXABAN 10 MG: 5 TABLET, FILM COATED ORAL at 08:20

## 2023-08-13 RX ADMIN — FERROUS SULFATE TAB 325 MG (65 MG ELEMENTAL FE) 325 MG: 325 (65 FE) TAB at 08:20

## 2023-08-13 RX ADMIN — ASPIRIN 81 MG CHEWABLE TABLET 81 MG: 81 TABLET CHEWABLE at 08:20

## 2023-08-13 RX ADMIN — VITAM B12 100 MCG: 100 TAB at 08:20

## 2023-08-13 RX ADMIN — METOPROLOL SUCCINATE 100 MG: 100 TABLET, EXTENDED RELEASE ORAL at 08:20

## 2023-08-13 RX ADMIN — ACETAMINOPHEN 650 MG: 325 TABLET, FILM COATED ORAL at 10:33

## 2023-08-13 RX ADMIN — DULOXETINE HYDROCHLORIDE 60 MG: 60 CAPSULE, DELAYED RELEASE ORAL at 08:20

## 2023-08-13 RX ADMIN — HYDROCHLOROTHIAZIDE 25 MG: 25 TABLET ORAL at 08:20

## 2023-08-13 RX ADMIN — Medication 1 APPLICATION: at 08:21

## 2023-08-13 RX ADMIN — PANTOPRAZOLE SODIUM 40 MG: 40 TABLET, DELAYED RELEASE ORAL at 05:15

## 2023-08-13 NOTE — DISCHARGE SUMMARY
[address] [facility]  Discharge- [address] [facility] 1940, 79 y.o. female MRN: [id number]  Unit/Bed#: [facility] 525-01 Encounter: [id number]  Primary Care Provider: Brice Yadav DO   Date and time admitted to hospital: 8/7/2023 12:32 PM    Acute lower limb ischemia  Assessment & Plan  · Presented to [address] [place] [address] ED today from her nursing home with a 3 day history of progressively worsening pain of her left lower extremity with numbness later and color change to purple  · H/o Afib - anticoagulation had been held intermittently due to epistaxis. 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 There are multifocal areas of occlusion through the superficial femoral artery with intermittent reconstitution of flow from collaterals. Two-vessel tibial vessel runoff persists. · Begun on a heparin drip and  transferred to [facility] for embolectomy. · Note vascular surgery input regarding CT findings. No urgent intervention indicated at this particular juncture. Will continue to monitor hemoglobin closely. · .  Hemoglobin appears stable    Persistent atrial fibrillation (HCC)  Assessment & Plan  · Anticoagulation held intermittently for epistaxis  · Had been on Xarelto previously. Now on Eliquis      Coronary artery disease involving native coronary artery of native heart without angina pectoris  Assessment & Plan  · Continue statin, BB  · Continue with aspirin therapy 81 mg. Discussed with vascular surgery  · Continue with anticoagulation per vascular surgery for thromboembolism x 2.       Essential hypertension  Assessment & Plan  · Continue PTA meds Toprol- mg daily, HCTZ 25 mg daily  · BP increased postprocedure  · Hydralazine prn   · Monitor BP    Depression  Assessment & Plan  · Continue PTA meds Duloxetine 60 mg daily, Trazodone 75 mg at bedtime    Mixed hyperlipidemia  Assessment & Plan  · Continue PTA med Atorvastatin 20 mg daily    History of permanent cardiac pacemaker placement  Assessment & Plan  · Noted              Medical Problems     Resolved Problems  Date Reviewed: 1/4/2021   None         Admission Date:   Admission Orders (From admission, onward)     Ordered        08/07/23 1420  Inpatient Admission  Once                        Admitting Diagnosis: Leg pain [M79.606]  Acute lower limb ischemia [I99.8]    HPI: This is a very pleasant 78-year-old female who presents from the nursing home with history of bilateral TKR, hypertension hyperlipidemia gastroesophageal reflux disease, bioprosthetic aortic valve, peripheral pacemaker, history of tremors, insomnia, depression and gastroesophageal reflux disease with a known history of atrial fibrillation/cardiac thrombus who presents to the hospital with lower extremity thromboembolism in the setting of a fall status post left lower extremity thrombectomy back in October 9002 complicated by a left groin hematoma back then. At that time she was recommended lifelong anticoagulation  She presented initially at [address] for left lower extremity pain and numbness. There was concern for cold limb/thromboembolic event which prompted transfer here for escalated vascular intervention. Prior to transfer a arterial duplex was performed and showed an occlusion of the distal left EIA, and CFA, SFA, popliteal, PT, AT. She was placed on a heparin drip and subsequently transferred here for urgent vascular assessment. Summary of Hospital Course: Upon arrival to [address] [place] [address] S patient reported ongoing symptoms for the past 3 days prior to arrival.  She presents with numbness in the foot with subjective discomfort.   She reports that she recently had her anticoagulation held due to complications associated with epistaxis  She is status post left groin exploration and thrombectomy for cardiac embolism/lower extremity ischemia. She require outpatient follow-up with vascular surgery. Her hospital course was complicated by a transient drop in her hemoglobin. Was noted to have a moderate size left groin hematoma on CTA ordered by critical care due to drop in blood sugars and hemoglobin. There was noted acute posthemorrhagic complication with associated mild retroperitoneal extension and small retroperitoneal hematoma with no active extravasation or evidence of pseudoaneurysm. After assessment with vascular no further intervention was reported to be indicated. Her hemoglobin has stabilized with blood transfusion. Condition at Discharge: fair         Discharge instructions/Information to patient and family:   See after visit summary for information provided to patient and family. Provisions for Follow-Up Care:  See after visit summary for information related to follow-up care and any pertinent home health orders. PCP: Erik Choudhary DO    Disposition: Home    Planned Readmission: No    Discharge Statement   I spent 85 minutes discharging the patient. This time was spent on the day of discharge. I had direct contact with the patient on the day of discharge. Additional documentation is required if more than 30 minutes were spent on discharge. Discharge Medications:  See after visit summary for reconciled discharge medications provided to patient and family.

## 2023-08-13 NOTE — ASSESSMENT & PLAN NOTE
· Presented to [address] [place] [address] ED today from her nursing home with a 3 day history of progressively worsening pain of her left lower extremity with numbness later and color change to purple  · H/o Afib - anticoagulation had been held intermittently due to epistaxis. 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 There are multifocal areas of occlusion through the superficial femoral artery with intermittent reconstitution of flow from collaterals. Two-vessel tibial vessel runoff persists. · Begun on a heparin drip and  transferred to [facility] for embolectomy. · Note vascular surgery input regarding CT findings. No urgent intervention indicated at this particular juncture. Will continue to monitor hemoglobin closely.   · .  We will repeat today

## 2023-08-13 NOTE — CASE MANAGEMENT
Case Management Discharge Planning Note    [facility] name Reg Gavin  [facility] MRN [id number]  : 1940 Date 2023       Current Admission Date: 2023  Current Admission Diagnosis:Hypotension   Patient Active Problem List    Diagnosis Date Noted   • Hypotension 2023   • Peripheral arterial disease ([address]) 2020   • Onychomycosis 10/07/2019   • Left femoral embolectomy 10/07/2019   • Urinary incontinence 10/07/2019   • Acute lower limb ischemia 10/06/2019   • Peripheral artery embolism or thrombosis ([address]) 10/06/2019   • Persistent atrial fibrillation ([address]) 10/06/2019   • Obesity (BMI 35.0-39.9 without comorbidity) 2019   • Globus sensation 2019   • Nocturnal enuresis 2019   • Sciatic nerve pain, right 2018   • Essential hypertension 10/30/2018   • Coronary artery disease involving native coronary artery of native heart without angina pectoris 10/30/2018   • Neuropathy 2018   • Primary insomnia 2018   • History of permanent cardiac pacemaker placement 05/10/2013   • Mixed hyperlipidemia 05/10/2013   • Generalized anxiety disorder 05/10/2013   • Depression 05/10/2013   • Benign familial tremor 05/10/2013      LOS (days): 6  Geometric Mean LOS (GMLOS) (days): 1.80  Days to GMLOS:-4.1     OBJECTIVE:  Risk of Unplanned Readmission Score: 17.5         Current admission status: Inpatient   Preferred Pharmacy:   [address], [address] [place] [address]  Phone: [phone] Fax: [phone]    Primary Care Provider: Ant Yadav DO    Primary Insurance: MEDICARE  Secondary Insurance: [address] DETAILS:      Treatment Team Recommendation: SNF  Discharge Destination Plan[de-identified] SNF  Transport at Discharge : [facility]        Transported by Leta and Unit #):  STACY  ETA of Transport (Date): 23  ETA of Transport (Time): 1300 Additional Comments: Pt is scheduled to discharge to [facility] today 8/13 at 1:00pm via [facility]. CM notified [place]. CM will continue to follow as needed.

## 2023-08-14 ENCOUNTER — NURSING HOME VISIT (OUTPATIENT)
Dept: FAMILY MEDICINE CLINIC | Facility: CLINIC | Age: 83
End: 2023-08-14
Payer: MEDICARE

## 2023-08-14 DIAGNOSIS — I73.9 PERIPHERAL ARTERIAL DISEASE (HCC): Chronic | ICD-10-CM

## 2023-08-14 DIAGNOSIS — I99.8 ACUTE LOWER LIMB ISCHEMIA: Primary | ICD-10-CM

## 2023-08-14 DIAGNOSIS — I25.10 CORONARY ARTERY DISEASE INVOLVING NATIVE CORONARY ARTERY OF NATIVE HEART WITHOUT ANGINA PECTORIS: ICD-10-CM

## 2023-08-14 DIAGNOSIS — I74.4 PERIPHERAL ARTERY EMBOLISM OR THROMBOSIS (HCC): ICD-10-CM

## 2023-08-14 DIAGNOSIS — I48.19 PERSISTENT ATRIAL FIBRILLATION (HCC): Chronic | ICD-10-CM

## 2023-08-14 DIAGNOSIS — R04.0 EPISTAXIS: ICD-10-CM

## 2023-08-14 DIAGNOSIS — Z98.890 HISTORY OF EMBOLECTOMY: ICD-10-CM

## 2023-08-14 PROCEDURE — 99306 1ST NF CARE HIGH MDM 50: CPT | Performed by: FAMILY MEDICINE

## 2023-08-14 NOTE — PROGRESS NOTES
[address] [facility], [facility] [address]    NAME: Chu Sandoval  AGE: 79 y.o. SEX: female    DATE OF ENCOUNTER: 8/14/2023    Code status:  Full Code    Assessment and Plan     1. Acute lower limb ischemia    2. Peripheral arterial disease ([address])    3. Coronary artery disease involving native coronary artery of native heart without angina pectoris    4. Peripheral artery embolism or thrombosis (HCC)    5. Persistent atrial fibrillation ([address])    6. Left femoral embolectomy    7. Epistaxis        All medications and routine orders were reviewed and updated as needed. Plan discussed with: Family member    Chief Complaint     Seen for admission at [facility]    History of Present Illness     The patient is here after being hospitalized due to arterial embolism of the left leg resulting in limb ischemia. The patient was previously anticoagulated with aspirin and Eliquis but these were held due to repeated profound episodes of epistaxis. She also has a history of atrial fibrillation requiring her anticoagulation. Since restarting she has had no episodes of epistaxis. She reports that the color and feeling has improved significantly in her left lower extremity. She is denying ischemic pain. Her bowels have moved. She has no dysuria. She denies any dyspnea. She has had no swallowing difficulties. She is pleasant and cooperative.   She is anxious to begin therapy to be able to get out of bed    HISTORY:  Past Medical History:   Diagnosis Date   • Abnormal blood chemistry 05/10/2013   • Accelerated essential hypertension 05/08/2012   • Atrial fibrillation (HCC)    • Depression    • Essential tremor    • GERD (gastroesophageal reflux disease)    • HLD (hyperlipidemia)    • Hypertension    • Melanoma ([address])    • Obesity    • Polyneuropathy    • Skin cancer    • Thromboembolism ([address]) 10/2019    LLE   • Tinea unguium    • Urinary incontinence      Past Surgical History:   Procedure Laterality Date   • AORTIC VALVE REPLACEMENT  12/2012   • BREAST BIOPSY Right     years ago -Benign   • CARDIAC PACEMAKER PLACEMENT      dual chamber last assessed 10/14/13   • CHOLECYSTECTOMY     • IR LOWER EXTREMITY / INTERVENTION  10/6/2019   • IR LOWER EXTREMITY ANGIOGRAM  8/7/2023   • JOINT REPLACEMENT      B/L knee replacement   • AL TEAEC W/WO PATCH GRAFT COMMON FEMORAL Left 8/7/2023    Procedure: ENDARTERECTOMY ARTERIAL FEMORAL WITH BOVINE PATCH ANGIOPLASTY;  Surgeon: Saintclair Ochs Doctor, MD;  Location: [facility];  Service: Vascular   • THROMBECTOMY W/ EMBOLECTOMY N/A 10/6/2019    Procedure: EMBOLECTOMY/THROMBECTOMY Left LOWER EXTREMITY; angiogram;  Surgeon: Miguel Ángel Lyon MD;  Location: [facility];  Service: Vascular   • THROMBECTOMY W/ EMBOLECTOMY Left 8/7/2023    Procedure: Left femoral exposure Left iliac, femoral, embolectomy wtih #3 and #4 Dieudonne catheters Left lower extremity angiogram Left femoral arteriotomy with bovine patch angioplasty;  Surgeon: Saintclair Ochs Doctor, MD;  Location: [facility];  Service: Vascular     Family History   Problem Relation Age of Onset   • Hypertension Mother    • Breast cancer Mother    • No Known Problems Father    • Breast cancer Daughter 46   • No Known Problems Maternal Grandmother    • No Known Problems Maternal Grandfather    • No Known Problems Paternal Grandmother    • No Known Problems Paternal Grandfather    • No Known Problems Son    • No Known Problems Son    • No Known Problems Daughter    • No Known Problems Brother    • No Known Problems Maternal Aunt    • No Known Problems Maternal Aunt      Social History     Socioeconomic History   • Marital status:      Spouse name: None   • Number of children: None   • Years of education: None   • Highest education level: None   Occupational History   • None   Tobacco Use   • Smoking status: Never   • Smokeless tobacco: Never   Vaping Use   • Vaping Use: Never used   Substance and Sexual Activity • Alcohol use: Not Currently     Alcohol/week: 0.0 standard drinks of alcohol   • Drug use: No   • Sexual activity: Not Currently   Other Topics Concern   • None   Social History Narrative   • None     Social Determinants of Health     Financial Resource Strain: Not on file   Food Insecurity: Not on file   Transportation Needs: Not on file   Physical Activity: Not on file   Stress: Not on file   Social Connections: Not on file   Intimate Partner Violence: Not on file   Housing Stability: Not on file       Allergies: Allergies   Allergen Reactions   • Penicillins Hives   • Sulfa Antibiotics Hives   • Medical Tape Rash       Review of Systems     Review of Systems   Constitutional: Negative for activity change, appetite change, chills, diaphoresis, fatigue and unexpected weight change. HENT: Negative for congestion, ear discharge, ear pain, hearing loss, nosebleeds and rhinorrhea. Eyes: Negative for pain, redness, itching and visual disturbance. Respiratory: Negative for cough, choking, chest tightness and shortness of breath. Cardiovascular: Negative for chest pain and leg swelling. Gastrointestinal: Negative for abdominal pain, blood in stool, constipation, diarrhea and nausea. Endocrine: Negative for cold intolerance, polydipsia and polyphagia. Genitourinary: Negative for dysuria, frequency, hematuria and urgency. Musculoskeletal: Negative for arthralgias, back pain, gait problem, joint swelling, neck pain and neck stiffness. Skin: Positive for color change. Negative for rash. Allergic/Immunologic: Negative for environmental allergies and food allergies. Neurological: Positive for weakness. Negative for dizziness, tremors, seizures, speech difficulty, numbness and headaches. Hematological: Negative for adenopathy. Bruises/bleeds easily. Psychiatric/Behavioral: Negative for behavioral problems, dysphoric mood, hallucinations and self-injury.        Medications and orders     All medications reviewed and updated in group home EMR. Objective     Vitals: per nursing home record    Physical Exam  Constitutional:       Appearance: She is well-developed. HENT:      Head: Normocephalic and atraumatic. Right Ear: External ear normal.      Left Ear: External ear normal.      Mouth/Throat:      Pharynx: No oropharyngeal exudate. Comments: Poor dentition  Eyes:      General: No scleral icterus. Right eye: No discharge. Left eye: No discharge. Conjunctiva/sclera: Conjunctivae normal.      Pupils: Pupils are equal, round, and reactive to light. Neck:      Thyroid: No thyromegaly. Cardiovascular:      Rate and Rhythm: Normal rate. Rhythm irregular. Heart sounds: Murmur heard. No gallop. Pulmonary:      Effort: Pulmonary effort is normal. No respiratory distress. Breath sounds: Normal breath sounds. No wheezing or rales. Abdominal:      General: Bowel sounds are normal.      Palpations: Abdomen is soft. There is no mass. Tenderness: There is no guarding or rebound. Musculoskeletal:         General: No tenderness or deformity. Normal range of motion. Cervical back: Normal range of motion and neck supple. Lymphadenopathy:      Cervical: No cervical adenopathy. Skin:     General: Skin is warm and dry. Coloration: Skin is pale. Findings: No rash. Comments: Distal left foot is cool to touch   Neurological:      Mental Status: She is alert and oriented to person, place, and time. Cranial Nerves: No cranial nerve deficit. Coordination: Coordination normal.      Deep Tendon Reflexes: Reflexes are normal and symmetric. Reflexes normal.   Psychiatric:         Behavior: Behavior normal.         Thought Content: Thought content normal.         Judgment: Judgment normal.         Pertinent Laboratory/Diagnostic Studies:    The following labs/studies were reviewed please see chart or hospital paperwork for details. Diagnostic studies from the hospital were reviewed    - Admit for PT OT and medical therapy. We will monitor her blood count. She will be maintained on Eliquis and aspirin.     Mauricio Ruffin DO  8/14/2023 10:39 AM

## 2023-08-15 ENCOUNTER — TELEPHONE (OUTPATIENT)
Dept: VASCULAR SURGERY | Facility: CLINIC | Age: 83
End: 2023-08-15

## 2023-08-15 NOTE — TELEPHONE ENCOUNTER
Vascular Nurse Navigator Post Op Call    Procedure: Left femoral exposure   Left iliac and femoral embolectomy with #3 and #4 Dieudonne catheters   Left lower extremity angiogram   Left femoral arteriotomy with bovine patch angioplasty    Date of Procedure: 8/7/23    Surgeon:    Jasmeet Lyon, MD - Primary     * Lacy Esteves DO - Fellow     * Ron Dong DPM - Podiatry Fellow    Discharge Date: 8/13/23    Discharge Disposition: Other [address] at [facility]    Leg Weakness?: No    Leg Swelling?: No    Leg Numbness?: No    Chest Pain?: No    Shortness of Breath?: No    Orthopnea?: No    Anticoagulation pt was discharged on post op?: Aspirin and Apixaban (Eliquis)    Statin pt was discharged on post op?:  Lipitor (atorvastatin)    Bleeding?: No    Uncontrolled Pain?: No    Incision Concerns?: No    Fever or Chills?: No      Reviewed discharge instructions and incision care with patient. NEXT OFFICE VISIT SCHEDULED:  8/21/23 at 11:15 am with SHIRA Hurt at [facility] Confirmed?: Yes - facility to arrange transportation - they are speaking with patient's family about transportation      Any further questions/concerns? Spoke with Ramsey, nurse manager at [facility], in regards to above. She stated that patient is doing good since discharge. She stated that patient's incision looks good and without any signs of infection. Reviewed incision care with her. Reviewed discharge medications - Asprin and Eliquis. All questions answered. No concerns expressed at this time.

## 2023-08-16 ENCOUNTER — TELEPHONE (OUTPATIENT)
Dept: VASCULAR SURGERY | Facility: CLINIC | Age: 83
End: 2023-08-16

## 2023-08-16 ENCOUNTER — APPOINTMENT (EMERGENCY)
Dept: RADIOLOGY | Facility: HOSPITAL | Age: 83
DRG: 908 | End: 2023-08-16
Payer: MEDICARE

## 2023-08-16 ENCOUNTER — HOSPITAL ENCOUNTER (INPATIENT)
Facility: HOSPITAL | Age: 83
LOS: 4 days | Discharge: NURSING, REHAB OR LONG-TERM CARE | DRG: 908 | End: 2023-08-20
Attending: EMERGENCY MEDICINE | Admitting: INTERNAL MEDICINE
Payer: MEDICARE

## 2023-08-16 DIAGNOSIS — Z98.890 HISTORY OF EMBOLECTOMY: Primary | ICD-10-CM

## 2023-08-16 DIAGNOSIS — I99.8 ACUTE LOWER LIMB ISCHEMIA: Primary | ICD-10-CM

## 2023-08-16 DIAGNOSIS — T81.31XA DEHISCENCE OF OPERATIVE WOUND, INITIAL ENCOUNTER: ICD-10-CM

## 2023-08-16 DIAGNOSIS — S30.1XXD HEMATOMA OF GROIN, SUBSEQUENT ENCOUNTER: ICD-10-CM

## 2023-08-16 PROBLEM — S30.1XXA HEMATOMA OF GROIN: Status: ACTIVE | Noted: 2023-08-16

## 2023-08-16 LAB
ABO GROUP BLD: NORMAL
ALBUMIN SERPL BCP-MCNC: 2.7 G/DL (ref 3.5–5)
ALP SERPL-CCNC: 81 U/L (ref 46–116)
ALT SERPL W P-5'-P-CCNC: 13 U/L (ref 12–78)
ANION GAP SERPL CALCULATED.3IONS-SCNC: 3 MMOL/L
AST SERPL W P-5'-P-CCNC: 20 U/L (ref 5–45)
BASOPHILS # BLD MANUAL: 0.18 THOUSAND/UL (ref 0–0.1)
BASOPHILS NFR MAR MANUAL: 2 % (ref 0–1)
BILIRUB SERPL-MCNC: 2.51 MG/DL (ref 0.2–1)
BLD GP AB SCN SERPL QL: NEGATIVE
BUN SERPL-MCNC: 15 MG/DL (ref 5–25)
CALCIUM ALBUM COR SERPL-MCNC: 10.2 MG/DL (ref 8.3–10.1)
CALCIUM SERPL-MCNC: 9.2 MG/DL (ref 8.3–10.1)
CHLORIDE SERPL-SCNC: 103 MMOL/L (ref 96–108)
CO2 SERPL-SCNC: 32 MMOL/L (ref 21–32)
CREAT SERPL-MCNC: 1 MG/DL (ref 0.6–1.3)
EOSINOPHIL # BLD MANUAL: 0.26 THOUSAND/UL (ref 0–0.4)
EOSINOPHIL NFR BLD MANUAL: 3 % (ref 0–6)
ERYTHROCYTE [DISTWIDTH] IN BLOOD BY AUTOMATED COUNT: 16.4 % (ref 11.6–15.1)
GFR SERPL CREATININE-BSD FRML MDRD: 51 ML/MIN/1.73SQ M
GLUCOSE SERPL-MCNC: 129 MG/DL (ref 65–140)
HCT VFR BLD AUTO: 32.4 % (ref 34.8–46.1)
HGB BLD-MCNC: 10.1 G/DL (ref 11.5–15.4)
INR PPP: 1.65 (ref 0.84–1.19)
LYMPHOCYTES # BLD AUTO: 1.05 THOUSAND/UL (ref 0.6–4.47)
LYMPHOCYTES # BLD AUTO: 10 % (ref 14–44)
MCH RBC QN AUTO: 30.7 PG (ref 26.8–34.3)
MCHC RBC AUTO-ENTMCNC: 31.2 G/DL (ref 31.4–37.4)
MCV RBC AUTO: 99 FL (ref 82–98)
MONOCYTES # BLD AUTO: 0.97 THOUSAND/UL (ref 0–1.22)
MONOCYTES NFR BLD: 11 % (ref 4–12)
NEUTROPHILS # BLD MANUAL: 6.33 THOUSAND/UL (ref 1.85–7.62)
NEUTS BAND NFR BLD MANUAL: 9 % (ref 0–8)
NEUTS SEG NFR BLD AUTO: 63 % (ref 43–75)
PATHOLOGY REVIEW: YES
PLATELET # BLD AUTO: 428 THOUSANDS/UL (ref 149–390)
PLATELET BLD QL SMEAR: ABNORMAL
PMV BLD AUTO: 9.5 FL (ref 8.9–12.7)
POLYCHROMASIA BLD QL SMEAR: PRESENT
POTASSIUM SERPL-SCNC: 3.5 MMOL/L (ref 3.5–5.3)
PROT SERPL-MCNC: 6.2 G/DL (ref 6.4–8.4)
PROTHROMBIN TIME: 19.7 SECONDS (ref 11.6–14.5)
RBC # BLD AUTO: 3.29 MILLION/UL (ref 3.81–5.12)
RBC MORPH BLD: PRESENT
RH BLD: POSITIVE
SODIUM SERPL-SCNC: 138 MMOL/L (ref 135–147)
SPECIMEN EXPIRATION DATE: NORMAL
VARIANT LYMPHS # BLD AUTO: 2 %
WBC # BLD AUTO: 8.79 THOUSAND/UL (ref 4.31–10.16)

## 2023-08-16 PROCEDURE — G1004 CDSM NDSC: HCPCS

## 2023-08-16 PROCEDURE — 85027 COMPLETE CBC AUTOMATED: CPT

## 2023-08-16 PROCEDURE — 85007 BL SMEAR W/DIFF WBC COUNT: CPT

## 2023-08-16 PROCEDURE — 97605 NEG PRS WND THER DME<=50SQCM: CPT | Performed by: SURGERY

## 2023-08-16 PROCEDURE — 86901 BLOOD TYPING SEROLOGIC RH(D): CPT

## 2023-08-16 PROCEDURE — 75635 CT ANGIO ABDOMINAL ARTERIES: CPT

## 2023-08-16 PROCEDURE — 99285 EMERGENCY DEPT VISIT HI MDM: CPT | Performed by: EMERGENCY MEDICINE

## 2023-08-16 PROCEDURE — 80053 COMPREHEN METABOLIC PANEL: CPT

## 2023-08-16 PROCEDURE — 99285 EMERGENCY DEPT VISIT HI MDM: CPT

## 2023-08-16 PROCEDURE — 11042 DBRDMT SUBQ TIS 1ST 20SQCM/<: CPT | Performed by: SURGERY

## 2023-08-16 PROCEDURE — 99223 1ST HOSP IP/OBS HIGH 75: CPT | Performed by: INTERNAL MEDICINE

## 2023-08-16 PROCEDURE — 36415 COLL VENOUS BLD VENIPUNCTURE: CPT

## 2023-08-16 PROCEDURE — 85610 PROTHROMBIN TIME: CPT

## 2023-08-16 PROCEDURE — 86850 RBC ANTIBODY SCREEN: CPT

## 2023-08-16 PROCEDURE — 86900 BLOOD TYPING SEROLOGIC ABO: CPT

## 2023-08-16 RX ORDER — TRANEXAMIC ACID 100 MG/ML
1000 INJECTION, SOLUTION INTRAVENOUS ONCE
Status: COMPLETED | OUTPATIENT
Start: 2023-08-16 | End: 2023-08-16

## 2023-08-16 RX ORDER — FERROUS SULFATE 325(65) MG
325 TABLET ORAL EVERY OTHER DAY
Status: DISCONTINUED | OUTPATIENT
Start: 2023-08-17 | End: 2023-08-20 | Stop reason: HOSPADM

## 2023-08-16 RX ORDER — PANTOPRAZOLE SODIUM 40 MG/1
40 TABLET, DELAYED RELEASE ORAL
Status: DISCONTINUED | OUTPATIENT
Start: 2023-08-17 | End: 2023-08-20 | Stop reason: HOSPADM

## 2023-08-16 RX ORDER — ATORVASTATIN CALCIUM 20 MG/1
20 TABLET, FILM COATED ORAL
Status: DISCONTINUED | OUTPATIENT
Start: 2023-08-17 | End: 2023-08-20 | Stop reason: HOSPADM

## 2023-08-16 RX ORDER — TRANEXAMIC ACID 100 MG/ML
1000 INJECTION, SOLUTION INTRAVENOUS ONCE
Status: CANCELLED | OUTPATIENT
Start: 2023-08-16 | End: 2023-08-16

## 2023-08-16 RX ORDER — TRAZODONE HYDROCHLORIDE 50 MG/1
75 TABLET ORAL
Status: DISCONTINUED | OUTPATIENT
Start: 2023-08-16 | End: 2023-08-20 | Stop reason: HOSPADM

## 2023-08-16 RX ORDER — LANOLIN ALCOHOL/MO/W.PET/CERES
3 CREAM (GRAM) TOPICAL
Status: DISCONTINUED | OUTPATIENT
Start: 2023-08-16 | End: 2023-08-20 | Stop reason: HOSPADM

## 2023-08-16 RX ORDER — IODIXANOL 320 MG/ML
120 INJECTION, SOLUTION INTRAVASCULAR
Status: COMPLETED | OUTPATIENT
Start: 2023-08-16 | End: 2023-08-16

## 2023-08-16 RX ORDER — ECHINACEA PURPUREA EXTRACT 125 MG
1 TABLET ORAL
Status: DISCONTINUED | OUTPATIENT
Start: 2023-08-16 | End: 2023-08-20 | Stop reason: HOSPADM

## 2023-08-16 RX ORDER — ONDANSETRON 2 MG/ML
4 INJECTION INTRAMUSCULAR; INTRAVENOUS EVERY 6 HOURS PRN
Status: DISCONTINUED | OUTPATIENT
Start: 2023-08-16 | End: 2023-08-20 | Stop reason: HOSPADM

## 2023-08-16 RX ORDER — DULOXETIN HYDROCHLORIDE 60 MG/1
60 CAPSULE, DELAYED RELEASE ORAL DAILY
Status: DISCONTINUED | OUTPATIENT
Start: 2023-08-17 | End: 2023-08-20 | Stop reason: HOSPADM

## 2023-08-16 RX ORDER — ACETAMINOPHEN 325 MG/1
650 TABLET ORAL EVERY 6 HOURS PRN
Status: DISCONTINUED | OUTPATIENT
Start: 2023-08-16 | End: 2023-08-20 | Stop reason: HOSPADM

## 2023-08-16 RX ORDER — AMOXICILLIN 250 MG
1 CAPSULE ORAL
Status: DISCONTINUED | OUTPATIENT
Start: 2023-08-16 | End: 2023-08-20 | Stop reason: HOSPADM

## 2023-08-16 RX ORDER — METOPROLOL SUCCINATE 100 MG/1
100 TABLET, EXTENDED RELEASE ORAL DAILY
Status: DISCONTINUED | OUTPATIENT
Start: 2023-08-17 | End: 2023-08-20 | Stop reason: HOSPADM

## 2023-08-16 RX ORDER — PRAMIPEXOLE DIHYDROCHLORIDE 0.25 MG/1
0.12 TABLET ORAL
Status: DISCONTINUED | OUTPATIENT
Start: 2023-08-16 | End: 2023-08-20 | Stop reason: HOSPADM

## 2023-08-16 RX ORDER — POLYETHYLENE GLYCOL 3350 17 G/17G
17 POWDER, FOR SOLUTION ORAL DAILY
Status: DISCONTINUED | OUTPATIENT
Start: 2023-08-17 | End: 2023-08-20 | Stop reason: HOSPADM

## 2023-08-16 RX ORDER — BISACODYL 10 MG
10 SUPPOSITORY, RECTAL RECTAL DAILY PRN
Status: DISCONTINUED | OUTPATIENT
Start: 2023-08-16 | End: 2023-08-20 | Stop reason: HOSPADM

## 2023-08-16 RX ADMIN — IODIXANOL 120 ML: 320 INJECTION, SOLUTION INTRAVASCULAR at 18:46

## 2023-08-16 RX ADMIN — TRAZODONE HYDROCHLORIDE 75 MG: 50 TABLET ORAL at 23:27

## 2023-08-16 RX ADMIN — MELATONIN 3 MG: at 23:27

## 2023-08-16 RX ADMIN — SENNOSIDES AND DOCUSATE SODIUM 1 TABLET: 50; 8.6 TABLET ORAL at 23:27

## 2023-08-16 RX ADMIN — TRANEXAMIC ACID 1000 MG: 100 INJECTION, SOLUTION INTRAVENOUS at 17:34

## 2023-08-16 NOTE — ED ATTENDING ATTESTATION
8/16/2023  I, Klaudia Jimenez MD, saw and evaluated the patient. I have discussed the patient with the resident/non-physician practitioner and agree with the resident's/non-physician practitioner's findings, Plan of Care, and MDM as documented in the resident's/non-physician practitioner's note, except where noted. All available labs and Radiology studies were reviewed. I was present for key portions of any procedure(s) performed by the resident/non-physician practitioner and I was immediately available to provide assistance. At this point I agree with the current assessment done in the Emergency Department.   I have conducted an independent evaluation of this patient a history and physical is as follows:  Pt had a embolectomy performed on 8/7 pt brought to ed secondary to L hematoma that may be enlarging and is currently bleeding since approx 3 pm PE: alert L groin with hematoma and active bleeding noted abd soft nontender MDM: will do ct wit run off and gel foam txa to stop bleed  Check labs  ED Course         Critical Care Time  Procedures

## 2023-08-16 NOTE — ED PROVIDER NOTES
History  Chief Complaint   Patient presents with   • Medical Problem     Per EMS - pt had a thrombectomy 2 weeks ago, rehab facility noticed bleeding around L groin incision site, bleeding controlled at this time     Patient is a 79-year-old female with past medical history of recent surgical procedure, left femoral embolectomy with left femoral angioplasty, who presents the emergency department for postoperative bleeding. Patient received a L femoral embolectomy w/ femoral angioplasty on 8/7/23. Sent from her rehab facility for concern of rapidly expanding hematoma/bleeding from the surgical site. Prior to today patient has otherwise been well, no recent complaint of fever/chills, no nausea/vomiting, no chest pain/shortness of breath, no change to bowel or bladder habits. With EMS patient had bleeding from the left femoral site, ABD pads in place on patient arrival, bleeding well coagulated at that point, EMS also anthony a tracing around the hematoma, there has been no advancement past the line provided by EMS during time of presentation. Vascular was consulted, also recieved CTA abdomen with runoff. Per CTA hematoma appears to be decreasing in size, vascular expressed some blood from the area, believe it's old, however they are planning on revising / cleaning out the area. Asking to admit for pre-op with goal of revision tomorrow. She's been stable for us during her stay. Hi, this is José Dougherty in the ED. I have a patient I would like to admit. Domi Can is a 80 y/o f in ED bed 16, MRN: [id number], PCP is Amparo Mclaughlin. Pt recieved a L femoral embolectomy w/ femoral angioplasty on 8/7/23. Sent from her rehab facility for concern of rapidly expanding hematoma/bleeding from the surgical site. Vascular was consulted, also recieved CTA abdomen with runoff.  Per CTA hematoma appears to be decreasing in size, vascular expressed some blood from the area, believe it's old, however they are planning on revising / cleaning out the area. Asking to admit for pre-op with goal of revision tomorrow. She's been stable for us during her stay. Prior to Admission Medications   Prescriptions Last Dose Informant Patient Reported? Taking? DULoxetine (CYMBALTA) 60 mg delayed release capsule   Yes No   Sig: Take 60 mg by mouth daily   Sennosides-Docusate Sodium 8.6-50 MG CAPS   Yes No   Sig: Take 1 tablet by mouth daily at bedtime   apixaban (ELIQUIS) 5 mg   No No   Sig: Take 2 tablets (10 mg total) by mouth 2 (two) times a day for 3 doses   apixaban (ELIQUIS) 5 mg   No No   Sig: Take 1 tablet (5 mg total) by mouth 2 (two) times a day Do not start before August 15, 2023. aspirin 81 mg chewable tablet   No No   Sig: Chew 1 tablet (81 mg total) daily Do not start before August 14, 2023.    atorvastatin (LIPITOR) 20 mg tablet  Outside Facility (Specify) No No   Sig: Take 1 tablet (20 mg total) by mouth daily after dinner   bisacodyl (DULCOLAX) 10 mg suppository  Outside Facility (Specify) Yes No   Sig: Insert 10 mg into the rectum daily as needed for constipation   cyanocobalamin (VITAMIN B-12) 100 mcg tablet  Outside Facility (Specify) Yes No   Sig: Take 100 mcg by mouth daily   ferrous sulfate 325 (65 Fe) mg tablet  Outside Facility (Specify) Yes No   Sig: Take 325 mg by mouth every other day   hydrochlorothiazide (HYDRODIURIL) 25 mg tablet  Outside Facility (Specify) Yes No   Sig: Take 25 mg by mouth daily   melatonin 3 mg  Outside Facility (Specify) Yes No   Sig: Take 3 mg by mouth daily at bedtime as needed   metoprolol succinate (TOPROL-XL) 100 mg 24 hr tablet   Yes No   Sig: Take 100 mg by mouth daily   miconazole (MICOTIN) 2 % powder   Yes No   Sig: Apply 1 Application topically every 8 (eight) hours Apply to inframammary crease b/l & abdominal fold   nystatin (MYCOSTATIN) cream   Yes No   Sig: Apply 1 Application topically in the morning Abdominal fold   omeprazole (PriLOSEC) 20 mg delayed release capsule Outside Facility (Specify) No No   Sig: TAKE ONE CAPSULE BY MOUTH EVERY DAY   oxyCODONE (ROXICODONE) 5 immediate release tablet   No No   Sig: Take 0.5 tablets (2.5 mg total) by mouth every 4 (four) hours as needed for severe pain for up to 10 days Max Daily Amount: 15 mg   polyethylene glycol (MIRALAX) 17 g packet   Yes No   Sig: Take 17 g by mouth daily   pramipexole (MIRAPEX) 0.125 mg tablet   Yes No   Sig: Take 0.125 mg by mouth daily at bedtime   sodium chloride (AYR SALINE NASAL) nasal gel   No No   Si Application into each nostril 2 (two) times a day   sodium chloride (OCEAN) 0.65 % nasal spray   Yes No   Si spray into each nostril daily at bedtime Both nostils   traZODone (DESYREL) 50 mg tablet  Outside Facility (Specify) Yes No   Sig: Take 75 mg by mouth daily at bedtime   triamcinolone (KENALOG) 0.1 % cream  Outside Facility (Specify) No No   Sig: Apply topically 2 (two) times a day      Facility-Administered Medications: None       Past Medical History:   Diagnosis Date   • Abnormal blood chemistry 05/10/2013   • Accelerated essential hypertension 2012   • Atrial fibrillation (HCC)    • Depression    • Essential tremor    • GERD (gastroesophageal reflux disease)    • HLD (hyperlipidemia)    • Hypertension    • Melanoma (HCC)    • Obesity    • Polyneuropathy    • Skin cancer    • Thromboembolism ([address]) 10/2019    LLE   • Tinea unguium    • Urinary incontinence        Past Surgical History:   Procedure Laterality Date   • AORTIC VALVE REPLACEMENT  2012   • BREAST BIOPSY Right     years ago -Benign   • CARDIAC PACEMAKER PLACEMENT      dual chamber last assessed 10/14/13   • CHOLECYSTECTOMY     • IR LOWER EXTREMITY / INTERVENTION  10/6/2019   • IR LOWER EXTREMITY ANGIOGRAM  2023   • JOINT REPLACEMENT      B/L knee replacement   • SC TEAEC W/WO PATCH GRAFT COMMON FEMORAL Left 2023    Procedure: ENDARTERECTOMY ARTERIAL FEMORAL WITH BOVINE PATCH ANGIOPLASTY;  Surgeon: William Lyon MD; Location: [facility];  Service: Vascular   • THROMBECTOMY W/ EMBOLECTOMY N/A 10/6/2019    Procedure: EMBOLECTOMY/THROMBECTOMY Left LOWER EXTREMITY; angiogram;  Surgeon: Adeola Lyon MD;  Location: [facility];  Service: Vascular   • THROMBECTOMY W/ EMBOLECTOMY Left 8/7/2023    Procedure: Left femoral exposure Left iliac, femoral, embolectomy wtih #3 and #4 Dieudonne catheters Left lower extremity angiogram Left femoral arteriotomy with bovine patch angioplasty;  Surgeon: Kit Lyon MD;  Location: [facility];  Service: Vascular       Family History   Problem Relation Age of Onset   • Hypertension Mother    • Breast cancer Mother    • No Known Problems Father    • Breast cancer Daughter 46   • No Known Problems Maternal Grandmother    • No Known Problems Maternal Grandfather    • No Known Problems Paternal Grandmother    • No Known Problems Paternal Grandfather    • No Known Problems Son    • No Known Problems Son    • No Known Problems Daughter    • No Known Problems Brother    • No Known Problems Maternal Aunt    • No Known Problems Maternal Aunt      I have reviewed and agree with the history as documented. E-Cigarette/Vaping   • E-Cigarette Use Never User      E-Cigarette/Vaping Substances   • Nicotine No    • THC No    • CBD No    • Flavoring No    • Other No    • Unknown No      Social History     Tobacco Use   • Smoking status: Never   • Smokeless tobacco: Never   Vaping Use   • Vaping Use: Never used   Substance Use Topics   • Alcohol use: Not Currently     Alcohol/week: 0.0 standard drinks of alcohol   • Drug use: No        Review of Systems   Constitutional: Negative for chills and fever. HENT: Negative for ear pain and sore throat. Eyes: Negative for pain and visual disturbance. Respiratory: Negative for cough and shortness of breath. Cardiovascular: Negative for chest pain and palpitations. Gastrointestinal: Negative for abdominal pain and vomiting.    Genitourinary: Negative for dysuria and hematuria. Musculoskeletal: Negative for arthralgias and back pain. Skin: Positive for wound. Negative for color change and rash. Patient has complaint of bleeding from the surgical site overlying the left femoral region   Neurological: Negative for seizures and syncope. All other systems reviewed and are negative. Physical Exam  ED Triage Vitals   Temperature Pulse Respirations Blood Pressure SpO2   08/16/23 1653 08/16/23 1653 08/16/23 1653 08/16/23 1655 08/16/23 1653   98.2 °F (36.8 °C) 75 20 137/63 96 %      Temp Source Heart Rate Source Patient Position - Orthostatic VS BP Location FiO2 (%)   08/16/23 1653 08/16/23 1653 08/16/23 1655 08/16/23 1655 --   Oral Monitor Lying Left arm       Pain Score       --                    Orthostatic Vital Signs  Vitals:    08/16/23 1653 08/16/23 1655 08/16/23 1900 08/16/23 2235   BP:  137/63 152/98 134/69   Pulse: 75  70 73   Patient Position - Orthostatic VS:  Lying Lying        Physical Exam  Vitals and nursing note reviewed. Constitutional:       General: She is not in acute distress. Appearance: She is well-developed. HENT:      Head: Normocephalic and atraumatic. Eyes:      Conjunctiva/sclera: Conjunctivae normal.   Cardiovascular:      Rate and Rhythm: Normal rate and regular rhythm. Heart sounds: No murmur heard. Pulmonary:      Effort: Pulmonary effort is normal. No respiratory distress. Breath sounds: Normal breath sounds. Abdominal:      Palpations: Abdomen is soft. Tenderness: There is no abdominal tenderness. Musculoskeletal:         General: No swelling. Cervical back: Neck supple. Skin:     General: Skin is warm and dry. Capillary Refill: Capillary refill takes less than 2 seconds. Findings: Bruising, erythema and lesion present.       Comments: Patient has intact surgical site overlying the left femoral region, after removal of the ABD pads there is some mild blood oozing from the intact sutures, there is a fist-sized hematoma overlying the left femoral region but does not appear to be expanding, patient also has a large pannus, some concern for infection of the surgical site, there is an odor and some evidence of pustular exudate within the folds of the pannus overlying the surgical site. This area is erythematous and tender. Patient also has a large degree of posterior leg/flank ecchymosis, this is believed to date back to the surgery on 8/7/2023. Neurological:      Mental Status: She is alert.    Psychiatric:         Mood and Affect: Mood normal.         ED Medications  Medications   atorvastatin (LIPITOR) tablet 20 mg (has no administration in time range)   bisacodyl (DULCOLAX) rectal suppository 10 mg (has no administration in time range)   DULoxetine (CYMBALTA) delayed release capsule 60 mg (has no administration in time range)   cyanocobalamin (VITAMIN B-12) tablet 100 mcg (has no administration in time range)   ferrous sulfate tablet 325 mg (has no administration in time range)   melatonin tablet 3 mg (3 mg Oral Given 8/16/23 2327)   metoprolol succinate (TOPROL-XL) 24 hr tablet 100 mg (has no administration in time range)   pantoprazole (PROTONIX) EC tablet 40 mg (has no administration in time range)   oxyCODONE (ROXICODONE) split tablet 2.5 mg (has no administration in time range)   polyethylene glycol (MIRALAX) packet 17 g (has no administration in time range)   pramipexole (MIRAPEX) tablet 0.125 mg (has no administration in time range)   senna-docusate sodium (SENOKOT S) 8.6-50 mg per tablet 1 tablet (1 tablet Oral Given 8/16/23 2327)   traZODone (DESYREL) tablet 75 mg (75 mg Oral Given 8/16/23 2327)   sodium chloride (OCEAN) 0.65 % nasal spray 1 spray (has no administration in time range)   ondansetron (ZOFRAN) injection 4 mg (has no administration in time range)   acetaminophen (TYLENOL) tablet 650 mg (has no administration in time range)   chlorhexidine (PERIDEX) 0.12 % oral rinse 15 mL (has no administration in time range)   heparin (porcine) 25,000 units in 0.45% NaCl 250 mL infusion (premix) (11.1 Units/kg/hr × 90 kg (Order-Specific) Intravenous New Bag 8/17/23 0122)   heparin (porcine) injection 4,000 Units (has no administration in time range)   heparin (porcine) injection 2,000 Units (has no administration in time range)   tranexamic acid 100mg/mL (for epistaxis) 1,000 mg (1,000 mg Nasal Given 8/16/23 1734)   iodixanol (VISIPAQUE) 320 MG/ML injection 120 mL (120 mL Intravenous Given 8/16/23 1846)       Diagnostic Studies  Results Reviewed     Procedure Component Value Units Date/Time    CBC and differential [[id number]]  (Abnormal) Collected: 08/16/23 1707    Lab Status: Final result Specimen: Blood from Arm, Left Updated: 08/16/23 2011     WBC 8.79 Thousand/uL      RBC 3.29 Million/uL      Hemoglobin 10.1 g/dL      Hematocrit 32.4 %      MCV 99 fL      MCH 30.7 pg      MCHC 31.2 g/dL      RDW 16.4 %      MPV 9.5 fL      Platelets 150 Thousands/uL     Narrative: This is an appended report. These results have been appended to a previously verified report. Manual Differential(PHLEBS Do Not Order) [[id number]]  (Abnormal) Collected: 08/16/23 1707    Lab Status: Final result Specimen: Blood from Arm, Left Updated: 08/16/23 2011     Segmented % 63 %      Bands % 9 %      Lymphocytes % 10 %      Monocytes % 11 %      Eosinophils, % 3 %      Basophils % 2 %      Atypical Lymphocytes % 2 %      Absolute Neutrophils 6.33 Thousand/uL      Lymphocytes Absolute 1.05 Thousand/uL      Monocytes Absolute 0.97 Thousand/uL      Eosinophils Absolute 0.26 Thousand/uL      Basophils Absolute 0.18 Thousand/uL      Total Counted --     RBC Morphology Present     Platelet Estimate Increased     Pathology Review Yes     Polychromasia Present    Path Slide Review [[id number]] Collected: 08/16/23 1707    Lab Status:  In process Specimen: Blood from Arm, Left Updated: 08/16/23 1743    Comprehensive metabolic panel [[id number]]  (Abnormal) Collected: 08/16/23 1707    Lab Status: Final result Specimen: Blood from Arm, Left Updated: 08/16/23 1737     Sodium 138 mmol/L      Potassium 3.5 mmol/L      Chloride 103 mmol/L      CO2 32 mmol/L      ANION GAP 3 mmol/L      BUN 15 mg/dL      Creatinine 1.00 mg/dL      Glucose 129 mg/dL      Calcium 9.2 mg/dL      Corrected Calcium 10.2 mg/dL      AST 20 U/L      ALT 13 U/L      Alkaline Phosphatase 81 U/L      Total Protein 6.2 g/dL      Albumin 2.7 g/dL      Total Bilirubin 2.51 mg/dL      eGFR 51 ml/min/1.73sq m     Narrative:      [place] guidelines for Chronic Kidney Disease (CKD):   •  Stage 1 with normal or high GFR (GFR > 90 mL/min/1.73 square meters)  •  Stage 2 Mild CKD (GFR = 60-89 mL/min/1.73 square meters)  •  Stage 3A Moderate CKD (GFR = 45-59 mL/min/1.73 square meters)  •  Stage 3B Moderate CKD (GFR = 30-44 mL/min/1.73 square meters)  •  Stage 4 Severe CKD (GFR = 15-29 mL/min/1.73 square meters)  •  Stage 5 End Stage CKD (GFR <15 mL/min/1.73 square meters)  Note: GFR calculation is accurate only with a steady state creatinine    Protime-INR [[id number]]  (Abnormal) Collected: 08/16/23 1707    Lab Status: Final result Specimen: Blood from Arm, Left Updated: 08/16/23 1736     Protime 19.7 seconds      INR 1.65                 CTA abdominal w run off w wo contrast   Final Result by Veronica Jones MD (08/16 1919)      Slightly decreased size of a left groin hematoma as described. No active extravasation. Patent left lower extremity runoff as before, in particular the external iliac and common femoral arteries remain patent, there is segmental  SFA stenosis with unchanged distal runoff. Small amount of air within the bladder, correlate for recent instrumentation. Correlate with urinalysis for possible infection.             Workstation performed: [id number]               Procedures  Procedures      ED Course SBIRT 22yo+    Flowsheet Row Most Recent Value   Initial Alcohol Screen: US AUDIT-C     1. How often do you have a drink containing alcohol? 0 Filed at: 08/16/2023 1656   2. How many drinks containing alcohol do you have on a typical day you are drinking? 0 Filed at: 08/16/2023 1656   3a. Male UNDER 65: How often do you have five or more drinks on one occasion? 0 Filed at: 08/16/2023 1656   3b. FEMALE Any Age, or MALE 65+: How often do you have 4 or more drinks on one occassion? 0 Filed at: 08/16/2023 1656   Audit-C Score 0 Filed at: 08/16/2023 1656   TYSHAWN: How many times in the past year have you. .. Used an illegal drug or used a prescription medication for non-medical reasons? Never Filed at: 08/16/2023 1656                Medical Decision Making  Patient is a 79 y.o. female with PMH of recent left femoral embolectomy with left femoral angioplasty who presents to the ED with wound dehiscence/surgical site breakdown with associated bleeding. Vital signs within normal limits. On exam patient has evidence of a fairly intact surgical site, there is some mild blood oozing, but the sutures appear to be intact, there is also a large hematoma overlying the surgical site, but the hematoma does not appear to be rapidly expanding, there is a large amount of ecchymosis to the flank and the posterior leg, but there is no evidence of new blood pooling in the flank/posterior leg. There is some concern for wound infection as a possible cause of dehiscence, the wound site has some foul odor to it and appears to have some pustular exudate within the folds of the patient's pannus overlying the left femoral surgical site. Remainder the patient's physical exam is benign. History and physical exam most consistent with surgical site breakdown versus infection/wound dehiscence. Differential diagnosis includes rapidly expanding hematoma/internal bleeding.  Plan CBC/CMP for evidence of anemia/concerns of bleeding with need for blood transfusion. Type and screen for concern of blood loss with need to transfuse, PT/INR due to patient's history of blood thinner. Plan for imaging of the affected area with CT lower extremity, will consult vascular surgery for additional recommendations. After consult to vascular surgery, vascular surgery recommends switching the CT lower extremity to a CTA abdomen with runoffs for a more comprehensive evaluation of potential bleeding. CT lower extremity canceled and CTA abdomen with runoffs added. All labs reviewed and utilized in the medical decision making process  All radiology studies independently viewed by me and interpreted by the radiologist.  I reviewed all testing with the patient. Upon re-evaluation vascular surgery has now been consulted and has also seen the patient, they believe that the hematoma is old blood, they were able to massage the area and partially decompressed the hematoma, CTA confirms that the patient's hematoma is not enlarging, appears decreased in size as compared to prior study. However at this time vascular surgery would like to attempt to admit the patient for further revision and surgical debridement of the area, vascular surgery is concerned for potential of infection considering patient's overlying pannus and the appearance of the surgical site. Consult to internal medicine over [facility] text, with patient agreed to be admitted to internal medicine. Patient admitted to the internal medicine team under Dr. Vladimir Urbina, at this point in time patient is stable, awaiting transfer to the floor, not complaining of any acute pain or symptom changes, patient's bleeding is well controlled with some surgical absorbent pads that were applied directly over the surgical site.      Patient successfully transferred to the floor without incident, patient to be admitted with goal of surgical intervention/revision tomorrow by vascular surgery. Amount and/or Complexity of Data Reviewed  Labs: ordered. Risk  Prescription drug management. Decision regarding hospitalization. Disposition  Final diagnoses:   Left femoral embolectomy   Dehiscence of operative wound, initial encounter     Time reflects when diagnosis was documented in both MDM as applicable and the Disposition within this note     Time User Action Codes Description Comment    8/16/2023  8:37 PM Doreatha Coca Add [G60.220] Left femoral embolectomy     8/16/2023  9:05 PM Doreatha Coca Add [T81.31XA] Dehiscence of operative wound, initial encounter     8/17/2023 12:18 AM Mark Horowitz Add [S30.1XXD] Hematoma of groin, subsequent encounter       ED Disposition     ED Disposition   Admit    Condition   Stable    Date/Time   Wed Aug 16, 2023  9:05 PM    Comment   Case was discussed with CATIE and the patient's admission status was agreed to be admitted to the service of Dr. Oneta Romberg. Follow-up Information    None         Current Discharge Medication List      CONTINUE these medications which have NOT CHANGED    Details   apixaban (ELIQUIS) 5 mg Take 1 tablet (5 mg total) by mouth 2 (two) times a day Do not start before August 15, 2023. Refills: 0    Associated Diagnoses: Acute lower limb ischemia      aspirin 81 mg chewable tablet Chew 1 tablet (81 mg total) daily Do not start before August 14, 2023.   Qty: 30 tablet, Refills: 0    Associated Diagnoses: Acute lower limb ischemia      atorvastatin (LIPITOR) 20 mg tablet Take 1 tablet (20 mg total) by mouth daily after dinner  Qty: 60 tablet, Refills: 0    Associated Diagnoses: Peripheral artery embolism or thrombosis (HCC)      bisacodyl (DULCOLAX) 10 mg suppository Insert 10 mg into the rectum daily as needed for constipation      cyanocobalamin (VITAMIN B-12) 100 mcg tablet Take 100 mcg by mouth daily      DULoxetine (CYMBALTA) 60 mg delayed release capsule Take 60 mg by mouth daily      ferrous sulfate 325 (65 Fe) mg tablet Take 325 mg by mouth every other day      hydrochlorothiazide (HYDRODIURIL) 25 mg tablet Take 25 mg by mouth daily      melatonin 3 mg Take 3 mg by mouth daily at bedtime as needed      metoprolol succinate (TOPROL-XL) 100 mg 24 hr tablet Take 100 mg by mouth daily      miconazole (MICOTIN) 2 % powder Apply 1 Application topically every 8 (eight) hours Apply to inframammary crease b/l & abdominal fold      nystatin (MYCOSTATIN) cream Apply 1 Application topically in the morning Abdominal fold      omeprazole (PriLOSEC) 20 mg delayed release capsule TAKE ONE CAPSULE BY MOUTH EVERY DAY  Qty: 30 capsule, Refills: 5    Associated Diagnoses: Gastroesophageal reflux disease with esophagitis      oxyCODONE (ROXICODONE) 5 immediate release tablet Take 0.5 tablets (2.5 mg total) by mouth every 4 (four) hours as needed for severe pain for up to 10 days Max Daily Amount: 15 mg  Qty: 20 tablet, Refills: 0    Associated Diagnoses: Acute lower limb ischemia      polyethylene glycol (MIRALAX) 17 g packet Take 17 g by mouth daily      pramipexole (MIRAPEX) 0.125 mg tablet Take 0.125 mg by mouth daily at bedtime      Sennosides-Docusate Sodium 8.6-50 MG CAPS Take 1 tablet by mouth daily at bedtime      sodium chloride (AYR SALINE NASAL) nasal gel 1 Application into each nostril 2 (two) times a day  Qty: 14.1 g, Refills: 0    Associated Diagnoses: Acute lower limb ischemia      sodium chloride (OCEAN) 0.65 % nasal spray 1 spray into each nostril daily at bedtime Both nostils      traZODone (DESYREL) 50 mg tablet Take 75 mg by mouth daily at bedtime      triamcinolone (KENALOG) 0.1 % cream Apply topically 2 (two) times a day  Qty: 30 g, Refills: 0    Associated Diagnoses: Peripheral artery embolism or thrombosis (HCC)           No discharge procedures on file.     PDMP Review       Value Time User    PDMP Reviewed  Yes 8/16/2023  9:24 PM Teresa Bryant DO           ED Provider  Attending physically available and evaluated Brady Garcia. I managed the patient along with the ED Attending.     Electronically Signed by         Crystal Doe DO  08/17/23 [id number]

## 2023-08-16 NOTE — TELEPHONE ENCOUNTER
Can they send a picture? Or upload to media? Patient is on ASA/Eliquis and had post op hematoma. Is she bleeding through dressings? If unable to control bleeding will need to go to ED. Can her office appt be moved up this week?

## 2023-08-16 NOTE — TELEPHONE ENCOUNTER
Hudson Meraz at [facility] called , patient has an enlarging hematoma ,  They are sending her to ED at [facility]. .  I will alert my triage and send an ADT 21 to let the ER know.

## 2023-08-16 NOTE — TELEPHONE ENCOUNTER
Valentino Diamond is unable to send picture. Noe Wiley She will check dressing again and see how much it is saturated and call us back . If saturated since she called, they will take her to ED.

## 2023-08-16 NOTE — ED NOTES
Patient transported to [address] [place] [address]  08/16/23 [facility] [address]

## 2023-08-16 NOTE — TELEPHONE ENCOUNTER
Enoc Feldman , nurse from [facility]    [phone] called, they were changing dressing on left fem and iliac  emoblectomy with left femoral exposure done 8/7/23  and they found a 4x4 saturated with blood . I t is activily bleeding but slowly she said. There appears to be one loose suture. Will send to vascular triage to advise.    Next appt 8/21 [facility], SHIRA,

## 2023-08-17 ENCOUNTER — ANESTHESIA EVENT (INPATIENT)
Dept: PERIOP | Facility: HOSPITAL | Age: 83
DRG: 908 | End: 2023-08-17
Payer: MEDICARE

## 2023-08-17 ENCOUNTER — ANESTHESIA (INPATIENT)
Dept: PERIOP | Facility: HOSPITAL | Age: 83
DRG: 908 | End: 2023-08-17
Payer: MEDICARE

## 2023-08-17 LAB
ANION GAP SERPL CALCULATED.3IONS-SCNC: 4 MMOL/L
APTT PPP: 34 SECONDS (ref 23–37)
APTT PPP: 50 SECONDS (ref 23–37)
APTT PPP: 61 SECONDS (ref 23–37)
BUN SERPL-MCNC: 17 MG/DL (ref 5–25)
CALCIUM SERPL-MCNC: 9.1 MG/DL (ref 8.3–10.1)
CHLORIDE SERPL-SCNC: 102 MMOL/L (ref 96–108)
CO2 SERPL-SCNC: 30 MMOL/L (ref 21–32)
CREAT SERPL-MCNC: 0.86 MG/DL (ref 0.6–1.3)
ERYTHROCYTE [DISTWIDTH] IN BLOOD BY AUTOMATED COUNT: 16.2 % (ref 11.6–15.1)
GFR SERPL CREATININE-BSD FRML MDRD: 62 ML/MIN/1.73SQ M
GLUCOSE SERPL-MCNC: 122 MG/DL (ref 65–140)
HCT VFR BLD AUTO: 31.6 % (ref 34.8–46.1)
HGB BLD-MCNC: 9.7 G/DL (ref 11.5–15.4)
INR PPP: 1.53 (ref 0.84–1.19)
MCH RBC QN AUTO: 30.7 PG (ref 26.8–34.3)
MCHC RBC AUTO-ENTMCNC: 30.7 G/DL (ref 31.4–37.4)
MCV RBC AUTO: 100 FL (ref 82–98)
PLATELET # BLD AUTO: 426 THOUSANDS/UL (ref 149–390)
PMV BLD AUTO: 9.9 FL (ref 8.9–12.7)
POTASSIUM SERPL-SCNC: 3.8 MMOL/L (ref 3.5–5.3)
PROTHROMBIN TIME: 18.7 SECONDS (ref 11.6–14.5)
RBC # BLD AUTO: 3.16 MILLION/UL (ref 3.81–5.12)
SODIUM SERPL-SCNC: 136 MMOL/L (ref 135–147)
WBC # BLD AUTO: 9.53 THOUSAND/UL (ref 4.31–10.16)

## 2023-08-17 PROCEDURE — 0Y960ZZ DRAINAGE OF LEFT INGUINAL REGION, OPEN APPROACH: ICD-10-PCS | Performed by: SURGERY

## 2023-08-17 PROCEDURE — 99232 SBSQ HOSP IP/OBS MODERATE 35: CPT | Performed by: PHYSICIAN ASSISTANT

## 2023-08-17 PROCEDURE — 85027 COMPLETE CBC AUTOMATED: CPT | Performed by: INTERNAL MEDICINE

## 2023-08-17 PROCEDURE — 85610 PROTHROMBIN TIME: CPT | Performed by: INTERNAL MEDICINE

## 2023-08-17 PROCEDURE — 10140 I&D HMTMA SEROMA/FLUID COLLJ: CPT | Performed by: SURGERY

## 2023-08-17 PROCEDURE — 85730 THROMBOPLASTIN TIME PARTIAL: CPT

## 2023-08-17 PROCEDURE — 80048 BASIC METABOLIC PNL TOTAL CA: CPT | Performed by: INTERNAL MEDICINE

## 2023-08-17 PROCEDURE — 85730 THROMBOPLASTIN TIME PARTIAL: CPT | Performed by: INTERNAL MEDICINE

## 2023-08-17 PROCEDURE — 85730 THROMBOPLASTIN TIME PARTIAL: CPT | Performed by: HOSPITALIST

## 2023-08-17 PROCEDURE — 2W17X6Z COMPRESSION OF LEFT INGUINAL REGION USING PRESSURE DRESSING: ICD-10-PCS | Performed by: SURGERY

## 2023-08-17 RX ORDER — SODIUM CHLORIDE, SODIUM LACTATE, POTASSIUM CHLORIDE, CALCIUM CHLORIDE 600; 310; 30; 20 MG/100ML; MG/100ML; MG/100ML; MG/100ML
INJECTION, SOLUTION INTRAVENOUS CONTINUOUS PRN
Status: DISCONTINUED | OUTPATIENT
Start: 2023-08-17 | End: 2023-08-17

## 2023-08-17 RX ORDER — HEPARIN SODIUM 1000 [USP'U]/ML
4000 INJECTION, SOLUTION INTRAVENOUS; SUBCUTANEOUS EVERY 6 HOURS PRN
Status: DISCONTINUED | OUTPATIENT
Start: 2023-08-17 | End: 2023-08-18

## 2023-08-17 RX ORDER — CHLORHEXIDINE GLUCONATE 0.12 MG/ML
15 RINSE ORAL ONCE
Status: COMPLETED | OUTPATIENT
Start: 2023-08-17 | End: 2023-08-17

## 2023-08-17 RX ORDER — HEPARIN SODIUM 1000 [USP'U]/ML
2000 INJECTION, SOLUTION INTRAVENOUS; SUBCUTANEOUS EVERY 6 HOURS PRN
Status: DISCONTINUED | OUTPATIENT
Start: 2023-08-17 | End: 2023-08-18

## 2023-08-17 RX ORDER — FENTANYL CITRATE 50 UG/ML
INJECTION, SOLUTION INTRAMUSCULAR; INTRAVENOUS AS NEEDED
Status: DISCONTINUED | OUTPATIENT
Start: 2023-08-17 | End: 2023-08-17

## 2023-08-17 RX ORDER — CLINDAMYCIN PHOSPHATE 900 MG/50ML
900 INJECTION INTRAVENOUS ONCE
Status: DISCONTINUED | OUTPATIENT
Start: 2023-08-17 | End: 2023-08-17

## 2023-08-17 RX ORDER — HEPARIN SODIUM 10000 [USP'U]/100ML
3-20 INJECTION, SOLUTION INTRAVENOUS
Status: DISCONTINUED | OUTPATIENT
Start: 2023-08-17 | End: 2023-08-18

## 2023-08-17 RX ORDER — SODIUM CHLORIDE 9 MG/ML
INJECTION, SOLUTION INTRAVENOUS AS NEEDED
Status: DISCONTINUED | OUTPATIENT
Start: 2023-08-17 | End: 2023-08-17 | Stop reason: HOSPADM

## 2023-08-17 RX ORDER — LIDOCAINE HYDROCHLORIDE 10 MG/ML
INJECTION, SOLUTION EPIDURAL; INFILTRATION; INTRACAUDAL; PERINEURAL AS NEEDED
Status: DISCONTINUED | OUTPATIENT
Start: 2023-08-17 | End: 2023-08-17

## 2023-08-17 RX ORDER — CEFAZOLIN SODIUM 2 G/50ML
2000 SOLUTION INTRAVENOUS EVERY 8 HOURS
Status: COMPLETED | OUTPATIENT
Start: 2023-08-17 | End: 2023-08-18

## 2023-08-17 RX ORDER — CEFAZOLIN SODIUM 2 G/50ML
2000 SOLUTION INTRAVENOUS ONCE
Status: COMPLETED | OUTPATIENT
Start: 2023-08-17 | End: 2023-08-17

## 2023-08-17 RX ORDER — PROPOFOL 10 MG/ML
INJECTION, EMULSION INTRAVENOUS AS NEEDED
Status: DISCONTINUED | OUTPATIENT
Start: 2023-08-17 | End: 2023-08-17

## 2023-08-17 RX ORDER — ASPIRIN 81 MG/1
81 TABLET, CHEWABLE ORAL DAILY
Status: DISCONTINUED | OUTPATIENT
Start: 2023-08-17 | End: 2023-08-20 | Stop reason: HOSPADM

## 2023-08-17 RX ORDER — HYDROMORPHONE HCL IN WATER/PF 6 MG/30 ML
0.2 PATIENT CONTROLLED ANALGESIA SYRINGE INTRAVENOUS
Status: DISCONTINUED | OUTPATIENT
Start: 2023-08-17 | End: 2023-08-17

## 2023-08-17 RX ORDER — ONDANSETRON 2 MG/ML
INJECTION INTRAMUSCULAR; INTRAVENOUS AS NEEDED
Status: DISCONTINUED | OUTPATIENT
Start: 2023-08-17 | End: 2023-08-17

## 2023-08-17 RX ORDER — SODIUM CHLORIDE, SODIUM LACTATE, POTASSIUM CHLORIDE, CALCIUM CHLORIDE 600; 310; 30; 20 MG/100ML; MG/100ML; MG/100ML; MG/100ML
20 INJECTION, SOLUTION INTRAVENOUS CONTINUOUS
Status: DISCONTINUED | OUTPATIENT
Start: 2023-08-17 | End: 2023-08-20 | Stop reason: HOSPADM

## 2023-08-17 RX ORDER — ONDANSETRON 2 MG/ML
4 INJECTION INTRAMUSCULAR; INTRAVENOUS ONCE AS NEEDED
Status: DISCONTINUED | OUTPATIENT
Start: 2023-08-17 | End: 2023-08-17

## 2023-08-17 RX ORDER — FENTANYL CITRATE/PF 50 MCG/ML
25 SYRINGE (ML) INJECTION
Status: DISCONTINUED | OUTPATIENT
Start: 2023-08-17 | End: 2023-08-17

## 2023-08-17 RX ADMIN — CEFAZOLIN SODIUM 2000 MG: 2 SOLUTION INTRAVENOUS at 21:09

## 2023-08-17 RX ADMIN — ATORVASTATIN CALCIUM 20 MG: 20 TABLET, FILM COATED ORAL at 17:25

## 2023-08-17 RX ADMIN — CHLORHEXIDINE GLUCONATE 15 ML: 1.2 SOLUTION ORAL at 01:57

## 2023-08-17 RX ADMIN — METOPROLOL SUCCINATE 100 MG: 100 TABLET, EXTENDED RELEASE ORAL at 08:07

## 2023-08-17 RX ADMIN — FENTANYL CITRATE 25 MCG: 50 INJECTION INTRAMUSCULAR; INTRAVENOUS at 11:30

## 2023-08-17 RX ADMIN — PANTOPRAZOLE SODIUM 40 MG: 40 TABLET, DELAYED RELEASE ORAL at 05:03

## 2023-08-17 RX ADMIN — PROPOFOL 120 MG: 10 INJECTION, EMULSION INTRAVENOUS at 10:29

## 2023-08-17 RX ADMIN — PRAMIPEXOLE DIHYDROCHLORIDE 0.12 MG: 0.25 TABLET ORAL at 01:56

## 2023-08-17 RX ADMIN — PRAMIPEXOLE DIHYDROCHLORIDE 0.12 MG: 0.25 TABLET ORAL at 21:10

## 2023-08-17 RX ADMIN — SALINE NASAL SPRAY 1 SPRAY: 1.5 SOLUTION NASAL at 01:56

## 2023-08-17 RX ADMIN — SODIUM CHLORIDE, SODIUM LACTATE, POTASSIUM CHLORIDE, AND CALCIUM CHLORIDE: .6; .31; .03; .02 INJECTION, SOLUTION INTRAVENOUS at 10:18

## 2023-08-17 RX ADMIN — TRAZODONE HYDROCHLORIDE 75 MG: 50 TABLET ORAL at 21:08

## 2023-08-17 RX ADMIN — FENTANYL CITRATE 25 MCG: 50 INJECTION INTRAMUSCULAR; INTRAVENOUS at 11:05

## 2023-08-17 RX ADMIN — MELATONIN 3 MG: at 21:09

## 2023-08-17 RX ADMIN — CEFAZOLIN SODIUM 2000 MG: 2 SOLUTION INTRAVENOUS at 11:31

## 2023-08-17 RX ADMIN — ONDANSETRON 4 MG: 2 INJECTION INTRAMUSCULAR; INTRAVENOUS at 10:26

## 2023-08-17 RX ADMIN — FENTANYL CITRATE 25 MCG: 50 INJECTION INTRAMUSCULAR; INTRAVENOUS at 10:37

## 2023-08-17 RX ADMIN — HEPARIN SODIUM 11.1 UNITS/KG/HR: 10000 INJECTION, SOLUTION INTRAVENOUS at 01:22

## 2023-08-17 RX ADMIN — HEPARIN SODIUM 2000 UNITS: 1000 INJECTION INTRAVENOUS; SUBCUTANEOUS at 22:52

## 2023-08-17 RX ADMIN — DULOXETINE HYDROCHLORIDE 60 MG: 60 CAPSULE, DELAYED RELEASE ORAL at 08:07

## 2023-08-17 RX ADMIN — LIDOCAINE HYDROCHLORIDE 50 MG: 10 INJECTION, SOLUTION EPIDURAL; INFILTRATION; INTRACAUDAL; PERINEURAL at 10:29

## 2023-08-17 RX ADMIN — CEFAZOLIN SODIUM 2000 MG: 2 SOLUTION INTRAVENOUS at 10:35

## 2023-08-17 RX ADMIN — FENTANYL CITRATE 50 MCG: 50 INJECTION INTRAMUSCULAR; INTRAVENOUS at 10:59

## 2023-08-17 NOTE — ASSESSMENT & PLAN NOTE
· Patient presenting from acute rehab with bleeding from groin incision site, concern for worsening of hematoma noted at recent admission  · Hemoglobin stable from prior, CTA with runoff revealing slightly decreased size of known hematoma  · Seen by vascular surgery team during ED evaluation, tentative plan for operative washout 8/17/2023. Keep n.p.o. after midnight  · Hold off on Eliquis/ASA pending further vascular surgery recommendations  · Monitor hemoglobins.   Local wound care as per vascular surgery

## 2023-08-17 NOTE — ANESTHESIA POSTPROCEDURE EVALUATION
Post-Op Assessment Note    CV Status:  Stable    Pain management: adequate     Mental Status:  Alert and awake   Hydration Status:  Stable   PONV Controlled:  Controlled   Airway Patency:  Patent      Post Op Vitals Reviewed: Yes      Staff: CRNA         There were no known notable events for this encounter.     BP   133/63   Temp (!) 97.3 °F (36.3 °C) (08/17/23 1117)    Pulse  85   Resp   14   SpO2   99 FM

## 2023-08-17 NOTE — ASSESSMENT & PLAN NOTE
· Patient presented from rehab with bleeding from groin incision site, concern for worsening of hematoma noted at recent admission  · Hemoglobin stable on admission, CTA with runoff revealing slightly decreased size of known hematoma  · Vascular Surgery following, appreciate their ongoing recommendations - for left groin hematoma evacuation, washout, and vac placement today  · Eliquis on hold - defer AC to Vascular Surgery perioperatively   · Monitor hemoglobins.   Local wound care as per vascular surgery

## 2023-08-17 NOTE — ASSESSMENT & PLAN NOTE
· S/p emergent left iliac and femoral embolectomy, angiogram, femoral arteriotomy with bovine patch 8/7/2023 in setting of acute left lower extremity limb ischemia  · Vascular surgery following given bleeding from site as above  · On aspirin and statin  · Defer AC to Vascular Surgery perioperatively

## 2023-08-17 NOTE — ASSESSMENT & PLAN NOTE
· S/p emergent left iliac and femoral embolectomy, angiogram, femoral arteriotomy with bovine patch 8/7/2023 in setting of acute left lower extremity limb ischemia  · Vascular surgery following given bleeding from site as above  · Continue statin therapy, holding ASA/Eliquis as above given slight bleeding pending further vascular surgery input

## 2023-08-17 NOTE — ASSESSMENT & PLAN NOTE
· Blood pressure acceptable, continue home metoprolol succinate.   · HCTZ was held pre-op  · Monitor blood pressure per protocol

## 2023-08-17 NOTE — PROGRESS NOTES
Spoke with Dr. Lane Lim. Will restart Heparin gtt at 1600 at previous rate of 11.1 units/kg/hr. Will check Ptt 6 hours after restarting. Orders will be placed for this.

## 2023-08-17 NOTE — H&P
[address] [facility]  H&P  Name: Di Archer 79 y.o. female I MRN: [id number]  Unit/Bed#: -40 I Date of Admission: 8/16/2023   Date of Service: 8/16/2023 I Hospital Day: 0      Assessment/Plan   * Hematoma of groin  Assessment & Plan  · Patient presenting from acute rehab with bleeding from groin incision site, concern for worsening of hematoma noted at recent admission  · Hemoglobin stable from prior, CTA with runoff revealing slightly decreased size of known hematoma  · Seen by vascular surgery team during ED evaluation, tentative plan for operative washout 8/17/2023. Keep n.p.o. after midnight  · Hold off on Eliquis/ASA pending further vascular surgery recommendations ADDENDUM: on heparin gtt per vascular surgery  · Monitor hemoglobins. Local wound care as per vascular surgery    Peripheral arterial disease ([address])  Assessment & Plan  · S/p emergent left iliac and femoral embolectomy, angiogram, femoral arteriotomy with bovine patch 8/7/2023 in setting of acute left lower extremity limb ischemia  · Vascular surgery following given bleeding from site as above  · Continue statin therapy, holding ASA/Eliquis as above given slight bleeding pending further vascular surgery input    Persistent atrial fibrillation (HCC)  Assessment & Plan  · S/p prior PPM  · Continue metoprolol succinate. Holding Eliquis as above given slight bleeding    Coronary artery disease involving native coronary artery of native heart without angina pectoris  Assessment & Plan  · Currently chest pain-free, continue beta-blocker and statin. Holding ASA/Eliquis as above    Essential hypertension  Assessment & Plan  · Blood pressure acceptable, continue home metoprolol succinate.   Holding HCTZ for potential intervention  · Monitor blood pressure per protocol    Depression  Assessment & Plan  · Mood stable, continue PTA duloxetine and trazodone         VTE Prophylaxis: Pharmacologic VTE Prophylaxis contraindicated due to Bleeding at groin site  / sequential compression device   Code Status: Full code  POLST: POLST form is not discussed and not completed at this time. Discussion with family: Updated daughter via telephone    Anticipated Length of Stay:  Patient will be admitted on an Inpatient basis with an anticipated length of stay of greater than 2 midnights. Justification for Hospital Stay: Please see detailed plans noted above. Chief Complaint:     Bleeding from left groin incision site, concern for enlarging hematoma  History of Present Illness:  Gomez Arroyo is a 79 y.o. female who presented from acute rehab with concerns for enlarging hematoma at recent site of left femoral embolectomy with bleeding from site. She underwent vascular surgery intervention here 8/7/2023 in the setting of acute left lower extremity limb ischemia, with hospitalization complicated by transient drop in hemoglobin requiring transfusion and found with moderate size of left groin hematoma with associated mild retroperitoneal extension and small retroperitoneal hematoma without active extravasation, reassessed by vascular surgery and no further intervention at that time was indicated per their service. At time of discharge she was discharged to [facility] for acute rehabilitation. Today at approximately 1500H  she was noted with bleeding at her surgical site by staff during a wound change. Staff discussed case with vascular surgery office, who ultimately advised her to be brought here for further evaluation. Patient denied any pain at the incision site, numbness/ting/paresthesias, dizziness/lightheadedness, fever/chills, chest pain/pressure, shortness of breath worse than baseline. During ED evaluation labs revealed a stable hemoglobin from prior and CTA with runoff revealed mildly decreased size of the known hematoma without other abnormalities.   Patient was evaluated vascular surgery service during ED evaluation, and tentatively is planning for revision/washout of the area 8/17/2023 pending full team evaluation and patient admitted under medicine service given comorbidities given situation.       Review of Systems:    Constitutional:  Denies fever or chills   Eyes:  Denies change in visual acuity   HENT:  Denies nasal congestion or sore throat   Respiratory:  Denies cough or shortness of breath   Cardiovascular:  Denies chest pain or edema   GI:  Denies abdominal pain, nausea, vomiting, bloody stools or diarrhea   :  Denies dysuria   Musculoskeletal:  Denies back pain or joint pain   Integument:  Denies rash   Neurologic:  Denies headache, focal weakness or sensory changes   Endocrine:  Denies polyuria or polydipsia   Lymphatic:  Denies swollen glands   Psychiatric:  Denies depression or anxiety     Past Medical and Surgical History:   Past Medical History:   Diagnosis Date   • Abnormal blood chemistry 05/10/2013   • Accelerated essential hypertension 05/08/2012   • Atrial fibrillation (HCC)    • Depression    • Essential tremor    • GERD (gastroesophageal reflux disease)    • HLD (hyperlipidemia)    • Hypertension    • Melanoma ([address])    • Obesity    • Polyneuropathy    • Skin cancer    • Thromboembolism ([address]) 10/2019    LLE   • Tinea unguium    • Urinary incontinence      Past Surgical History:   Procedure Laterality Date   • AORTIC VALVE REPLACEMENT  12/2012   • BREAST BIOPSY Right     years ago -Benign   • CARDIAC PACEMAKER PLACEMENT      dual chamber last assessed 10/14/13   • CHOLECYSTECTOMY     • IR LOWER EXTREMITY / INTERVENTION  10/6/2019   • IR LOWER EXTREMITY ANGIOGRAM  8/7/2023   • JOINT REPLACEMENT      B/L knee replacement   • LA TEAEC W/WO PATCH GRAFT COMMON FEMORAL Left 8/7/2023    Procedure: ENDARTERECTOMY ARTERIAL FEMORAL WITH BOVINE PATCH ANGIOPLASTY;  Surgeon: Rc Lyon MD;  Location: [facility];  Service: Vascular   • THROMBECTOMY W/ EMBOLECTOMY N/A 10/6/2019    Procedure: EMBOLECTOMY/THROMBECTOMY Left LOWER EXTREMITY; angiogram;  Surgeon: Neymar Stephens MD;  Location: [facility];  Service: Vascular   • THROMBECTOMY W/ EMBOLECTOMY Left 8/7/2023    Procedure: Left femoral exposure Left iliac, femoral, embolectomy wtih #3 and #4 Dieudonne catheters Left lower extremity angiogram Left femoral arteriotomy with bovine patch angioplasty;  Surgeon: Rafaela Lyon MD;  Location: [facility];  Service: Vascular       Meds/Allergies:  Medications Prior to Admission   Medication   • apixaban (ELIQUIS) 5 mg   • apixaban (ELIQUIS) 5 mg   • aspirin 81 mg chewable tablet   • atorvastatin (LIPITOR) 20 mg tablet   • bisacodyl (DULCOLAX) 10 mg suppository   • cyanocobalamin (VITAMIN B-12) 100 mcg tablet   • DULoxetine (CYMBALTA) 60 mg delayed release capsule   • ferrous sulfate 325 (65 Fe) mg tablet   • hydrochlorothiazide (HYDRODIURIL) 25 mg tablet   • melatonin 3 mg   • metoprolol succinate (TOPROL-XL) 100 mg 24 hr tablet   • miconazole (MICOTIN) 2 % powder   • nystatin (MYCOSTATIN) cream   • omeprazole (PriLOSEC) 20 mg delayed release capsule   • oxyCODONE (ROXICODONE) 5 immediate release tablet   • polyethylene glycol (MIRALAX) 17 g packet   • pramipexole (MIRAPEX) 0.125 mg tablet   • Sennosides-Docusate Sodium 8.6-50 MG CAPS   • sodium chloride (AYR SALINE NASAL) nasal gel   • sodium chloride (OCEAN) 0.65 % nasal spray   • traZODone (DESYREL) 50 mg tablet   • triamcinolone (KENALOG) 0.1 % cream       Allergies:    Allergies   Allergen Reactions   • Penicillins Hives   • Sulfa Antibiotics Hives   • Medical Tape Rash     History:  Marital Status:      Substance Use History:   Social History     Substance and Sexual Activity   Alcohol Use Not Currently   • Alcohol/week: 0.0 standard drinks of alcohol     Social History     Tobacco Use   Smoking Status Never   Smokeless Tobacco Never     Social History     Substance and Sexual Activity   Drug Use No       Family History:  Family History   Problem Relation Age of Onset   • Hypertension Mother    • Breast cancer Mother    • No Known Problems Father    • Breast cancer Daughter 46   • No Known Problems Maternal Grandmother    • No Known Problems Maternal Grandfather    • No Known Problems Paternal Grandmother    • No Known Problems Paternal Grandfather    • No Known Problems Son    • No Known Problems Son    • No Known Problems Daughter    • No Known Problems Brother    • No Known Problems Maternal Aunt    • No Known Problems Maternal Aunt        Physical Exam:     Vitals:   Blood Pressure: 134/69 (08/16/23 2235)  Pulse: 73 (08/16/23 2235)  Temperature: 98.2 °F (36.8 °C) (08/16/23 1653)  Temp Source: Oral (08/16/23 1653)  Respirations: 20 (08/16/23 1900)  SpO2: 91 % (08/16/23 2235)    Constitutional:  Well developed, well nourished, no acute distress, non-toxic appearance   Eyes:  PERRL, conjunctiva normal   HENT:  Atraumatic, external ears normal, nose normal, oropharynx moist, no pharyngeal exudates. Neck- normal range of motion, no tenderness, supple   Respiratory:  No respiratory distress, normal breath sounds, no rales, no wheezing   Cardiovascular: Irregularly irregular rate and rhythm, no murmurs, no gallops, no rubs   GI:  Soft, nondistended, normal bowel sounds, nontender, no organomegaly, no mass, no rebound, no guarding   :  No costovertebral angle tenderness   Musculoskeletal:  No edema, no tenderness, no deformities. Back- no tenderness  Integument:  Well hydrated, no rash, left groin wound with bandage covering without visualized bleeding  Lymphatic:  No lymphadenopathy noted   Neurologic:  Alert &awake, communicative, CN 2-12 normal, normal motor function, normal sensory function, no focal deficits noted   Psychiatric:  Speech and behavior appropriate       Lab Results: I have personally reviewed pertinent reports.       Results from last 7 days   Lab Units 08/16/23  1707 08/12/23  1259   WBC Thousand/uL 8.79 12.77*   HEMOGLOBIN g/dL 10.1* 9.9* HEMATOCRIT % 32.4* 31.9*   PLATELETS Thousands/uL 428* 310   NEUTROS PCT %  --  73   LYMPHS PCT %  --  13*   LYMPHO PCT % 10*  --    MONOS PCT %  --  8   MONO PCT % 11  --    EOS PCT % 3 3     Results from last 7 days   Lab Units 08/16/23  1707   POTASSIUM mmol/L 3.5   CHLORIDE mmol/L 103   CO2 mmol/L 32   BUN mg/dL 15   CREATININE mg/dL 1.00   CALCIUM mg/dL 9.2   ALK PHOS U/L 81   ALT U/L 13   AST U/L 20     Results from last 7 days   Lab Units 08/16/23  1707   INR  1.65*       Imaging: I have personally reviewed pertinent reports. CTA abdominal w run off w wo contrast    Result Date: 8/16/2023  Narrative: CT ANGIOGRAM OF THE AORTA AND LOWER EXTREMITIES WITH IV CONTRAST INDICATION: Evaluate for expanding left groin hematoma. Status post vascular surgery endarterectomy/thromboembolectomy. COMPARISON: CTA of the abdomen and pelvis with bilateral runoff on August 9, 2023. TECHNIQUE:  CT angiogram examination of the abdomen, pelvis, and lower extremities was performed according to standard protocol with intravenous contrast.  This examination, like all CT scans performed in the [facility], was performed utilizing techniques to minimize radiation dose exposure, including the use of iterative reconstruction and automated exposure control. 3D reconstructions were performed an independent workstation, and are supplied for review. Rad dose 3093.7 mGy-cm IV Contrast:  120 mL of iodixanol (VISIPAQUE) FINDINGS: VASCULAR STRUCTURES: The visualized thoracoabdominal aorta is of normal caliber, as before there is approximately 50% stenosis in the infrarenal segment secondary to soft plaque. Unchanged near complete occlusion of a proximal SMA branch, series 2 image 81, otherwise mesenteric and renal arteries are patent. LEFT: Common iliac artery: Patent. External iliac artery: Patent. Common femoral artery: Patent Superficial femoral artery: Segmental stenoses of up to 50%, unchanged from prior.  Popliteal artery: Diminutive caliber, obscured by metallic streak artifact from knee arthroplasty. Patent three-vessel runoff. RIGHT: Common iliac artery: Patent with severe calcific atherosclerotic disease, no significant stenosis. External iliac artery: Patent. Common femoral artery: Patent. Superficial femoral artery: Segmental 50% stenosis throughout Popliteal artery: Patent, obscured by streak artifact from adjacent knee arthroplasty. Patent three-vessel runoff. A right groin hematoma is grossly unchanged in size in comparison to prior, a component within the retroperitoneum again measures 5.7 x 3.5 cm in maximal axial dimension. A hematoma overlying the groin is irregular in shape, immediately overlying the common femoral artery the hematoma appears decreased in size, currently measuring 4.7 x 3 cm, previously 4.8 x 4.8 cm. A component of the collection more laterally is grossly unchanged in size. There is no active extravasation. OTHER FINDINGS ABDOMEN LOWER CHEST:  No significant abnormality in the lung bases. Johanna Phalen LIVER/BILIARY TREE:  Unremarkable. GALLBLADDER:  No calcified gallstones. No pericholecystic inflammatory change. SPLEEN:  Unremarkable. Normal size. PANCREAS:  Unremarkable. ADRENAL GLANDS: Unremarkable. KIDNEYS/URETERS: Right renal cyst, other small rounded hypodensities in the bilateral kidneys are too small to characterize on CT, most likely also represent cysts, unchanged from prior. No hydronephrosis. No solid masses. Ureters unremarkable. PELVIS REPRODUCTIVE ORGANS:  Unremarkable for patient's age. URINARY BLADDER: Small amount of antidependent air, correlate for recent instrumentation, correlate with urinalysis. ADDITIONAL ABDOMINAL AND PELVIC STRUCTURES STOMACH AND BOWEL: Diverticulosis without evidence of active inflammation. Stomach unremarkable. No evidence of obstruction. ABDOMINOPELVIC CAVITY:   No pathologically enlarged mesenteric or retroperitoneal lymph nodes.  No ascites or free intraperitoneal air. ABDOMINAL WALL/INGUINAL REGIONS: See hematoma description and vascular findings above. OSSEOUS STRUCTURES:  No acute fracture or destructive osseous lesion. Bilateral knee arthroplasties. Scoliosis. Multilevel degenerative changes of the lumbar spine. Impression: Slightly decreased size of a left groin hematoma as described. No active extravasation. Patent left lower extremity runoff as before, in particular the external iliac and common femoral arteries remain patent, there is segmental  SFA stenosis with unchanged distal runoff. Small amount of air within the bladder, correlate for recent instrumentation. Correlate with urinalysis for possible infection. Workstation performed: [id number]     IR lower extremity angiogram    Result Date: 8/9/2023  Narrative: Please see Epic operative note for details Workstation performed: [id number]     CTA abdominal w run off w wo contrast    Result Date: 8/9/2023  Narrative: CT ANGIOGRAM OF THE AORTA AND LOWER EXTREMITIES WITH IV CONTRAST INDICATION: Status post vascular intervention. Abdominal pain. COMPARISON: August 7, 2023 TECHNIQUE:  CT angiogram examination of the abdomen, pelvis, and lower extremities was performed according to standard protocol with intravenous contrast.  This examination, like all CT scans performed in the [facility], was performed utilizing techniques to minimize radiation dose exposure, including the use of iterative reconstruction and automated exposure control. 3D reconstructions were performed an independent workstation, and are supplied for review. Rad dose 3002.98 mGy-cm IV Contrast:  120 mL of iohexol (OMNIPAQUE) FINDINGS: VASCULAR STRUCTURES:  There is a moderate size hematoma in the left groin. This is difficult to quantify with measurements though the inferior component measures 10 x 3 cm and the more superior component measures approximately 8 x 6.  There is retroperitoneal extension there was relatively small measuring 6 x 3 cm. No active extravasation is seen. There is no evidence of pseudoaneurysm. Left SFA and popliteal arteries appear patent but diffusely small and contains multiple stenoses. Left profunda femoris artery remains patent. Peroneal runoff seems to be intact which reconstitutes the distal posterior tibial artery. Backspace which is similar to the arteriogram from 2 days ago. The aorta contains eccentric plaque with 40% stenosis infrarenally. In the right leg, patent SFA and popliteal arteries with multifocal stenoses. Diseased three-vessel runoff appears patent. OTHER FINDINGS ABDOMEN LOWER CHEST:  No significant abnormality in the lung bases. LIVER/BILIARY TREE:  Unremarkable. GALLBLADDER: Absent SPLEEN:  Unremarkable. Normal size. PANCREAS:  Unremarkable. ADRENAL GLANDS: Unremarkable. KIDNEYS/URETERS:  No solid renal mass. No hydronephrosis. No urinary tract calculi. PELVIS REPRODUCTIVE ORGANS:  Unremarkable for patient's age. URINARY BLADDER:  Unremarkable. ADDITIONAL ABDOMINAL AND PELVIC STRUCTURES STOMACH AND BOWEL: Unremarkable ABDOMINOPELVIC CAVITY:   No pathologically enlarged mesenteric or retroperitoneal lymph nodes. No ascites or free intraperitoneal air. ABDOMINAL WALL/INGUINAL REGIONS:  Unremarkable. OSSEOUS STRUCTURES:  No acute fracture or destructive osseous lesion. Bilateral knee replacements. * I personally communicated this result to Nato Barrera on 8/9/2023 11:50 AM.     Impression: 1. Moderate-sized left groin hematoma with mild retroperitoneal extension and small retroperitoneal hematoma. No active extravasation or evidence of pseudoaneurysm. 2. 40% infrarenal aortic stenosis Workstation performed: [id number]     XR chest portable    Result Date: 8/8/2023  Narrative: CHEST INDICATION:   cp. COMPARISON: October 9, 2019 EXAM PERFORMED/VIEWS:  XR CHEST PORTABLE FINDINGS: Heart normal in size. Status post open heart surgery. Permanent pacemaker present.  The lungs are clear. No pneumothorax or pleural effusion. Osseous structures appear within normal limits for patient age. Impression: No acute disease in the chest. Workstation performed: [id number]     CTA ABDOMEN W RUN OFF W WO CONTRAST    Result Date: 8/7/2023  Narrative: CT ANGIOGRAM OF THE AORTA AND LOWER EXTREMITIES WITH IV CONTRAST INDICATION: 3-day history of left lower extremity pain, discoloration, and tingling. COMPARISON: None. TECHNIQUE:  CT angiogram examination of the abdomen, pelvis, and lower extremities was performed according to standard protocol with intravenous contrast.  This examination, like all CT scans performed in the [facility], was performed utilizing techniques to minimize radiation dose exposure, including the use of iterative reconstruction and automated exposure control. Rad dose 2793.75 mGy-cm IV Contrast:  120 mL of iohexol (OMNIPAQUE) FINDINGS: VASCULAR STRUCTURES: ABDOMINAL VASCULAR STRUCTURES: AORTA: Moderate eccentric atheroma involving the infrarenal abdominal aorta. It is not aneurysmal. CELIAC ARTERY: Conventional branching anatomy is present. There are mild atherosclerotic calcifications at the ostia. SUPERIOR MESENTERIC ARTERY: High-grade stenosis versus focal occlusion of the mid superior mesenteric artery (series 2, image 27), with additional superimposed moderate atherosclerotic narrowing. INFERIOR MESENTERIC ARTERY: Mild atherosclerotic calcifications at the origin. 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 There are mild atherosclerotic calcifications arising from the dominant artery LEFT LOWER EXTREMITY: LEFT COMMON ILIAC ARTERY: Mild calcific atherosclerotic disease. The vessel is normal in caliber. LEFT INTERNAL ILIAC ARTERY: Moderate atherosclerotic disease. Visualized branches are normal in caliber. LEFT EXTERNAL ILIAC ARTERY: Moderate eccentric atheroma in the proximal extent, with occlusion of the mid and distal external iliac artery. LEFT COMMON FEMORAL ARTERY: Occluded. LEFT PROFUNDA FEMORIS: Diminutive reconstitution from collaterals. LEFT SUPERFICIAL FEMORAL ARTERY: Multifocal areas of occlusion throughout the proximal, mid, and distal vessel, with diminutive areas of reconstitution. LEFT POPLITEAL ARTERY: Vessel is obscured by beam hardening artifact, although a paucity of flow reconstitutes. LEFT TIBIOPERONEAL TRUNK: Moderate calcific atherosclerotic disease. The vessel is normal in caliber. LEFT ANTERIOR TIBIAL ARTERY: Severe calcifications, limiting evaluation of vessel patency. LEFT PERONEAL ARTERY: Normal in caliber, without atherosclerotic disease. LEFT POSTERIOR TIBIAL ARTERY: Moderate calcific atherosclerotic disease. The vessel is normal in caliber. LEFT FOOT: Dorsalis pedis artery is not opacified. Opacification of calcaneal branches. RIGHT LOWER EXTREMITY: RIGHT COMMON ILIAC ARTERY: Moderate calcific atherosclerotic disease. The vessel is normal in caliber. RIGHT INTERNAL ILIAC ARTERY: Moderate atherosclerotic disease. Visualized branches are normal in caliber. RIGHT EXTERNAL ILIAC ARTERY: Normal in caliber, without atherosclerotic disease. RIGHT COMMON FEMORAL ARTERY: Mild calcific atherosclerotic disease. The vessel is normal in caliber. RIGHT PROFUNDA FEMORIS: Normal in caliber, without atherosclerotic disease. RIGHT SUPERFICIAL FEMORAL ARTERY: Multifocal areas of moderate atherosclerotic narrowing throughout the vessel. RIGHT POPLITEAL ARTERY: Obscured by beam hardening artifact but patent. RIGHT TIBIOPERONEAL TRUNK: Moderate calcific atherosclerotic disease. The vessel is normal in caliber. RIGHT ANTERIOR TIBIAL ARTERY: Mild calcific atherosclerotic disease. The vessel is normal in caliber.  RIGHT PERONEAL ARTERY: Mild calcific atherosclerotic disease. The vessel is normal in caliber. RIGHT POSTERIOR TIBIAL ARTERY: Mild calcific atherosclerotic disease. The vessel is normal in caliber. RIGHT FOOT: Visualized branches are normal. VENOUS: Iliocaval system is normal in caliber and normal in opacification. OTHER FINDINGS ABDOMEN LOWER CHEST: No abnormality seen on  images. AICD. Aortic valvular replacement. Sternotomy wires. LIVER/BILIARY TREE: Visualized liver is normal. PANCREAS: Visualized pancreas is normal. KIDNEYS/URETERS: No solid renal mass. Bilateral cortical atrophy. Scattered right renal lesions which are technically indeterminate but are well circumscribed and statistically likely to represent cysts. No hydronephrosis or calculi. PELVIS REPRODUCTIVE ORGANS:  Unremarkable for patient's age. URINARY BLADDER:  Unremarkable. ADDITIONAL ABDOMINAL AND PELVIC STRUCTURES STOMACH AND BOWEL: Sigmoid diverticulosis. Remainder of visualized colon is normal. Visualized small bowel is normal. Visualized stomach is normal. ABDOMINOPELVIC CAVITY:   No pathologically enlarged mesenteric or retroperitoneal lymph nodes. No ascites or free intraperitoneal air. ABDOMINAL WALL/INGUINAL REGIONS:  Unremarkable. OSSEOUS STRUCTURES: Age-related degenerative changes. No acute fracture. Bilateral knee replacements. Impression: Vascular: Mesenteric: 1. High-grade stenosis versus focal occlusion of the mid superior mesenteric artery, age-indeterminate. No CT findings to suggest bowel ischemia within the visualized bowel. 2.  Moderate eccentric atheroma involving the infrarenal abdominal aorta. Left lower extremity: Arterial occlusion extends from the mid left external iliac artery through the common femoral artery. There are multifocal areas of occlusion through the superficial femoral artery with intermittent reconstitution of flow from collaterals. Two-vessel tibial vessel runoff persists.  Right lower extremity: Moderate atherosclerotic narrowing through the right superficial femoral artery. The study was marked in [facility] for immediate notification. Workstation performed: [id number]     VAS lower limb arterial duplex, limited, unilateral    Result Date: 8/7/2023  Narrative:  [facility] REPORT CLINICAL: Indications:  Patient presents with left leg pain , weakness, cold, and purplish discoloration that started Saturday evening (8/5/2023). Operative History: 2019-10-06 Left  embolectomy/thrombectomy of femoral artery Risk Factors The patient has history of HTN and Hyperlipidemia. FINDINGS:  Left                   Impression  PSV (cm/s)  Dist EIA               Occluded             0  Common Femoral Artery  Occluded             0  Prox Profunda          Occluded             0  Prox SFA               Occluded             0  Mid SFA                Occluded             0  Dist SFA               Occluded             0  Proximal Pop           Occluded             0  Distal Pop             Occluded             0  Dist Post Tibial       Occluded             0  Dist. Ant. Tibial      Occluded             0     CONCLUSION:  Impression: LEFT LOWER LIMB: This resting evaluation shows occlusion of the distal external iliac, common femoral, superficial femoral, popliteal, posterior tibial, and anterior tibial arteries. Unable to visualize further proximally due to bowel gas. Unable to obtain a Ankle/Brachial index, metatarsal pressure, and a great toe pressure due to machine malfunction. Compared to previous study on 8/12/2021 , there is progression of disease in the left leg. Technical findings were given to [address] [place] [address] on 8/7/2023 @ 1055. SIGNATURE: Electronically Signed by: Sherl Libman on 2023-08-07 12:12:46 PM        ** Please Note: Dragon 360 Dictation voice to text software was used in the creation of this document.  **

## 2023-08-17 NOTE — OP NOTE
OPERATIVE REPORT  PATIENT NAME: Hilario Cooper    :  1940  MRN: [id number]  Pt Location: [facility] OR ROOM 05    SURGERY DATE: 2023    Surgeon(s) and Role:     * Traci Dhillon MD - Primary     * Benja Felipe DO - Assisting    Preop Diagnosis:  Hematoma of groin, subsequent encounter [S30.1XXD]    Post-Op Diagnosis Codes:     * Hematoma of groin, subsequent encounter [S30.1XXD]    Procedure(s):  Left - L groin exploration. washout. hematoma evacuation vac placement    Specimen(s):  * No specimens in log *    Estimated Blood Loss:   Minimal    Drains:  External Urinary Catheter (Active)   Number of days: 9       Anesthesia Type:   Choice    Operative Indications:  Hematoma of groin, subsequent encounter [S30.1XXD]    83 y/oF s/p L femoral open exposure, endarterectomy with patch angioplasty, iliofemoral thromboembolectomy for LLE ALI in setting of hold on anticoagulation for nosebleeds with known post-op hematoma at that time, stable prior to discharge presents from [place] acute rehab with concern for bleeding from L groin incision site. Noted to have residual hematoma to incision site. Given risk of infection to bypass with residual hematoma products, decision was made to take patient to operating room for evacuation. Risks/benefits/alternatives discussed in detail. Operative Findings:  Left groin incision wound with large hematoma cavity noted with liquefied and coagulated hematoma products noted. No exposed patch noted with healthy tissue overlying endarterectomy site. No underlying necrotic tissue or tunneling. Wound edges were sharply debrided down to subcutaneous tissue. Area was thoroughly irrigated with Pulsavac. Wound VAC was applied. Complications:   None    Procedure and Technique:  Consent was obtained preoperatively. Patient brought to the operating room placed on table in supine position. All bony prominences were appropriately padded.   Patient received appropriate perioperative antibiotics. General anesthesia was induced and the patient had an LMA placed. Patient was then prepped and draped in usual sterile fashion. A timeout was done identifying patient by name and date of birth. Procedure was initiated with the use of #15 blade to sharply excise the wound edges and sharp debridement was carried down into subcutaneous tissue. There was noted to be a large hematoma cavity which appeared to be contained without evidence of purulent fluid. Cavity was entered and approximately 100cc of old sanguinous fluid and hematoma products removed. No evidence of exposed artery or patch. A Pulsavac was used and the wound was thoroughly irrigated with 2 L bag of saline solution. A block wound sponge was then cut to appropriate size to fit the wound which was measured at 8.3 cm x 3.3 cm x 2 cm. Wound sponge was appropriately covered with adhesive dressings and connected to [facility] with appropriate suction and no evidence of leak. At the conclusion of the procedure all sponge instrument counts were correct cultures confirmed to be sent off. Patient tolerated procedure well was awoken from anesthesia and transferred to PACU in stable condition.     Dr. Giuseppe Jacobson was present for the entire procedure.     Patient Disposition:  PACU      V. A.C.  Note     Date: placed 8/17/23     Location of wound: Left groin      Dimensions of wound: 8.3 cm x 3.3 cm x 2 cm     Description of wound:Large hematoma cavity with with healthy tissue without evidence      Sponges placed:  1 Black Sponges     VAC settings:  125 mmHg      SIGNATURE: Tavon Lopez DO  DATE: August 17, 2023  TIME: 11:24 AM

## 2023-08-17 NOTE — ANESTHESIA PREPROCEDURE EVALUATION
Procedure:  L groin exploration, washout, possible hematoma evacuation possible vac placement (Left: Groin)    Relevant Problems   CARDIO   (+) Coronary artery disease involving native coronary artery of native heart without angina pectoris   (+) Essential hypertension   (+) Mixed hyperlipidemia   (+) Persistent atrial fibrillation (HCC)      MUSCULOSKELETAL   (+) Sciatic nerve pain, right      NEURO/PSYCH   (+) Depression   (+) Generalized anxiety disorder      Assessment/Plan   * Hematoma of groin  Assessment & Plan  • Patient presenting from acute rehab with bleeding from groin incision site, concern for worsening of hematoma noted at recent admission  • Hemoglobin stable from prior  • Hold off on Eliquis/ASA pending further vascular surgery recommendations   • ADDENDUM: on heparin gtt per vascular surgery       Peripheral arterial disease ([address])  Assessment & Plan  • S/p emergent left iliac and femoral embolectomy, angiogram, femoral arteriotomy with bovine patch 8/7/2023 in setting of acute left lower extremity limb ischemia       Persistent atrial fibrillation ([address])  Assessment & Plan  • S/p prior PPM  • Continue metoprolol succinate.   Holding Eliquis as above given slight bleeding     Coronary artery disease involving native coronary artery of native heart without angina pectoris  Assessment & Plan     Essential hypertension    Lab Results   Component Value Date    SODIUM 136 08/17/2023    K 3.8 08/17/2023    BUN 17 08/17/2023    CREATININE 0.86 08/17/2023    EGFR 62 08/17/2023    GLUCOSE 203 (H) 10/06/2019     Lab Results   Component Value Date    HGBA1C 7.8 (H) 10/07/2019       Lab Results   Component Value Date    HGB 9.7 (L) 08/17/2023    HGB 10.1 (L) 08/16/2023    HGB 9.9 (L) 08/12/2023     (H) 08/17/2023     (H) 08/16/2023     08/12/2023     Lab Results   Component Value Date    WBC 9.53 08/17/2023       Lab Results   Component Value Date    CREATININE 0.86 08/17/2023    CREATININE 1.00 08/16/2023    CREATININE 1.00 08/12/2023       Lab Results   Component Value Date    INR 1.53 (H) 08/17/2023    INR 1.65 (H) 08/16/2023    INR 2.45 (H) 08/09/2023     Lab Results   Component Value Date    PTT 61 (H) 08/17/2023    PTT 34 08/17/2023    PTT 33 08/09/2023       No results found for: "LACTATE"      Type and Screen  O    Indications: AFIB     Diagnoses: I48.0 - Atrial fibrillation     Sonographer:  ROGELIO Benitez  Interpreting Physician:  Dunia Schuster MD  Primary Physician:  Jabier Jensen MD  Referring Physician:  Eve Galvan PA-C  Group:  [facility]  Cardiology Fellow:  Olayinka Parkinson DO     SUMMARY     LEFT VENTRICLE:  Systolic function was normal. Ejection fraction was estimated to be 65 %. Although no diagnostic regional wall motion abnormality was identified, this possibility cannot be completely excluded on the basis of this study.     RIGHT VENTRICLE:  The size was at the upper limits of normal.     LEFT ATRIUM:  The atrium was mildly dilated.     RIGHT ATRIUM:  The atrium was mildly dilated.     MITRAL VALVE:  There was moderate annular calcification. There was mild regurgitation.     AORTIC VALVE:  There was mild stenosis. Valve mean gradient was 7.7 mmHg. Estimated aortic valve area (by Vmean) was 1.63 cmï¾².    TRICUSPID VALVE:  There was mild to moderate regurgitation.     PULMONIC VALVE:  There was trace regurgitation.     HISTORY: PRIOR HISTORY: Pacemaker,GERD,HLD,AFIB,CAD,Obesity,HTN                Physical Exam    Airway    Mallampati score: III  TM Distance: >3 FB       Dental       Cardiovascular      Pulmonary      Other Findings  ETT Placement Date: 08/07/23; Placement Time: 1409; Mask Ventilation: Mask ventilation not attempted (0); Technique: Direct laryngoscopy, Stylet; Type: Cuffed, Oral, Hi-Lo;  Tube Size: 7.5 mm; Laryngoscope: Mac; Blade Size: 3; Location: Oral; Grade View: 1; Insertion Attempts: 1; Placement Verification: Auscultation, Symmetrical chest wall movement, Cuff palpitation, End tidal CO2; Placed By: CRNA; Removal Date: 08/07/23; Removal Time: 1837       Anesthesia Plan  ASA Score- 3     Anesthesia Type- general with ASA Monitors. Additional Monitors:   Airway Plan: LMA. Comment:  Lolita Jones M.D., have personally seen and evaluated the patient prior to anesthetic care. I have reviewed the pre-anesthetic record, and other medical records if appropriate to the anesthetic care. If a CRNA is involved in the case, I have reviewed the CRNA assessment, if present, and agree. Risks/benefits and alternatives discussed with patient including possible PONV, sore throat, and possibility of rare anesthetic and surgical emergencies. .       Plan Factors-    Chart reviewed. Existing labs reviewed. Patient summary reviewed. Patient instructed to abstain from smoking on day of procedure. There is medical exclusion for perioperative obstructive sleep apnea risk education. Induction- intravenous. Postoperative Plan- Plan for postoperative opioid use. Planned trial extubation    Informed Consent- Anesthetic plan and risks discussed with patient. I personally reviewed this patient with the CRNA. Discussed and agreed on the Anesthesia Plan with the CRNA. Anna Marie Santos

## 2023-08-17 NOTE — PROGRESS NOTES
Post-Op Check Note - Vascular Surgery  Dennys Sandoval 79 y.o. female MRN: [id number]  Unit/Bed#: -Tae Encounter: [id number]    ASSESSMENT:  1. Left groin hematoma s/p left groin washout, hematoma evacuation, wound vacuum application    PLAN:  •     • Diet: regular  • Medications: resume  • Anti-platelet/AC: c/w ASA, restart heparin gtt at 4pm, repeat PTT 6 hrs after restart  • Wound care: wound vac set at 125 mmHg, low continuous, MWF    Subjective:   Zofia Allison is a 79 y.o. female who is status post left groin washout, hematoma evacuation, wound vacuum application. Pt seen bedside. Wound vac with seal intact, no leakage. Pain controlled with current regiment. Past Surgical History:   Procedure Laterality Date   • AORTIC VALVE REPLACEMENT  12/2012   • BREAST BIOPSY Right     years ago -Benign   • CARDIAC PACEMAKER PLACEMENT      dual chamber last assessed 10/14/13   • CHOLECYSTECTOMY     • IR LOWER EXTREMITY / INTERVENTION  10/6/2019   • IR LOWER EXTREMITY ANGIOGRAM  8/7/2023   • JOINT REPLACEMENT      B/L knee replacement   • WA TEAEC W/WO PATCH GRAFT COMMON FEMORAL Left 8/7/2023    Procedure: ENDARTERECTOMY ARTERIAL FEMORAL WITH BOVINE PATCH ANGIOPLASTY;  Surgeon: Adriano Lyon MD;  Location: [facility];  Service: Vascular   • THROMBECTOMY W/ EMBOLECTOMY N/A 10/6/2019    Procedure: EMBOLECTOMY/THROMBECTOMY Left LOWER EXTREMITY; angiogram;  Surgeon: Ina Lyon MD;  Location: [facility];  Service: Vascular   • THROMBECTOMY W/ EMBOLECTOMY Left 8/7/2023    Procedure: Left femoral exposure Left iliac, femoral, embolectomy wtih #3 and #4 Dieudonne catheters Left lower extremity angiogram Left femoral arteriotomy with bovine patch angioplasty;  Surgeon: Adriano Lyon MD;  Location: [facility];  Service: Vascular       Physical Exam:  GEN: NAD  CV: RRR  Lung: Normal effort  Ab: Soft, NT/ND  Neuro:  A+Ox3  Skin: wound vac seal intact, no leakage    Blood pressure 170/65, pulse 63, temperature 97.6 °F (36.4 °C), resp. rate 16, SpO2 99 %. ,There is no height or weight on file to calculate BMI.       Intake/Output Summary (Last 24 hours) at 8/17/2023 1457  Last data filed at 8/17/2023 1301  Gross per 24 hour   Intake 379.67 ml   Output 1025 ml   Net -645.33 ml       Invasive Devices     Peripheral Intravenous Line  Duration           Peripheral IV 08/16/23 Left Antecubital <1 day          Drain  Duration           External Urinary Catheter 8 days                VTE Pharmacologic Prophylaxis: Sequential compression device (Venodyne)

## 2023-08-17 NOTE — DISCHARGE INSTR - AVS FIRST PAGE
DISCHARGE INSTRUCTIONS  NEGATIVE PRESSURE WOUND THERAPY/WOUND VAC    ACTIVITY:   Limit your physical activity with the limb with the Wound VAC therapy. Walking up steps and normal activities may be resumed as you feel ready. Avoid strenuous activity such as vigorous exercise. Avoid heavy lifting (do not lift more than 15 pounds) while you have the Wound VAC in place. You should not drive a car while you have the Wound VAC in place. Your provider will instruct you when it is safe to drive. You may ride in a car. DIET:  Resume your normal diet. Good nutrition is important for healing of your incision. If you are discharged on narcotics for pain control, continue taking your stool softeners until you are having regular bowel movements. INCISION:  Wound VAC will stay in place 24 hours a day. A licensed professional [facility] [place]) will come to your home a few times a week to change the bandage and check the machine. You may need to have the bandage/sponge changed more often if there is a lot of drainage. You may NOT shower or bathe while wound VAC is in place. Do NOT remove dressing unless your provider instructs you to. DO NOT put any powders, creams, ointments, or lotions around the dressing of the Wound VAC. SWELLING: Most patients have noticeable swelling after leg surgery. This usually improves within a few weeks. If swelling is present, elevate the affected limb whenever possible. FOLLOW UP APPOINTMENTS:  Making and keeping follow up appointments and ultrasound tests are important to your recovery. If you have difficulty making it to or keeping your follow up appointments, call the office.     If you have increased pain, fever >101.5, nausea, vomiting, increased drainage, redness, warmth, swelling, change in color of drainage/pus, or a bad smell at your Wound VAC site, new coldness/numbness of your arm or leg, or become dizzy or confused please call us immediately and GO directly to the ER. Appt w/ SHIRA Campbell Ace: 9/5/2023 at 11:15am, [facility] office  *Please remember to bring [facility] supplies (foam kit, canister, machine, etc.) for wound assessment and dressing change    PLEASE CALL THE OFFICE IF YOU HAVE ANY QUESTIONS  [phone]  [phone]  [address] [facility], [place] ([place]), [address] [facility]  [address], [facility], [address]  [address] [facility], [address] [facility]  [address] [facility], [address], [place], [address]  [address] [facility]Sixth [address], 1st Floor, [place], [address]  [address], [address], [address], [facility], [address] [facility] [place]  [address], [address] [facility] [place] ([place]), [address] [facility]  [address] [facility], [address] [facility], [address] [facility], [place], [facility]  [facility] [address], [place]  [address]

## 2023-08-17 NOTE — PROGRESS NOTES
[address] [place] [address]  Progress Note  Name: Lea Resendiz I  MRN: [id number]  Unit/Bed#: -47 I Date of Admission: 8/16/2023   Date of Service: 8/17/2023 I Hospital Day: 1    Assessment/Plan   * Hematoma of groin  Assessment & Plan  · Patient presented from rehab with bleeding from groin incision site, concern for worsening of hematoma noted at recent admission  · Hemoglobin stable on admission, CTA with runoff revealing slightly decreased size of known hematoma  · Vascular Surgery following, appreciate their ongoing recommendations - for left groin hematoma evacuation, washout, and vac placement today  · Eliquis on hold - defer [facility] to Vascular Surgery perioperatively   · Monitor hemoglobins. Local wound care as per vascular surgery    Peripheral arterial disease ([address])  Assessment & Plan  · S/p emergent left iliac and femoral embolectomy, angiogram, femoral arteriotomy with bovine patch 8/7/2023 in setting of acute left lower extremity limb ischemia  · Vascular surgery following given bleeding from site as above  · On aspirin and statin  · Defer AC to Vascular Surgery perioperatively     Persistent atrial fibrillation (HCC)  Assessment & Plan  · S/p prior PPM  · Continue metoprolol succinate. · Holding Eliquis as above     Coronary artery disease involving native coronary artery of native heart without angina pectoris  Assessment & Plan  · Currently chest pain-free, on aspirin, statin, BB    Essential hypertension  Assessment & Plan  · Blood pressure acceptable, continue home metoprolol succinate. · HCTZ was held pre-op  · Monitor blood pressure per protocol    Depression  Assessment & Plan  · Mood stable, continue PTA duloxetine and trazodone           VTE Pharmacologic Prophylaxis: VTE Score: 6 High Risk (Score >/= 5) - Pharmacological DVT Prophylaxis Contraindicated. Sequential Compression Devices Ordered.     Patient Centered Rounds: I performed bedside rounds with nursing staff today. d/w RN Donald Lorenzana  Discussions with Specialists or Other Care Team Provider:     Education and Discussions with Family / Patient: Patient declined call to . Total Time Spent on Date of Encounter in care of patient: 25 minutes This time was spent on one or more of the following: performing physical exam; counseling and coordination of care; obtaining or reviewing history; documenting in the medical record; reviewing/ordering tests, medications or procedures; communicating with other healthcare professionals and discussing with patient's family/caregivers. Current Length of Stay: 1 day(s)  Current Patient Status: Inpatient   Certification Statement: The patient will continue to require additional inpatient hospital stay due to post-op as above  Discharge Plan: Anticipate discharge in 48-72 hrs to rehab facility. Code Status: Level 1 - Full Code    Subjective:   Ms. Vanessa Chino denies any pain or complaint     Objective:     Vitals:   Temp (24hrs), Av.8 °F (36.6 °C), Min:97.3 °F (36.3 °C), Max:98.2 °F (36.8 °C)    Temp:  [97.3 °F (36.3 °C)-98.2 °F (36.8 °C)] 97.6 °F (36.4 °C)  HR:  [63-79] 63  Resp:  [15-21] 16  BP: (120-170)/(63-98) 170/65  SpO2:  [91 %-100 %] 99 %  There is no height or weight on file to calculate BMI. Input and Output Summary (last 24 hours): Intake/Output Summary (Last 24 hours) at 2023 1525  Last data filed at 2023 1301  Gross per 24 hour   Intake 379.67 ml   Output 1025 ml   Net -645.33 ml       Physical Exam:   Physical Exam  Vitals reviewed. Constitutional:       Appearance: She is obese. Comments: Patient seen lying in bed post-op, sleeping when I got to her room but awoke easily to her name being called   Cardiovascular:      Rate and Rhythm: Normal rate and regular rhythm. Pulmonary:      Effort: Pulmonary effort is normal. No respiratory distress. Breath sounds: Normal breath sounds.    Abdominal:      General: Bowel sounds are normal.      Palpations: Abdomen is soft. Tenderness: There is no abdominal tenderness. Musculoskeletal:      Right lower leg: No edema. Left lower leg: No edema. Skin:     General: Skin is warm.    Psychiatric:         Mood and Affect: Mood normal.         Behavior: Behavior normal.         Additional Data:     Labs:  Results from last 7 days   Lab Units 08/17/23  0121 08/16/23  1707 08/12/23  1259   WBC Thousand/uL 9.53 8.79 12.77*   HEMOGLOBIN g/dL 9.7* 10.1* 9.9*   HEMATOCRIT % 31.6* 32.4* 31.9*   PLATELETS Thousands/uL 426* 428* 310   BANDS PCT %  --  9*  --    NEUTROS PCT %  --   --  73   LYMPHS PCT %  --   --  13*   LYMPHO PCT %  --  10*  --    MONOS PCT %  --   --  8   MONO PCT %  --  11  --    EOS PCT %  --  3 3     Results from last 7 days   Lab Units 08/17/23  0121 08/16/23  1707   SODIUM mmol/L 136 138   POTASSIUM mmol/L 3.8 3.5   CHLORIDE mmol/L 102 103   CO2 mmol/L 30 32   BUN mg/dL 17 15   CREATININE mg/dL 0.86 1.00   ANION GAP mmol/L 4 3   CALCIUM mg/dL 9.1 9.2   ALBUMIN g/dL  --  2.7*   TOTAL BILIRUBIN mg/dL  --  2.51*   ALK PHOS U/L  --  81   ALT U/L  --  13   AST U/L  --  20   GLUCOSE RANDOM mg/dL 122 129     Results from last 7 days   Lab Units 08/17/23  0121   INR  1.53*                   Lines/Drains:  Invasive Devices     Peripheral Intravenous Line  Duration           Peripheral IV 08/16/23 Left Antecubital <1 day          Drain  Duration           External Urinary Catheter 8 days                      Imaging: Reviewed radiology reports from this admission including: abdominal/pelvic CT    Recent Cultures (last 7 days):         Last 24 Hours Medication List:   Current Facility-Administered Medications   Medication Dose Route Frequency Provider Last Rate   • acetaminophen  650 mg Oral Q6H PRN Nagi Salgado DO     • aspirin  81 mg Oral Daily Nagi Salgado DO     • atorvastatin  20 mg Oral After Dinner Aishwarya Kirkland DO     • bisacodyl  10 mg Rectal Daily PRN Aishwarya Kirkland DO • cefazolin  2,000 mg Intravenous Q8H Nagi Salgado DO Stopped (08/17/23 1253)   • cyanocobalamin  100 mcg Oral Daily Nagi Colmenares, DO     • DULoxetine  60 mg Oral Daily Nagi Salgado, DO     • ferrous sulfate  325 mg Oral Every Other Day Benja Felipe, DO     • heparin (porcine)  3-20 Units/kg/hr (Order-Specific) Intravenous Titrated Nagi Colmenares, DO Stopped (08/17/23 0915)   • heparin (porcine)  2,000 Units Intravenous Q6H PRN Nagi Colmenares, DO     • heparin (porcine)  4,000 Units Intravenous Q6H PRN Nagi Salgado, DO     • lactated ringers  500 mL Intravenous Once PRN Nagi Salgado DO      And   • lactated ringers  500 mL Intravenous Once PRN Nagi Colmenares, DO     • lactated ringers  20 mL/hr Intravenous Continuous Mendy Nicholas MD Stopped (08/17/23 1244)   • melatonin  3 mg Oral HS Nagi Salgado DO     • metoprolol succinate  100 mg Oral Daily Nagi Salgado DO     • ondansetron  4 mg Intravenous Q6H PRN Nagi Salgado DO     • oxyCODONE  2.5 mg Oral Q4H PRN Nagi Salgado DO     • pantoprazole  40 mg Oral Early Morning Nagi Salgado DO     • polyethylene glycol  17 g Oral Daily Nagi Salgado DO     • pramipexole  0.125 mg Oral HS Nagi Salgado, DO     • senna-docusate sodium  1 tablet Oral HS Nagi Salgado, DO     • sodium chloride  1 spray Each Nare HS Nagi Salgado DO     • sodium chloride  500 mL Intravenous Once PRN Nagi Salgado DO      And   • sodium chloride  500 mL Intravenous Once PRN Nagi Salgado, DO     • traZODone  75 mg Oral HS Benja Felipe, DO          Today, Patient Was Seen By: Luisa Quinteros PA-C    **Please Note: This note may have been constructed using a voice recognition system. **

## 2023-08-17 NOTE — CONSULTS
Consultation - Vascular Surgery   Garrett Sandoval 79 y.o. female MRN: [id number]  Unit/Bed#: [facility] Encounter: [id number]    ASSESMENT:  79yoF known to vascular surgery service s/p L femoral open exposure, endarterectomy with patch angioplasty, iliofemoral thromboembolectomy, completion angiogram 8/7/23 (Doctor) for LLE ALI in setting of hold on anticoagulation for nosebleeds; with known post-op hematoma at that time, stable prior to discharge. Patient represents from [facility] with concern for bleeding from L groin incision site    8/16/23 CTA abdominal with runoff  - Slightly decreased size of a left groin hematoma as described. No active extravasation.   - Patent left lower extremity runoff as before, in particular the external iliac and common femoral arteries remain patent, there is segmental  SFA stenosis with unchanged distal runoff.   - Small amount of air within the bladder, correlate for recent instrumentation. Correlate with urinalysis for possible infection. WBC 8.79  Hgb 10.1  sCr 1.00    PLAN:  - Patient remains motor and sensory intact, with DP/PT signals, grossly warm. L groin with swelling surrounding incision site but grossly soft to palpation, without overlying erythema or purulent drainage. At bedside, old, dark appearing blood was expressed from incision site. Swelling and bleeding noted at rehab was therefore likely noted to be unorganized hematoma that was getting expressed from the wound also while on anticoagulation.  - With confirmation on imaging, no current concerns for active extravasation; with older appearing blood expressed, stable hemoglobin, with decreased size of L groin swelling after expressing blood out, very low index of suspicion for active bleed.   - With patient's overlying large pannus over L groin incision site with hematoma expressed at bedside, plan for L groin exploration/washout tomorrow 8/17/23 possible wound vac placement to assist with wound healing and to decrease risk of incisional site infection risk given location.  - Continue heparin gtt, Eliquis on hold pending OR  - Continue lipitor  - Seen and examined with vascular fellow who is in agreement with plan  - Appreciate remainder of care per primary medicine service. Consulting Service: ED    Chief Complaint: L groin bleeding    HPI: Izola Claude is a 79 y.o. female with HLD, HTN, CAD, A-fib, pacer, LLE ALI s/p embolectomy '23 Doctor, recently admitted for LLE ALI s/p L femoral exposure with bovine patch angioplasty, L iliofemoral thromboembolectomy, after intermittent hold of anticoagulation for epistaxis. She re-presents shortly after discharge to [facility] with bleeding from L groin incision site; nursing at the facility were concerned about continued oozing from L groin with dressing changes, and therefore patient was brought to ER for evaluation. Patient was seen and evaluated by ED who ordered tranexamic acid. CTA abdomen with runoff ordered to r/o acute bleed, with decrease in L groin hematoma size on imaging, notably after hematoma/old blood expressed from incision site by myself and vascular fellow at the bedside during evaluation. Patient denied dizziness, shortness of breath, lightheadedness, numbness or tingling to bilateral LE, motor weakness. She primarily complained of expected pain with palpation of L groin while expressing out hematoma. Denied excess/increasing pain to L groin while at [facility].      Review of Systems:  General: negative for - chills or fever  Cardiovascular: negative for - chest pain  Respiratory: negative for - shortness of breath  Gastrointestinal: negative for - abdominal pain  Genitourinary ROS: negative  Musculoskeletal ROS: negative for - muscular weakness  Neurological ROS: negative for - numbness/tingling  Hematological and Lymphatic ROS: positive for - swelling to L groin with hematoma expressed  Dermatological ROS: positive for blood expressed from L groin, suture liine intact without gross dehiscence noted  negative for surroundeing erythema, purulent drainage  Psychological ROS: negative  Ophthalmic ROS: negative  ENT ROS: negative    Past Medical History:  Past Medical History:   Diagnosis Date   • Abnormal blood chemistry 05/10/2013   • Accelerated essential hypertension 05/08/2012   • Atrial fibrillation (HCC)    • Depression    • Essential tremor    • GERD (gastroesophageal reflux disease)    • HLD (hyperlipidemia)    • Hypertension    • Melanoma ([address])    • Obesity    • Polyneuropathy    • Skin cancer    • Thromboembolism ([address]) 10/2019    LLE   • Tinea unguium    • Urinary incontinence        Past Surgical History:  Past Surgical History:   Procedure Laterality Date   • AORTIC VALVE REPLACEMENT  12/2012   • BREAST BIOPSY Right     years ago -Benign   • CARDIAC PACEMAKER PLACEMENT      dual chamber last assessed 10/14/13   • CHOLECYSTECTOMY     • IR LOWER EXTREMITY / INTERVENTION  10/6/2019   • IR LOWER EXTREMITY ANGIOGRAM  8/7/2023   • JOINT REPLACEMENT      B/L knee replacement   • TN TEAEC W/WO PATCH GRAFT COMMON FEMORAL Left 8/7/2023    Procedure: ENDARTERECTOMY ARTERIAL FEMORAL WITH BOVINE PATCH ANGIOPLASTY;  Surgeon: Michelle Lyon MD;  Location: [facility];  Service: Vascular   • THROMBECTOMY W/ EMBOLECTOMY N/A 10/6/2019    Procedure: EMBOLECTOMY/THROMBECTOMY Left LOWER EXTREMITY; angiogram;  Surgeon: Rubens Lyon MD;  Location: [facility];  Service: Vascular   • THROMBECTOMY W/ EMBOLECTOMY Left 8/7/2023    Procedure: Left femoral exposure Left iliac, femoral, embolectomy wtih #3 and #4 Dieudonne catheters Left lower extremity angiogram Left femoral arteriotomy with bovine patch angioplasty;  Surgeon: Michelle Lyon MD;  Location: [facility];  Service: Vascular       Social History:  Social History     Substance and Sexual Activity   Alcohol Use Not Currently   • Alcohol/week: 0.0 standard drinks of alcohol     Social History     Substance and Sexual Activity   Drug Use No     Social History     Tobacco Use   Smoking Status Never   Smokeless Tobacco Never       Family History:  Family History   Problem Relation Age of Onset   • Hypertension Mother    • Breast cancer Mother    • No Known Problems Father    • Breast cancer Daughter 46   • No Known Problems Maternal Grandmother    • No Known Problems Maternal Grandfather    • No Known Problems Paternal Grandmother    • No Known Problems Paternal Grandfather    • No Known Problems Son    • No Known Problems Son    • No Known Problems Daughter    • No Known Problems Brother    • No Known Problems Maternal Aunt    • No Known Problems Maternal Aunt        Allergies:   Allergies   Allergen Reactions   • Penicillins Hives   • Sulfa Antibiotics Hives   • Medical Tape Rash       Medications:  Current Facility-Administered Medications   Medication Dose Route Frequency   • acetaminophen (TYLENOL) tablet 650 mg  650 mg Oral Q6H PRN   • atorvastatin (LIPITOR) tablet 20 mg  20 mg Oral After Dinner   • bisacodyl (DULCOLAX) rectal suppository 10 mg  10 mg Rectal Daily PRN   • chlorhexidine (PERIDEX) 0.12 % oral rinse 15 mL  15 mL Swish & Spit Once   • cyanocobalamin (VITAMIN B-12) tablet 100 mcg  100 mcg Oral Daily   • DULoxetine (CYMBALTA) delayed release capsule 60 mg  60 mg Oral Daily   • ferrous sulfate tablet 325 mg  325 mg Oral Every Other Day   • melatonin tablet 3 mg  3 mg Oral HS   • metoprolol succinate (TOPROL-XL) 24 hr tablet 100 mg  100 mg Oral Daily   • ondansetron (ZOFRAN) injection 4 mg  4 mg Intravenous Q6H PRN   • oxyCODONE (ROXICODONE) split tablet 2.5 mg  2.5 mg Oral Q4H PRN   • pantoprazole (PROTONIX) EC tablet 40 mg  40 mg Oral Early Morning   • polyethylene glycol (MIRALAX) packet 17 g  17 g Oral Daily   • pramipexole (MIRAPEX) tablet 0.125 mg  0.125 mg Oral HS   • senna-docusate sodium (SENOKOT S) 8.6-50 mg per tablet 1 tablet  1 tablet Oral HS   • sodium chloride (OCEAN) 0.65 % nasal spray 1 spray  1 spray Each Nare HS   • traZODone (DESYREL) tablet 75 mg  75 mg Oral HS       Vitals:  /69   Pulse 73   Temp 98.2 °F (36.8 °C) (Oral)   Resp 20   SpO2 91%     I/Os:  No intake/output data recorded. Lab Results and Cultures:   Lab Results   Component Value Date    WBC 8.79 08/16/2023    HGB 10.1 (L) 08/16/2023    HCT 32.4 (L) 08/16/2023    MCV 99 (H) 08/16/2023     (H) 08/16/2023     Lab Results   Component Value Date    GLUCOSE 203 (H) 10/06/2019    CALCIUM 9.2 08/16/2023    K 3.5 08/16/2023    CO2 32 08/16/2023     08/16/2023    BUN 15 08/16/2023    CREATININE 1.00 08/16/2023     Lab Results   Component Value Date    INR 1.65 (H) 08/16/2023    INR 2.45 (H) 08/09/2023    INR 1.07 08/07/2023    PROTIME 19.7 (H) 08/16/2023    PROTIME 26.9 (H) 08/09/2023    PROTIME 14.6 (H) 08/07/2023       Lipid Panel: No results found for: "CHOL",     Blood Culture: No results found for: "BLOODCX",   Urinalysis:   Lab Results   Component Value Date    COLORU Yellow 10/10/2019    CLARITYU Cloudy 10/10/2019    SPECGRAV 1.006 10/10/2019    PHUR 6.0 10/10/2019    LEUKOCYTESUR Large (A) 10/10/2019    NITRITE Negative 10/10/2019    GLUCOSEU Negative 10/10/2019    KETONESU Negative 10/10/2019    BILIRUBINUR Negative 10/10/2019    BLOODU Large (A) 10/10/2019   ,   Urine Culture:   Lab Results   Component Value Date    URINECX >100,000 cfu/ml Escherichia coli (A) 10/10/2019   ,   Wound Culure: No results found for: "WOUNDCULT"    Imaging:  Reviewed as above    Physical Exam:  General appearance: alert and oriented, in no acute distress  Skin: Grossly warm and dry throughout  Neurologic: Grossly neurologically intact  Head: Normocephalic, without obvious abnormality, atraumatic  Eyes: EOMI bilaterally  Neck: supple, symmetrical, trachea midline  Lungs: Normal work of breathing while supine, no accessory muscle use  Heart: Regular rate. Abdomen: Soft, non-tender abdomen.  L groin with what appears to be antifungal powder, ecchymosis, L groin incision with suture line intact; old appearing dark blood expressed from L groin with decrease in swelling thereafter.    Extremities: extremities normal, warm and well-perfused; no cyanosis, clubbing, or edema    Wound/Incision:    Prior to hematoma evacuation    Pulse exam:  Femoral: Right: 2+ Left: 2+  DP: Right: doppler signal Left: doppler signal  PT: Right: doppler signal Left: doppler signal    Myrtle Camacho PA-C  8/17/2023

## 2023-08-17 NOTE — QUICK NOTE
Post-Op Check - Vascular Surgery   Angelo Melgar Jaime 79 y.o. female MRN: [id number]  Unit/Bed#: [facility] Encounter: [id number]    ASSESMENT:  79yoF s/p L groin exploration, washout, hematoma evacuation without patch exposure, wound edge debridement, vac placement 8/17/23    PLAN:  - Incentive spirometry  - Diet as tolerated- patient with recent pain to tongue would appreciate nursing assisting with giving soft foods such as applesauce; discussed with nursing.   - PRN analgesia  - I/Os  - Neurovascular checks    Subjective:  Patient seen and examined at the bedside, noting discomfort with eating due to an ulceration on tongue per patient. Denies fevers, chills, chest pain, lightheadedness, dizziness, motor weakness, paresthesias    Vitals:  /65   Pulse 63   Temp 97.6 °F (36.4 °C)   Resp 16   SpO2 99%     Physical Exam:  General appearance: alert and oriented, in no acute distress  Neurologic: Grossly neurologically intact to baseline  Neck: supple, symmetrical, trachea midline  Lungs: Normal work of breathing without accessory muscle use  Heart: Regular rate. grossly warm and well-perfused  Abdomen: Soft, non-tender abdomen.  L groin with wound vac in place, well adhered, 125mmHg continuous with minimal serosanguineous output noted Stable ecchymosis   Extremities: extremities normal, warm and well-perfused; no cyanosis, clubbing, or edema M/S intact bilaterally    Pulse exam:  DP: Right: doppler signal Left: doppler signal  PT: Right: doppler signal Left: doppler signal    I/Os:  I/O last 3 completed shifts:  In: -   Out: 600 [Urine:600]  I/O this shift:  In: 379.7 [I.V.:329.7; IV Piggyback:50]  Out: 425 [Urine:400; Blood:25]    Invasive Lines/Tubes:  Invasive Devices     Peripheral Intravenous Line  Duration           Peripheral IV 08/16/23 Left Antecubital 1 day          Drain  Duration           External Urinary Catheter 8 days              VTE Prophylaxis: Heparin    Clearance NOLAN Del Real  8/17/2023

## 2023-08-17 NOTE — PROGRESS NOTES
Pt off the floor to OR. Per Dr. Crow Chinchilla Doctor, ok to disconnect pt's Heparin gtt prior to sending her to OR.

## 2023-08-17 NOTE — PLAN OF CARE
Problem: Potential for Falls  Goal: Patient will remain free of falls  Description: INTERVENTIONS:  - Educate patient/family on patient safety including physical limitations  - Instruct patient to call for assistance with activity   - Consult OT/PT to assist with strengthening/mobility   - Keep Call bell within reach  - Keep bed low and locked with side rails adjusted as appropriate  - Keep care items and personal belongings within reach  - Initiate and maintain comfort rounds  - Make Fall Risk Sign visible to staff  - Offer Toileting every two Hours, in advance of need  - Initiate/Maintain bed alarm  - Obtain necessary fall risk management equipment: wheelchair  - Apply yellow socks and bracelet for high fall risk patients  - Consider moving patient to room near nurses station  Outcome: Progressing

## 2023-08-17 NOTE — ASSESSMENT & PLAN NOTE
· Blood pressure acceptable, continue home metoprolol succinate.   Holding HCTZ for potential intervention  · Monitor blood pressure per protocol

## 2023-08-17 NOTE — DISCHARGE INSTR - OTHER ORDERS
[employer] VAC to L groin:  Upon arrival to facility, remove wet to dry dressing, assess wound, and replace VAC  Black foam  125mmHG continuous suction  To be changed by licensed/ trained clinician every 2-3 days  Keep dressing clean, dry, and intact.    NO showers/ NO tub baths

## 2023-08-18 ENCOUNTER — DOCUMENTATION (OUTPATIENT)
Dept: VASCULAR SURGERY | Facility: CLINIC | Age: 83
End: 2023-08-18

## 2023-08-18 LAB — APTT PPP: 82 SECONDS (ref 23–37)

## 2023-08-18 PROCEDURE — 99232 SBSQ HOSP IP/OBS MODERATE 35: CPT | Performed by: PHYSICIAN ASSISTANT

## 2023-08-18 PROCEDURE — 97167 OT EVAL HIGH COMPLEX 60 MIN: CPT

## 2023-08-18 PROCEDURE — 99024 POSTOP FOLLOW-UP VISIT: CPT | Performed by: SURGERY

## 2023-08-18 PROCEDURE — 85730 THROMBOPLASTIN TIME PARTIAL: CPT | Performed by: HOSPITALIST

## 2023-08-18 PROCEDURE — 97163 PT EVAL HIGH COMPLEX 45 MIN: CPT

## 2023-08-18 RX ADMIN — ATORVASTATIN CALCIUM 20 MG: 20 TABLET, FILM COATED ORAL at 17:10

## 2023-08-18 RX ADMIN — TRAZODONE HYDROCHLORIDE 75 MG: 50 TABLET ORAL at 21:17

## 2023-08-18 RX ADMIN — MELATONIN 3 MG: at 21:18

## 2023-08-18 RX ADMIN — ASPIRIN 81 MG CHEWABLE TABLET 81 MG: 81 TABLET CHEWABLE at 08:24

## 2023-08-18 RX ADMIN — VITAM B12 100 MCG: 100 TAB at 08:24

## 2023-08-18 RX ADMIN — CEFAZOLIN SODIUM 2000 MG: 2 SOLUTION INTRAVENOUS at 02:44

## 2023-08-18 RX ADMIN — PANTOPRAZOLE SODIUM 40 MG: 40 TABLET, DELAYED RELEASE ORAL at 05:23

## 2023-08-18 RX ADMIN — DULOXETINE HYDROCHLORIDE 60 MG: 60 CAPSULE, DELAYED RELEASE ORAL at 08:24

## 2023-08-18 RX ADMIN — METOPROLOL SUCCINATE 100 MG: 100 TABLET, EXTENDED RELEASE ORAL at 08:24

## 2023-08-18 RX ADMIN — APIXABAN 5 MG: 5 TABLET, FILM COATED ORAL at 08:24

## 2023-08-18 RX ADMIN — PRAMIPEXOLE DIHYDROCHLORIDE 0.12 MG: 0.25 TABLET ORAL at 21:18

## 2023-08-18 RX ADMIN — POLYETHYLENE GLYCOL 3350 17 G: 17 POWDER, FOR SOLUTION ORAL at 08:22

## 2023-08-18 RX ADMIN — APIXABAN 5 MG: 5 TABLET, FILM COATED ORAL at 17:10

## 2023-08-18 RX ADMIN — SENNOSIDES AND DOCUSATE SODIUM 1 TABLET: 50; 8.6 TABLET ORAL at 21:19

## 2023-08-18 NOTE — PROGRESS NOTES
Vascular Nurse Navigator Post Op Education    Met with patient at bedside to introduce myself as Vascular Nurse Navigator and explained my role. Patient is appropriate and accepting to education. Patient was educated with Review of written materials provided, Teachback, Explanation, Demonstration and Question & Answer on expectations of post op care and recovery on L groin exploration, washout, hematoma evacuation without patch exposure, wound edge debridement, vac placement. Patient is a non-smoker. Education provided to patient on infection prevention, activity limitations, when to call the office, importance of follow up, and incisional care. Discharge instruction handout provided to patient to review.

## 2023-08-18 NOTE — PROGRESS NOTES
-- Patient:  -- MRN: [id number]  -- Aidin Request ID: [id number]  -- Level of care reserved: [address]  -- Partner Reserved: [place], [place], [address] [place] [address] [phone]  -- Clinical needs requested:  -- Geography searched: 10 miles around [id number]  -- Start of Service:  -- Request sent: 3:38pm EDT on 8/17/2023 by Juanito Faria  -- Partner reserved: 11:47am EDT on 8/18/2023 by Juanito Faria  -- Choice list shared: 11:46am EDT on 8/18/2023 by Juanito Faria

## 2023-08-18 NOTE — ASSESSMENT & PLAN NOTE
· S/p emergent left iliac and femoral embolectomy, angiogram, femoral arteriotomy with bovine patch 8/7/2023 in setting of acute left lower extremity limb ischemia  · Vascular surgery following given bleeding from site as above  · On aspirin and statin  · Vascular resumed Eliquis post-op

## 2023-08-18 NOTE — PLAN OF CARE
Problem: Potential for Falls  Goal: Patient will remain free of falls  Description: INTERVENTIONS:  - Educate patient/family on patient safety including physical limitations  - Instruct patient to call for assistance with activity   - Consult OT/PT to assist with strengthening/mobility   - Keep Call bell within reach  - Keep bed low and locked with side rails adjusted as appropriate  - Keep care items and personal belongings within reach  - Initiate and maintain comfort rounds  - Make Fall Risk Sign visible to staff  - Offer Toileting every *** Hours, in advance of need  - Initiate/Maintain ***alarm  - Obtain necessary fall risk management equipment: ***  - Apply yellow socks and bracelet for high fall risk patients  - Consider moving patient to room near nurses station  Outcome: Progressing     Problem: Prexisting or High Potential for Compromised Skin Integrity  Goal: Skin integrity is maintained or improved  Description: INTERVENTIONS:  - Identify patients at risk for skin breakdown  - Assess and monitor skin integrity  - Assess and monitor nutrition and hydration status  - Monitor labs   - Assess for incontinence   - Turn and reposition patient  - Assist with mobility/ambulation  - Relieve pressure over bony prominences  - Avoid friction and shearing  - Provide appropriate hygiene as needed including keeping skin clean and dry  - Evaluate need for skin moisturizer/barrier cream  - Collaborate with interdisciplinary team   - Patient/family teaching  - Consider wound care consult   Outcome: Progressing     Problem: MOBILITY - ADULT  Goal: Maintain or return to baseline ADL function  Description: INTERVENTIONS:  -  Assess patient's ability to carry out ADLs; assess patient's baseline for ADL function and identify physical deficits which impact ability to perform ADLs (bathing, care of mouth/teeth, toileting, grooming, dressing, etc.)  - Assess/evaluate cause of self-care deficits   - Assess range of motion  - Assess patient's mobility; develop plan if impaired  - Assess patient's need for assistive devices and provide as appropriate  - Encourage maximum independence but intervene and supervise when necessary  - Involve family in performance of ADLs  - Assess for home care needs following discharge   - Consider OT consult to assist with ADL evaluation and planning for discharge  - Provide patient education as appropriate  Outcome: Progressing  Goal: Maintains/Returns to pre admission functional level  Description: INTERVENTIONS:  - Perform BMAT or MOVE assessment daily.   - Set and communicate daily mobility goal to care team and patient/family/caregiver. - Collaborate with rehabilitation services on mobility goals if consulted  - Perform Range of Motion *** times a day. - Reposition patient every *** hours.   - Dangle patient *** times a day  - Stand patient *** times a day  - Ambulate patient *** times a day  - Out of bed to chair *** times a day   - Out of bed for meals *** times a day  - Out of bed for toileting  - Record patient progress and toleration of activity level   Outcome: Progressing

## 2023-08-18 NOTE — NURSING NOTE
Malfunction with wound vac charging port. Vas surgery paged to come take a look. New vac requested from store room.

## 2023-08-18 NOTE — PROGRESS NOTES
[address] [place] [address]  Progress Note  Name: Cindy Mccall I  MRN: [id number]  Unit/Bed#: -42 I Date of Admission: 8/16/2023   Date of Service: 8/18/2023 I Hospital Day: 2    Assessment/Plan   * Hematoma of groin  Assessment & Plan  · Patient presented from rehab with bleeding from groin incision site, concern for worsening of hematoma noted at recent admission  · Hemoglobin stable on admission, CTA with runoff revealing slightly decreased size of known hematoma  · Vascular Surgery following, appreciate their ongoing recommendations - for left groin hematoma evacuation, washout, and vac placement 8/17/23  · Vascular Surgery resumed Eliquis  · Monitor hemoglobins. Local wound care as per vascular surgery    Peripheral arterial disease ([address])  Assessment & Plan  · S/p emergent left iliac and femoral embolectomy, angiogram, femoral arteriotomy with bovine patch 8/7/2023 in setting of acute left lower extremity limb ischemia  · Vascular surgery following given bleeding from site as above  · On aspirin and statin  · Vascular resumed Eliquis post-op    Persistent atrial fibrillation (HCC)  Assessment & Plan  · S/p prior PPM  · Continue metoprolol succinate. · Vascular resumed Eliquis as above    Coronary artery disease involving native coronary artery of native heart without angina pectoris  Assessment & Plan  · Currently chest pain-free, on aspirin, statin, BB    Essential hypertension  Assessment & Plan  · Blood pressure acceptable, continue home metoprolol succinate. · HCTZ was held pre-op  · Monitor blood pressure per protocol    Depression  Assessment & Plan  · Mood stable, continue PTA duloxetine and trazodone           VTE Pharmacologic Prophylaxis: VTE Score: 6 High Risk (Score >/= 5) - Pharmacological DVT Prophylaxis Ordered: apixaban (Eliquis). Sequential Compression Devices Ordered.     Patient Centered Rounds: I performed bedside rounds with nursing staff today.  Discussions with Specialists or Other Care Team Provider:     Education and Discussions with Family / Patient: Updated  (daughter) via phone. Brielle     Total Time Spent on Date of Encounter in care of patient: 25 minutes This time was spent on one or more of the following: performing physical exam; counseling and coordination of care; obtaining or reviewing history; documenting in the medical record; reviewing/ordering tests, medications or procedures; communicating with other healthcare professionals and discussing with patient's family/caregivers. Current Length of Stay: 2 day(s)  Current Patient Status: Inpatient   Certification Statement: The patient will continue to require additional inpatient hospital stay due to wound vac, safe d/c planning, vascular clearance  Discharge Plan: Anticipate discharge in 48-72 hrs to rehab facility. Code Status: Level 1 - Full Code    Subjective:   Ms. Paulina Rosado denies complaint. She denies any groin pain. Objective:     Vitals:   Temp (24hrs), Av.8 °F (36.6 °C), Min:97.3 °F (36.3 °C), Max:98.2 °F (36.8 °C)    Temp:  [97.3 °F (36.3 °C)-98.2 °F (36.8 °C)] 98.2 °F (36.8 °C)  HR:  [63-79] 65  Resp:  [15-21] 16  BP: (120-170)/(49-81) 124/60  SpO2:  [92 %-100 %] 92 %  There is no height or weight on file to calculate BMI. Input and Output Summary (last 24 hours): Intake/Output Summary (Last 24 hours) at 2023 0925  Last data filed at 2023 0907  Gross per 24 hour   Intake 547.67 ml   Output 1175 ml   Net -627.33 ml       Physical Exam:   Physical Exam  Vitals reviewed. Constitutional:       Appearance: She is obese. Comments: Patient seen sitting in bedside chair, NAD   Cardiovascular:      Rate and Rhythm: Normal rate and regular rhythm. Pulmonary:      Effort: Pulmonary effort is normal. No respiratory distress. Breath sounds: Normal breath sounds.    Abdominal:      General: Bowel sounds are normal.      Palpations: Abdomen is soft. Tenderness: There is no abdominal tenderness. Musculoskeletal:      Comments: Wound vac   Skin:     General: Skin is warm. Neurological:      Mental Status: She is alert. Comments: Oriented to person, [facility], and states the year is "21"   Psychiatric:         Mood and Affect: Mood normal.         Behavior: Behavior normal.          Additional Data:     Labs:  Results from last 7 days   Lab Units 08/17/23  0121 08/16/23  1707 08/12/23  1259   WBC Thousand/uL 9.53 8.79 12.77*   HEMOGLOBIN g/dL 9.7* 10.1* 9.9*   HEMATOCRIT % 31.6* 32.4* 31.9*   PLATELETS Thousands/uL 426* 428* 310   BANDS PCT %  --  9*  --    NEUTROS PCT %  --   --  73   LYMPHS PCT %  --   --  13*   LYMPHO PCT %  --  10*  --    MONOS PCT %  --   --  8   MONO PCT %  --  11  --    EOS PCT %  --  3 3     Results from last 7 days   Lab Units 08/17/23  0121 08/16/23  1707   SODIUM mmol/L 136 138   POTASSIUM mmol/L 3.8 3.5   CHLORIDE mmol/L 102 103   CO2 mmol/L 30 32   BUN mg/dL 17 15   CREATININE mg/dL 0.86 1.00   ANION GAP mmol/L 4 3   CALCIUM mg/dL 9.1 9.2   ALBUMIN g/dL  --  2.7*   TOTAL BILIRUBIN mg/dL  --  2.51*   ALK PHOS U/L  --  81   ALT U/L  --  13   AST U/L  --  20   GLUCOSE RANDOM mg/dL 122 129     Results from last 7 days   Lab Units 08/17/23  0121   INR  1.53*                   Lines/Drains:  Invasive Devices     Peripheral Intravenous Line  Duration           Peripheral IV 08/16/23 Left Antecubital 1 day          Drain  Duration           External Urinary Catheter 9 days    External Urinary Catheter <1 day                      Imaging: No pertinent imaging reviewed.     Recent Cultures (last 7 days):         Last 24 Hours Medication List:   Current Facility-Administered Medications   Medication Dose Route Frequency Provider Last Rate   • acetaminophen  650 mg Oral Q6H PRN Nagi Salgado DO     • apixaban  5 mg Oral BID Mary Conti PA-C     • aspirin  81 mg Oral Daily Tavon Lopez DO     • atorvastatin  20 mg Oral After Zane Stains, DO     • bisacodyl  10 mg Rectal Daily PRN Aishwarya Kirkland, DO     • cyanocobalamin  100 mcg Oral Daily Nagi Newton Blinks, DO     • DULoxetine  60 mg Oral Daily Nagi Salgado, DO     • ferrous sulfate  325 mg Oral Every Other Day Nagi Newton Blinks, DO     • lactated ringers  500 mL Intravenous Once PRN Nagi Newton Blinks, DO      And   • lactated ringers  500 mL Intravenous Once PRN Nagi Newton Blinks, DO     • lactated ringers  20 mL/hr Intravenous Continuous Ramiro De Luna MD Stopped (08/17/23 1244)   • melatonin  3 mg Oral HS Nagi Newton Blinks, DO     • metoprolol succinate  100 mg Oral Daily Nagi Salgado, DO     • ondansetron  4 mg Intravenous Q6H PRN Nagi Salgado, DO     • oxyCODONE  2.5 mg Oral Q4H PRN Nagi Salgado, DO     • pantoprazole  40 mg Oral Early Morning Nagi Salgado, DO     • polyethylene glycol  17 g Oral Daily Nagi Salgado, DO     • pramipexole  0.125 mg Oral HS Nagi Salgado, DO     • senna-docusate sodium  1 tablet Oral HS Nagi Salgado, DO     • sodium chloride  1 spray Each Nare HS Nagi Salgado, DO     • sodium chloride  500 mL Intravenous Once PRN Nagi Salgado DO      And   • sodium chloride  500 mL Intravenous Once PRN Nagi Salgado, DO     • traZODone  75 mg Oral HS Aishwarya Kirkland, DO          Today, Patient Was Seen By: Gerber Alvarado PA-C    **Please Note: This note may have been constructed using a voice recognition system. **

## 2023-08-18 NOTE — PLAN OF CARE
Problem: PHYSICAL THERAPY ADULT  Goal: Performs mobility at highest level of function for planned discharge setting. See evaluation for individualized goals. Description: Treatment/Interventions: Functional transfer training, LE strengthening/ROM, Therapeutic exercise, Endurance training, Patient/family training, Equipment eval/education, Bed mobility, Gait training          See flowsheet documentation for full assessment, interventions and recommendations. Note: Prognosis: Fair  Problem List: Decreased strength, Decreased endurance, Impaired balance, Decreased mobility, Pain, Decreased coordination, Decreased cognition  Assessment: Pt seen for physical therapy evaluation, portion of which was performed as a co-evaluation w/ OT due to concerns re: medical stability. PT portion of evaluation focused on mobility assessment where OT portion focused more on ADLs, self care. Pt is an 80 y/o female w/ history/comorbidities of recent L femoral embolectomy, obesity, HTN, a-fib, depression, essential tremors, HTN, HLD who is now re-admitted from snf rehab w/ L groin site bleeding. Pt underwent L groin exploration, washout, hematoma evacuation and vac placement 8/7/23. Due to acute medical issues, need for hospital re-admit, acute surgery, pain, fall risk, note unstable clinical picture. PT consulted to assess post op mobility, d/c needs. Pt presents w/ decreased functional mob, standing balance, endurance, B LE strength. Pt will benefit from skilled PT to correct for the above problems. Recommend return to rehab once stable. PT Discharge Recommendation: Post acute rehabilitation services    See flowsheet documentation for full assessment.

## 2023-08-18 NOTE — ASSESSMENT & PLAN NOTE
80 y/o F s/p L femoral open exposure, endarterectomy with patch angioplasty, iliofemoral thromboembolectomy 8/7 for LLE ALI in setting of hold on anticoagulation for nosebleeds with known post-op hematoma at that time, stable prior to discharge presents from [place] acute rehab with concern for bleeding from L groin incision site. Noted to have residual hematoma to incision site.     s/p L groin exploration, washout, hematoma evacuation without patch exposure, wound edge debridement, vac placement 8/17/23    Plan:   D/C Heparin gtt, resume Eliquis  Continue VAC, plan for change Sat 8/19 then [facility] schedule  Diet  Pain control  PT/OT  Case Management for dispo planning w/ VAC and placement

## 2023-08-18 NOTE — PHYSICAL THERAPY NOTE
Physical Therapy Evaluation    Patient's Name: Colleen Patel    Admitting Diagnosis  Post-operative complication [B18. 9XXA]  Dehiscence of operative wound, initial encounter [T81.31XA]  History of embolectomy [Z98.890]    Problem List  Patient Active Problem List   Diagnosis    History of permanent cardiac pacemaker placement    Mixed hyperlipidemia    Generalized anxiety disorder    Depression    Benign familial tremor    Neuropathy    Primary insomnia    Essential hypertension    Coronary artery disease involving native coronary artery of native heart without angina pectoris    Sciatic nerve pain, right    Obesity (BMI 35.0-39.9 without comorbidity)    Globus sensation    Nocturnal enuresis    Acute lower limb ischemia    Peripheral artery embolism or thrombosis (HCC)    Persistent atrial fibrillation (HCC)    Onychomycosis    Left femoral embolectomy    Urinary incontinence    Peripheral arterial disease (HCC)    Hypotension    Epistaxis    Hematoma of groin       Past Medical History  Past Medical History:   Diagnosis Date    Abnormal blood chemistry 05/10/2013    Accelerated essential hypertension 05/08/2012    Atrial fibrillation (HCC)     Depression     Essential tremor     GERD (gastroesophageal reflux disease)     HLD (hyperlipidemia)     Hypertension     Melanoma ([address])     Obesity     Polyneuropathy     Skin cancer     Thromboembolism ([address]) 10/2019    LLE    Tinea unguium     Urinary incontinence        Past Surgical History  Past Surgical History:   Procedure Laterality Date    AORTIC VALVE REPLACEMENT  12/2012    BREAST BIOPSY Right     years ago -Benign    CARDIAC PACEMAKER PLACEMENT      dual chamber last assessed 10/14/13    CHOLECYSTECTOMY      IR LOWER EXTREMITY / INTERVENTION  10/6/2019    IR LOWER EXTREMITY ANGIOGRAM  8/7/2023    JOINT REPLACEMENT      B/L knee replacement    LA TEAEC W/WO PATCH GRAFT COMMON FEMORAL Left 8/7/2023    Procedure: ENDARTERECTOMY ARTERIAL FEMORAL WITH BOVINE PATCH ANGIOPLASTY;  Surgeon: Juliana Lyon MD;  Location: [facility];  Service: Vascular    THROMBECTOMY W/ EMBOLECTOMY N/A 10/6/2019    Procedure: EMBOLECTOMY/THROMBECTOMY Left LOWER EXTREMITY; angiogram;  Surgeon: Eveline Lyon MD;  Location: [facility];  Service: Vascular    THROMBECTOMY W/ EMBOLECTOMY Left 8/7/2023    Procedure: Left femoral exposure Left iliac, femoral, embolectomy wtih #3 and #4 Dieudonne catheters Left lower extremity angiogram Left femoral arteriotomy with bovine patch angioplasty;  Surgeon: Juliana Lyon MD;  Location: [facility];  Service: Vascular          08/18/23 0905   PT Last Visit   PT Visit Date 08/18/23   Note Type   Note type Evaluation   Pain Assessment   Pain Assessment Tool 0-10   Pain Score 3   Pain Location/Orientation Orientation: Left; Location: Leg   Patient's Stated Pain Goal No pain   Hospital Pain Intervention(s) Ambulation/increased activity;Repositioned   Restrictions/Precautions   Weight Bearing Precautions Per Order No   Other Precautions Telemetry;Multiple lines; Fall Risk;Pain;Cognitive; Chair Alarm; Bed Alarm   Home Living   Type of Home SNF; Other (Comment)   Additional Comments Normally resides at [facility], admitted from [facility] rehab. Was only there x a few days since recent admit. Prior Function   Level of [place] Needs assistance with ADLs; Needs assistance with functional mobility   Falls in the last 6 months 1 to 4   General   Family/Caregiver Present No   Cognition   Overall Cognitive Status Impaired   Arousal/Participation Responsive   Orientation Level Oriented X4   Memory Unable to assess   Following Commands Follows one step commands without difficulty   Subjective   Subjective states she feels OK.  minimal pain.   cooperative w/. session   RLE Assessment   RLE Assessment   (strength grossly 3 to 3+/5)   LLE Assessment   LLE Assessment   (strength grossly 3 to 3+/5)   Coordination   Movements are Fluid and Coordinated 0   Coordination and Movement Description mild B LE ataxia   Bed Mobility   Supine to Sit 4  Minimal assistance   Additional items Assist x 1; Increased time required;LE management;Verbal cues   Transfers   Sit to Stand 4  Minimal assistance   Additional items Assist x 1; Increased time required;Verbal cues   Stand to Sit 4  Minimal assistance   Additional items Assist x 1; Increased time required; Impulsive;Verbal cues   Ambulation/Elevation   Gait pattern   (slow, ataxia, short step length, antalgic)   Gait Assistance 4  Minimal assist   Additional items Assist x 2   Assistive Device   (HHA of 2- use RW next session)   Distance 3'x1 from bed to bedside chair   Balance   Static Sitting Good   Dynamic Sitting Fair -   Static Standing Poor +   Dynamic Standing Poor +   Ambulatory Poor   Endurance Deficit   Endurance Deficit Yes   Endurance Deficit Description fatogue, weakness, pain   Activity Tolerance   Activity Tolerance Patient limited by fatigue;Patient limited by pain;Treatment limited secondary to medical complications (Comment)   Nurse Made Aware yes   Assessment   Prognosis Fair   Problem List Decreased strength;Decreased endurance; Impaired balance;Decreased mobility;Pain;Decreased coordination;Decreased cognition   Assessment Pt seen for physical therapy evaluation, portion of which was performed as a co-evaluation w/ OT due to concerns re: medical stability. PT portion of evaluation focused on mobility assessment where OT portion focused more on ADLs, self care. Pt is an 78 y/o female w/ history/comorbidities of recent L femoral embolectomy, obesity, HTN, a-fib, depression, essential tremors, HTN, HLD who is now re-admitted from snf rehab w/ L groin site bleeding. Pt underwent L groin exploration, washout, hematoma evacuation and vac placement 8/7/23. Due to acute medical issues, need for hospital re-admit, acute surgery, pain, fall risk, note unstable clinical picture. PT consulted to assess post op mobility, d/c needs. Pt presents w/ decreased functional mob, standing balance, endurance, B LE strength. Pt will benefit from skilled PT to correct for the above problems. Recommend return to rehab once stable. Goals   Patient Goals none identified. STG Expiration Date 09/01/23   Short Term Goal #1 t+14   Short Term Goal #2 1-2 wks: bed mob and transfers w/ S, standing balance to fair w/ device, ambulate  ft w. RW and min A, increase B LE strength by 1/2-1 grade   PT Treatment Day 0   Plan   Treatment/Interventions Functional transfer training;LE strengthening/ROM; Therapeutic exercise; Endurance training;Patient/family training;Equipment eval/education; Bed mobility;Gait training   PT Frequency 2-3x/wk   Recommendation   PT Discharge Recommendation Post acute rehabilitation services   AM-PAC Basic Mobility Inpatient   Turning in Flat Bed Without Bedrails 3   Lying on Back to Sitting on Edge of Flat Bed Without Bedrails 3   Moving Bed to Chair 2   Standing Up From Chair Using Arms 3   Walk in Room 2   Climb 3-5 Stairs With Railing 1   Basic Mobility Inpatient Raw Score 14   Basic Mobility Standardized Score 35.55   Highest Level Of Mobility   JH-HLM Goal 4: Move to chair/commode   JH-HLM Achieved 4: Move to chair/commode         Araceli Maurer PT, DPT, CSRS

## 2023-08-18 NOTE — OCCUPATIONAL THERAPY NOTE
Occupational Therapy Evaluation     Patient Name: Boston Carlson  ISXLS'X Date: 8/18/2023  Problem List  Principal Problem:    Hematoma of groin  Active Problems:    Depression    Essential hypertension    Coronary artery disease involving native coronary artery of native heart without angina pectoris    Persistent atrial fibrillation (HCC)    Peripheral arterial disease ([address])    Past Medical History  Past Medical History:   Diagnosis Date    Abnormal blood chemistry 05/10/2013    Accelerated essential hypertension 05/08/2012    Atrial fibrillation (HCC)     Depression     Essential tremor     GERD (gastroesophageal reflux disease)     HLD (hyperlipidemia)     Hypertension     Melanoma ([address])     Obesity     Polyneuropathy     Skin cancer     Thromboembolism ([address]) 10/2019    LLE    Tinea unguium     Urinary incontinence      Past Surgical History  Past Surgical History:   Procedure Laterality Date    AORTIC VALVE REPLACEMENT  12/2012    BREAST BIOPSY Right     years ago -Benign    CARDIAC PACEMAKER PLACEMENT      dual chamber last assessed 10/14/13    CHOLECYSTECTOMY      IR LOWER EXTREMITY / INTERVENTION  10/6/2019    IR LOWER EXTREMITY ANGIOGRAM  8/7/2023    JOINT REPLACEMENT      B/L knee replacement    MA TEAEC W/WO PATCH GRAFT COMMON FEMORAL Left 8/7/2023    Procedure: ENDARTERECTOMY ARTERIAL FEMORAL WITH BOVINE PATCH ANGIOPLASTY;  Surgeon: Ada Lyon MD;  [facility];  Service: Vascular    THROMBECTOMY W/ EMBOLECTOMY N/A 10/6/2019    Procedure: EMBOLECTOMY/THROMBECTOMY Left LOWER EXTREMITY; angiogram;  Surgeon: Yanet Lyon MD;  [facility];  Service: Vascular    THROMBECTOMY W/ EMBOLECTOMY Left 8/7/2023    Procedure: Left femoral exposure Left iliac, femoral, embolectomy wtih #3 and #4 Dieudonne catheters Left lower extremity angiogram Left femoral arteriotomy with bovine patch angioplasty;  Surgeon: Ada Lyon MD;  [facility];  Service: Vascular           08/18/23 0236   OT Last Visit   OT Visit Date 08/18/23   Note Type   Note type Evaluation   Additional Comments Pt seen w/ PT to increase safety, decrease fall risk, and maximize functional/occupational 2* medical complexity which is a regression from pt's functional baseline. Pain Assessment   Pain Assessment Tool 0-10   Pain Score 3   Effect of Pain on Daily Activities Impacts ability to engage in valued occupations   Hospital Pain Intervention(s) Repositioned; Ambulation/increased activity; Emotional support   Restrictions/Precautions   Weight Bearing Precautions Per Order No   Other Precautions Multiple lines; Fall Risk;Pain;Cognitive; Chair Alarm; Bed Alarm  (wound vac)   Home Living   Type of Home SNF   Home Layout One level;Performs ADLs on one level; Able to live on main level with bedroom/bathroom; Access   Roomorama Shower/Tub Walk-in shower   Bathroom Toilet Raised   Bathroom Accessibility Accessible   Home Equipment Walker; Wheelchair-manual   Additional Comments Per chart review, pt typically resides at [facility]; now presenting from [facility]; typically uses RW vc w/c for functional mobility reporting that she can stand pivot to w/c and self-propel   Prior Function   Level of [place] Needs assistance with ADLs; Needs assistance with functional mobility; Needs assistance with [address] staff   Receives Help From Other (Comment)  (facility staff)   IADLs Family/Friend/Other provides transportation; Family/Friend/Other provides meals; Family/Friend/Other provides medication management   Falls in the last 6 months 1 to 4   Vocational Retired   Comments (-) driving   Lifestyle   Autonomy PTA, pt requires assistance w/ ADLs/IADLs and functional mobility w/ mainly w/c for functional mobility; (-) driving   Reciprocal Relationships Lives with facility staff   Service to Others Retired; previously working as RN   Intrinsic Gratification Enjoys spending time with friends   Subjective   Subjective "They help me with what I need."   ADL   Where Assessed Chair   Eating Assistance 5  Supervision/Setup   Grooming Assistance 5  Supervision/Setup   UB Bathing Assistance 4  Minimal Assistance   LB Bathing Assistance 3  Moderate [place] 4  Minimal [place] 3  Moderate [address]  3  Moderate Assistance   Functional Assistance 4  Minimal Assistance   Bed Mobility   Supine to Sit 4  Minimal assistance   Additional items Assist x 1;HOB elevated; Bedrails; Increased time required;Verbal cues;LE management   Sit to Supine Unable to assess   Additional Comments At end of session, pt left sitting upright in recliner with all functional needs in reach   Transfers   Sit to Stand 4  Minimal assistance   Additional items Assist x 1; Increased time required;Verbal cues   Stand to Sit 4  Minimal assistance   Additional items Assist x 1; Increased time required;Verbal cues   Additional Comments w/ b/l HHA for safety and support   Functional Mobility   Functional Mobility 4  Minimal assistance   Additional Comments Pt completed short functional mobility from EOB <> recliner w/ Min A x1 w/ b/l HHA for safety and support   Additional items Hand hold assistance   Balance   Static Sitting Fair   Dynamic Sitting Fair -   Static Standing Fair -   Dynamic Standing Poor +   Ambulatory Poor +   Activity Tolerance   Activity Tolerance Patient tolerated treatment well;Patient limited by fatigue   Medical Staff Made Alyson Cavazos DPT   Nurse Made Aware RN cleared   RUE Assessment   RUE Assessment WFL   LUE Assessment   LUE Assessment WFL   Hand Function   Gross Motor Coordination Functional   Fine Motor Coordination Functional   Cognition   Arousal/Participation Responsive;Arousable; Cooperative   Attention Within functional limits   Orientation Level Oriented X4   Memory Decreased recall of precautions   Following Commands Follows one step commands without difficulty   Comments Pt pleasant and cooperative   Assessment   Limitation Decreased ADL status; Decreased endurance;Decreased self-care trans;Decreased high-level ADLs   Prognosis Fair   Assessment Pt is a 78 yo female who presented to [facility] from rehab with concerns for enlarging hematoma at recent site of left femoral embolectomy with bleeding from the site. Per chart review, pt s/p vascular surgery [facility] on 8/7 2* acute left lower extremity limb ischemia in which pt d/c to [facility] for rehab. Pt  has a past medical history of Abnormal blood chemistry (05/10/2013), Accelerated essential hypertension (05/08/2012), Atrial fibrillation ([address]), Depression, Essential tremor, GERD (gastroesophageal reflux disease), HLD (hyperlipidemia), Hypertension, Melanoma ([address]), Obesity, Polyneuropathy, Skin cancer, Thromboembolism ([address]) (10/2019), Tinea unguium, and Urinary incontinence. Pt with active OT orders in which OT consulted to assess pt's functional status and occupational performance to determine safe d/c needs. Pt seen with PT to increase safety, decrease fall risk, and maximize functional/occupational performance 2* medical complexity which is a regression from pt's functional baseline. Prior to rehab, pt typically resides at [facility] and receives assistance w/ ADLs/IADLs and uses mainly w/c for functional mobility. (-) driving. Currently, pt performing bed mobility w/ Min A, functional transfers/mobility w/ Min A x1 w/ b/l HHA, UB ADLs w/ Min A, and LB ADLs w/ Mod A. Pt demonstrates the following limitations/impairments which impact the pt's ability to engage in valued occupations: balance, endurance/activity tolerance, standing tolerance, functional reach, postural/trunk control, strength, safety awareness, and pain. From an OT standpoint, recommend discharge to return to facility with rehab, once medically stable. The patient's raw score on the AM-PAC Daily Activity Inpatient Short Form is 17.  A raw score of less than 23 suggests the patient may benefit from discharge to post-acute rehabilitation services. Please refer to the recommendation of the Occupational Therapist for safe discharge planning. Pt would benefit from skilled OT services 2-3x/wk to address immediate acute care needs and underlying performance skills to promote safety, decrease fall risk, and enhance occupational performance to return to [facility]. Goals to be met within the next 10-14 days. Goals   Patient Goals To go home   LTG Time Frame 10-14   Long Term Goal #1 See OT goals listed below   Plan   Treatment Interventions ADL retraining;Functional transfer training; Endurance training;Patient/family training;Equipment evaluation/education; Compensatory technique education;Continued evaluation; Energy conservation; Activityengagement   Goal Expiration Date 09/01/23   OT Frequency 2-3x/wk   Recommendation   OT Discharge Recommendation Return to facility with rehabilitation services  (If able to meet pt's current functional needs)   AM-PAC Daily Activity Inpatient   Lower Body Dressing 2   Bathing 2   Toileting 2   Upper Body Dressing 3   Grooming 4   Eating 4   Daily Activity Raw Score 17   Daily Activity Standardized Score (Calc for Raw Score >=11) 37.26   AM-PAC Applied Cognition Inpatient   Following a Speech/Presentation 3   Understanding Ordinary Conversation 4   Taking Medications 3   Remembering Where Things Are Placed or Put Away 3   Remembering List of 4-5 Errands 3   Taking Care of Complicated Tasks 3   Applied Cognition Raw Score 19   Applied Cognition Standardized Score 39.77   End of Consult   Education Provided Yes   Patient Position at End of Consult Bedside chair;Bed/Chair alarm activated; All needs within reach   Nurse Communication Nurse aware of consult     OT GOALS:    Pt will improve functional mobility during ADL/IADL/leisure tasks with S using AE/DME prn.     Pt will improve activity tolerance/functional endurance during ADL/IADL/leisure tasks for at least 20 minutes to improve occupational performance and engagement in valued occupations using AE/DME prn. Pt will engage in ongoing functional/formal cognitive assessments to assist with safe d/c planning and increase safety during functional tasks. Pt will improve dynamic standing balance for at least 15 minutes with S during functional tasks to decrease fall risk and improve independence and engagement in ADL/IADL/leisure activities. Pt will follow 100% of multi-step commands in ADL/IADL/leisure activities to improve functional cognition used in functional daily routines. Pt will complete functional transfers on and off all surfaces used in daily routines with S for safety to maximize functional/occupational performance. Pt will complete all bed mobility tasks with Mod I to serve as a prerequisite for EOB/OOB ADL/IADL/leisure tasks, optimize positioning/comfort, and increase functional independence. Pt will independently demonstrate good carryover of safety precautions and education/training during ADL/IADL/leisure tasks with energy conservation techniques s/p skilled instruction without verbal cues. Pt will complete UB ADL tasks with Mod I using AE/DME prn to increase functional independence in ADL/IADL/leisure tasks. Pt will complete LB ADL tasks with Min A using AE/DME prn to increase functional independence in ADL/IADL/leisure tasks. Pt will complete toileting tasks with Min A and good hygiene/thoroughness using AE/DME prn to increase functional independence. Pt will independently identify and utilize 2-3 positive coping strategies to enhance overall wellbeing and engagement in valued occupations.       Abilio Conde MS, OTR/L

## 2023-08-18 NOTE — ASSESSMENT & PLAN NOTE
· Patient presented from rehab with bleeding from groin incision site, concern for worsening of hematoma noted at recent admission  · Hemoglobin stable on admission, CTA with runoff revealing slightly decreased size of known hematoma  · Vascular Surgery following, appreciate their ongoing recommendations - for left groin hematoma evacuation, washout, and vac placement 8/17/23  · Vascular Surgery resumed Eliquis  · Monitor hemoglobins.   Local wound care as per vascular surgery

## 2023-08-18 NOTE — PLAN OF CARE
Problem: OCCUPATIONAL THERAPY ADULT  Goal: Performs self-care activities at highest level of function for planned discharge setting. See evaluation for individualized goals. Description: Treatment Interventions: ADL retraining, Functional transfer training, Endurance training, Patient/family training, Equipment evaluation/education, Compensatory technique education, Continued evaluation, Energy conservation, Activityengagement          See flowsheet documentation for full assessment, interventions and recommendations. Note: Limitation: Decreased ADL status, Decreased endurance, Decreased self-care trans, Decreased high-level ADLs  Prognosis: Fair  Assessment: Pt is a 78 yo female who presented to [facility] from rehab with concerns for enlarging hematoma at recent site of left femoral embolectomy with bleeding from the site. Per chart review, pt s/p vascular surgery intervention at [facility] on 8/7 2* acute left lower extremity limb ischemia in which pt d/c to [facility] for rehab. Pt  has a past medical history of Abnormal blood chemistry (05/10/2013), Accelerated essential hypertension (05/08/2012), Atrial fibrillation ([address]), Depression, Essential tremor, GERD (gastroesophageal reflux disease), HLD (hyperlipidemia), Hypertension, Melanoma ([address]), Obesity, Polyneuropathy, Skin cancer, Thromboembolism ([address]) (10/2019), Tinea unguium, and Urinary incontinence. Pt with active OT orders in which OT consulted to assess pt's functional status and occupational performance to determine safe d/c needs. Pt seen with PT to increase safety, decrease fall risk, and maximize functional/occupational performance 2* medical complexity which is a regression from pt's functional baseline. Prior to rehab, pt typically resides at [facility] and receives assistance w/ ADLs/IADLs and uses mainly w/c for functional mobility. (-) driving.  Currently, pt performing bed mobility w/ Min A, functional transfers/mobility w/ Min A x1 w/ b/l HHA, UB ADLs w/ Min A, and LB ADLs w/ Mod A. Pt demonstrates the following limitations/impairments which impact the pt's ability to engage in valued occupations: balance, endurance/activity tolerance, standing tolerance, functional reach, postural/trunk control, strength, safety awareness, and pain. From an OT standpoint, recommend discharge to return to facility with rehab, once medically stable. The patient's raw score on the -PAC Daily Activity Inpatient Short Form is 17. A raw score of less than 19 suggests the patient may benefit from discharge to post-acute rehabilitation services. Please refer to the recommendation of the Occupational Therapist for safe discharge planning. Pt would benefit from skilled OT services 2-3x/wk to address immediate acute care needs and underlying performance skills to promote safety, decrease fall risk, and enhance occupational performance to return to [facility]. Goals to be met within the next 10-14 days.      OT Discharge Recommendation: Return to facility with rehabilitation services (If able to meet pt's current functional needs)

## 2023-08-18 NOTE — PROGRESS NOTES
[address] [facility]  Progress Note  Name: Mary Troy  MRN: [id number]  Unit/Bed#: -20 I Date of Admission: 8/16/2023   Date of Service: 8/18/2023 I Hospital Day: 2    Assessment/Plan   * Hematoma of groin  Assessment & Plan  78 y/o F s/p L femoral open exposure, endarterectomy with patch angioplasty, iliofemoral thromboembolectomy 8/7 for LLE ALI in setting of hold on anticoagulation for nosebleeds with known post-op hematoma at that time, stable prior to discharge presents from [facility] rehab with concern for bleeding from L groin incision site. Noted to have residual hematoma to incision site. s/p L groin exploration, washout, hematoma evacuation without patch exposure, wound edge debridement, vac placement 8/17/23    Plan:   D/C Heparin gtt, resume Eliquis  Continue VAC, plan for change Sat 8/19 then MWF schedule  Diet  Pain control  PT/OT  Case Management for dispo planning w/ VAC and placement              Subjective:  Pt doing well, no events overnight    Vitals:  /60   Pulse 65   Temp 98.2 °F (36.8 °C)   Resp 16   SpO2 92%     I/Os:  I/O last 3 completed shifts: In: 429.7 [I.V.:329.7; IV Piggyback:100]  Out: 5218 [Urine:1600; Drains:150; Blood:25]  No intake/output data recorded.     Lab Results and Cultures:   Lab Results   Component Value Date    WBC 9.53 08/17/2023    HGB 9.7 (L) 08/17/2023    HCT 31.6 (L) 08/17/2023     (H) 08/17/2023     (H) 08/17/2023     Lab Results   Component Value Date    GLUCOSE 203 (H) 10/06/2019    CALCIUM 9.1 08/17/2023    K 3.8 08/17/2023    CO2 30 08/17/2023     08/17/2023    BUN 17 08/17/2023    CREATININE 0.86 08/17/2023     Lab Results   Component Value Date    INR 1.53 (H) 08/17/2023    INR 1.65 (H) 08/16/2023    INR 2.45 (H) 08/09/2023    PROTIME 18.7 (H) 08/17/2023    PROTIME 19.7 (H) 08/16/2023    PROTIME 26.9 (H) 08/09/2023        Blood Culture: No results found for: "[place]", Urinalysis:   Lab Results   Component Value Date    COLORU Yellow 10/10/2019    CLARITYU Cloudy 10/10/2019    SPECGRAV 1.006 10/10/2019    PHUR 6.0 10/10/2019    LEUKOCYTESUR Large (A) 10/10/2019    NITRITE Negative 10/10/2019    GLUCOSEU Negative 10/10/2019    KETONESU Negative 10/10/2019    BILIRUBINUR Negative 10/10/2019    BLOODU Large (A) 10/10/2019   ,   Urine Culture:   Lab Results   Component Value Date    URINECX >100,000 cfu/ml Escherichia coli (A) 10/10/2019   ,   Wound Culure: No results found for: "WOUNDCULT"    Medications:  Current Facility-Administered Medications   Medication Dose Route Frequency   • acetaminophen (TYLENOL) tablet 650 mg  650 mg Oral Q6H PRN   • apixaban (ELIQUIS) tablet 5 mg  5 mg Oral BID   • aspirin chewable tablet 81 mg  81 mg Oral Daily   • atorvastatin (LIPITOR) tablet 20 mg  20 mg Oral After Dinner   • bisacodyl (DULCOLAX) rectal suppository 10 mg  10 mg Rectal Daily PRN   • cyanocobalamin (VITAMIN B-12) tablet 100 mcg  100 mcg Oral Daily   • DULoxetine (CYMBALTA) delayed release capsule 60 mg  60 mg Oral Daily   • ferrous sulfate tablet 325 mg  325 mg Oral Every Other Day   • lactated ringers bolus 500 mL  500 mL Intravenous Once PRN    And   • lactated ringers bolus 500 mL  500 mL Intravenous Once PRN   • lactated ringers infusion  20 mL/hr Intravenous Continuous   • melatonin tablet 3 mg  3 mg Oral HS   • metoprolol succinate (TOPROL-XL) 24 hr tablet 100 mg  100 mg Oral Daily   • ondansetron (ZOFRAN) injection 4 mg  4 mg Intravenous Q6H PRN   • oxyCODONE (ROXICODONE) split tablet 2.5 mg  2.5 mg Oral Q4H PRN   • pantoprazole (PROTONIX) EC tablet 40 mg  40 mg Oral Early Morning   • polyethylene glycol (MIRALAX) packet 17 g  17 g Oral Daily   • pramipexole (MIRAPEX) tablet 0.125 mg  0.125 mg Oral HS   • senna-docusate sodium (SENOKOT S) 8.6-50 mg per tablet 1 tablet  1 tablet Oral HS   • sodium chloride (OCEAN) 0.65 % nasal spray 1 spray  1 spray Each Nare HS   • sodium chloride 0.9 % bolus 500 mL  500 mL Intravenous Once PRN    And   • sodium chloride 0.9 % bolus 500 mL  500 mL Intravenous Once PRN   • traZODone (DESYREL) tablet 75 mg  75 mg Oral HS       Physical Exam:    General appearance: alert and oriented, in no acute distress  Lungs: clear to auscultation bilaterally  Heart: S1, S2 normal  Abdomen: soft, non-tender; bowel sounds normal; no masses,  no organomegaly  Extremities: extremities warm and well-perfused; no cyanosis, clubbing    Wound/Incision:  Left groin VAC intact      Pulse exam:    DP: Right: doppler signal Left: doppler signal  PT: Right: doppler signal Left: doppler signal      Milli Young PA-C  8/18/2023

## 2023-08-19 LAB
BASOPHILS # BLD AUTO: 0.06 THOUSANDS/ÂΜL (ref 0–0.1)
BASOPHILS NFR BLD AUTO: 1 % (ref 0–1)
EOSINOPHIL # BLD AUTO: 0.61 THOUSAND/ÂΜL (ref 0–0.61)
EOSINOPHIL NFR BLD AUTO: 6 % (ref 0–6)
ERYTHROCYTE [DISTWIDTH] IN BLOOD BY AUTOMATED COUNT: 16.6 % (ref 11.6–15.1)
HCT VFR BLD AUTO: 35.6 % (ref 34.8–46.1)
HGB BLD-MCNC: 10.6 G/DL (ref 11.5–15.4)
IMM GRANULOCYTES # BLD AUTO: 0.17 THOUSAND/UL (ref 0–0.2)
IMM GRANULOCYTES NFR BLD AUTO: 2 % (ref 0–2)
LYMPHOCYTES # BLD AUTO: 1.39 THOUSANDS/ÂΜL (ref 0.6–4.47)
LYMPHOCYTES NFR BLD AUTO: 14 % (ref 14–44)
MCH RBC QN AUTO: 30 PG (ref 26.8–34.3)
MCHC RBC AUTO-ENTMCNC: 29.8 G/DL (ref 31.4–37.4)
MCV RBC AUTO: 101 FL (ref 82–98)
MONOCYTES # BLD AUTO: 0.66 THOUSAND/ÂΜL (ref 0.17–1.22)
MONOCYTES NFR BLD AUTO: 7 % (ref 4–12)
NEUTROPHILS # BLD AUTO: 6.81 THOUSANDS/ÂΜL (ref 1.85–7.62)
NEUTS SEG NFR BLD AUTO: 70 % (ref 43–75)
NRBC BLD AUTO-RTO: 0 /100 WBCS
PLATELET # BLD AUTO: 453 THOUSANDS/UL (ref 149–390)
PMV BLD AUTO: 9.3 FL (ref 8.9–12.7)
RBC # BLD AUTO: 3.53 MILLION/UL (ref 3.81–5.12)
WBC # BLD AUTO: 9.7 THOUSAND/UL (ref 4.31–10.16)

## 2023-08-19 PROCEDURE — 85025 COMPLETE CBC W/AUTO DIFF WBC: CPT | Performed by: INTERNAL MEDICINE

## 2023-08-19 PROCEDURE — 99232 SBSQ HOSP IP/OBS MODERATE 35: CPT | Performed by: INTERNAL MEDICINE

## 2023-08-19 PROCEDURE — 97605 NEG PRS WND THER DME<=50SQCM: CPT | Performed by: SURGERY

## 2023-08-19 RX ORDER — HYDROMORPHONE HCL/PF 1 MG/ML
0.5 SYRINGE (ML) INJECTION ONCE
Status: COMPLETED | OUTPATIENT
Start: 2023-08-19 | End: 2023-08-19

## 2023-08-19 RX ORDER — HYDRALAZINE HYDROCHLORIDE 20 MG/ML
5 INJECTION INTRAMUSCULAR; INTRAVENOUS ONCE
Status: COMPLETED | OUTPATIENT
Start: 2023-08-19 | End: 2023-08-19

## 2023-08-19 RX ADMIN — DULOXETINE HYDROCHLORIDE 60 MG: 60 CAPSULE, DELAYED RELEASE ORAL at 09:03

## 2023-08-19 RX ADMIN — TRAZODONE HYDROCHLORIDE 75 MG: 50 TABLET ORAL at 21:29

## 2023-08-19 RX ADMIN — PRAMIPEXOLE DIHYDROCHLORIDE 0.12 MG: 0.25 TABLET ORAL at 21:30

## 2023-08-19 RX ADMIN — POLYETHYLENE GLYCOL 3350 17 G: 17 POWDER, FOR SOLUTION ORAL at 09:05

## 2023-08-19 RX ADMIN — MELATONIN 3 MG: at 21:29

## 2023-08-19 RX ADMIN — VITAM B12 100 MCG: 100 TAB at 09:04

## 2023-08-19 RX ADMIN — APIXABAN 5 MG: 5 TABLET, FILM COATED ORAL at 09:05

## 2023-08-19 RX ADMIN — HYDRALAZINE HYDROCHLORIDE 5 MG: 20 INJECTION, SOLUTION INTRAMUSCULAR; INTRAVENOUS at 23:37

## 2023-08-19 RX ADMIN — SENNOSIDES AND DOCUSATE SODIUM 1 TABLET: 50; 8.6 TABLET ORAL at 21:29

## 2023-08-19 RX ADMIN — APIXABAN 5 MG: 5 TABLET, FILM COATED ORAL at 17:14

## 2023-08-19 RX ADMIN — FERROUS SULFATE TAB 325 MG (65 MG ELEMENTAL FE) 325 MG: 325 (65 FE) TAB at 09:04

## 2023-08-19 RX ADMIN — ATORVASTATIN CALCIUM 20 MG: 20 TABLET, FILM COATED ORAL at 17:14

## 2023-08-19 RX ADMIN — ASPIRIN 81 MG CHEWABLE TABLET 81 MG: 81 TABLET CHEWABLE at 09:04

## 2023-08-19 RX ADMIN — SODIUM CHLORIDE, SODIUM LACTATE, POTASSIUM CHLORIDE, AND CALCIUM CHLORIDE 20 ML/HR: .6; .31; .03; .02 INJECTION, SOLUTION INTRAVENOUS at 16:04

## 2023-08-19 RX ADMIN — HYDROMORPHONE HYDROCHLORIDE 0.5 MG: 1 INJECTION, SOLUTION INTRAMUSCULAR; INTRAVENOUS; SUBCUTANEOUS at 13:21

## 2023-08-19 RX ADMIN — PANTOPRAZOLE SODIUM 40 MG: 40 TABLET, DELAYED RELEASE ORAL at 05:44

## 2023-08-19 RX ADMIN — METOPROLOL SUCCINATE 100 MG: 100 TABLET, EXTENDED RELEASE ORAL at 09:04

## 2023-08-19 RX ADMIN — SALINE NASAL SPRAY 1 SPRAY: 1.5 SOLUTION NASAL at 21:31

## 2023-08-19 NOTE — ASSESSMENT & PLAN NOTE
Patient presented from rehab with bleeding from groin incision site, concern for worsening of hematoma noted at recent admission  · Hemoglobin stable on admission, CTA with runoff revealing slightly decreased size of known hematoma  · Vascular Surgery following, appreciate their ongoing recommendations -s/p left groin hematoma evacuation, washout, and vac placement 8/17/23  · Vascular Surgery resumed Eliquis  · Monitor hemoglobin--stable  · Vascular surgery signed off, recommend VAC with MWF schedule change and outpatient f/u  · Stable for discharge to rehab

## 2023-08-19 NOTE — CASE MANAGEMENT
Case Management Assessment & Discharge Planning Note    Patient name Lea Resendiz  Location /-09 MRN [id number]  : 1940 Date 2023       Current Admission Date: 2023  Current Admission Diagnosis:Hematoma of groin   Patient Active Problem List    Diagnosis Date Noted   • Hematoma of groin 2023   • Epistaxis 2023   • Hypotension 2023   • Peripheral arterial disease ([address]) 2020   • Onychomycosis 10/07/2019   • Left femoral embolectomy 10/07/2019   • Urinary incontinence 10/07/2019   • Acute lower limb ischemia 10/06/2019   • Peripheral artery embolism or thrombosis ([address]) 10/06/2019   • Persistent atrial fibrillation ([address]) 10/06/2019   • Obesity (BMI 35.0-39.9 without comorbidity) 2019   • Globus sensation 2019   • Nocturnal enuresis 2019   • Sciatic nerve pain, right 2018   • Essential hypertension 10/30/2018   • Coronary artery disease involving native coronary artery of native heart without angina pectoris 10/30/2018   • Neuropathy 2018   • Primary insomnia 2018   • History of permanent cardiac pacemaker placement 05/10/2013   • Mixed hyperlipidemia 05/10/2013   • Generalized anxiety disorder 05/10/2013   • Depression 05/10/2013   • Benign familial tremor 05/10/2013      LOS (days): 3  Geometric Mean LOS (GMLOS) (days): 3.80  Days to GMLOS:1.2     OBJECTIVE:  PATIENT READMITTED TO HOSPITAL  Risk of Unplanned Readmission Score: 21.21         Current admission status: Inpatient       Preferred Pharmacy:   [address] [place] [address], [address] [place] [address] [address] [place] [address]  Phone: [phone] Fax: [phone]    Primary Care Provider: Michoacano Martinez DO    Primary Insurance: MEDICARE  Secondary Insurance: [address]:  Active Health Care Proxies    There are no active Health Care Proxies on file. DISCHARGE DETAILS:    Discharge planning discussed with[de-identified] Cm rec'd TT from medical team. Per medical team, pt is medically cleared for discharged pending placement. Pt from [facility]. Cm contacted facility via [facility] and TC liaison. Facility able to accept pt pending wound vac order and delivered to the facility. Liaison requested transport for tomm pending wound vac delivery. transport pending. Cm spoke to pt's dtr updated on medical clearance and made aware of transport to be arrange for tomm pending wound vac delivery to the facility. Cm will continue to follow.                       Contacts  Relationship to Patient[de-identified] Family  Contact Method: Phone  Reason/Outcome: Discharge Planning              Other Referral/Resources/Interventions Provided:  Interventions: SNF

## 2023-08-19 NOTE — ASSESSMENT & PLAN NOTE
S/p emergent left iliac and femoral embolectomy, angiogram, femoral arteriotomy with bovine patch 8/7/2023 in setting of acute left lower extremity limb ischemia  · Vascular surgery following given bleeding from site as above  · On aspirin and statin  · Vascular resumed Eliquis post-op

## 2023-08-19 NOTE — ASSESSMENT & PLAN NOTE
Blood pressure acceptable, continue home metoprolol succinate.   · HCTZ was held pre-op-can resume on dc   · Monitor blood pressure per protocol

## 2023-08-19 NOTE — ASSESSMENT & PLAN NOTE
Patient presented from rehab with bleeding from groin incision site, concern for worsening of hematoma noted at recent admission  · Hemoglobin stable on admission, CTA with runoff revealing slightly decreased size of known hematoma  · Vascular Surgery following, appreciate their ongoing recommendations -s/p left groin hematoma evacuation, washout, and vac placement 8/17/23  · Vascular Surgery resumed Eliquis  · Monitor hemoglobin--stable  · Vascular surgery signed off, recommend VAC with MWF schedule change and outpatient f/u  · Will d/w CM regarding return to rehab per PT/OT recs

## 2023-08-19 NOTE — PROGRESS NOTES
[address] [facility]  Progress Note  Name: Kishor Moreno I  MRN: [id number]  Unit/Bed#: -12 I Date of Admission: 8/16/2023   Date of Service: 8/19/2023 I Hospital Day: 3    Assessment/Plan   * Hematoma of groin  Assessment & Plan  Patient presented from rehab with bleeding from groin incision site, concern for worsening of hematoma noted at recent admission  · Hemoglobin stable on admission, CTA with runoff revealing slightly decreased size of known hematoma  · Vascular Surgery following, appreciate their ongoing recommendations -s/p left groin hematoma evacuation, washout, and vac placement 8/17/23  · Vascular Surgery resumed Eliquis  · Monitor hemoglobin--stable  · Vascular surgery signed off, recommend VAC with MWF schedule change and outpatient f/u  · Will d/w CM regarding return to rehab per PT/OT recs     Peripheral arterial disease ([address])  Assessment & Plan  S/p emergent left iliac and femoral embolectomy, angiogram, femoral arteriotomy with bovine patch 8/7/2023 in setting of acute left lower extremity limb ischemia  · Vascular surgery following given bleeding from site as above  · On aspirin and statin  · Vascular resumed Eliquis post-op    Persistent atrial fibrillation ([address])  Assessment & Plan  · S/p prior PPM  · Continue metoprolol succinate. · Vascular resumed Eliquis as above    Coronary artery disease involving native coronary artery of native heart without angina pectoris  Assessment & Plan  · Currently chest pain-free, on aspirin, statin, BB    Essential hypertension  Assessment & Plan  Blood pressure acceptable, continue home metoprolol succinate.   · HCTZ was held pre-op  · Monitor blood pressure per protocol    Depression  Assessment & Plan  · Mood stable, continue PTA duloxetine and trazodone    History of permanent cardiac pacemaker placement  Assessment & Plan  · Noted              VTE Pharmacologic Prophylaxis: VTE Score: 6 Moderate Risk (Score 3-4) - Pharmacological DVT Prophylaxis Ordered: apixaban (Eliquis). Patient Centered Rounds: I performed bedside rounds with nursing staff today. Discussions with Specialists or Other Care Team Provider: Appreciate case management input. Education and Discussions with Family / Patient: Updated  (daughter) via phone. Total Time Spent on Date of Encounter in care of patient: 25 minutes This time was spent on one or more of the following: performing physical exam; counseling and coordination of care; obtaining or reviewing history; documenting in the medical record; reviewing/ordering tests, medications or procedures; communicating with other healthcare professionals and discussing with patient's family/caregivers. Current Length of Stay: 3 day(s)  Current Patient Status: Inpatient   Certification Statement: The patient will continue to require additional inpatient hospital stay due to Likely stable to return to rehab, awaiting case management/vascular coordination regarding wound VAC and discharge planning  Discharge Plan: Anticipate discharge later today or tomorrow to rehab facility. Code Status: Level 1 - Full Code    Subjective: Is well today. Denies any pain. Ate breakfast without any concerns or complaints. Objective:     Vitals:   Temp (24hrs), Av.1 °F (36.7 °C), Min:97.8 °F (36.6 °C), Max:98.3 °F (36.8 °C)    Temp:  [97.8 °F (36.6 °C)-98.3 °F (36.8 °C)] 97.8 °F (36.6 °C)  HR:  [65-69] 68  BP: (120-141)/(57-93) 141/93  SpO2:  [93 %-95 %] 95 %  There is no height or weight on file to calculate BMI. Input and Output Summary (last 24 hours): Intake/Output Summary (Last 24 hours) at 2023 0906  Last data filed at 2023 0845  Gross per 24 hour   Intake 458 ml   Output 450 ml   Net 8 ml       Physical Exam:   Physical Exam  Vitals and nursing note reviewed. Constitutional:       General: She is not in acute distress. Appearance: She is obese.    Cardiovascular: Rate and Rhythm: Normal rate. Pulmonary:      Effort: No respiratory distress. Abdominal:      General: There is no distension. Tenderness: There is no abdominal tenderness. Musculoskeletal:      Right lower leg: No edema. Left lower leg: No edema. Skin:     Coloration: Skin is pale. Comments: Left groin wound VAC in place   Neurological:      Mental Status: She is oriented to person, place, and time. Mental status is at baseline. Psychiatric:         Mood and Affect: Mood normal.          Additional Data:     Labs:  Results from last 7 days   Lab Units 08/19/23  0838 08/17/23  0121 08/16/23  1707   WBC Thousand/uL 9.70   < > 8.79   HEMOGLOBIN g/dL 10.6*   < > 10.1*   HEMATOCRIT % 35.6   < > 32.4*   PLATELETS Thousands/uL 453*   < > 428*   BANDS PCT %  --   --  9*   NEUTROS PCT % 70  --   --    LYMPHS PCT % 14  --   --    LYMPHO PCT %  --   --  10*   MONOS PCT % 7  --   --    MONO PCT %  --   --  11   EOS PCT % 6  --  3    < > = values in this interval not displayed. Results from last 7 days   Lab Units 08/17/23  0121 08/16/23  1707   SODIUM mmol/L 136 138   POTASSIUM mmol/L 3.8 3.5   CHLORIDE mmol/L 102 103   CO2 mmol/L 30 32   BUN mg/dL 17 15   CREATININE mg/dL 0.86 1.00   ANION GAP mmol/L 4 3   CALCIUM mg/dL 9.1 9.2   ALBUMIN g/dL  --  2.7*   TOTAL BILIRUBIN mg/dL  --  2.51*   ALK PHOS U/L  --  81   ALT U/L  --  13   AST U/L  --  20   GLUCOSE RANDOM mg/dL 122 129     Results from last 7 days   Lab Units 08/17/23  0121   INR  1.53*                   Lines/Drains:  Invasive Devices     Peripheral Intravenous Line  Duration           Peripheral IV 08/16/23 Left Antecubital 2 days          Drain  Duration           External Urinary Catheter 10 days    External Urinary Catheter 1 day                      Imaging: No pertinent imaging reviewed.     Recent Cultures (last 7 days):         Last 24 Hours Medication List:   Current Facility-Administered Medications   Medication Dose Route Frequency Provider Last Rate   • acetaminophen  650 mg Oral Q6H PRN Fredia Dy, DO     • apixaban  5 mg Oral BID Milli Young PA-C     • aspirin  81 mg Oral Daily Nagi Nichols, DO     • atorvastatin  20 mg Oral After Dinner Fredia Dy, DO     • bisacodyl  10 mg Rectal Daily PRN Fredia Dy, DO     • cyanocobalamin  100 mcg Oral Daily Nagi Nichols, DO     • DULoxetine  60 mg Oral Daily Nagi Salgado, DO     • ferrous sulfate  325 mg Oral Every Other Day Fredia Dy, DO     • lactated ringers  20 mL/hr Intravenous Continuous James Arzate MD Stopped (08/17/23 1244)   • melatonin  3 mg Oral HS Nagi Nichols, DO     • metoprolol succinate  100 mg Oral Daily Nagi Salgado DO     • ondansetron  4 mg Intravenous Q6H PRN Nagi Salgado DO     • oxyCODONE  2.5 mg Oral Q4H PRN Nagi Salgado, DO     • pantoprazole  40 mg Oral Early Morning Nagi Salgado DO     • polyethylene glycol  17 g Oral Daily Nagi Salgado DO     • pramipexole  0.125 mg Oral HS Nagi Salgado, DO     • senna-docusate sodium  1 tablet Oral HS Nagi Salgado, DO     • sodium chloride  1 spray Each Nare HS Nagi Salgado DO     • traZODone  75 mg Oral HS Nagi Nichols DO          Today, Patient Was Seen By: Temitope Skinner PA-C    **Please Note: This note may have been constructed using a voice recognition system. **

## 2023-08-19 NOTE — PLAN OF CARE
Problem: Potential for Falls  Goal: Patient will remain free of falls  Description: INTERVENTIONS:  - Educate patient/family on patient safety including physical limitations  - Instruct patient to call for assistance with activity   - Consult OT/PT to assist with strengthening/mobility   - Keep Call bell within reach  - Keep bed low and locked with side rails adjusted as appropriate  - Keep care items and personal belongings within reach  - Initiate and maintain comfort rounds  - Make Fall Risk Sign visible to staff  - Offer Toileting ever Hours, in advance of need  - Initiate/Maintain larm  - Obtain necessary fall risk management equipment:   - Apply yellow socks and bracelet for high fall risk patients  - Consider moving patient to room near nurses station  Outcome: Progressing

## 2023-08-19 NOTE — PROGRESS NOTES
SONAL SUBRAMANIAN  Procedure Note    Date: 8/19/23  Time: 2146    Location of wound: left groin     Sponges removed:  1 Black Sponges  0 White Sponges    Dimensions of wound: 7 cm x 3 cm x 4 cm    Description of wound: Left groin wound    Sponges placed:  1 Black Sponges  0 White Sponges    VAC settings:  125 mmHg  Continuous    Pt tolerated procedure well  VAC sticker placed to wound dressing, per protocol

## 2023-08-19 NOTE — DISCHARGE SUMMARY
[facility]  Discharge- [address] [facility] 1940, 79 y.o. female MRN: [id number]  Unit/Bed#: [facility] Encounter: [id number]  Primary Care Provider: Isabela Choudhary DO   Date and time admitted to hospital: 8/16/2023  4:44 PM    * Hematoma of groin  Assessment & Plan  Patient presented from rehab with bleeding from groin incision site, concern for worsening of hematoma noted at recent admission  · Hemoglobin stable on admission, CTA with runoff revealing slightly decreased size of known hematoma  · Vascular Surgery following, appreciate their ongoing recommendations -s/p left groin hematoma evacuation, washout, and vac placement 8/17/23  · Vascular Surgery resumed Eliquis  · Monitor hemoglobin--stable  · Vascular surgery signed off, recommend VAC with MWF schedule change and outpatient f/u  · Stable for discharge to rehab     Peripheral arterial disease ([address])  Assessment & Plan  S/p emergent left iliac and femoral embolectomy, angiogram, femoral arteriotomy with bovine patch 8/7/2023 in setting of acute left lower extremity limb ischemia  · Vascular surgery following given bleeding from site as above  · On aspirin and statin  · Vascular resumed Eliquis post-op    Persistent atrial fibrillation ([address])  Assessment & Plan  · S/p prior PPM  · Continue metoprolol succinate. · Vascular resumed Eliquis as above    Coronary artery disease involving native coronary artery of native heart without angina pectoris  Assessment & Plan  · Currently chest pain-free, on aspirin, statin, BB    Essential hypertension  Assessment & Plan  Blood pressure acceptable, continue home metoprolol succinate.   · HCTZ was held pre-op-can resume on dc   · Monitor blood pressure per protocol    Depression  Assessment & Plan  · Mood stable, continue PTA duloxetine and trazodone    History of permanent cardiac pacemaker placement  Assessment & Plan  · Noted     Medical Problems     Resolved Problems  Date Reviewed: 8/19/2023   None       Discharging Physician / Practitioner: Kathy Randall PA-C  PCP: Bassem Santana DO  Admission Date:   Admission Orders (From admission, onward)     Ordered        08/16/23 2106  INPATIENT ADMISSION  Once                      Discharge Date: 8/20/23    Disposition:    Other: STR    Reason for Admission: Groin hematoma    Discharge Diagnoses:   Please see assessment and plan section above for further details regarding discharge diagnoses. Consultations During Hospital Stay:  · Vascular    Procedures Performed:   · 8/17/2023: Left - L groin exploration. washout. hematoma evacuation vac placement   · 8/17/2023 CTA abdominal w run off w wo contrast:    · Slightly decreased size of a left groin hematoma as described. No active extravasation. Patent left lower extremity runoff as before, in particular the external iliac and common femoral arteries remain patent, there is segmental  SFA stenosis with unchanged distal runoff. Small amount of air within the bladder, correlate for recent instrumentation. Correlate with urinalysis for possible infection. Significant Findings / Test Results:   · See above    Incidental Findings:   · See above     Test Results Pending at Discharge (will require follow up): · None     Outpatient Tests Requested:  · F/u vascular as directed  · F/u PCP    Complications:  none    Hospital Course:      Nory Marie is a 79 y.o. female patient with PMHx of HLD, HTN, CAD, A-fib, pacer, LLE ALI s/p embolectomy '23 Doctor, recently admitted for LLE ALI s/p L femoral exposure with bovine patch angioplasty, L iliofemoral thromboembolectomy, after intermittent hold of anticoagulation for epistaxis who originally presented to the hospital on 8/16/2023 due to bleeding from L groin incision site. Nursing at the facility were concerned about continued oozing from L groin with dressing changes, and therefore patient was brought to ER for evaluation. Patient was seen and evaluated by ED who ordered tranexamic acid. CTA abdomen with runoff ordered to r/o acute bleed, with decrease in L groin hematoma size on imaging, notably after hematoma/old blood expressed from incision site. Taken to OR on 8/17. VAC placed. Vascular cleared for discharge after resuming Eliquis. Ok for [facility] changes at facility MWF. Condition at Discharge: stable    Discharge Day Visit / Exam:   Subjective:  Feels okay. Was c/o being itchy. Was a bit confused per RN. Non focal examination. Vitals: Blood Pressure: 159/88 (08/20/23 0729)  Pulse: 71 (08/20/23 0729)  Temperature: 97.8 °F (36.6 °C) (08/20/23 0729)  Temp Source: Oral (08/20/23 0729)  Respirations: 18 (08/20/23 0729)  SpO2: 93 % (08/20/23 0729)  Exam:   Physical Exam  Vitals and nursing note reviewed. Constitutional:       General: She is not in acute distress. Appearance: She is obese. Cardiovascular:      Rate and Rhythm: Normal rate and regular rhythm. Pulmonary:      Effort: No respiratory distress. Abdominal:      General: There is no distension. Tenderness: There is no abdominal tenderness. Musculoskeletal:      Right lower leg: No edema. Left lower leg: No edema. Skin:     Comments: L groin wound VAC in place    Neurological:      Comments: Oriented to self and place. Unclear of year. Psychiatric:      Comments: Anxious           Discussion with Family: daughter via phone at 10:04 AM    Medication Adjustments and Discharge Medications:  · Discharge Medication List: See after visit summary for reconciled discharge medications. · Medication Dosing Tapers - Please refer to Discharge Medication List for details on any medication dosing tapers (if applicable to patient).    · Summary of Medication Adjustments made as a result of this hospitalization: see med rec  · Medications being temporarily held (include recommended restart time): see med rec    Wound Care Recommendations:  When applicable, please see wound care section of After Visit Summary. Instructions for any Catheters / Lines Present at Discharge (including removal date, if applicable):  See specific wound VAC instructions for details    Diet Recommendations at Discharge:  Diet -        Diet Orders   (From admission, onward)             Start     Ordered    08/17/23 1222  Diet Regular; Regular House  Diet effective now        References:    Adult Nutrition Support Algorithm    RD Therapeutic Diet Order Protocol   Question Answer Comment   Diet Type Regular    Regular Regular House    RD to adjust diet per protocol? Yes        08/17/23 1221                Goals of Care Discussions:  · Code Status at Discharge: Level 1 - Full Code  · Goals of care were not discussed during this admission. Discharge instructions/Information to patient and family:   See after visit summary section titled Discharge Instructions for information provided to patient and family. Planned Readmission: no      Discharge Statement:  I spent 34 minutes discharging the patient. This time was spent on the day of discharge. I had direct contact with the patient on the day of discharge. Greater than 50% of the total time was spent examining patient, answering all patient questions, arranging and discussing plan of care with patient as well as directly providing post-discharge instructions. Additional time then spent on discharge activities. **Please Note: This note may have been constructed using a voice recognition system. **

## 2023-08-19 NOTE — ASSESSMENT & PLAN NOTE
Blood pressure acceptable, continue home metoprolol succinate.   · HCTZ was held pre-op  · Monitor blood pressure per protocol

## 2023-08-20 VITALS
SYSTOLIC BLOOD PRESSURE: 159 MMHG | HEART RATE: 71 BPM | DIASTOLIC BLOOD PRESSURE: 88 MMHG | RESPIRATION RATE: 18 BRPM | TEMPERATURE: 97.8 F | OXYGEN SATURATION: 93 %

## 2023-08-20 PROCEDURE — 99239 HOSP IP/OBS DSCHRG MGMT >30: CPT | Performed by: INTERNAL MEDICINE

## 2023-08-20 RX ORDER — NYSTATIN 100000 U/G
CREAM TOPICAL 2 TIMES DAILY
Status: DISCONTINUED | OUTPATIENT
Start: 2023-08-20 | End: 2023-08-20 | Stop reason: HOSPADM

## 2023-08-20 RX ORDER — ACETAMINOPHEN 325 MG/1
650 TABLET ORAL EVERY 6 HOURS PRN
Refills: 0
Start: 2023-08-20

## 2023-08-20 RX ORDER — HYDROXYZINE HYDROCHLORIDE 10 MG/1
10 TABLET, FILM COATED ORAL EVERY 6 HOURS PRN
Status: DISCONTINUED | OUTPATIENT
Start: 2023-08-20 | End: 2023-08-20 | Stop reason: HOSPADM

## 2023-08-20 RX ADMIN — VITAM B12 100 MCG: 100 TAB at 08:43

## 2023-08-20 RX ADMIN — METOPROLOL SUCCINATE 100 MG: 100 TABLET, EXTENDED RELEASE ORAL at 08:43

## 2023-08-20 RX ADMIN — PANTOPRAZOLE SODIUM 40 MG: 40 TABLET, DELAYED RELEASE ORAL at 06:01

## 2023-08-20 RX ADMIN — ASPIRIN 81 MG CHEWABLE TABLET 81 MG: 81 TABLET CHEWABLE at 08:43

## 2023-08-20 RX ADMIN — POLYETHYLENE GLYCOL 3350 17 G: 17 POWDER, FOR SOLUTION ORAL at 08:42

## 2023-08-20 RX ADMIN — DULOXETINE HYDROCHLORIDE 60 MG: 60 CAPSULE, DELAYED RELEASE ORAL at 08:43

## 2023-08-20 RX ADMIN — APIXABAN 5 MG: 5 TABLET, FILM COATED ORAL at 08:43

## 2023-08-20 NOTE — CASE MANAGEMENT
Case Management Discharge Planning Note    Patient name Hilario Cooper  Location /-89 MRN [id number]  : 1940 Date 2023       Current Admission Date: 2023  Current Admission Diagnosis:Hematoma of groin   Patient Active Problem List    Diagnosis Date Noted   • Hematoma of groin 2023   • Epistaxis 2023   • Hypotension 2023   • Peripheral arterial disease ([address]) 2020   • Onychomycosis 10/07/2019   • Left femoral embolectomy 10/07/2019   • Urinary incontinence 10/07/2019   • Acute lower limb ischemia 10/06/2019   • Peripheral artery embolism or thrombosis ([address]) 10/06/2019   • Persistent atrial fibrillation ([address]) 10/06/2019   • Obesity (BMI 35.0-39.9 without comorbidity) 2019   • Globus sensation 2019   • Nocturnal enuresis 2019   • Sciatic nerve pain, right 2018   • Essential hypertension 10/30/2018   • Coronary artery disease involving native coronary artery of native heart without angina pectoris 10/30/2018   • Neuropathy 2018   • Primary insomnia 2018   • History of permanent cardiac pacemaker placement 05/10/2013   • Mixed hyperlipidemia 05/10/2013   • Generalized anxiety disorder 05/10/2013   • Depression 05/10/2013   • Benign familial tremor 05/10/2013      LOS (days): 4  Geometric Mean LOS (GMLOS) (days): 3.80  Days to GMLOS:0.1     OBJECTIVE:  Risk of Unplanned Readmission Score: 19.6         Current admission status: Inpatient   Preferred Pharmacy:   [address] [place] [address] [address]  [address] [place] [address]  Phone: [phone] Fax: [phone]    Primary Care Provider: Darrian Ball DO    Primary Insurance: MEDICARE  Secondary Insurance: Bernarda Tavarez    DISCHARGE DETAILS:    Discharge planning discussed with[de-identified]  left for lucio Zheng of Choice: Yes  Comments - Freedom of Choice: Confirmed with [facility] acceptance for today  CM contacted family/caregiver?: Yes  Were Treatment Team discharge recommendations reviewed with patient/caregiver?: Yes  Did patient/caregiver verbalize understanding of patient care needs?: N/A- going to facility  Were patient/caregiver advised of the risks associated with not following Treatment Team discharge recommendations?: Yes    Contacts  Patient Contacts: Wilberto Lopez   Relationship to Patient[de-identified] Family  Contact Method: Phone  Phone Number: [phone]  Reason/Outcome: Discharge Planning, Emergency Contact, Continuity of [address] [place] [address]         Is the patient interested in [facility] at discharge?: No    DME Referral Provided  Referral made for DME?: No    Other Referral/Resources/Interventions Provided:  Interventions: Short Term Rehab, Transportation       CM notified that patient is medically stable for DC today. CM followed up with [facility] admissions team and confirmed that they did receive the wound vac ordered. CM spoke with primary nurse to confirm appropriate mode of transportation. [facility]  [facility] via [facility]    CM called patient's daughter Ky Blunt and left a detailed message regarding the transport time. SLIM, primary nurse, facility and patient aware of the DCP and transport time. No further CM DC needs were discussed or identified at this time.

## 2023-08-21 ENCOUNTER — HOSPITAL ENCOUNTER (EMERGENCY)
Facility: HOSPITAL | Age: 83
Discharge: HOME | End: 2023-08-21
Attending: EMERGENCY MEDICINE
Payer: MEDICARE

## 2023-08-21 VITALS
BODY MASS INDEX: 36.62 KG/M2 | SYSTOLIC BLOOD PRESSURE: 148 MMHG | OXYGEN SATURATION: 96 % | HEIGHT: 62 IN | DIASTOLIC BLOOD PRESSURE: 70 MMHG | HEART RATE: 73 BPM | RESPIRATION RATE: 18 BRPM | TEMPERATURE: 97.5 F | WEIGHT: 199 LBS

## 2023-08-21 DIAGNOSIS — R04.0 RECURRENT EPISTAXIS: Primary | ICD-10-CM

## 2023-08-21 PROCEDURE — 99284 EMERGENCY DEPT VISIT MOD MDM: CPT | Performed by: EMERGENCY MEDICINE

## 2023-08-21 PROCEDURE — 30901 CONTROL OF NOSEBLEED: CPT | Performed by: EMERGENCY MEDICINE

## 2023-08-21 PROCEDURE — 99284 EMERGENCY DEPT VISIT MOD MDM: CPT

## 2023-08-21 RX ORDER — OXYMETAZOLINE HYDROCHLORIDE 0.05 G/100ML
2 SPRAY NASAL ONCE
Status: COMPLETED | OUTPATIENT
Start: 2023-08-21 | End: 2023-08-21

## 2023-08-21 RX ORDER — TRANEXAMIC ACID 100 MG/ML
1000 INJECTION, SOLUTION INTRAVENOUS ONCE
Status: COMPLETED | OUTPATIENT
Start: 2023-08-21 | End: 2023-08-21

## 2023-08-21 RX ADMIN — TRANEXAMIC ACID 1000 MG: 100 INJECTION, SOLUTION INTRAVENOUS at 11:19

## 2023-08-21 RX ADMIN — OXYMETAZOLINE HYDROCHLORIDE 2 SPRAY: 0.05 SPRAY NASAL at 08:17

## 2023-08-21 NOTE — ED PROVIDER NOTES
History  Chief Complaint   Patient presents with   • Nose Bleed     Patient presents to ED via EMS from [facility] nose bleed x30 minutes on eliquis      79year old female presents for evaluation of recurrent nosebleeds for the past 4 weeks. Patient states she noticed bleeding from both nares approximately 1 hour ago. Tampons were placed in the nares 30 minutes ago with cessation of bleeding. Patient denies recent illness. Prior to Admission Medications   Prescriptions Last Dose Informant Patient Reported? Taking? DULoxetine (CYMBALTA) 60 mg delayed release capsule   Yes No   Sig: Take 60 mg by mouth daily   Sennosides-Docusate Sodium 8.6-50 MG CAPS   Yes No   Sig: Take 1 tablet by mouth daily at bedtime   acetaminophen (TYLENOL) 325 mg tablet   No No   Sig: Take 2 tablets (650 mg total) by mouth every 6 (six) hours as needed for mild pain, headaches or fever   apixaban (ELIQUIS) 5 mg   No No   Sig: Take 1 tablet (5 mg total) by mouth 2 (two) times a day Do not start before August 15, 2023. aspirin 81 mg chewable tablet   No No   Sig: Chew 1 tablet (81 mg total) daily Do not start before August 14, 2023.    atorvastatin (LIPITOR) 20 mg tablet  Outside Facility (Specify) No No   Sig: Take 1 tablet (20 mg total) by mouth daily after dinner   bisacodyl (DULCOLAX) 10 mg suppository  Outside Facility (Specify) Yes No   Sig: Insert 10 mg into the rectum daily as needed for constipation   cyanocobalamin (VITAMIN B-12) 100 mcg tablet  Outside Facility (Specify) Yes No   Sig: Take 100 mcg by mouth daily   ferrous sulfate 325 (65 Fe) mg tablet  Outside Facility (Specify) Yes No   Sig: Take 325 mg by mouth every other day   hydrochlorothiazide (HYDRODIURIL) 25 mg tablet  Outside Facility (Specify) Yes No   Sig: Take 25 mg by mouth daily   melatonin 3 mg  Outside Facility (Specify) Yes No   Sig: Take 3 mg by mouth daily at bedtime as needed   metoprolol succinate (TOPROL-XL) 100 mg 24 hr tablet   Yes No Sig: Take 100 mg by mouth daily   omeprazole (PriLOSEC) 20 mg delayed release capsule  Outside Facility (Specify) No No   Sig: TAKE ONE CAPSULE BY MOUTH EVERY DAY   oxyCODONE (ROXICODONE) 5 immediate release tablet   No No   Sig: Take 0.5 tablets (2.5 mg total) by mouth every 4 (four) hours as needed for severe pain for up to 10 days Max Daily Amount: 15 mg   polyethylene glycol (MIRALAX) 17 g packet   Yes No   Sig: Take 17 g by mouth daily   pramipexole (MIRAPEX) 0.125 mg tablet   Yes No   Sig: Take 0.125 mg by mouth daily at bedtime   sodium chloride (AYR SALINE NASAL) nasal gel   No No   Si Application into each nostril 2 (two) times a day   sodium chloride (OCEAN) 0.65 % nasal spray   Yes No   Si spray into each nostril daily at bedtime Both nostils   traZODone (DESYREL) 50 mg tablet  Outside Facility (Specify) Yes No   Sig: Take 75 mg by mouth daily at bedtime      Facility-Administered Medications: None       Past Medical History:   Diagnosis Date   • Abnormal blood chemistry 05/10/2013   • Accelerated essential hypertension 2012   • Atrial fibrillation (HCC)    • Depression    • Essential tremor    • GERD (gastroesophageal reflux disease)    • HLD (hyperlipidemia)    • Hypertension    • Melanoma (HCC)    • Obesity    • Polyneuropathy    • Skin cancer    • Thromboembolism ([address]) 10/2019    LLE   • Tinea unguium    • Urinary incontinence        Past Surgical History:   Procedure Laterality Date   • AORTIC VALVE REPLACEMENT  2012   • BREAST BIOPSY Right     years ago -Benign   • CARDIAC PACEMAKER PLACEMENT      dual chamber last assessed 10/14/13   • CHOLECYSTECTOMY     • IR LOWER EXTREMITY / INTERVENTION  10/6/2019   • IR LOWER EXTREMITY ANGIOGRAM  2023   • JOINT REPLACEMENT      B/L knee replacement   • AL TEAEC W/WO PATCH GRAFT COMMON FEMORAL Left 2023    Procedure: ENDARTERECTOMY ARTERIAL FEMORAL WITH BOVINE PATCH ANGIOPLASTY;  Surgeon: Christopher Lyon MD;  [facility]; Service: Vascular   • THROMBECTOMY W/ EMBOLECTOMY N/A 10/6/2019    Procedure: EMBOLECTOMY/THROMBECTOMY Left LOWER EXTREMITY; angiogram;  Surgeon: Yanet Lyon MD;  [facility];  Service: Vascular   • THROMBECTOMY W/ EMBOLECTOMY Left 8/7/2023    Procedure: Left femoral exposure Left iliac, femoral, embolectomy wtih #3 and #4 Dieudonne catheters Left lower extremity angiogram Left femoral arteriotomy with bovine patch angioplasty;  Surgeon: Ada Lyon MD;  [facility];  Service: Vascular   • WOUND DEBRIDEMENT Left 8/17/2023    Procedure: L groin exploration, washout, hematoma evacuation vac placement;  Surgeon: Phuc Slater MD;  [facility];  Service: Vascular       Family History   Problem Relation Age of Onset   • Hypertension Mother    • Breast cancer Mother    • No Known Problems Father    • Breast cancer Daughter 46   • No Known Problems Maternal Grandmother    • No Known Problems Maternal Grandfather    • No Known Problems Paternal Grandmother    • No Known Problems Paternal Grandfather    • No Known Problems Son    • No Known Problems Son    • No Known Problems Daughter    • No Known Problems Brother    • No Known Problems Maternal Aunt    • No Known Problems Maternal Aunt      I have reviewed and agree with the history as documented. E-Cigarette/Vaping   • E-Cigarette Use Never User      E-Cigarette/Vaping Substances   • Nicotine No    • THC No    • CBD No    • Flavoring No    • Other No    • Unknown No      Social History     Tobacco Use   • Smoking status: Never   • Smokeless tobacco: Never   Vaping Use   • Vaping Use: Never used   Substance Use Topics   • Alcohol use: Not Currently     Alcohol/week: 0.0 standard drinks of alcohol   • Drug use: No       Review of Systems    Physical Exam  Physical Exam  Vitals and nursing note reviewed. HENT:      Head: Normocephalic and atraumatic. Nose: No nasal deformity. Right Nostril: Epistaxis present.       Left Nostril: No epistaxis. Mouth/Throat:      Comments: Dried blood in oropharynx  Eyes:      Conjunctiva/sclera: Conjunctivae normal.   Cardiovascular:      Rate and Rhythm: Normal rate and regular rhythm. Pulses: Normal pulses. Pulmonary:      Effort: Pulmonary effort is normal.   Skin:     General: Skin is warm and dry. Neurological:      Mental Status: She is alert. Vital Signs  ED Triage Vitals [08/21/23 0756]   Temperature Pulse Respirations Blood Pressure SpO2   97.5 °F (36.4 °C) 66 18 (!) 172/72 95 %      Temp Source Heart Rate Source Patient Position - Orthostatic VS BP Location FiO2 (%)   Axillary Monitor Sitting Right arm --      Pain Score       No Pain           Vitals:    08/21/23 0756 08/21/23 0800 08/21/23 1300   BP: (!) 172/72 159/72 163/74   Pulse: 66 68 70   Patient Position - Orthostatic VS: Sitting Sitting Sitting         Visual Acuity      ED Medications  Medications   oxymetazoline (AFRIN) 0.05 % nasal spray 2 spray (2 sprays Each Nare Given 8/21/23 0817)   tranexamic acid 100mg/mL (for epistaxis) 1,000 mg (1,000 mg Nasal Given 8/21/23 1119)       Diagnostic Studies  Results Reviewed     None                 No orders to display              Procedures  Epistaxis management    Date/Time: 8/21/2023 8:41 AM    Performed by: Felicity Juan MD  Authorized by: Felicity Juan MD  Universal Protocol:  Consent: Verbal consent obtained. Risks and benefits: risks, benefits and alternatives were discussed  Consent given by: patient  Required items: required blood products, implants, devices, and special equipment available  Patient identity confirmed: verbally with patient and arm band      Patient location:  ED  Anesthesia (see MAR for exact dosages): Anesthesia method:  None  Procedure details:     Treatment site:  R anterior    Repair method: Afrin followed by direct pressure.     Approach:  External    Spec Headlamp used: No      Treatment complexity:  Limited Treatment episode: recurring    Post-procedure details:     Assessment:  Bleeding stopped    Patient tolerance of procedure: Tolerated well, no immediate complications             ED Course  ED Course as of 08/21/23 1512   Mon Aug 21, 2023   2596 Bleeding appears to have stopped on re-evaluation. Will monitor for re-bleed   1146 While awaiting transport, bleeding resumed. Attempted to pack with anterior merocel inflated with TXA with no improvement. Long posterior merocel placed again with no improvement. Nasal balloon placed with 10 cc saline in posterior balloon; however, patient only able to tolerate 6 cc in anterior balloon and nose continued to bleed and patient had significant pain with the balloon. Balloon removed and long merocel replaced. 1200 Discussed with ENT. If bleeding does not stop by 5 pm she will need to go to the OR for intervention. SBIRT 22yo+    Flowsheet Row Most Recent Value   Initial Alcohol Screen: US AUDIT-C     1. How often do you have a drink containing alcohol? 0 Filed at: 08/21/2023 0757   2. How many drinks containing alcohol do you have on a typical day you are drinking? 0 Filed at: 08/21/2023 0757   3a. Male UNDER 65: How often do you have five or more drinks on one occasion? 0 Filed at: 08/21/2023 0757   3b. FEMALE Any Age, or MALE 65+: How often do you have 4 or more drinks on one occassion? 0 Filed at: 08/21/2023 0757   Audit-C Score 0 Filed at: 08/21/2023 6464   TYSHAWN: How many times in the past year have you. .. Used an illegal drug or used a prescription medication for non-medical reasons? Never Filed at: 08/21/2023 0757                    Medical Decision Making  79year old female anticoagulated on Eliquis presents for bilateral epistaxis for the past hour, controlled with tampons placed in the nares 30 minutes prior to arrival.  Patient reports recurrent nosebleeds for the past 4 weeks.   According to medical records, she was seen on 8/1/23 by ENT who cauterized bleeding from the right nares. Tampons removed from nares. No bleeding from left nares. Small anterior bleed right nares. Afrin applied followed by direct pressure with resolution of bleeding; however, prior to transport home, patient began bleeding again. Merocel and epistat balloon attempted without cessation of bleeding. Bleeding did slow; however. Discussed with ENT. Patient monitored in ED with eventual cessation of bleeding. ENT follow up as outpatient. Risk  OTC drugs. Prescription drug management. Disposition  Final diagnoses:   Recurrent epistaxis     Time reflects when diagnosis was documented in both MDM as applicable and the Disposition within this note     Time User Action Codes Description Comment    8/21/2023  9:03 AM Ryan Chungry Rashawn [R04.0] Recurrent epistaxis       ED Disposition     ED Disposition   Discharge    Condition   Stable    Date/Time   Mon Aug 21, 2023  9:04 AM    Comment   Maximus Sandoval discharge to home/self care.                Follow-up Information     Follow up With Specialties Details Why Contact Info Additional Information    [facility] Schedule an appointment as soon as possible for a visit in 1 week for re-evaluation [id number] [place] [address] Rd [id number]  [facility], [address] [facility] and Throat, [facility] [address], [facility], [address] [facility] [facility] [address]     [address] [facility] Emergency Department Emergency Medicine Go to  If symptoms worsen [address] [facility] [id number]  [address] [facility] Emergency Department, [address] [facility], [facility], [address] [place] [address],[address]          Patient's Medications   Discharge Prescriptions    No medications on file           PDMP Review       Value Time User    PDMP Reviewed  Yes 8/16/2023  9:24 PM Floyd Huang DO          ED Provider  Electronically Signed by           Christi Jc MD  08/21/23 9746       Christi Jc MD  08/21/23 0974

## 2023-08-21 NOTE — ED NOTES
Round trip canceled at this time, per ENT patient to be monitored until 1700.       Danae Leyden, RN  08/21/23 3741

## 2023-08-21 NOTE — DISCHARGE INSTRUCTIONS
If bleeding recurs, you can apply 2 sprays of oxymetazoline (Afrin) to the nares followed by direct pressure for 20 minutes. Do not use oxymetazoline spray more than twice in the same day and do not use it for more than 3 days in a row.

## 2023-08-22 ENCOUNTER — NURSING HOME VISIT (OUTPATIENT)
Dept: FAMILY MEDICINE CLINIC | Facility: CLINIC | Age: 83
End: 2023-08-22
Payer: MEDICARE

## 2023-08-22 DIAGNOSIS — I48.19 PERSISTENT ATRIAL FIBRILLATION (HCC): Chronic | ICD-10-CM

## 2023-08-22 DIAGNOSIS — Z95.0 HISTORY OF PERMANENT CARDIAC PACEMAKER PLACEMENT: Chronic | ICD-10-CM

## 2023-08-22 DIAGNOSIS — R04.0 EPISTAXIS: ICD-10-CM

## 2023-08-22 DIAGNOSIS — I99.8 ACUTE LOWER LIMB ISCHEMIA: ICD-10-CM

## 2023-08-22 DIAGNOSIS — I10 ESSENTIAL HYPERTENSION: ICD-10-CM

## 2023-08-22 DIAGNOSIS — I25.10 CORONARY ARTERY DISEASE INVOLVING NATIVE CORONARY ARTERY OF NATIVE HEART WITHOUT ANGINA PECTORIS: ICD-10-CM

## 2023-08-22 DIAGNOSIS — G25.0 BENIGN FAMILIAL TREMOR: ICD-10-CM

## 2023-08-22 DIAGNOSIS — S30.1XXD HEMATOMA OF GROIN, SUBSEQUENT ENCOUNTER: Primary | ICD-10-CM

## 2023-08-22 DIAGNOSIS — I73.9 PERIPHERAL ARTERIAL DISEASE (HCC): Chronic | ICD-10-CM

## 2023-08-22 DIAGNOSIS — I74.4 PERIPHERAL ARTERY EMBOLISM OR THROMBOSIS (HCC): ICD-10-CM

## 2023-08-22 PROCEDURE — 99306 1ST NF CARE HIGH MDM 50: CPT | Performed by: FAMILY MEDICINE

## 2023-08-22 NOTE — PROGRESS NOTES
[address] [facility], [address] [place] [address] [address]    NAME: Nilda Sandoval  AGE: 79 y.o. SEX: female    DATE OF ENCOUNTER: 8/22/2023    Code status:  Full Code    Assessment and Plan     1. Hematoma of groin, subsequent encounter    2. Epistaxis    3. Benign familial tremor    4. Persistent atrial fibrillation ([address])    5. Peripheral arterial disease ([address])    6. Acute lower limb ischemia    7. Coronary artery disease involving native coronary artery of native heart without angina pectoris    8. Essential hypertension    9. Peripheral artery embolism or thrombosis ([address])    10. History of permanent cardiac pacemaker placement        All medications and routine orders were reviewed and updated as needed. Plan discussed with: Family member    Chief Complaint     Seen for admission at Nursing Home    History of Present Illness     2year-old female returns after hospitalization for hematoma of the groin. This required evacuation and exploration. She subsequently returned to the facility but then developed epistaxis and she was rehospitalized. The patient has had significant epistaxis in the past requiring repeated hospitalizations. She is anticoagulated with Eliquis and aspirin due to her history of arterial thrombus as well as atrial fibrillation. She was noted to be anemic during her hospitalization. She has a history of essential tremor as well as hypertension. Her mood is good. She moved her bowels today. She is having no difficulty swallowing. She denies any dysuria.     HISTORY:  Past Medical History:   Diagnosis Date   • Abnormal blood chemistry 05/10/2013   • Accelerated essential hypertension 05/08/2012   • Atrial fibrillation (HCC)    • Depression    • Essential tremor    • GERD (gastroesophageal reflux disease)    • HLD (hyperlipidemia)    • Hypertension    • Melanoma ([address])    • Obesity    • Polyneuropathy    • Skin cancer    • Thromboembolism ([address]) 10/2019    LLE   • Tinea unguium    • Urinary incontinence      Past Surgical History:   Procedure Laterality Date   • AORTIC VALVE REPLACEMENT  12/2012   • BREAST BIOPSY Right     years ago -Benign   • CARDIAC PACEMAKER PLACEMENT      dual chamber last assessed 10/14/13   • CHOLECYSTECTOMY     • IR LOWER EXTREMITY / INTERVENTION  10/6/2019   • IR LOWER EXTREMITY ANGIOGRAM  8/7/2023   • JOINT REPLACEMENT      B/L knee replacement   • MN TEAEC W/WO PATCH GRAFT COMMON FEMORAL Left 8/7/2023    Procedure: ENDARTERECTOMY ARTERIAL FEMORAL WITH BOVINE PATCH ANGIOPLASTY;  Surgeon: Ada Lyon MD;  [facility];  Service: Vascular   • THROMBECTOMY W/ EMBOLECTOMY N/A 10/6/2019    Procedure: EMBOLECTOMY/THROMBECTOMY Left LOWER EXTREMITY; angiogram;  Surgeon: Fozia Lyon MD;  [facility];  Service: Vascular   • THROMBECTOMY W/ EMBOLECTOMY Left 8/7/2023    Procedure: Left femoral exposure Left iliac, femoral, embolectomy wtih #3 and #4 Dieudonne catheters Left lower extremity angiogram Left femoral arteriotomy with bovine patch angioplasty;  Surgeon: Ada Lyon MD;  [facility];  Service: Vascular   • WOUND DEBRIDEMENT Left 8/17/2023    Procedure: L groin exploration, washout, hematoma evacuation vac placement;  Surgeon: Shelli Brody MD;  [facility];  Service: Vascular     Family History   Problem Relation Age of Onset   • Hypertension Mother    • Breast cancer Mother    • No Known Problems Father    • Breast cancer Daughter 46   • No Known Problems Maternal Grandmother    • No Known Problems Maternal Grandfather    • No Known Problems Paternal Grandmother    • No Known Problems Paternal Grandfather    • No Known Problems Son    • No Known Problems Son    • No Known Problems Daughter    • No Known Problems Brother    • No Known Problems Maternal Aunt    • No Known Problems Maternal Aunt      Social History     Socioeconomic History   • Marital status:      Spouse name: None   • Number of children: None   • Years of education: None   • Highest education level: None   Occupational History   • None   Tobacco Use   • Smoking status: Never   • Smokeless tobacco: Never   Vaping Use   • Vaping Use: Never used   Substance and Sexual Activity   • Alcohol use: Not Currently     Alcohol/week: 0.0 standard drinks of alcohol   • Drug use: No   • Sexual activity: Not Currently   Other Topics Concern   • None   Social History Narrative   • None     Social Determinants of Health     Financial Resource Strain: Not on file   Food Insecurity: Not on file   Transportation Needs: Not on file   Physical Activity: Not on file   Stress: Not on file   Social Connections: Not on file   Intimate Partner Violence: Not on file   Housing Stability: Not on file       Allergies: Allergies   Allergen Reactions   • Penicillins Hives   • Sulfa Antibiotics Hives   • Medical Tape Rash       Review of Systems     Review of Systems   Constitutional: Negative for activity change, appetite change, chills, diaphoresis, fatigue and unexpected weight change. HENT: Positive for nosebleeds. Negative for congestion, ear discharge, ear pain, hearing loss and rhinorrhea. Eyes: Negative for pain, redness, itching and visual disturbance. Respiratory: Negative for cough, choking, chest tightness and shortness of breath. Cardiovascular: Negative for chest pain and leg swelling. Gastrointestinal: Negative for abdominal pain, blood in stool, constipation, diarrhea and nausea. Endocrine: Negative for cold intolerance, polydipsia and polyphagia. Genitourinary: Positive for frequency. Negative for dysuria, hematuria and urgency. Musculoskeletal: Negative for arthralgias, back pain, gait problem, joint swelling, neck pain and neck stiffness. Skin: Negative for color change and rash. Allergic/Immunologic: Negative for environmental allergies and food allergies. Neurological: Positive for tremors and weakness.  Negative for dizziness, seizures, speech difficulty, numbness and headaches. Hematological: Negative for adenopathy. Bruises/bleeds easily. Psychiatric/Behavioral: Negative for behavioral problems, dysphoric mood, hallucinations and self-injury. Medications and orders     All medications reviewed and updated in [facility] EMR. Objective     Vitals: per nursing home record    Physical Exam  Constitutional:       Appearance: She is well-developed. HENT:      Head: Normocephalic and atraumatic. Right Ear: External ear normal.      Left Ear: External ear normal.      Nose:      Comments: Right nasal packing     Mouth/Throat:      Pharynx: No oropharyngeal exudate. Eyes:      General: No scleral icterus. Right eye: No discharge. Left eye: No discharge. Conjunctiva/sclera: Conjunctivae normal.      Pupils: Pupils are equal, round, and reactive to light. Neck:      Thyroid: No thyromegaly. Cardiovascular:      Rate and Rhythm: Normal rate. Rhythm irregular. Heart sounds: Normal heart sounds. No murmur heard. No gallop. Pulmonary:      Effort: Pulmonary effort is normal. No respiratory distress. Breath sounds: Normal breath sounds. No wheezing or rales. Abdominal:      General: Bowel sounds are normal.      Palpations: Abdomen is soft. There is no mass. Tenderness: There is no guarding or rebound. Musculoskeletal:         General: No tenderness or deformity. Normal range of motion. Cervical back: Normal range of motion and neck supple. Lymphadenopathy:      Cervical: No cervical adenopathy. Skin:     General: Skin is warm and dry. Findings: Bruising present. No rash. Neurological:      Mental Status: She is alert and oriented to person, place, and time. Cranial Nerves: No cranial nerve deficit. Motor: Weakness present. Coordination: Coordination abnormal.      Deep Tendon Reflexes: Reflexes are normal and symmetric.  Reflexes normal. Psychiatric:         Behavior: Behavior normal.         Thought Content: Thought content normal.         Judgment: Judgment normal.         Pertinent Laboratory/Diagnostic Studies: The following labs/studies were reviewed please see chart or hospital paperwork for details. Diagnostic studies from the hospital were reviewed    - Readmit for long-term care. We will monitor her blood count. She will continue with the nasal packing until its time for removal by ENT.     Henrry Watts DO  8/22/2023 12:38 PM

## 2023-08-23 ENCOUNTER — TELEPHONE (OUTPATIENT)
Dept: VASCULAR SURGERY | Facility: CLINIC | Age: 83
End: 2023-08-23

## 2023-08-23 NOTE — TELEPHONE ENCOUNTER
Vascular Nurse Navigator Post Op Call    Procedure: Left - L groin exploration. washout. hematoma evacuation vac placement    Date of Procedure: 8/17/23    Surgeon:    Melody Luis MD - Primary     * Lane Lim DO - Assisting    Discharge Date: 8/20/23    Discharge Disposition: Other [address] at [facility]    Leg Weakness?: No    Leg Swelling?: No    Leg Numbness?: No    Chest Pain?: No    Shortness of Breath?: No    Orthopnea?: No    Anticoagulation pt was discharged on post op?: Aspirin and Apixaban (Eliquis)    Statin pt was discharged on post op?:  Lipitor (atorvastatin)    Bleeding?: No    Uncontrolled Pain?: No    Incision Concerns?: see below    Fever or Chills?: No      Reviewed discharge instructions and incision care with patient. NEXT OFFICE VISIT SCHEDULED:  9/5/23 at 11 am with SHIRA Dimas at [facility] Confirmed?: Yes      Any further questions/concerns? Spoke with Brenda Lacey, nurse at [facility], in regards to above. He stated that patient is to have a wound vac on her left groin and they received the machine today, but it was sent without tubing. They contacted supplier and they are sending the tubing and supplies today ans should have receive it tonight. Advised him to apply wound vac once received. Verbal understanding received. Advised him to notify office if they are not able to apply wound vac in a timely manner. Verbal understanding received. He stated they have been doing a wet to dry dressing on her left groin. He denies any signs of infection or bleeding from wound. Reviewed discharge medications - Aspirin and Eliquis. All questions answered.

## 2023-08-24 ENCOUNTER — NURSING HOME VISIT (OUTPATIENT)
Dept: FAMILY MEDICINE CLINIC | Facility: CLINIC | Age: 83
End: 2023-08-24
Payer: MEDICARE

## 2023-08-24 DIAGNOSIS — R04.0 EPISTAXIS: ICD-10-CM

## 2023-08-24 DIAGNOSIS — I99.8 ACUTE LOWER LIMB ISCHEMIA: ICD-10-CM

## 2023-08-24 DIAGNOSIS — I48.19 PERSISTENT ATRIAL FIBRILLATION (HCC): Primary | Chronic | ICD-10-CM

## 2023-08-24 DIAGNOSIS — S30.1XXD HEMATOMA OF GROIN, SUBSEQUENT ENCOUNTER: ICD-10-CM

## 2023-08-24 DIAGNOSIS — Z98.890 HISTORY OF EMBOLECTOMY: ICD-10-CM

## 2023-08-24 DIAGNOSIS — I73.9 PERIPHERAL ARTERIAL DISEASE (HCC): Chronic | ICD-10-CM

## 2023-08-24 PROCEDURE — 99309 SBSQ NF CARE MODERATE MDM 30: CPT | Performed by: NURSE PRACTITIONER

## 2023-08-24 NOTE — PROGRESS NOTES
[address] [facility], [address] [facility]  Facility: [facility]    NAME: Alberta Sandoval  AGE: 79 y.o. SEX: female    DATE OF ENCOUNTER: 8/24/2023    Code status:  Full Code    Assessment and Plan     1. Persistent atrial fibrillation ([address])    2. Peripheral arterial disease ([address])    3. Acute lower limb ischemia    4. Left femoral embolectomy    5. Hematoma of groin, subsequent encounter    6. Epistaxis        All medications and routine orders were reviewed and updated as needed. Plan discussed with: Patient, nursing staff    Chief Complaint     Interim evaluation    History of Present Illness     Luz White is assessed for follow up. She was evaluated in the ER on 8/21/23 for recurrent right nare epistaxis despite prior cauterization by ENT on 8/1/23. She was given Afrin and direct pressure with temporary cessation. Then Merocel with epistat balloon applied with cessation which remains intact at today's assessment. She will have further evaluation with ENT tomorrow. She continues on ASA 81mg po daily and Eliquis 5mg po BID. She also had a hematoma evacuation from her left iliofemoral thromboembolectomy site 2 weeks ago with VAC placement on 8/17/23. She had returned to [facility] [place] on 8/20/23 without VAC in place. Nursing struggled to get [facility] supplies thus placing wet to dry dressings and consulted wound care. Wound care will assess today and VAC will be placed at that time. Pain is ill controlled, she was not aware that she had to ask for pain mediations. Addressed this with nursing as well as adjustment of pain mediation regime. Appetite erratic, staying hydrated. Bowels are moving, denies dysuria, UI at baseline. Chronic insomnia, however she reports it has improved overall. Afebrile, VSS. 10lb weight loss over the past month?     The following portions of the patient's history were reviewed and updated as appropriate: current medications, past family history, past medical history, past social history, past surgical history and problem list.    Allergies: Allergies   Allergen Reactions   • Penicillins Hives   • Sulfa Antibiotics Hives   • Medical Tape Rash       Review of Systems     Review of Systems   Constitutional: Negative. Negative for activity change, appetite change, chills, fatigue and fever. HENT: Positive for hearing loss and nosebleeds. Negative for congestion, ear pain, postnasal drip and sinus pain. Eyes: Negative. Respiratory: Negative. Negative for cough and shortness of breath. Cardiovascular: Negative. Negative for chest pain and leg swelling. Gastrointestinal: Negative. Negative for constipation and diarrhea. Endocrine: Negative. Genitourinary: Negative. Negative for dysuria. Musculoskeletal: Positive for gait problem. Skin: Positive for wound. Allergic/Immunologic: Negative. Negative for immunocompromised state. Neurological: Positive for weakness. Negative for dizziness and light-headedness. Hematological: Negative. Psychiatric/Behavioral: Positive for decreased concentration. The patient is nervous/anxious. Medications and orders     All medications reviewed and updated in detention EMR. Objective     Vitals: per nursing home records    Physical Exam  Vitals and nursing note reviewed. Constitutional:       General: She is awake. Appearance: Normal appearance. She is obese. HENT:      Head: Normocephalic and atraumatic. Right Ear: Tympanic membrane, ear canal and external ear normal.      Left Ear: Tympanic membrane, ear canal and external ear normal.      Nose: Nose normal.      Comments: Right nare packing     Mouth/Throat:      Mouth: Mucous membranes are moist.      Pharynx: Oropharynx is clear. Eyes:      Extraocular Movements: Extraocular movements intact. Conjunctiva/sclera: Conjunctivae normal.      Pupils: Pupils are equal, round, and reactive to light.    Cardiovascular: Rate and Rhythm: Normal rate. Rhythm irregular. Pulses: Normal pulses. Heart sounds: Murmur heard. Pulmonary:      Effort: Pulmonary effort is normal.      Breath sounds: Normal breath sounds. Abdominal:      General: Bowel sounds are normal.      Palpations: Abdomen is soft. Musculoskeletal:         General: Normal range of motion. Cervical back: Normal range of motion and neck supple. Right lower leg: No edema. Left lower leg: No edema. Skin:     General: Skin is warm and dry. Coloration: Skin is pale. Comments: Left groin wound, see media. Followed by Travis Flores   Neurological:      General: No focal deficit present. Mental Status: She is alert and oriented to person, place, and time. Motor: Weakness present. Gait: Gait abnormal.   Psychiatric:         Mood and Affect: Mood is anxious. Speech: Speech normal.         Behavior: Behavior normal. Behavior is cooperative. Cognition and Memory: Memory is impaired. Judgment: Judgment normal.      Comments: forgetful             Pertinent Laboratory/Diagnostic Studies: The following labs and studies were reviewed please see chart or hospital paperwork for details. Adjust oxycodone schedule. Colace 100mg po BID. Change to weekly weights x4. Add b12 and folate to tomorrow's CBC.       SHIRA Riley  8/24/2023 11:44 AM

## 2023-08-28 ENCOUNTER — HOSPITAL ENCOUNTER (EMERGENCY)
Facility: HOSPITAL | Age: 83
End: 2023-08-28
Attending: EMERGENCY MEDICINE | Admitting: EMERGENCY MEDICINE
Payer: MEDICARE

## 2023-08-28 VITALS
TEMPERATURE: 98.5 F | DIASTOLIC BLOOD PRESSURE: 66 MMHG | SYSTOLIC BLOOD PRESSURE: 159 MMHG | RESPIRATION RATE: 18 BRPM | OXYGEN SATURATION: 97 % | HEART RATE: 64 BPM

## 2023-08-28 DIAGNOSIS — R04.0 RIGHT-SIDED EPISTAXIS: Primary | ICD-10-CM

## 2023-08-28 PROCEDURE — 99284 EMERGENCY DEPT VISIT MOD MDM: CPT | Performed by: EMERGENCY MEDICINE

## 2023-08-28 PROCEDURE — 30903 CONTROL OF NOSEBLEED: CPT | Performed by: EMERGENCY MEDICINE

## 2023-08-28 PROCEDURE — 99282 EMERGENCY DEPT VISIT SF MDM: CPT

## 2023-08-28 RX ORDER — CEPHALEXIN 500 MG/1
500 CAPSULE ORAL EVERY 6 HOURS SCHEDULED
Qty: 12 CAPSULE | Refills: 0 | Status: SHIPPED | OUTPATIENT
Start: 2023-08-28 | End: 2023-08-31

## 2023-08-28 RX ORDER — CEPHALEXIN 250 MG/1
500 CAPSULE ORAL ONCE
Status: COMPLETED | OUTPATIENT
Start: 2023-08-28 | End: 2023-08-28

## 2023-08-28 RX ORDER — OXYMETAZOLINE HYDROCHLORIDE 0.05 G/100ML
2 SPRAY NASAL ONCE
Status: COMPLETED | OUTPATIENT
Start: 2023-08-28 | End: 2023-08-28

## 2023-08-28 RX ORDER — TRANEXAMIC ACID 100 MG/ML
1000 INJECTION, SOLUTION INTRAVENOUS ONCE
Status: COMPLETED | OUTPATIENT
Start: 2023-08-28 | End: 2023-08-28

## 2023-08-28 RX ADMIN — TRANEXAMIC ACID 1000 MG: 100 INJECTION, SOLUTION INTRAVENOUS at 09:58

## 2023-08-28 RX ADMIN — OXYMETAZOLINE HYDROCHLORIDE 2 SPRAY: 0.05 SPRAY NASAL at 09:57

## 2023-08-28 RX ADMIN — CEPHALEXIN 500 MG: 250 CAPSULE ORAL at 10:51

## 2023-08-28 RX ADMIN — SILVER NITRATE APPLICATORS 1 APPLICATOR: 25; 75 STICK TOPICAL at 09:58

## 2023-08-28 NOTE — ED NOTES
STACY  at 1200 to take patient back to facility       Uvaldo Askew RN  08/28/23 8546 For information on Fall & Injury Prevention, visit: [email or web address] OR  [email or web address] OR  https://www.cdc.gov/steadi/patient.html

## 2023-08-30 NOTE — PROGRESS NOTES
ECHO to be completed at 1200  PT agreed to get OOB after ECHO  [facility]  [address] [place] [address]  [phone]  Schedule an appointment as soon as possible for a visit       [facility] EMERGENCY DEPT  [address] [facility]  [phone]    As needed, If symptoms worsen       DISCHARGE MEDICATIONS:     Medication List        START taking these medications      polyethylene glycol 17 GM/SCOOP powder  Commonly known as: GLYCOLAX  Take 17 g by mouth daily as needed (for constipation)               Where to Get Your Medications        These medications were sent to [address] [place] [address] Jose Mcdonald, [address] [place] [id number]      Phone: [phone]   polyethylene glycol 17 GM/SCOOP powder           DISCONTINUED MEDICATIONS:  Discharge Medication List as of 8/30/2023  2:32 PM        I have seen and evaluated the patient autonomously. My supervision physician was on site and available for consultation if needed. I am the Primary Clinician of Record. Maurice Huynh PA-C (electronically signed)    (Please note that parts of this dictation were completed with voice recognition software. Quite often unanticipated grammatical, syntax, homophones, and other interpretive errors are inadvertently transcribed by the computer software. Please disregards these errors.  Please excuse any errors that have escaped final proofreading.)       Maurice Huynh PA-C  08/30/23 [id number]

## 2023-09-11 ENCOUNTER — NURSING HOME VISIT (OUTPATIENT)
Dept: FAMILY MEDICINE CLINIC | Facility: CLINIC | Age: 83
End: 2023-09-11
Payer: MEDICARE

## 2023-09-11 DIAGNOSIS — I74.4 PERIPHERAL ARTERY EMBOLISM OR THROMBOSIS (HCC): Primary | ICD-10-CM

## 2023-09-11 DIAGNOSIS — I25.10 CORONARY ARTERY DISEASE INVOLVING NATIVE CORONARY ARTERY OF NATIVE HEART WITHOUT ANGINA PECTORIS: ICD-10-CM

## 2023-09-11 DIAGNOSIS — R04.0 EPISTAXIS: ICD-10-CM

## 2023-09-11 DIAGNOSIS — I99.8 ACUTE LOWER LIMB ISCHEMIA: ICD-10-CM

## 2023-09-11 DIAGNOSIS — I10 ESSENTIAL HYPERTENSION: ICD-10-CM

## 2023-09-11 DIAGNOSIS — I48.19 PERSISTENT ATRIAL FIBRILLATION (HCC): Chronic | ICD-10-CM

## 2023-09-11 DIAGNOSIS — S30.1XXD HEMATOMA OF GROIN, SUBSEQUENT ENCOUNTER: ICD-10-CM

## 2023-09-11 PROCEDURE — 99309 SBSQ NF CARE MODERATE MDM 30: CPT | Performed by: FAMILY MEDICINE

## 2023-09-11 NOTE — PROGRESS NOTES
[address] [facility], [address] [place] [address]  Facility: [facility]    NAME: Maxime Sandoval  AGE: 79 y.o. SEX: female    DATE OF ENCOUNTER: 9/11/2023    Code status:  Full Code    Assessment and Plan     1. Peripheral artery embolism or thrombosis (HCC)    2. Persistent atrial fibrillation ([address])    3. Essential hypertension    4. Acute lower limb ischemia    5. Coronary artery disease involving native coronary artery of native heart without angina pectoris    6. Hematoma of groin, subsequent encounter    7. Epistaxis        All medications and routine orders were reviewed and updated as needed. Plan discussed with: Patient    Chief Complaint     Interim evaluation    History of Present Illness     The patient has had multiple episodes of epistaxis since coming back from the hospital.  The wound vacuum is still in place and is functioning well. Hematoma in the left groin is slowly improving. The patient has no dyspnea. She was observed in therapy today walking up to 100 feet. Her bowel habits are stable. She has no dysuria. She has been seen in the emergency room due to the epistaxis and the need for packing of the nose. The following portions of the patient's history were reviewed and updated as appropriate: current medications, past family history, past medical history, past social history, past surgical history and problem list.    Allergies: Allergies   Allergen Reactions   • Penicillins Hives   • Sulfa Antibiotics Hives   • Medical Tape Rash       Review of Systems     Review of Systems   Constitutional: Negative for activity change, appetite change, chills, diaphoresis, fatigue and unexpected weight change. HENT: Positive for nosebleeds. Negative for congestion, ear discharge, ear pain, hearing loss and rhinorrhea. Eyes: Negative for pain, redness, itching and visual disturbance.    Respiratory: Negative for cough, choking, chest tightness and shortness of breath. Cardiovascular: Positive for palpitations and leg swelling. Negative for chest pain. Gastrointestinal: Negative for abdominal pain, blood in stool, constipation, diarrhea and nausea. Endocrine: Negative for cold intolerance, polydipsia and polyphagia. Genitourinary: Negative for dysuria, frequency, hematuria and urgency. Musculoskeletal: Negative for arthralgias, back pain, gait problem, joint swelling, neck pain and neck stiffness. Skin: Positive for wound. Negative for color change and rash. Allergic/Immunologic: Negative for environmental allergies and food allergies. Neurological: Positive for weakness. Negative for dizziness, tremors, seizures, speech difficulty, numbness and headaches. Hematological: Negative for adenopathy. Does not bruise/bleed easily. Psychiatric/Behavioral: Negative for behavioral problems, dysphoric mood, hallucinations and self-injury. Medications and orders     All medications reviewed and updated in [facility] EMR. Objective     Vitals: per nursing home records    Physical Exam  Constitutional:       Appearance: She is well-developed. HENT:      Head: Normocephalic and atraumatic. Right Ear: External ear normal.      Left Ear: External ear normal.      Mouth/Throat:      Pharynx: No oropharyngeal exudate. Eyes:      General: No scleral icterus. Right eye: No discharge. Left eye: No discharge. Conjunctiva/sclera: Conjunctivae normal.      Pupils: Pupils are equal, round, and reactive to light. Neck:      Thyroid: No thyromegaly. Cardiovascular:      Rate and Rhythm: Normal rate. Rhythm irregular. Heart sounds: Murmur heard. No gallop. Pulmonary:      Effort: Pulmonary effort is normal. No respiratory distress. Breath sounds: Normal breath sounds. No wheezing or rales. Abdominal:      General: Bowel sounds are normal.      Palpations: Abdomen is soft. There is no mass. Tenderness:  There is no guarding or rebound. Musculoskeletal:         General: No tenderness or deformity. Normal range of motion. Cervical back: Normal range of motion and neck supple. Lymphadenopathy:      Cervical: No cervical adenopathy. Skin:     General: Skin is warm and dry. Findings: Bruising present. No rash. Neurological:      Mental Status: She is alert and oriented to person, place, and time. Cranial Nerves: No cranial nerve deficit. Motor: Weakness present. Coordination: Coordination normal.      Deep Tendon Reflexes: Reflexes are normal and symmetric. Reflexes normal.   Psychiatric:         Behavior: Behavior normal.         Thought Content: Thought content normal.         Judgment: Judgment normal.         Pertinent Laboratory/Diagnostic Studies: The following labs were reviewed please see chart or hospital paperwork for details. Space for lab dictation most recent CBC from last month with anemia    - Recheck CBC and BMP.   Continue with nasal packing    Court Souza DO  9/11/2023 12:35 PM

## 2023-09-13 DIAGNOSIS — Z98.890 HISTORY OF EMBOLECTOMY: ICD-10-CM

## 2023-09-13 DIAGNOSIS — S30.1XXD HEMATOMA OF GROIN, SUBSEQUENT ENCOUNTER: Primary | ICD-10-CM

## 2023-09-13 DIAGNOSIS — M54.31 SCIATIC NERVE PAIN, RIGHT: ICD-10-CM

## 2023-09-13 RX ORDER — OXYCODONE HYDROCHLORIDE 5 MG/1
TABLET ORAL
Qty: 120 TABLET | Refills: 0 | Status: SHIPPED | OUTPATIENT
Start: 2023-09-13

## 2023-09-18 ENCOUNTER — TELEPHONE (OUTPATIENT)
Dept: VASCULAR SURGERY | Facility: CLINIC | Age: 83
End: 2023-09-18

## 2023-09-22 ENCOUNTER — TELEPHONE (OUTPATIENT)
Dept: FAMILY MEDICINE CLINIC | Facility: CLINIC | Age: 83
End: 2023-09-22

## 2023-10-09 ENCOUNTER — NURSING HOME VISIT (OUTPATIENT)
Dept: FAMILY MEDICINE CLINIC | Facility: CLINIC | Age: 83
End: 2023-10-09
Payer: MEDICARE

## 2023-10-09 DIAGNOSIS — I99.8 ACUTE LOWER LIMB ISCHEMIA: Primary | ICD-10-CM

## 2023-10-09 DIAGNOSIS — Z98.890 HISTORY OF EMBOLECTOMY: ICD-10-CM

## 2023-10-09 DIAGNOSIS — S30.1XXD HEMATOMA OF GROIN, SUBSEQUENT ENCOUNTER: ICD-10-CM

## 2023-10-09 DIAGNOSIS — I48.19 PERSISTENT ATRIAL FIBRILLATION (HCC): Chronic | ICD-10-CM

## 2023-10-09 DIAGNOSIS — R04.0 EPISTAXIS: ICD-10-CM

## 2023-10-09 DIAGNOSIS — G25.0 BENIGN FAMILIAL TREMOR: ICD-10-CM

## 2023-10-09 PROCEDURE — 99308 SBSQ NF CARE LOW MDM 20: CPT | Performed by: FAMILY MEDICINE

## 2023-10-09 NOTE — PROGRESS NOTES
[address] [facility], [address] [place] [address]  Facility: [facility]    NAME: Vandana Sandoval  AGE: 79 y.o. SEX: female    DATE OF ENCOUNTER: 10/9/2023    Code status:  Full Code    Assessment and Plan     1. Acute lower limb ischemia    2. Persistent atrial fibrillation ([address])    3. Benign familial tremor    4. Epistaxis    5. Hematoma of groin, subsequent encounter    6. Left femoral embolectomy        All medications and routine orders were reviewed and updated as needed. Plan discussed with: Patient    Chief Complaint     Interim evaluation    History of Present Illness     The patient reports that the wound vacuum worked very well on hematoma that was evacuated from her groin. She has had no further bleeding. There is been no further episodes of epistaxis either. She is fully anticoagulated and also taking aspirin. Her bowel habits are stable. She denies any dyspnea. The following portions of the patient's history were reviewed and updated as appropriate: current medications, past family history, past medical history, past social history, past surgical history and problem list.    Allergies: Allergies   Allergen Reactions   • Penicillins Hives   • Sulfa Antibiotics Hives   • Medical Tape Rash       Review of Systems     Review of Systems   Constitutional: Negative for activity change, appetite change, chills, diaphoresis, fatigue and unexpected weight change. HENT: Negative for congestion, ear discharge, ear pain, hearing loss, nosebleeds and rhinorrhea. Eyes: Negative for pain, redness, itching and visual disturbance. Respiratory: Negative for cough, choking, chest tightness and shortness of breath. Cardiovascular: Negative for chest pain and leg swelling. Gastrointestinal: Negative for abdominal pain, blood in stool, constipation, diarrhea and nausea. Endocrine: Negative for cold intolerance, polydipsia and polyphagia.    Genitourinary: Negative for dysuria, frequency, hematuria and urgency. Musculoskeletal: Negative for arthralgias, back pain, gait problem, joint swelling, neck pain and neck stiffness. Skin: Negative for color change and rash. Allergic/Immunologic: Negative for environmental allergies and food allergies. Neurological: Positive for weakness. Negative for dizziness, tremors, seizures, speech difficulty, numbness and headaches. Hematological: Negative for adenopathy. Bruises/bleeds easily. Psychiatric/Behavioral: Negative for behavioral problems, dysphoric mood, hallucinations and self-injury. Medications and orders     All medications reviewed and updated in retirement EMR. Objective     Vitals: per nursing home records    Physical Exam  Constitutional:       Appearance: She is well-developed. HENT:      Head: Normocephalic and atraumatic. Right Ear: External ear normal.      Left Ear: External ear normal.      Mouth/Throat:      Pharynx: No oropharyngeal exudate. Eyes:      General: No scleral icterus. Right eye: No discharge. Left eye: No discharge. Conjunctiva/sclera: Conjunctivae normal.      Pupils: Pupils are equal, round, and reactive to light. Neck:      Thyroid: No thyromegaly. Cardiovascular:      Rate and Rhythm: Normal rate. Rhythm irregular. Heart sounds: Normal heart sounds. No murmur heard. No gallop. Pulmonary:      Effort: Pulmonary effort is normal. No respiratory distress. Breath sounds: Normal breath sounds. No wheezing or rales. Abdominal:      General: Bowel sounds are normal.      Palpations: Abdomen is soft. There is no mass. Tenderness: There is no guarding or rebound. Musculoskeletal:         General: No tenderness or deformity. Normal range of motion. Cervical back: Normal range of motion and neck supple. Right lower leg: Edema present. Left lower leg: Edema present.    Lymphadenopathy:      Cervical: No cervical adenopathy. Skin:     General: Skin is warm and dry. Findings: Bruising present. No rash. Neurological:      Mental Status: She is alert and oriented to person, place, and time. Cranial Nerves: No cranial nerve deficit. Coordination: Coordination normal.      Deep Tendon Reflexes: Reflexes are normal and symmetric. Reflexes normal.   Psychiatric:         Behavior: Behavior normal.         Thought Content: Thought content normal.         Judgment: Judgment normal.         Pertinent Laboratory/Diagnostic Studies: The following studies were reviewed please see chart or hospital paperwork for details.     Space for lab dictation no new diagnostic studies    - Maintain the current medical regimen    Mariannalfie Ryder DO  10/9/2023 2:00 PM

## 2023-10-23 DIAGNOSIS — S30.1XXD HEMATOMA OF GROIN, SUBSEQUENT ENCOUNTER: ICD-10-CM

## 2023-10-23 DIAGNOSIS — Z98.890 HISTORY OF EMBOLECTOMY: ICD-10-CM

## 2023-10-23 DIAGNOSIS — M54.31 SCIATIC NERVE PAIN, RIGHT: ICD-10-CM

## 2023-11-03 ENCOUNTER — NURSING HOME VISIT (OUTPATIENT)
Dept: FAMILY MEDICINE CLINIC | Facility: CLINIC | Age: 83
End: 2023-11-03
Payer: MEDICARE

## 2023-11-03 DIAGNOSIS — R04.0 EPISTAXIS: ICD-10-CM

## 2023-11-03 DIAGNOSIS — I10 ESSENTIAL HYPERTENSION: ICD-10-CM

## 2023-11-03 DIAGNOSIS — I73.9 PERIPHERAL ARTERIAL DISEASE (HCC): Chronic | ICD-10-CM

## 2023-11-03 DIAGNOSIS — G25.0 BENIGN FAMILIAL TREMOR: ICD-10-CM

## 2023-11-03 DIAGNOSIS — I25.10 CORONARY ARTERY DISEASE INVOLVING NATIVE CORONARY ARTERY OF NATIVE HEART WITHOUT ANGINA PECTORIS: Primary | ICD-10-CM

## 2023-11-03 DIAGNOSIS — I48.19 PERSISTENT ATRIAL FIBRILLATION (HCC): Chronic | ICD-10-CM

## 2023-11-03 DIAGNOSIS — I74.4 PERIPHERAL ARTERY EMBOLISM OR THROMBOSIS (HCC): ICD-10-CM

## 2023-11-03 DIAGNOSIS — Z95.0 HISTORY OF PERMANENT CARDIAC PACEMAKER PLACEMENT: Chronic | ICD-10-CM

## 2023-11-03 PROCEDURE — 99308 SBSQ NF CARE LOW MDM 20: CPT | Performed by: FAMILY MEDICINE

## 2023-11-03 NOTE — PROGRESS NOTES
[address] [facility], [address] [place] [address]  Facility: [facility]    NAME: Sky Sandoval  AGE: 79 y.o. SEX: female    DATE OF ENCOUNTER: 11/3/2023    Code status:  Full Code    Assessment and Plan     1. Coronary artery disease involving native coronary artery of native heart without angina pectoris    2. Essential hypertension    3. Peripheral arterial disease ([address])    4. Peripheral artery embolism or thrombosis (HCC)    5. Persistent atrial fibrillation ([address])    6. Benign familial tremor    7. Epistaxis    8. History of permanent cardiac pacemaker placement        All medications and routine orders were reviewed and updated as needed. Plan discussed with: Patient    Chief Complaint     Interim evaluation    History of Present Illness     Patient reports that the hematoma in her groin is finally moved her wound closed. She has had no further signs of hip axis. She denies any collection symptoms. Bowel habits are stable with the use of magnesia. Appetite is at baseline. Denies any dyspnea and is genuinely happy to be here    The following portions of the patient's history were reviewed and updated as appropriate: current medications, past family history, past medical history, past social history, past surgical history and problem list.    Allergies: Allergies   Allergen Reactions    Penicillins Hives    Sulfa Antibiotics Hives    Medical Tape Rash       Review of Systems     Review of Systems   Constitutional:  Negative for activity change, appetite change, chills, diaphoresis, fatigue and unexpected weight change. HENT:  Negative for congestion, ear discharge, ear pain, hearing loss, nosebleeds and rhinorrhea. Eyes:  Negative for pain, redness, itching and visual disturbance. Respiratory:  Negative for cough, choking, chest tightness and shortness of breath. Cardiovascular:  Positive for palpitations. Negative for chest pain and leg swelling. Gastrointestinal:  Negative for abdominal pain, blood in stool, constipation, diarrhea and nausea. Endocrine: Negative for cold intolerance, polydipsia and polyphagia. Genitourinary:  Negative for dysuria, frequency, hematuria and urgency. Musculoskeletal:  Negative for arthralgias, back pain, gait problem, joint swelling, neck pain and neck stiffness. Skin:  Negative for color change and rash. Allergic/Immunologic: Negative for environmental allergies and food allergies. Neurological:  Positive for weakness. Negative for dizziness, tremors, seizures, speech difficulty, numbness and headaches. Hematological:  Negative for adenopathy. Bruises/bleeds easily. Psychiatric/Behavioral:  Negative for behavioral problems, dysphoric mood, hallucinations and self-injury. Medications and orders     All medications reviewed and updated in alf EMR. Objective     Vitals: per nursing home records    Physical Exam  Constitutional:       Appearance: Normal appearance. She is well-developed. HENT:      Head: Normocephalic and atraumatic. Right Ear: External ear normal.      Left Ear: External ear normal.      Mouth/Throat:      Mouth: Mucous membranes are moist.      Pharynx: Oropharynx is clear. No oropharyngeal exudate. Eyes:      General: No scleral icterus. Right eye: No discharge. Left eye: No discharge. Extraocular Movements: Extraocular movements intact. Conjunctiva/sclera: Conjunctivae normal.      Pupils: Pupils are equal, round, and reactive to light. Neck:      Thyroid: No thyromegaly. Cardiovascular:      Rate and Rhythm: Normal rate. Rhythm irregular. Heart sounds: Normal heart sounds. No murmur heard. No gallop. Pulmonary:      Effort: Pulmonary effort is normal. No respiratory distress. Breath sounds: Normal breath sounds. No wheezing or rales.    Abdominal:      General: Bowel sounds are normal.      Palpations: Abdomen is soft. There is no mass. Tenderness: There is no guarding or rebound. Musculoskeletal:         General: No tenderness or deformity. Normal range of motion. Cervical back: Normal range of motion and neck supple. Lymphadenopathy:      Cervical: No cervical adenopathy. Skin:     General: Skin is warm and dry. Findings: Bruising present. No rash. Neurological:      Mental Status: She is alert and oriented to person, place, and time. Cranial Nerves: No cranial nerve deficit. Motor: Weakness present. Coordination: Coordination normal.      Deep Tendon Reflexes: Reflexes are normal and symmetric. Reflexes normal.   Psychiatric:         Mood and Affect: Mood normal.         Behavior: Behavior normal.         Thought Content: Thought content normal.         Judgment: Judgment normal.         Pertinent Laboratory/Diagnostic Studies: The following labs were reviewed please see chart or hospital paperwork for details.     Space for lab dictation no new studies    - Maintain the current therapy plan and medication regimen including anticoagulation    Henrrybrigid Watts, DO  11/3/2023 12:10 PM

## 2023-11-08 ENCOUNTER — IN-CLINIC DEVICE VISIT (OUTPATIENT)
Dept: CARDIOLOGY CLINIC | Facility: CLINIC | Age: 83
End: 2023-11-08
Payer: MEDICARE

## 2023-11-08 DIAGNOSIS — Z95.0 CARDIAC PACEMAKER IN SITU: Primary | ICD-10-CM

## 2023-11-08 PROCEDURE — 93280 PM DEVICE PROGR EVAL DUAL: CPT | Performed by: INTERNAL MEDICINE

## 2023-11-08 NOTE — PROGRESS NOTES
Results for orders placed or performed in visit on 11/08/23   Cardiac EP device report    Narrative    MDT DUAL PPM  DEVICE INTERROGATED IN THE [place] OFFICE: BATTERY VOLTAGE ADEQUATE-18 MONS. AP 2%  4%. ALL AVAILABLE LEAD PARAMETERS & TRENDS WITHIN NORMAL LIMITS. MULTIPLE AMS NOTED; 52% BURDEN; LONGEST >3 DAYS. PT ON ELIQUIS. SMALL P WAVES; PROBABLE UNDERSENSING OF AFIB BURDEN (HX OF SAME); RA SENSITIVITY CHANGED. PT WHEELCHAIR BOUND. NORMAL DEVICE FUNCTION.  NC

## 2023-11-20 DIAGNOSIS — M54.31 SCIATIC NERVE PAIN, RIGHT: ICD-10-CM

## 2023-11-20 DIAGNOSIS — S30.1XXD HEMATOMA OF GROIN, SUBSEQUENT ENCOUNTER: ICD-10-CM

## 2023-11-20 DIAGNOSIS — Z98.890 HISTORY OF EMBOLECTOMY: ICD-10-CM

## 2023-11-20 RX ORDER — OXYCODONE HYDROCHLORIDE 5 MG/1
TABLET ORAL
Qty: 120 TABLET | Refills: 0 | Status: SHIPPED | OUTPATIENT
Start: 2023-11-20

## 2023-12-01 ENCOUNTER — NURSING HOME VISIT (OUTPATIENT)
Dept: FAMILY MEDICINE CLINIC | Facility: CLINIC | Age: 83
End: 2023-12-01
Payer: MEDICARE

## 2023-12-01 DIAGNOSIS — I73.9 PERIPHERAL ARTERIAL DISEASE (HCC): Chronic | ICD-10-CM

## 2023-12-01 DIAGNOSIS — F41.1 GENERALIZED ANXIETY DISORDER: Chronic | ICD-10-CM

## 2023-12-01 DIAGNOSIS — I48.19 PERSISTENT ATRIAL FIBRILLATION (HCC): Primary | Chronic | ICD-10-CM

## 2023-12-01 DIAGNOSIS — G62.9 NEUROPATHY: ICD-10-CM

## 2023-12-01 DIAGNOSIS — Z98.890 HISTORY OF EMBOLECTOMY: ICD-10-CM

## 2023-12-01 PROCEDURE — 99309 SBSQ NF CARE MODERATE MDM 30: CPT | Performed by: NURSE PRACTITIONER

## 2023-12-01 NOTE — PROGRESS NOTES
[facility] [facility], [address] [place] [address]  Facility: [facility]    NAME: Daniel Sandoval  AGE: 79 y.o. SEX: female    DATE OF ENCOUNTER: 12/1/2023    Code status:  Full Code    Assessment and Plan     1. Persistent atrial fibrillation ([address])    2. Peripheral arterial disease ([address])    3. Generalized anxiety disorder    4. Left femoral embolectomy    5. Neuropathy        All medications and routine orders were reviewed and updated as needed. Plan discussed with: Patient    Chief Complaint     Follow-up of chronic conditions    History of Present Illness     Ms. Sandoval is assessed for follow up. She is feeling well, happy with her new room change. Reports overall chronic neuropathic pain is well managed, still has some sleep disturbance due to the pain at times. Denies dyspnea, chest pressure nor pain. No recent epistaxis. She is scheduled for follow up with vascular as well as cardiology this month. Iliofemoral thromboembolectomy site has healed nicely. She does has some current MASD adjacent to scar due to panis fold. Nystatin tx. Appetite stable, no signs of dehydration. Bowel and bladder function at baseline. Mood stable. Afebrile, VSS. No recent falls. The following portions of the patient's history were reviewed and updated as appropriate: current medications, past family history, past medical history, past social history, past surgical history and problem list.    Allergies: Allergies   Allergen Reactions    Penicillins Hives    Sulfa Antibiotics Hives    Medical Tape Rash       Review of Systems     Review of Systems   Constitutional: Negative. Negative for activity change, appetite change, chills, fatigue and fever. HENT:  Positive for dental problem and hearing loss. Negative for congestion, ear pain, postnasal drip and sinus pain. Eyes: Negative. Respiratory: Negative. Negative for cough, chest tightness and shortness of breath. Cardiovascular: Negative. Negative for chest pain and leg swelling. Gastrointestinal: Negative. Negative for constipation and diarrhea. Endocrine: Negative. Genitourinary: Negative. Negative for dysuria. Musculoskeletal:  Positive for gait problem. Skin:  Positive for wound. Allergic/Immunologic: Negative. Negative for immunocompromised state. Neurological:  Positive for tremors, weakness and numbness. Negative for dizziness and light-headedness. Hematological: Negative. Psychiatric/Behavioral:  Positive for decreased concentration and sleep disturbance. Medications and orders     All medications reviewed and updated in [facility] EMR. Objective     Vitals: per nursing home records    Physical Exam  Vitals and nursing note reviewed. Constitutional:       General: She is awake. Appearance: Normal appearance. She is morbidly obese. HENT:      Head: Normocephalic and atraumatic. Right Ear: Tympanic membrane, ear canal and external ear normal. Decreased hearing noted. Left Ear: Tympanic membrane, ear canal and external ear normal. Decreased hearing noted. Nose: Nose normal.      Mouth/Throat:      Mouth: Mucous membranes are moist.      Dentition: Abnormal dentition. Pharynx: Oropharynx is clear. Eyes:      Extraocular Movements: Extraocular movements intact. Conjunctiva/sclera: Conjunctivae normal.      Pupils: Pupils are equal, round, and reactive to light. Cardiovascular:      Rate and Rhythm: Normal rate. Rhythm irregular. Pulses: Normal pulses. Heart sounds: Murmur heard. Pulmonary:      Effort: Pulmonary effort is normal.      Breath sounds: Normal breath sounds. Abdominal:      General: Bowel sounds are normal.      Palpations: Abdomen is soft. Musculoskeletal:         General: Normal range of motion. Cervical back: Normal range of motion and neck supple. Skin:     General: Skin is warm and dry.       Coloration: Skin is pale. Comments: PAD/PVD  Left pannis fold with scant maceration. Nystatin tx. Neurological:      General: No focal deficit present. Mental Status: She is alert and oriented to person, place, and time. Sensory: Sensory deficit present. Motor: Weakness and tremor present. Gait: Gait abnormal.      Comments: forgetful   Psychiatric:         Mood and Affect: Mood normal.         Speech: Speech normal.         Behavior: Behavior is slowed. Behavior is cooperative. Thought Content: Thought content normal.         Cognition and Memory: Memory is impaired. Judgment: Judgment is inappropriate. Pertinent Laboratory/Diagnostic Studies: The following labs and studies were reviewed please see chart or hospital paperwork for details. Continue current medical management. Await Vascular and Cardiology follow up appointments this month.     SHIRA Alford  12/1/2023 5:11 PM

## 2023-12-05 ENCOUNTER — OFFICE VISIT (OUTPATIENT)
Dept: VASCULAR SURGERY | Facility: CLINIC | Age: 83
End: 2023-12-05
Payer: MEDICARE

## 2023-12-05 VITALS
HEIGHT: 62 IN | SYSTOLIC BLOOD PRESSURE: 140 MMHG | DIASTOLIC BLOOD PRESSURE: 78 MMHG | HEART RATE: 65 BPM | OXYGEN SATURATION: 96 % | BODY MASS INDEX: 36.4 KG/M2

## 2023-12-05 DIAGNOSIS — I99.8 ACUTE LOWER LIMB ISCHEMIA: Primary | ICD-10-CM

## 2023-12-05 DIAGNOSIS — I73.9 PERIPHERAL ARTERIAL DISEASE (HCC): ICD-10-CM

## 2023-12-05 DIAGNOSIS — I10 ESSENTIAL HYPERTENSION: ICD-10-CM

## 2023-12-05 PROBLEM — S30.1XXA HEMATOMA OF GROIN: Status: RESOLVED | Noted: 2023-08-16 | Resolved: 2023-12-05

## 2023-12-05 PROCEDURE — 99213 OFFICE O/P EST LOW 20 MIN: CPT

## 2023-12-05 RX ORDER — MICONAZOLE NITRATE ANTIFUNGAL POWDER 2 G/100G
POWDER TOPICAL
COMMUNITY
Start: 2023-11-06

## 2023-12-05 RX ORDER — OXYMETAZOLINE HYDROCHLORIDE 0.05 G/100ML
2 SPRAY NASAL 2 TIMES DAILY
COMMUNITY

## 2023-12-05 RX ORDER — SODIUM CHLORIDE/ALOE VERA
SWAB, NON-MEDICATED NASAL
COMMUNITY
Start: 2023-08-01

## 2023-12-05 RX ORDER — DOCUSATE SODIUM 100 MG/1
CAPSULE, LIQUID FILLED ORAL
COMMUNITY
Start: 2023-11-01

## 2023-12-05 NOTE — ASSESSMENT & PLAN NOTE
Mildly elevated in the office today, 140/78.    -Continue medical management per PCP  -Low-salt, low-fat diet

## 2023-12-05 NOTE — ASSESSMENT & PLAN NOTE
Patient is denying any concerns at this time. She reports that left groin wound VAC site has completely healed. Patient reports pain to bilateral feet at night that is described as burning. She reports that pain wakes her from sleep and she massages her feet to get the pain to go away. Patient does not know if the pain would improve with putting her feet in a dependent position. Patient is minimally ambulatory at this time and utilizes a wheelchair to get around. She is denying any claudication or tissue loss. She does report numbness/tingling to bilateral feet, as well as rukhsana discoloration and cold sensation to bilateral feet that has been ongoing for quite some time. Plan:  -We discussed the pathophysiology of peripheral arterial disease as well as contributing lifestyle factors.  -Patient's symptoms are concerning for rest pain. Requested that patient take note of if pain to bilateral feet improves with hanging them off the side of the bed at night.  -Patient has not had LEAD since 8/7/2023. We will plan to repeat duplex at patient's earliest convenience to establish new baseline.  -Continue medical optimization with aspirin, atorvastatin, and Eliquis.  -Instructed patient to notify office of any worsening rest pain, claudication, or tissue loss. -Instructed patient to report to the ED for any symptoms of acute limb ischemia (color/temperature change, motor/sensory loss, tissue loss).   -Follow up in the office after LEAD

## 2023-12-05 NOTE — PROGRESS NOTES
Assessment/Plan:    Peripheral arterial disease ([address])  Patient is denying any concerns at this time. She reports that left groin wound VAC site has completely healed. Patient reports pain to bilateral feet at night that is described as burning. She reports that pain wakes her from sleep and she massages her feet to get the pain to go away. Patient does not know if the pain would improve with putting her feet in a dependent position. Patient is minimally ambulatory at this time and utilizes a wheelchair to get around. She is denying any claudication or tissue loss. She does report numbness/tingling to bilateral feet, as well as rukhsana discoloration and cold sensation to bilateral feet that has been ongoing for quite some time. Plan:  -We discussed the pathophysiology of peripheral arterial disease as well as contributing lifestyle factors.  -Patient's symptoms are concerning for rest pain. Requested that patient take note of if pain to bilateral feet improves with hanging them off the side of the bed at night.  -Patient has not had LEAD since 8/7/2023. We will plan to repeat duplex at patient's earliest convenience to establish new baseline.  -Continue medical optimization with aspirin, atorvastatin, and Eliquis.  -Instructed patient to notify office of any worsening rest pain, claudication, or tissue loss. -Instructed patient to report to the ED for any symptoms of acute limb ischemia (color/temperature change, motor/sensory loss, tissue loss). -Follow up in the office after LEAD      Acute lower limb ischemia  Left groin incision is completely approximated and healed at this time.     Essential hypertension  Mildly elevated in the office today, 140/78.    -Continue medical management per PCP  -Low-salt, low-fat diet       Diagnoses and all orders for this visit:    Acute lower limb ischemia    Peripheral arterial disease (HCC)  -     VAS lower limb arterial duplex, complete bilateral; Future    Essential hypertension    Other orders  -     docusate sodium (COLACE) 100 mg capsule  -     Antifungal 2 % powder  -     Aloe-Sodium Chloride (Ayr Saline Nasal Gel) SWAB; into each nostril  -     oxymetazoline (AFRIN) 0.05 % nasal spray; 2 sprays by Each Nare route 2 (two) times a day          Subjective:      Patient ID: Jose Calderon is a 79 y.o. female. Patient is an 48-year-old female, non-smoker, with PMH HLD, HTN, obesity, CAD, aortic valve stenosis s/p aortic valve replacement, atrial fibrillation s/p pacemaker, and PAD s/p left femoral embolectomy in 2019. Patient required additional left external iliac artery, common femoral artery, proximal profunda femoral artery, superficial femoral artery  thromboembolectomy on 8/7/2023. Patient had been off of her anticoagulation for atrial fibrillation due to epistaxis when she developed acute limb ischemia to left lower extremity. Patient subsequently developed left groin hematoma requiring evacuation and wound VAC placement. Patient is presenting to the office for follow-up. Patient is denying any concerns at this time. She reports that left groin wound VAC site has completely healed. Patient reports pain to bilateral feet at night that is described as burning. She reports that pain wakes her from sleep and she massages her feet to get the pain to go away. Patient does not know if the pain would improve with putting her feet in a dependent position. Patient is minimally ambulatory at this time and utilizes a wheelchair to get around. She does go to physical therapy several times per week. She is denying any claudication or tissue loss. She does report numbness/tingling to bilateral feet, as well as rukhsana discoloration and cold sensation to bilateral feet that has been ongoing for quite some time. Patient currently resides at an assisted living facility. She is currently taking aspirin, atorvastatin, and Eliquis.         The following portions of the patient's history were reviewed and updated as appropriate: allergies, current medications, past family history, past medical history, past social history, past surgical history, and problem list.    Review of Systems   Constitutional: Negative. Negative for activity change. HENT:  Positive for hearing loss. Eyes: Negative. Respiratory: Negative. Negative for shortness of breath. Cardiovascular: Negative. Negative for chest pain and leg swelling. Gastrointestinal: Negative. Endocrine: Negative. Genitourinary: Negative. Musculoskeletal:  Positive for gait problem. Skin:  Positive for color change. Negative for pallor and wound. Allergic/Immunologic: Negative. Neurological:  Positive for numbness. Hematological: Negative. Psychiatric/Behavioral: Negative. Objective:      /78 (BP Location: Left arm, Patient Position: Sitting, Cuff Size: Standard)   Pulse 65   Ht 5' 2" (1.575 m)   SpO2 96%   BMI 36.40 kg/m²        Physical Exam  Constitutional:       General: She is not in acute distress. Appearance: Normal appearance. HENT:      Head: Normocephalic and atraumatic. Neck:      Vascular: No carotid bruit. Cardiovascular:      Rate and Rhythm: Normal rate and regular rhythm. Pulses:           Radial pulses are 2+ on the right side and 2+ on the left side. Popliteal pulses are 1+ on the right side and 1+ on the left side. Dorsalis pedis pulses are detected w/ Doppler on the right side and 0 on the left side. Posterior tibial pulses are detected w/ Doppler on the right side and detected w/ Doppler on the left side. Comments: Lower legs and feet are cool to touch. Pulmonary:      Effort: Pulmonary effort is normal. No respiratory distress. Musculoskeletal:         General: No swelling or tenderness. Cervical back: Normal range of motion and neck supple. No tenderness. Right lower leg: No edema. Left lower leg: No edema. Feet:      Right foot:      Skin integrity: Skin integrity normal.      Left foot:      Skin integrity: Skin integrity normal.   Skin:     General: Skin is cool and dry. Capillary Refill: Capillary refill takes less than 2 seconds. Findings: No lesion, rash or wound. Comments: Ready discoloration to bilateral feet. Neurological:      General: No focal deficit present. Mental Status: She is alert and oriented to person, place, and time. Psychiatric:         Mood and Affect: Mood normal.         Behavior: Behavior normal.     I have reviewed and made appropriate changes to the review of systems input by the medical assistant.     Vitals:    12/05/23 1434   BP: 140/78   BP Location: Left arm   Patient Position: Sitting   Cuff Size: Standard   Pulse: 65   SpO2: 96%   Height: 5' 2" (1.575 m)       Patient Active Problem List   Diagnosis    History of permanent cardiac pacemaker placement    Mixed hyperlipidemia    Generalized anxiety disorder    Depression    Benign familial tremor    Neuropathy    Primary insomnia    Essential hypertension    Coronary artery disease involving native coronary artery of native heart without angina pectoris    Sciatic nerve pain, right    Obesity (BMI 35.0-39.9 without comorbidity)    Globus sensation    Nocturnal enuresis    Acute lower limb ischemia    Peripheral artery embolism or thrombosis (HCC)    Persistent atrial fibrillation (HCC)    Onychomycosis    Left femoral embolectomy    Urinary incontinence    Peripheral arterial disease (HCC)    Hypotension    Epistaxis       Past Surgical History:   Procedure Laterality Date    AORTIC VALVE REPLACEMENT  12/2012    BREAST BIOPSY Right     years ago -Benign    CARDIAC PACEMAKER PLACEMENT      dual chamber last assessed 10/14/13    CHOLECYSTECTOMY      IR LOWER EXTREMITY / INTERVENTION  10/6/2019    IR LOWER EXTREMITY ANGIOGRAM  8/7/2023    JOINT REPLACEMENT      B/L knee replacement MA TEAEC W/WO PATCH GRAFT COMMON FEMORAL Left 8/7/2023    Procedure: ENDARTERECTOMY ARTERIAL FEMORAL WITH BOVINE PATCH ANGIOPLASTY;  Surgeon: William Lyon MD;  Location: [facility];  Service: Vascular    THROMBECTOMY W/ EMBOLECTOMY N/A 10/6/2019    Procedure: EMBOLECTOMY/THROMBECTOMY Left LOWER EXTREMITY; angiogram;  Surgeon: Alyson Lyon MD;  Location: [facility];  Service: Vascular    THROMBECTOMY W/ EMBOLECTOMY Left 8/7/2023    Procedure: Left femoral exposure Left iliac, femoral, embolectomy wtih #3 and #4 Dieudonne catheters Left lower extremity angiogram Left femoral arteriotomy with bovine patch angioplasty;  Surgeon: William Lyon MD;  Location: [facility];  Service: Vascular    WOUND DEBRIDEMENT Left 8/17/2023    Procedure: L groin exploration, washout, hematoma evacuation vac placement;  Surgeon: Gladys Bell MD;  Location: [facility];  Service: Vascular       Family History   Problem Relation Age of Onset    Hypertension Mother     Breast cancer Mother     No Known Problems Father     Breast cancer Daughter 46    No Known Problems Maternal Grandmother     No Known Problems Maternal Grandfather     No Known Problems Paternal Grandmother     No Known Problems Paternal Grandfather     No Known Problems Son     No Known Problems Son     No Known Problems Daughter     No Known Problems Brother     No Known Problems Maternal Aunt     No Known Problems Maternal Aunt        Social History     Socioeconomic History    Marital status:      Spouse name: Not on file    Number of children: Not on file    Years of education: Not on file    Highest education level: Not on file   Occupational History    Not on file   Tobacco Use    Smoking status: Never    Smokeless tobacco: Never   Vaping Use    Vaping Use: Never used   Substance and Sexual Activity    Alcohol use: Not Currently     Alcohol/week: 0.0 standard drinks of alcohol    Drug use: No    Sexual activity: Not Currently   Other Topics Concern    Not on file   Social History Narrative    Not on file     Social Determinants of Health     Financial Resource Strain: Not on file   Food Insecurity: Not on file   Transportation Needs: Not on file   Physical Activity: Not on file   Stress: Not on file   Social Connections: Not on file   Intimate Partner Violence: Not on file   Housing Stability: Not on file       Allergies   Allergen Reactions    Penicillins Hives    Sulfa Antibiotics Hives    Medical Tape Rash         Current Outpatient Medications:     acetaminophen (TYLENOL) 325 mg tablet, Take 2 tablets (650 mg total) by mouth every 6 (six) hours as needed for mild pain, headaches or fever, Disp: , Rfl: 0    Aloe-Sodium Chloride (Ayr Saline Nasal Gel) SWAB, into each nostril, Disp: , Rfl:     Antifungal 2 % powder, , Disp: , Rfl:     apixaban (ELIQUIS) 5 mg, Take 1 tablet (5 mg total) by mouth 2 (two) times a day Do not start before August 15, 2023., Disp: , Rfl: 0    aspirin 81 mg chewable tablet, Chew 1 tablet (81 mg total) daily Do not start before August 14, 2023., Disp: 30 tablet, Rfl: 0    atorvastatin (LIPITOR) 20 mg tablet, Take 1 tablet (20 mg total) by mouth daily after dinner, Disp: 60 tablet, Rfl: 0    bisacodyl (DULCOLAX) 10 mg suppository, Insert 10 mg into the rectum daily as needed for constipation, Disp: , Rfl:     cyanocobalamin (VITAMIN B-12) 100 mcg tablet, Take 100 mcg by mouth daily, Disp: , Rfl:     docusate sodium (COLACE) 100 mg capsule, , Disp: , Rfl:     DULoxetine (CYMBALTA) 60 mg delayed release capsule, Take 60 mg by mouth daily, Disp: , Rfl:     ferrous sulfate 325 (65 Fe) mg tablet, Take 325 mg by mouth every other day, Disp: , Rfl:     hydrochlorothiazide (HYDRODIURIL) 25 mg tablet, Take 25 mg by mouth daily, Disp: , Rfl:     melatonin 3 mg, Take 3 mg by mouth daily at bedtime as needed, Disp: , Rfl:     metoprolol succinate (TOPROL-XL) 100 mg 24 hr tablet, Take 100 mg by mouth daily, Disp: , Rfl:     omeprazole (PriLOSEC) 20 mg delayed release capsule, TAKE ONE CAPSULE BY MOUTH EVERY DAY, Disp: 30 capsule, Rfl: 5    oxyCODONE (Roxicodone) 5 immediate release tablet, Take 0.5 tablets (2.5 mg total) by mouth 2 (two) times a day. May also take 0.5 tablets (2.5 mg total) 2 (two) times a day as needed for moderate pain. Max Daily Amount: 10 mg., Disp: 120 tablet, Rfl: 0    oxymetazoline (AFRIN) 0.05 % nasal spray, 2 sprays by Each Nare route 2 (two) times a day, Disp: , Rfl:     polyethylene glycol (MIRALAX) 17 g packet, Take 17 g by mouth daily, Disp: , Rfl:     pramipexole (MIRAPEX) 0.125 mg tablet, Take 0.125 mg by mouth daily at bedtime, Disp: , Rfl:     Sennosides-Docusate Sodium 8.6-50 MG CAPS, Take 1 tablet by mouth daily at bedtime, Disp: , Rfl:     sodium chloride (OCEAN) 0.65 % nasal spray, 1 spray into each nostril daily at bedtime Both nostils, Disp: , Rfl:     traZODone (DESYREL) 50 mg tablet, Take 75 mg by mouth daily at bedtime, Disp: , Rfl:     I have spent a total time of 30 minutes on 12/05/23 in caring for this patient including Prognosis, Risks and benefits of tx options, Instructions for management, Patient and family education, Importance of tx compliance, Risk factor reductions, Impressions, Counseling / Coordination of care, Documenting in the medical record, Reviewing / ordering tests, medicine, procedures  , and Obtaining or reviewing history  .

## 2023-12-27 ENCOUNTER — OFFICE VISIT (OUTPATIENT)
Dept: CARDIOLOGY CLINIC | Facility: CLINIC | Age: 83
End: 2023-12-27
Payer: MEDICARE

## 2023-12-27 VITALS
HEART RATE: 74 BPM | DIASTOLIC BLOOD PRESSURE: 60 MMHG | WEIGHT: 183 LBS | HEIGHT: 62 IN | SYSTOLIC BLOOD PRESSURE: 126 MMHG | BODY MASS INDEX: 33.68 KG/M2

## 2023-12-27 DIAGNOSIS — Z95.0 CARDIAC PACEMAKER IN SITU: ICD-10-CM

## 2023-12-27 DIAGNOSIS — E66.01 OBESITY, MORBID (HCC): ICD-10-CM

## 2023-12-27 DIAGNOSIS — I25.10 CORONARY ARTERY DISEASE INVOLVING NATIVE CORONARY ARTERY OF NATIVE HEART WITHOUT ANGINA PECTORIS: ICD-10-CM

## 2023-12-27 DIAGNOSIS — I48.19 PERSISTENT ATRIAL FIBRILLATION (HCC): Primary | Chronic | ICD-10-CM

## 2023-12-27 PROCEDURE — 99214 OFFICE O/P EST MOD 30 MIN: CPT | Performed by: INTERNAL MEDICINE

## 2023-12-27 NOTE — PROGRESS NOTES
Cardiology Follow Up    Veda LIN Jaime  1940  [id number]  [facility] [address] [id number] [phone]    1. Persistent atrial fibrillation (HCC)  CBC and Platelet    Basic metabolic panel      2. Coronary artery disease involving native coronary artery of native heart without angina pectoris  Lipid Panel with Direct LDL reflex      3. Obesity, morbid (HCC)        4. Cardiac pacemaker in situ            Interval History: Followup afib, cad    No chest pain, dyspnea or palpitations. No complaints today. Device check reviewed.     Medical Problems       Problem List       History of permanent cardiac pacemaker placement (Chronic)    Mixed hyperlipidemia (Chronic)    Generalized anxiety disorder (Chronic)    Depression (Chronic)    Benign familial tremor    Neuropathy    Primary insomnia    Essential hypertension    Coronary artery disease involving native coronary artery of native heart without angina pectoris    Sciatic nerve pain, right    Obesity (BMI 35.0-39.9 without comorbidity)    Globus sensation    Nocturnal enuresis    Acute lower limb ischemia    Peripheral artery embolism or thrombosis (HCC)    Persistent atrial fibrillation (HCC) (Chronic)    Onychomycosis (Chronic)    Left femoral embolectomy    Urinary incontinence (Chronic)    Peripheral arterial disease (HCC) (Chronic)    Hypotension    Epistaxis        Past Medical History:   Diagnosis Date    Abnormal blood chemistry 05/10/2013    Accelerated essential hypertension 05/08/2012    Atrial fibrillation (HCC)     Depression     Essential tremor     GERD (gastroesophageal reflux disease)     HLD (hyperlipidemia)     Hypertension     Melanoma (HCC)     Obesity     Polyneuropathy     Skin cancer     Thromboembolism (HCC) 10/2019    LLE    Tinea unguium     Urinary incontinence      Social History     Socioeconomic History    Marital  status:      Spouse name: Not on file    Number of children: Not on file    Years of education: Not on file    Highest education level: Not on file   Occupational History    Not on file   Tobacco Use    Smoking status: Never    Smokeless tobacco: Never   Vaping Use    Vaping status: Never Used   Substance and Sexual Activity    Alcohol use: Not Currently     Alcohol/week: 0.0 standard drinks of alcohol    Drug use: No    Sexual activity: Not Currently   Other Topics Concern    Not on file   Social History Narrative    Not on file     Social Determinants of Health     Financial Resource Strain: Not on file   Food Insecurity: Not on file   Transportation Needs: Not on file   Physical Activity: Not on file   Stress: Not on file   Social Connections: Not on file   Intimate Partner Violence: Not on file   Housing Stability: Not on file      Family History   Problem Relation Age of Onset    Hypertension Mother     Breast cancer Mother     No Known Problems Father     Breast cancer Daughter 52    No Known Problems Maternal Grandmother     No Known Problems Maternal Grandfather     No Known Problems Paternal Grandmother     No Known Problems Paternal Grandfather     No Known Problems Son     No Known Problems Son     No Known Problems Daughter     No Known Problems Brother     No Known Problems Maternal Aunt     No Known Problems Maternal Aunt      Past Surgical History:   Procedure Laterality Date    AORTIC VALVE REPLACEMENT  12/2012    BREAST BIOPSY Right     years ago -Benign    CARDIAC PACEMAKER PLACEMENT      dual chamber last assessed 10/14/13    CHOLECYSTECTOMY      IR LOWER EXTREMITY / INTERVENTION  10/6/2019    IR LOWER EXTREMITY ANGIOGRAM  8/7/2023    JOINT REPLACEMENT      B/L knee replacement    KY TEAEC W/WO PATCH GRAFT COMMON FEMORAL Left 8/7/2023    Procedure: ENDARTERECTOMY ARTERIAL FEMORAL WITH BOVINE PATCH ANGIOPLASTY;  Surgeon: Ryann Lyon MD;  Location: [facility];  Service: Vascular     THROMBECTOMY W/ EMBOLECTOMY N/A 10/6/2019    Procedure: EMBOLECTOMY/THROMBECTOMY Left LOWER EXTREMITY; angiogram;  Surgeon: Ryann Lyon MD;  Location: [facility];  Service: Vascular    THROMBECTOMY W/ EMBOLECTOMY Left 8/7/2023    Procedure: Left femoral exposure Left iliac, femoral, embolectomy wtih #3 and #4 Dieudonne catheters Left lower extremity angiogram Left femoral arteriotomy with bovine patch angioplasty;  Surgeon: Ryann Lyon MD;  Location: [facility];  Service: Vascular    WOUND DEBRIDEMENT Left 8/17/2023    Procedure: L groin exploration, washout, hematoma evacuation vac placement;  Surgeon: Sammie Londono MD;  Location: [facility];  Service: Vascular       Current Outpatient Medications:     acetaminophen (TYLENOL) 325 mg tablet, Take 2 tablets (650 mg total) by mouth every 6 (six) hours as needed for mild pain, headaches or fever, Disp: , Rfl: 0    Antifungal 2 % powder, , Disp: , Rfl:     apixaban (ELIQUIS) 5 mg, Take 1 tablet (5 mg total) by mouth 2 (two) times a day Do not start before August 15, 2023., Disp: , Rfl: 0    aspirin 81 mg chewable tablet, Chew 1 tablet (81 mg total) daily Do not start before August 14, 2023., Disp: 30 tablet, Rfl: 0    atorvastatin (LIPITOR) 20 mg tablet, Take 1 tablet (20 mg total) by mouth daily after dinner, Disp: 60 tablet, Rfl: 0    bisacodyl (DULCOLAX) 10 mg suppository, Insert 10 mg into the rectum daily as needed for constipation, Disp: , Rfl:     cyanocobalamin (VITAMIN B-12) 100 mcg tablet, Take 100 mcg by mouth daily, Disp: , Rfl:     docusate sodium (COLACE) 100 mg capsule, , Disp: , Rfl:     DULoxetine (CYMBALTA) 60 mg delayed release capsule, Take 60 mg by mouth daily, Disp: , Rfl:     ferrous sulfate 325 (65 Fe) mg tablet, Take 325 mg by mouth every other day, Disp: , Rfl:     hydrochlorothiazide (HYDRODIURIL) 25 mg tablet, Take 25 mg by mouth daily, Disp: , Rfl:     melatonin 3 mg, Take 3 mg by mouth daily at bedtime as needed, Disp: , Rfl:      "metoprolol succinate (TOPROL-XL) 100 mg 24 hr tablet, Take 100 mg by mouth daily, Disp: , Rfl:     naloxone (NARCAN) 0.04 mg/mL, Inject 0.04 mg into a catheter in a vein Q1MIN PRN, Disp: , Rfl:     omeprazole (PriLOSEC) 20 mg delayed release capsule, TAKE ONE CAPSULE BY MOUTH EVERY DAY, Disp: 30 capsule, Rfl: 5    oxyCODONE (Roxicodone) 5 immediate release tablet, Take 0.5 tablets (2.5 mg total) by mouth 2 (two) times a day. May also take 0.5 tablets (2.5 mg total) 2 (two) times a day as needed for moderate pain. Max Daily Amount: 10 mg., Disp: 120 tablet, Rfl: 0    oxymetazoline (AFRIN) 0.05 % nasal spray, 2 sprays by Each Nare route 2 (two) times a day, Disp: , Rfl:     polyethylene glycol (MIRALAX) 17 g packet, Take 17 g by mouth daily, Disp: , Rfl:     pramipexole (MIRAPEX) 0.125 mg tablet, Take 0.125 mg by mouth daily at bedtime, Disp: , Rfl:     Sennosides-Docusate Sodium 8.6-50 MG CAPS, Take 1 tablet by mouth daily at bedtime, Disp: , Rfl:     sodium chloride (OCEAN) 0.65 % nasal spray, 1 spray into each nostril daily at bedtime Both nostils, Disp: , Rfl:     traZODone (DESYREL) 50 mg tablet, Take 75 mg by mouth daily at bedtime, Disp: , Rfl:     Aloe-Sodium Chloride (Ayr Saline Nasal Gel) SWAB, into each nostril (Patient not taking: Reported on 12/27/2023), Disp: , Rfl:   Allergies   Allergen Reactions    Penicillins Hives    Sulfa Antibiotics Hives    Medical Tape Rash       Labs:     Chemistry        Component Value Date/Time    K 3.8 08/17/2023 0121     08/17/2023 0121    CO2 30 08/17/2023 0121    CO2 23 10/06/2019 1941    BUN 17 08/17/2023 0121    CREATININE 0.86 08/17/2023 0121        Component Value Date/Time    CALCIUM 9.1 08/17/2023 0121    ALKPHOS 81 08/16/2023 1707    AST 20 08/16/2023 1707    ALT 13 08/16/2023 1707            No results found for: \"CHOL\"  Lab Results   Component Value Date    HDL 32 (L) 10/09/2019    HDL 49 11/07/2018     Lab Results   Component Value Date    LDLCALC 104 (H) " "10/09/2019    LDLCALC 195 (H) 11/07/2018     Lab Results   Component Value Date    TRIG 132 10/09/2019    TRIG 126 11/07/2018     No results found for: \"CHOLHDL\"    Imaging: No results found.    .    Review of Systems   Constitutional: Negative.   HENT: Negative.     Eyes: Negative.    Cardiovascular: Negative.    Respiratory: Negative.     Endocrine: Negative.    Hematologic/Lymphatic: Negative.    Skin: Negative.    Musculoskeletal: Negative.    Gastrointestinal: Negative.    Genitourinary: Negative.    Neurological: Negative.    Psychiatric/Behavioral: Negative.     Allergic/Immunologic: Negative.        Vitals:    12/27/23 0802   BP: 126/60   Pulse: 74           Physical Exam  Vitals and nursing note reviewed.   Constitutional:       Appearance: Normal appearance.   HENT:      Head: Normocephalic.      Nose: Nose normal.      Mouth/Throat:      Mouth: Mucous membranes are moist.   Eyes:      General: No scleral icterus.     Conjunctiva/sclera: Conjunctivae normal.   Cardiovascular:      Rate and Rhythm: Normal rate and regular rhythm.      Heart sounds: No murmur heard.     No gallop.   Pulmonary:      Effort: Pulmonary effort is normal. No respiratory distress.      Breath sounds: Normal breath sounds. No wheezing or rales.   Abdominal:      General: Abdomen is flat. Bowel sounds are normal. There is no distension.      Palpations: Abdomen is soft.      Tenderness: There is no abdominal tenderness. There is no guarding.   Musculoskeletal:      Cervical back: Normal range of motion and neck supple.      Right lower leg: No edema.      Left lower leg: No edema.   Skin:     General: Skin is warm and dry.   Neurological:      General: No focal deficit present.      Mental Status: She is alert and oriented to person, place, and time.   Psychiatric:         Mood and Affect: Mood normal.         Behavior: Behavior normal.         Discussion/Summary:    1. Persistent Atrial Fibrillation: She is rate controlled. " Continue Eliquis.     2. PAD: Hx of LLE iliofemoral embolectomy and patch angioplasty.      2. Hx of AS s/p AVR. Stable based on exam and prior echo.     3. PPM implant: Continue with device checks.      4. CAD: According to prior records this involved the LAD which was a small vessel and she did not have bypass at the time of AVR. Continue aspirin and atorvastatin.       The patient was counseled regarding diagnostic results, instructions for management, risk factor reductions, impressions. total time of encounter was 25 minutes and 15 minutes was spent counseling.

## 2024-01-11 ENCOUNTER — HOSPITAL ENCOUNTER (OUTPATIENT)
Dept: NON INVASIVE DIAGNOSTICS | Facility: CLINIC | Age: 84
Discharge: HOME | End: 2024-01-11
Payer: MEDICARE

## 2024-01-11 DIAGNOSIS — I73.9 PERIPHERAL ARTERIAL DISEASE (HCC): ICD-10-CM

## 2024-01-11 PROCEDURE — 93922 UPR/L XTREMITY ART 2 LEVELS: CPT | Performed by: SURGERY

## 2024-01-11 PROCEDURE — 93925 LOWER EXTREMITY STUDY: CPT | Performed by: SURGERY

## 2024-01-11 PROCEDURE — 93923 UPR/LXTR ART STDY 3+ LVLS: CPT

## 2024-01-11 PROCEDURE — 93925 LOWER EXTREMITY STUDY: CPT

## 2024-01-16 ENCOUNTER — OFFICE VISIT (OUTPATIENT)
Dept: VASCULAR SURGERY | Facility: CLINIC | Age: 84
End: 2024-01-16
Payer: MEDICARE

## 2024-01-16 VITALS
BODY MASS INDEX: 33.47 KG/M2 | HEART RATE: 75 BPM | OXYGEN SATURATION: 97 % | HEIGHT: 62 IN | DIASTOLIC BLOOD PRESSURE: 90 MMHG | SYSTOLIC BLOOD PRESSURE: 140 MMHG

## 2024-01-16 DIAGNOSIS — I73.9 PERIPHERAL ARTERIAL DISEASE (HCC): Primary | ICD-10-CM

## 2024-01-16 DIAGNOSIS — I10 ESSENTIAL HYPERTENSION: ICD-10-CM

## 2024-01-16 PROCEDURE — 99213 OFFICE O/P EST LOW 20 MIN: CPT

## 2024-01-16 RX ORDER — CILOSTAZOL 50 MG/1
50 TABLET ORAL 2 TIMES DAILY
Qty: 60 TABLET | Refills: 2 | Status: SHIPPED | OUTPATIENT
Start: 2024-01-16 | End: 2024-04-15

## 2024-01-16 RX ORDER — CILOSTAZOL 50 MG/1
50 TABLET ORAL 2 TIMES DAILY
Qty: 60 TABLET | Refills: 2 | Status: SHIPPED | OUTPATIENT
Start: 2024-01-16 | End: 2024-01-16 | Stop reason: CLARIF

## 2024-01-16 NOTE — ASSESSMENT & PLAN NOTE
Blood pressure mildly elevated in the office today, 140/90.    -Continue medical management per PCP.  -Low salt diet

## 2024-01-16 NOTE — ASSESSMENT & PLAN NOTE
Patient continues to report pain to bilateral feet at night that is described as burning.  She has noticed that pain improves when dangling her feet off the side of the bed. Pain continues to wake her from sleep. She remains minimally ambulatory at this time and spends the majority of her day in a wheelchair with feet dangling in dependant position. She is denying any claudication or tissue loss.  She continues with numbness/tingling to bilateral feet, as well as rukhsana discoloration and cold sensation to bilateral feet.     Imaging:  LEAD 1/11/24:  RIGHT LOWER LIMB:  A >75% stenosis was noted in the proximal SFA. A 50-75% stenosis was noted in the mid popliteal artery.  Ankle/Brachial index: 0.78/141/51  LEFT LOWER LIMB:  A >75% stenosis was noted in the proximal SFA and a 50-75% stenosis was noted in the distal SFA. A <50% stenosis was noted in the PFA. High grade stenosis vs. occlusion was noted in the distal LINDA.  Ankle/Brachial index: 0.78/116/56    Plan:  -We discussed the pathophysiology of peripheral arterial disease as well as contributing lifestyle factors.  -We discussed the results of LEAD at length. Stenoses to bilateral SFA and popliteal arteries, as well left PFA. There has been no significant change from previous study.  -Continue medical optimization with aspirin and atorvastatin. We discussed starting cilostazol twice daily. Patient does not have diagnosis of CHF, however will discuss with her cardiologist prior to initiating as patient's last echocardiogram is from 2019.  -Will repeat LEAD in 1 year  -Instructed patient to notify office of any worsening rest pain, claudication or tissue loss.  -Instructed patient to report to the ED for any symptoms of acute limb ischemia (color/temperature change, motor/sensory loss, tissue loss).  -Follow up in the office in 3 months to assess response to cilostazol.

## 2024-01-16 NOTE — PATIENT INSTRUCTIONS
Please elevate your legs regularly throughout the day (obtain wheelchair attachments to elevate legs).   Moisturize your skin daily.   Start taking cilostazol 50mg twice daily.   Repeat LEAD in 1 year.   Return to the office in 3 months to assess the effectiveness of cilostazol.    Review with patient and make patient aware of follow up appointment in advance.

## 2024-01-16 NOTE — PROGRESS NOTES
Assessment/Plan:    Peripheral arterial disease (HCC)  Patient continues to report pain to bilateral feet at night that is described as burning.  She has noticed that pain improves when dangling her feet off the side of the bed. Pain continues to wake her from sleep. She remains minimally ambulatory at this time and spends the majority of her day in a wheelchair with feet dangling in dependant position. She is denying any claudication or tissue loss.  She continues with numbness/tingling to bilateral feet, as well as rukhsana discoloration and cold sensation to bilateral feet.     Imaging:  LEAD 1/11/24:  RIGHT LOWER LIMB:  A >75% stenosis was noted in the proximal SFA. A 50-75% stenosis was noted in the mid popliteal artery.  Ankle/Brachial index: 0.78/141/51  LEFT LOWER LIMB:  A >75% stenosis was noted in the proximal SFA and a 50-75% stenosis was noted in the distal SFA. A <50% stenosis was noted in the PFA. High grade stenosis vs. occlusion was noted in the distal LINDA.  Ankle/Brachial index: 0.78/116/56    Plan:  -We discussed the pathophysiology of peripheral arterial disease as well as contributing lifestyle factors.  -We discussed the results of LEAD at length. Stenoses to bilateral SFA and popliteal arteries, as well left PFA. There has been no significant change from previous study.  -Continue medical optimization with aspirin and atorvastatin. We discussed starting cilostazol twice daily. Patient does not have diagnosis of CHF, however will discuss with her cardiologist prior to initiating as patient's last echocardiogram is from 2019.  -Will repeat LEAD in 1 year  -Instructed patient to notify office of any worsening rest pain, claudication or tissue loss.  -Instructed patient to report to the ED for any symptoms of acute limb ischemia (color/temperature change, motor/sensory loss, tissue loss).  -Follow up in the office in 3 months to assess response to cilostazol.      Essential hypertension  Blood  pressure mildly elevated in the office today, 140/90.    -Continue medical management per PCP.  -Low salt diet       Diagnoses and all orders for this visit:    Peripheral arterial disease (HCC)  -     Discontinue: cilostazol (PLETAL) 50 mg tablet; Take 1 tablet (50 mg total) by mouth 2 (two) times a day  -     VAS ARTERIAL DUPLEX- LOWER LIMB BILATERAL; Future  -     cilostazol (PLETAL) 50 mg tablet; Take 1 tablet (50 mg total) by mouth 2 (two) times a day    Essential hypertension    Other orders  -     magnesium hydroxide (MILK OF MAGNESIA) 400 mg/5 mL oral suspension; Take by mouth daily as needed for constipation          Subjective:      Patient ID: Veda Sandoval is a 84 y.o. female.        Patient is an 83-year-old female, non-smoker, with PMH HLD, HTN, obesity, CAD, aortic valve stenosis s/p aortic valve replacement, atrial fibrillation s/p pacemaker, and PAD s/p left femoral embolectomy in 2019. Patient required additional left external iliac artery, common femoral artery, proximal profunda femoral artery, superficial femoral artery thromboembolectomy on 8/7/2023. Patient had been off of her anticoagulation for atrial fibrillation due to epistaxis when she developed acute limb ischemia to left lower extremity. Patient subsequently developed left groin hematoma requiring evacuation and wound VAC placement. Patient is presenting to the office to review results of LEAD completed 1/11/2024.    Patient continues to report pain to bilateral feet at night that is described as burning.  She has noticed that pain improves when dangling her feet off the side of the bed. Pain continues to wake her from sleep. She remains minimally ambulatory at this time and spends the majority of her day in a wheelchair with feet dangling in dependant position. She is denying any claudication or tissue loss.  She continues with numbness/tingling to bilateral feet, as well as rukhsana discoloration and cold sensation to bilateral  "feet.          The following portions of the patient's history were reviewed and updated as appropriate: allergies, current medications, past family history, past medical history, past social history, past surgical history, and problem list.    Review of Systems   Constitutional: Negative.  Negative for activity change.   HENT:  Positive for sneezing.         Cough past 2 weeks   Eyes: Negative.    Respiratory: Negative.  Negative for shortness of breath.    Cardiovascular: Negative.  Negative for chest pain and leg swelling.   Gastrointestinal: Negative.    Endocrine: Negative.    Genitourinary: Negative.    Musculoskeletal: Negative.         Rest pain bilateral lower extremities   Skin:  Positive for color change. Negative for pallor and wound (ankles).   Allergic/Immunologic: Negative.    Neurological:  Positive for numbness.   Hematological:  Bruises/bleeds easily.   Psychiatric/Behavioral: Negative.           Objective:    /90 (BP Location: Left arm, Patient Position: Sitting, Cuff Size: Standard)   Pulse 75   Ht 5' 2\" (1.575 m)   SpO2 97%   BMI 33.47 kg/m²        Physical Exam  Vitals reviewed.   Constitutional:       General: She is not in acute distress.     Appearance: Normal appearance.   HENT:      Head: Normocephalic and atraumatic.   Cardiovascular:      Rate and Rhythm: Normal rate and regular rhythm.      Pulses:           Dorsalis pedis pulses are detected w/ Doppler on the right side and 0 on the left side.        Posterior tibial pulses are detected w/ Doppler on the right side and detected w/ Doppler on the left side.   Pulmonary:      Effort: Pulmonary effort is normal. No respiratory distress.   Musculoskeletal:         General: No swelling or tenderness.      Right lower leg: No edema.      Left lower leg: No edema.   Feet:      Right foot:      Skin integrity: Skin integrity normal.      Left foot:      Skin integrity: Skin integrity normal.      Comments: Cool to touch  Skin:     " "General: Skin is warm and dry.      Capillary Refill: Capillary refill takes less than 2 seconds.      Findings: No lesion, rash or wound.   Neurological:      General: No focal deficit present.      Mental Status: She is alert and oriented to person, place, and time.   Psychiatric:         Mood and Affect: Mood normal.         Behavior: Behavior normal.     I have reviewed and made appropriate changes to the review of systems input by the medical assistant.    Vitals:    01/16/24 1354   BP: 140/90   BP Location: Left arm   Patient Position: Sitting   Cuff Size: Standard   Pulse: 75   SpO2: 97%   Height: 5' 2\" (1.575 m)       Patient Active Problem List   Diagnosis    History of permanent cardiac pacemaker placement    Mixed hyperlipidemia    Generalized anxiety disorder    Depression    Benign familial tremor    Neuropathy    Primary insomnia    Essential hypertension    Coronary artery disease involving native coronary artery of native heart without angina pectoris    Sciatic nerve pain, right    Obesity (BMI 35.0-39.9 without comorbidity)    Globus sensation    Nocturnal enuresis    Acute lower limb ischemia    Peripheral artery embolism or thrombosis (HCC)    Persistent atrial fibrillation (HCC)    Onychomycosis    Left femoral embolectomy    Urinary incontinence    Peripheral arterial disease (HCC)    Hypotension    Epistaxis    Obesity, morbid (HCC)       Past Surgical History:   Procedure Laterality Date    AORTIC VALVE REPLACEMENT  12/2012    BREAST BIOPSY Right     years ago -Benign    CARDIAC PACEMAKER PLACEMENT      dual chamber last assessed 10/14/13    CHOLECYSTECTOMY      IR LOWER EXTREMITY / INTERVENTION  10/6/2019    IR LOWER EXTREMITY ANGIOGRAM  8/7/2023    JOINT REPLACEMENT      B/L knee replacement    MD TEAEC W/WO PATCH GRAFT COMMON FEMORAL Left 8/7/2023    Procedure: ENDARTERECTOMY ARTERIAL FEMORAL WITH BOVINE PATCH ANGIOPLASTY;  Surgeon: Ryann Lyon MD;  Location: [facility];  Service: " Vascular    THROMBECTOMY W/ EMBOLECTOMY N/A 10/6/2019    Procedure: EMBOLECTOMY/THROMBECTOMY Left LOWER EXTREMITY; angiogram;  Surgeon: Ryann Lyon MD;  Location: [facility];  Service: Vascular    THROMBECTOMY W/ EMBOLECTOMY Left 8/7/2023    Procedure: Left femoral exposure Left iliac, femoral, embolectomy wtih #3 and #4 Dieudonne catheters Left lower extremity angiogram Left femoral arteriotomy with bovine patch angioplasty;  Surgeon: Ryann Lyon MD;  Location: [facility];  Service: Vascular    WOUND DEBRIDEMENT Left 8/17/2023    Procedure: L groin exploration, washout, hematoma evacuation vac placement;  Surgeon: Sammie Londono MD;  Location: [facility];  Service: Vascular       Family History   Problem Relation Age of Onset    Hypertension Mother     Breast cancer Mother     No Known Problems Father     Breast cancer Daughter 52    No Known Problems Maternal Grandmother     No Known Problems Maternal Grandfather     No Known Problems Paternal Grandmother     No Known Problems Paternal Grandfather     No Known Problems Son     No Known Problems Son     No Known Problems Daughter     No Known Problems Brother     No Known Problems Maternal Aunt     No Known Problems Maternal Aunt        Social History     Socioeconomic History    Marital status:      Spouse name: Not on file    Number of children: Not on file    Years of education: Not on file    Highest education level: Not on file   Occupational History    Not on file   Tobacco Use    Smoking status: Never    Smokeless tobacco: Never   Vaping Use    Vaping status: Never Used   Substance and Sexual Activity    Alcohol use: Not Currently     Alcohol/week: 0.0 standard drinks of alcohol    Drug use: No    Sexual activity: Not Currently   Other Topics Concern    Not on file   Social History Narrative    Not on file     Social Determinants of Health     Financial Resource Strain: Not on file   Food Insecurity: Not on file   Transportation Needs: Not on file    Physical Activity: Not on file   Stress: Not on file   Social Connections: Not on file   Intimate Partner Violence: Not on file   Housing Stability: Not on file       Allergies   Allergen Reactions    Penicillins Hives    Sulfa Antibiotics Hives    Medical Tape Rash         Current Outpatient Medications:     acetaminophen (TYLENOL) 325 mg tablet, Take 2 tablets (650 mg total) by mouth every 6 (six) hours as needed for mild pain, headaches or fever, Disp: , Rfl: 0    Aloe-Sodium Chloride (Ayr Saline Nasal Gel) SWAB, into each nostril, Disp: , Rfl:     Antifungal 2 % powder, , Disp: , Rfl:     apixaban (ELIQUIS) 5 mg, Take 1 tablet (5 mg total) by mouth 2 (two) times a day Do not start before August 15, 2023., Disp: , Rfl: 0    aspirin 81 mg chewable tablet, Chew 1 tablet (81 mg total) daily Do not start before August 14, 2023., Disp: 30 tablet, Rfl: 0    atorvastatin (LIPITOR) 20 mg tablet, Take 1 tablet (20 mg total) by mouth daily after dinner, Disp: 60 tablet, Rfl: 0    bisacodyl (DULCOLAX) 10 mg suppository, Insert 10 mg into the rectum daily as needed for constipation, Disp: , Rfl:     cilostazol (PLETAL) 50 mg tablet, Take 1 tablet (50 mg total) by mouth 2 (two) times a day, Disp: 60 tablet, Rfl: 2    cyanocobalamin (VITAMIN B-12) 100 mcg tablet, Take 100 mcg by mouth daily, Disp: , Rfl:     docusate sodium (COLACE) 100 mg capsule, , Disp: , Rfl:     DULoxetine (CYMBALTA) 60 mg delayed release capsule, Take 60 mg by mouth daily, Disp: , Rfl:     ferrous sulfate 325 (65 Fe) mg tablet, Take 325 mg by mouth every other day, Disp: , Rfl:     hydrochlorothiazide (HYDRODIURIL) 25 mg tablet, Take 25 mg by mouth daily, Disp: , Rfl:     magnesium hydroxide (MILK OF MAGNESIA) 400 mg/5 mL oral suspension, Take by mouth daily as needed for constipation, Disp: , Rfl:     melatonin 3 mg, Take 3 mg by mouth daily at bedtime as needed, Disp: , Rfl:     metoprolol succinate (TOPROL-XL) 100 mg 24 hr tablet, Take 100 mg by  mouth daily, Disp: , Rfl:     naloxone (NARCAN) 0.04 mg/mL, Inject 0.04 mg into a catheter in a vein Q1MIN PRN, Disp: , Rfl:     omeprazole (PriLOSEC) 20 mg delayed release capsule, TAKE ONE CAPSULE BY MOUTH EVERY DAY, Disp: 30 capsule, Rfl: 5    oxyCODONE (Roxicodone) 5 immediate release tablet, Take 0.5 tablets (2.5 mg total) by mouth 2 (two) times a day. May also take 0.5 tablets (2.5 mg total) 2 (two) times a day as needed for moderate pain. Max Daily Amount: 10 mg., Disp: 120 tablet, Rfl: 0    oxymetazoline (AFRIN) 0.05 % nasal spray, 2 sprays by Each Nare route 2 (two) times a day, Disp: , Rfl:     polyethylene glycol (MIRALAX) 17 g packet, Take 17 g by mouth daily, Disp: , Rfl:     pramipexole (MIRAPEX) 0.125 mg tablet, Take 0.125 mg by mouth daily at bedtime, Disp: , Rfl:     Sennosides-Docusate Sodium 8.6-50 MG CAPS, Take 1 tablet by mouth daily at bedtime, Disp: , Rfl:     traZODone (DESYREL) 50 mg tablet, Take 75 mg by mouth daily at bedtime, Disp: , Rfl:     sodium chloride (OCEAN) 0.65 % nasal spray, 1 spray into each nostril daily at bedtime Both nostils, Disp: , Rfl:     I have spent a total time of 30 minutes on 01/16/24 in caring for this patient including Diagnostic results, Prognosis, Risks and benefits of tx options, Instructions for management, Patient and family education, Importance of tx compliance, Risk factor reductions, Impressions, Counseling / Coordination of care, Documenting in the medical record, Reviewing / ordering tests, medicine, procedures  , and Obtaining or reviewing history  .

## 2024-01-19 ENCOUNTER — NURSING HOME VISIT (OUTPATIENT)
Dept: FAMILY MEDICINE CLINIC | Facility: CLINIC | Age: 84
End: 2024-01-19
Payer: MEDICARE

## 2024-01-19 DIAGNOSIS — I10 ESSENTIAL HYPERTENSION: ICD-10-CM

## 2024-01-19 DIAGNOSIS — I73.9 PERIPHERAL ARTERIAL DISEASE (HCC): Primary | Chronic | ICD-10-CM

## 2024-01-19 DIAGNOSIS — I48.19 PERSISTENT ATRIAL FIBRILLATION (HCC): Chronic | ICD-10-CM

## 2024-01-19 DIAGNOSIS — G62.9 NEUROPATHY: ICD-10-CM

## 2024-01-19 DIAGNOSIS — E66.01 OBESITY, MORBID (HCC): ICD-10-CM

## 2024-01-19 DIAGNOSIS — G25.0 BENIGN FAMILIAL TREMOR: ICD-10-CM

## 2024-01-19 PROBLEM — I95.9 HYPOTENSION: Status: RESOLVED | Noted: 2023-08-09 | Resolved: 2024-01-19

## 2024-01-19 PROCEDURE — 99309 SBSQ NF CARE MODERATE MDM 30: CPT | Performed by: NURSE PRACTITIONER

## 2024-01-19 NOTE — PROGRESS NOTES
[facility]  [address] [place] [address]  Facility: [facility]    NAME: Veda Sandoval  AGE: 84 y.o. SEX: female    DATE OF ENCOUNTER: 1/19/2024    Code status:  Full Code    Assessment and Plan     1. Peripheral arterial disease (HCC)    2. Persistent atrial fibrillation (HCC)    3. Essential hypertension    4. Obesity, morbid (HCC)    5. Benign familial tremor    6. Neuropathy        All medications and routine orders were reviewed and updated as needed.    Plan discussed with: Patient    Chief Complaint     Follow-up of chronic conditions    History of Present Illness     Ms. Sandoval was recently put on Pletal for PAD this week.  She will be monitored closely given hx recurrent epistaxis and co-administered Eliquis and baby ASA daily.  She is hopeful this will help her PAD symptoms.  She denies dyspnea/chest pressure/pain.  Follow up with Cardiology last month reported stable.  Appetite is good, stays hydrated.  Bowel and bladder function at baseline.  She enjoys knitting as well as coloring, will attend a few social events.  Mood stable, no recent falls.  Afebrile, VSS.      The following portions of the patient's history were reviewed and updated as appropriate: current medications, past family history, past medical history, past social history, past surgical history and problem list.    Allergies:  Allergies   Allergen Reactions    Penicillins Hives    Sulfa Antibiotics Hives    Medical Tape Rash       Review of Systems     Review of Systems   Constitutional: Negative.  Negative for activity change, appetite change, chills, fatigue and fever.   HENT:  Positive for dental problem and hearing loss. Negative for congestion, ear pain, postnasal drip and sinus pain.    Eyes: Negative.    Respiratory: Negative.  Negative for cough and shortness of breath.    Cardiovascular: Negative.  Negative for chest pain and leg swelling.   Gastrointestinal: Negative.  Negative for  constipation and diarrhea.   Endocrine: Negative.    Genitourinary: Negative.  Negative for difficulty urinating and dysuria.   Musculoskeletal:  Positive for gait problem.   Skin:  Positive for color change.   Allergic/Immunologic: Negative.  Negative for immunocompromised state.   Neurological:  Positive for tremors and weakness. Negative for dizziness and light-headedness.   Hematological: Negative.    Psychiatric/Behavioral:  Positive for decreased concentration and sleep disturbance.        Medications and orders     [facility]      Objective     Vitals: per nursing home records    Physical Exam  Vitals and nursing note reviewed.   Constitutional:       General: She is awake.      Appearance: Normal appearance. She is morbidly obese.   HENT:      Head: Normocephalic and atraumatic.      Right Ear: Tympanic membrane, ear canal and external ear normal.      Left Ear: Tympanic membrane, ear canal and external ear normal. Decreased hearing noted.      Nose: Nose normal.      Mouth/Throat:      Mouth: Mucous membranes are moist.      Dentition: Abnormal dentition.      Pharynx: Oropharynx is clear.   Eyes:      Extraocular Movements: Extraocular movements intact.      Conjunctiva/sclera: Conjunctivae normal.      Pupils: Pupils are equal, round, and reactive to light.   Cardiovascular:      Rate and Rhythm: Normal rate. Rhythm irregular.      Pulses: Normal pulses.      Heart sounds: Murmur heard.   Pulmonary:      Effort: Pulmonary effort is normal.      Breath sounds: Normal breath sounds.   Abdominal:      General: Bowel sounds are normal.      Palpations: Abdomen is soft.   Musculoskeletal:         General: Normal range of motion.      Cervical back: Normal range of motion and neck supple.      Right lower leg: No edema.      Left lower leg: No edema.   Skin:     General: Skin is warm and dry.      Comments: PAD/PVD   Neurological:      General: No focal deficit  present.      Mental Status: She is alert and oriented to person, place, and time.      Sensory: Sensory deficit present.      Motor: Weakness and tremor present.      Gait: Gait abnormal.   Psychiatric:         Mood and Affect: Mood normal.         Speech: Speech normal.         Behavior: Behavior is slowed. Behavior is cooperative.         Thought Content: Thought content normal.         Cognition and Memory: Memory is impaired.         Judgment: Judgment normal.         Pertinent Laboratory/Diagnostic Studies:     The following labs and studies were reviewed please see chart or hospital paperwork for details.    Continue current medical management, monitor CBC closely    SHIRA Price  1/19/2024 6:12 PM

## 2024-01-22 ENCOUNTER — TELEPHONE (OUTPATIENT)
Dept: CARDIOLOGY CLINIC | Facility: CLINIC | Age: 84
End: 2024-01-22

## 2024-01-22 NOTE — TELEPHONE ENCOUNTER
Karley calling from [facility]. I'm calling in reference to Veda Sandoval birth date of 1940. I have to OK a medication with the doctor from the vascular surgeon. The medication is Cilostazol l 50 milligrams BID for her restless legs. My number here is [phone]     Please advise

## 2024-02-02 ENCOUNTER — NURSING HOME VISIT (OUTPATIENT)
Dept: FAMILY MEDICINE CLINIC | Facility: CLINIC | Age: 84
End: 2024-02-02
Payer: MEDICARE

## 2024-02-02 DIAGNOSIS — I10 ESSENTIAL HYPERTENSION: ICD-10-CM

## 2024-02-02 DIAGNOSIS — U07.1 COVID-19: Primary | ICD-10-CM

## 2024-02-02 DIAGNOSIS — I48.19 PERSISTENT ATRIAL FIBRILLATION (HCC): Chronic | ICD-10-CM

## 2024-02-02 DIAGNOSIS — I73.9 PERIPHERAL ARTERIAL DISEASE (HCC): Chronic | ICD-10-CM

## 2024-02-02 DIAGNOSIS — E66.01 OBESITY, MORBID (HCC): ICD-10-CM

## 2024-02-02 PROCEDURE — 99309 SBSQ NF CARE MODERATE MDM 30: CPT | Performed by: NURSE PRACTITIONER

## 2024-02-02 NOTE — PROGRESS NOTES
"[facility]  [address] [place] [address]  Facility: [facility]    NAME: Veda Sandoval  AGE: 84 y.o. SEX: female    DATE OF ENCOUNTER: 2/2/2024    Code status:  Full Code    Assessment and Plan     1. COVID-19    2. Persistent atrial fibrillation (HCC)    3. Peripheral arterial disease (HCC)    4. Essential hypertension    5. Obesity, morbid (HCC)        All medications and routine orders were reviewed and updated as needed.    Plan discussed with: Patient, nursing staff    Chief Complaint     Follow-up of chronic conditions    History of Present Illness     Ms. Sandoval tested positive for COVID 19 on 2/1/24.  Thus far she reports \"a bad cold\" with sinus pressure, congestion/rhinorrhea and myalgia.  Denies fever/chills, dyspnea.  Appetite unchanged, staying hydrated.  Bowel and bladder function at baseline.  Sleeping well at night.  Afebrile, VSS.        The following portions of the patient's history were reviewed and updated as appropriate: current medications, past family history, past medical history, past social history, past surgical history and problem list.    Allergies:  Allergies   Allergen Reactions    Penicillins Hives    Sulfa Antibiotics Hives    Medical Tape Rash       Review of Systems     Review of Systems   Constitutional: Negative.  Negative for activity change, appetite change, chills, fatigue and fever.   HENT:  Positive for congestion, dental problem, hearing loss and sinus pressure. Negative for ear pain, postnasal drip and sinus pain.    Eyes: Negative.    Respiratory:  Positive for cough. Negative for shortness of breath.         Occasional dry cough   Cardiovascular: Negative.  Negative for chest pain and leg swelling.   Gastrointestinal: Negative.  Negative for constipation and diarrhea.   Endocrine: Negative.    Genitourinary: Negative.  Negative for dysuria.   Musculoskeletal:  Positive for gait problem and myalgias.   Skin: Negative.  "   Allergic/Immunologic: Negative.  Negative for immunocompromised state.   Neurological:  Positive for tremors and weakness. Negative for dizziness and light-headedness.   Hematological: Negative.    Psychiatric/Behavioral:  Positive for decreased concentration and sleep disturbance.        Medications and orders     All medications reviewed and updated in shelter EMR.      Objective     Vitals: per nursing home records    Physical Exam  Vitals and nursing note reviewed.   Constitutional:       General: She is awake.      Appearance: Normal appearance. She is morbidly obese.   HENT:      Head: Normocephalic and atraumatic.      Right Ear: Tympanic membrane, ear canal and external ear normal. Decreased hearing noted.      Left Ear: Tympanic membrane, ear canal and external ear normal. Decreased hearing noted.      Nose: Congestion and rhinorrhea present.      Mouth/Throat:      Mouth: Mucous membranes are moist.      Dentition: Abnormal dentition.      Pharynx: Oropharynx is clear.   Eyes:      Extraocular Movements: Extraocular movements intact.      Conjunctiva/sclera: Conjunctivae normal.      Pupils: Pupils are equal, round, and reactive to light.   Cardiovascular:      Rate and Rhythm: Normal rate. Rhythm irregular.      Pulses: Normal pulses.      Heart sounds: Murmur heard.   Pulmonary:      Effort: Pulmonary effort is normal.      Breath sounds: Normal breath sounds.   Abdominal:      General: Bowel sounds are normal.      Palpations: Abdomen is soft.   Musculoskeletal:         General: Normal range of motion.      Cervical back: Normal range of motion and neck supple.   Skin:     General: Skin is warm and dry.      Comments: PAD/PVD discoloration   Neurological:      General: No focal deficit present.      Mental Status: She is alert and oriented to person, place, and time.      Sensory: Sensory deficit present.      Motor: Weakness and tremor present.      Gait: Gait abnormal.   Psychiatric:          Mood and Affect: Mood normal.         Speech: Speech normal.         Behavior: Behavior is slowed. Behavior is cooperative.         Thought Content: Thought content normal.         Cognition and Memory: Memory is impaired.         Judgment: Judgment normal.         Pertinent Laboratory/Diagnostic Studies:     The following labs and studies were reviewed please see chart or hospital paperwork for details.    Molnupiravir 800mg po BID x5days.  Cough syrup as needed.      SHIRA Price  2/2/2024 4:36 PM

## 2024-03-04 ENCOUNTER — NURSING HOME VISIT (OUTPATIENT)
Dept: FAMILY MEDICINE CLINIC | Facility: CLINIC | Age: 84
End: 2024-03-04
Payer: MEDICARE

## 2024-03-04 DIAGNOSIS — G62.9 NEUROPATHY: ICD-10-CM

## 2024-03-04 DIAGNOSIS — I48.19 PERSISTENT ATRIAL FIBRILLATION (HCC): Chronic | ICD-10-CM

## 2024-03-04 DIAGNOSIS — I73.9 PERIPHERAL ARTERIAL DISEASE (HCC): Primary | Chronic | ICD-10-CM

## 2024-03-04 DIAGNOSIS — I10 ESSENTIAL HYPERTENSION: ICD-10-CM

## 2024-03-04 DIAGNOSIS — E66.09 CLASS 1 OBESITY DUE TO EXCESS CALORIES WITH SERIOUS COMORBIDITY AND BODY MASS INDEX (BMI) OF 33.0 TO 33.9 IN ADULT: ICD-10-CM

## 2024-03-04 DIAGNOSIS — M54.2 CERVICALGIA: ICD-10-CM

## 2024-03-04 PROBLEM — E66.01 OBESITY, MORBID (HCC): Status: RESOLVED | Noted: 2023-12-27 | Resolved: 2024-03-04

## 2024-03-04 PROBLEM — E66.811 CLASS 1 OBESITY DUE TO EXCESS CALORIES WITH SERIOUS COMORBIDITY AND BODY MASS INDEX (BMI) OF 33.0 TO 33.9 IN ADULT: Status: ACTIVE | Noted: 2019-09-24

## 2024-03-04 PROCEDURE — 99309 SBSQ NF CARE MODERATE MDM 30: CPT | Performed by: NURSE PRACTITIONER

## 2024-03-04 NOTE — PROGRESS NOTES
[facility]  [address] [place] [address]  Facility: [facility]    NAME: Veda Sandoval  AGE: 84 y.o. SEX: female    DATE OF ENCOUNTER: 3/4/2024    Code status:  Full Code    Assessment and Plan     1. Peripheral arterial disease (HCC)    2. Persistent atrial fibrillation (HCC)    3. Class 1 obesity due to excess calories with serious comorbidity and body mass index (BMI) of 33.0 to 33.9 in adult    4. Neuropathy    5. Essential hypertension    6. Cervicalgia        All medications and routine orders were reviewed and updated as needed.    Plan discussed with: Patient, nursing staff    Chief Complaint     Follow-up of chronic conditions    History of Present Illness     Ms. Sandoval is assessed for follow up.  She notes tenderness when moving her neck since using throw pillow for bedtime.  Using tylenol with improvement however she also tends to flex neck throughout day while working on her knitting.  Denies fever/chills, severe HA.  Otherwise feeling well.  Appetite unchanged, stays hydrated.  Bowel and bladder function at baseline.  Skin warm/dry/intact.  Afebrile, VSS.  Weight stable.      The following portions of the patient's history were reviewed and updated as appropriate: current medications, past family history, past medical history, past social history, past surgical history and problem list.    Allergies:  Allergies   Allergen Reactions    Penicillins Hives    Sulfa Antibiotics Hives    Medical Tape Rash       Review of Systems     Review of Systems   Constitutional: Negative.  Negative for activity change, appetite change, chills, fatigue and fever.   HENT:  Positive for dental problem and hearing loss. Negative for congestion, ear pain, postnasal drip and sinus pain.    Eyes: Negative.    Respiratory: Negative.  Negative for cough and shortness of breath.    Cardiovascular: Negative.  Negative for chest pain and leg swelling.   Gastrointestinal: Negative.   Negative for constipation and diarrhea.   Endocrine: Negative.    Genitourinary: Negative.  Negative for dysuria.   Musculoskeletal:  Positive for gait problem and neck pain. Negative for neck stiffness.   Skin: Negative.    Allergic/Immunologic: Negative.  Negative for immunocompromised state.   Neurological:  Positive for tremors and weakness. Negative for dizziness and light-headedness.   Hematological: Negative.    Psychiatric/Behavioral:  Positive for decreased concentration.        Medications and orders     All medications reviewed and updated in longterm EMR.      Objective     Vitals: per nursing home records    Physical Exam  Vitals and nursing note reviewed.   Constitutional:       General: She is awake.      Appearance: Normal appearance. She is morbidly obese.   HENT:      Head: Normocephalic and atraumatic.      Right Ear: Tympanic membrane, ear canal and external ear normal. Decreased hearing noted.      Left Ear: Tympanic membrane, ear canal and external ear normal. Decreased hearing noted.      Nose: Nose normal.      Mouth/Throat:      Mouth: Mucous membranes are moist.      Dentition: Abnormal dentition.      Pharynx: Oropharynx is clear.   Eyes:      Extraocular Movements: Extraocular movements intact.      Conjunctiva/sclera: Conjunctivae normal.      Pupils: Pupils are equal, round, and reactive to light.   Cardiovascular:      Rate and Rhythm: Normal rate. Rhythm irregular.      Pulses: Normal pulses.      Heart sounds: Murmur heard.   Pulmonary:      Effort: Pulmonary effort is normal.      Breath sounds: Normal breath sounds.   Abdominal:      General: Bowel sounds are normal.      Palpations: Abdomen is soft.   Musculoskeletal:         General: Normal range of motion.      Cervical back: Normal range of motion and neck supple. Tenderness present. No rigidity. Muscular tenderness present.   Lymphadenopathy:      Cervical: No cervical adenopathy.   Skin:     General: Skin is warm and dry.       Comments: PAD/PVD discoloration.     Neurological:      General: No focal deficit present.      Mental Status: She is alert and oriented to person, place, and time.      Sensory: Sensory deficit present.      Motor: Weakness and tremor present.      Gait: Gait abnormal.   Psychiatric:         Mood and Affect: Mood normal.         Speech: Speech normal.         Behavior: Behavior is slowed. Behavior is cooperative.         Thought Content: Thought content normal.         Cognition and Memory: Memory is impaired.         Judgment: Judgment normal.         Pertinent Laboratory/Diagnostic Studies:     The following labs and studies were reviewed please see chart or hospital paperwork for details.    PT eval/tx.  Nursing to assist Veda in obtaining new pillow for bed.     SHIRA Price  3/4/2024 3:44 PM

## 2024-03-21 DIAGNOSIS — S30.1XXD HEMATOMA OF GROIN, SUBSEQUENT ENCOUNTER: ICD-10-CM

## 2024-03-21 DIAGNOSIS — M54.31 SCIATIC NERVE PAIN, RIGHT: ICD-10-CM

## 2024-03-21 DIAGNOSIS — Z98.890 HISTORY OF EMBOLECTOMY: ICD-10-CM

## 2024-03-21 RX ORDER — OXYCODONE HYDROCHLORIDE 5 MG/1
TABLET ORAL
Qty: 120 TABLET | Refills: 0 | Status: SHIPPED | OUTPATIENT
Start: 2024-03-21

## 2024-04-01 ENCOUNTER — NURSING HOME VISIT (OUTPATIENT)
Dept: FAMILY MEDICINE CLINIC | Facility: CLINIC | Age: 84
End: 2024-04-01
Payer: MEDICARE

## 2024-04-01 DIAGNOSIS — I10 ESSENTIAL HYPERTENSION: ICD-10-CM

## 2024-04-01 DIAGNOSIS — G62.9 NEUROPATHY: ICD-10-CM

## 2024-04-01 DIAGNOSIS — I73.9 PERIPHERAL ARTERIAL DISEASE (HCC): Chronic | ICD-10-CM

## 2024-04-01 DIAGNOSIS — I48.19 PERSISTENT ATRIAL FIBRILLATION (HCC): Primary | Chronic | ICD-10-CM

## 2024-04-01 DIAGNOSIS — F41.1 GENERALIZED ANXIETY DISORDER: Chronic | ICD-10-CM

## 2024-04-01 PROCEDURE — 99309 SBSQ NF CARE MODERATE MDM 30: CPT | Performed by: NURSE PRACTITIONER

## 2024-04-01 NOTE — PROGRESS NOTES
[facility]  [address] [place] [address]  Facility: [facility]    NAME: Veda Sandoval  AGE: 84 y.o. SEX: female    DATE OF ENCOUNTER: 4/1/2024    Code status:  Full Code    Assessment and Plan     1. Persistent atrial fibrillation (HCC)    2. Peripheral arterial disease (HCC)    3. Essential hypertension    4. Generalized anxiety disorder    5. Neuropathy        All medications and routine orders were reviewed and updated as needed.    Plan discussed with: Patient, nursing staff    Chief Complaint     Follow-up of chronic conditions    History of Present Illness     Ms. Sandoval is assessed for routine follow up.  She is feeling well, chronic pain well controlled.  Tolerating cilostazol without s/e, has follow up with Vascular later this week.  Denies dyspnea, chest pressure/pain.  Appetite stable, denies troubles swallowing.  Bowels moving, no s/s dysuria present.  Sleeping well at night.  Mood stable.  Afebrile, VSS.     The following portions of the patient's history were reviewed and updated as appropriate: current medications, past family history, past medical history, past social history, past surgical history and problem list.    Allergies:  Allergies   Allergen Reactions    Penicillins Hives    Sulfa Antibiotics Hives    Medical Tape Rash       Review of Systems     Review of Systems   Constitutional: Negative.  Negative for activity change, appetite change, chills, fatigue and fever.   HENT:  Positive for dental problem and hearing loss. Negative for congestion, ear pain, postnasal drip and sinus pain.    Eyes: Negative.    Respiratory: Negative.  Negative for cough and shortness of breath.    Cardiovascular: Negative.  Negative for chest pain and leg swelling.   Gastrointestinal: Negative.  Negative for constipation and diarrhea.   Endocrine: Negative.    Genitourinary: Negative.  Negative for dysuria.   Musculoskeletal:  Positive for gait problem.   Skin:  Negative.    Allergic/Immunologic: Negative.  Negative for immunocompromised state.   Neurological:  Positive for tremors and weakness. Negative for dizziness and light-headedness.   Hematological: Negative.    Psychiatric/Behavioral:  Positive for decreased concentration.        Medications and orders     All medications reviewed and updated in longterm EMR.      Objective     Vitals: per nursing home records    Physical Exam  Vitals and nursing note reviewed.   Constitutional:       Appearance: Normal appearance. She is obese.   HENT:      Head: Normocephalic and atraumatic.      Right Ear: Tympanic membrane, ear canal and external ear normal. Decreased hearing noted.      Left Ear: Tympanic membrane, ear canal and external ear normal. Decreased hearing noted.      Nose: Nose normal.      Mouth/Throat:      Mouth: Mucous membranes are moist.      Dentition: Abnormal dentition.      Pharynx: Oropharynx is clear.   Eyes:      Extraocular Movements: Extraocular movements intact.      Conjunctiva/sclera: Conjunctivae normal.      Pupils: Pupils are equal, round, and reactive to light.   Cardiovascular:      Rate and Rhythm: Normal rate. Rhythm irregular.      Pulses: Normal pulses.      Heart sounds: Murmur heard.   Pulmonary:      Effort: Pulmonary effort is normal.      Breath sounds: Normal breath sounds.   Abdominal:      General: Bowel sounds are normal.      Palpations: Abdomen is soft.   Musculoskeletal:         General: Normal range of motion.      Cervical back: Normal range of motion and neck supple.      Right lower leg: No edema.      Left lower leg: No edema.   Skin:     General: Skin is warm and dry.      Comments: PAD/PVD   Neurological:      General: No focal deficit present.      Mental Status: She is alert and oriented to person, place, and time.      Sensory: Sensory deficit present.      Motor: Weakness and tremor present.      Gait: Gait abnormal.   Psychiatric:         Mood and Affect: Mood  normal.         Speech: Speech normal.         Behavior: Behavior is slowed. Behavior is cooperative.         Thought Content: Thought content normal.         Cognition and Memory: Memory is impaired.         Judgment: Judgment normal.      Comments: forgetful         Pertinent Laboratory/Diagnostic Studies:     The following labs and studies were reviewed please see chart or hospital paperwork for details.    Await follow up with vascular on 4/3/24    SHIRA Price  4/1/2024 2:42 PM

## 2024-04-03 ENCOUNTER — TELEPHONE (OUTPATIENT)
Dept: OTHER | Facility: OTHER | Age: 84
End: 2024-04-03

## 2024-04-03 NOTE — TELEPHONE ENCOUNTER
Patient is calling regarding cancelling an appointment.    Date/Time: 04/3/24 10a    Patient was rescheduled: YES [] NO [x]    Patient requesting call back to reschedule: YES [] NO [x]

## 2024-04-22 NOTE — PLAN OF CARE
Left Vm for patient.  A1c is the only lab she is due for currently.  Encouraged patient to call and schedule lab when able and to call back with any questions.  Can discuss any further labs at upcoming visit with PCP.     Problem: OCCUPATIONAL THERAPY ADULT  Goal: Performs self-care activities at highest level of function for planned discharge setting. See evaluation for individualized goals. Description: Treatment Interventions: ADL retraining, Functional transfer training, Endurance training, Patient/family training, Equipment evaluation/education, Compensatory technique education, Energy conservation, Activityengagement          See flowsheet documentation for full assessment, interventions and recommendations. Outcome: Progressing  Note: Limitation: Decreased ADL status, Decreased Safe judgement during ADL, Decreased endurance, Decreased self-care trans, Decreased high-level ADLs, Decreased cognition  Prognosis: Good  Assessment: Pt is a 78 yo female who participated in skilled OT session on 8/10/2023. Pt seen with PT to increase safety, decrease fall risk, and maximize functional/occupational performance 2* medical complexity which is a regression from pt's functional baseline. Treatment focused to improve functional transfers with fall prevention strategies, static/dynamic balance, postural/trunk control, proper body mechanics, functional use of b/l UE's, higher level cognitive functions, safety awareness, and overall increased activity tolerance in ADL/IADL/leisure tasks. Upon arrival, pt found lying supine in bed and was agreeable to OT session. Pt completed supine <> sit with Mod A x2 for UB postural support/LE management and sat EOB with close supervision for approx 20 minutes to complete seated ADLs. Pt performed grooming tasks w/ setup and UB bathing/dressing tasks w/ Mod A. Pt completed STS w/ Mod A x2 w/ b/l HHA and completed few small steps from EOB <> drop arm recliner with Mod A x2 w/ b/l HHA. At the end of the session, pt was left sitting upright in recliner with all functional needs in reach.  Pt demonstrates gradual functional improvements towards OT goals however continues to be functioning below occupational baseline and is still limited by the following limitations/impairments which were addressed through skilled instruction: cognition, generalized weakness, balance, endurance/activity tolerance, postural/trunk control, strength, pain, and safety awareness. At this time, recommend discharge to post-acute rehab when medically stable. The patient's raw score on the -PAC Daily Activity Inpatient Short Form is 15. A raw score of less than 19 suggests the patient may benefit from discharge to post-acute rehabilitation services. Please refer to the recommendation of the Occupational Therapist for safe discharge planning. Established OT goals will be continued 2-3x/wk to address immediate acute care needs and underlying performance skills to maximize occupational performance and safety to return to [facility].      OT Discharge Recommendation: Post acute rehabilitation services

## 2024-04-30 ENCOUNTER — TELEPHONE (OUTPATIENT)
Age: 84
End: 2024-04-30

## 2024-04-30 NOTE — TELEPHONE ENCOUNTER
Dane alcantara from [facility], pt has a device, but facility has questions about the device, and the actually monitor that goes with the device.   Device is scheduled in May    C/b [id number] Dane

## 2024-05-01 ENCOUNTER — NURSING HOME VISIT (OUTPATIENT)
Dept: FAMILY MEDICINE CLINIC | Facility: CLINIC | Age: 84
End: 2024-05-01
Payer: MEDICARE

## 2024-05-01 DIAGNOSIS — I10 ESSENTIAL HYPERTENSION: ICD-10-CM

## 2024-05-01 DIAGNOSIS — I73.9 PERIPHERAL ARTERIAL DISEASE (HCC): Primary | Chronic | ICD-10-CM

## 2024-05-01 DIAGNOSIS — G62.9 NEUROPATHY: ICD-10-CM

## 2024-05-01 DIAGNOSIS — N39.46 MIXED STRESS AND URGE URINARY INCONTINENCE: Chronic | ICD-10-CM

## 2024-05-01 DIAGNOSIS — I48.19 PERSISTENT ATRIAL FIBRILLATION (HCC): Chronic | ICD-10-CM

## 2024-05-01 PROCEDURE — 99309 SBSQ NF CARE MODERATE MDM 30: CPT | Performed by: NURSE PRACTITIONER

## 2024-05-01 NOTE — PROGRESS NOTES
[facility]  [address] [place] [address]  Facility: [facility]    NAME: Veda Sandoval  AGE: 84 y.o. SEX: female    DATE OF ENCOUNTER: 5/1/2024    Code status:  Full Code    Assessment and Plan     1. Peripheral arterial disease (HCC)    2. Persistent atrial fibrillation (HCC)    3. Neuropathy    4. Mixed stress and urge urinary incontinence    5. Essential hypertension        All medications and routine orders were reviewed and updated as needed.    Plan discussed with: Patient    Chief Complaint     Follow-up of chronic conditions    History of Present Illness     Ms. Sandoval is assessed for follow up.  She reports bilateral ankle abrasions due to hitting them against her WC when ambulating without awareness due to neuropathy. Wound care involved, no s/s infection present.  Dressings CDI.  HX mixed urinary incontinence but now Veda appears to be unaware of incontinence where before she wound toilet self.  Discussed risk of MASD/infections with this and Veda is willing to allow staff to assist with toileting schedule moving forward.  She missed 2 vascular follow up appt? Will address with nursing.  No s/s bleed, tolerating pletal along with eliquis and ASA without concerns.  Appetite robust, stays hydrated.  Bowels moving.  Sleeps well at night.  Enjoys crafting in her room.  Afebrile, VSS.     The following portions of the patient's history were reviewed and updated as appropriate: current medications, past family history, past medical history, past social history, past surgical history and problem list.    Allergies:  Allergies   Allergen Reactions    Penicillins Hives    Sulfa Antibiotics Hives    Medical Tape Rash       Review of Systems     Review of Systems   Constitutional: Negative.  Negative for activity change, appetite change, chills, fatigue and fever.   HENT:  Positive for dental problem and hearing loss. Negative for congestion, ear pain, postnasal drip and  sinus pain.    Eyes: Negative.    Respiratory: Negative.  Negative for cough and shortness of breath.    Cardiovascular: Negative.  Negative for chest pain and leg swelling.   Gastrointestinal: Negative.  Negative for constipation and diarrhea.   Endocrine: Negative.    Genitourinary:  Positive for frequency and urgency. Negative for dysuria.        Hx mixed UI   Musculoskeletal:  Positive for gait problem.   Skin:  Positive for wound.   Allergic/Immunologic: Negative.  Negative for immunocompromised state.   Neurological:  Positive for tremors and weakness. Negative for dizziness and light-headedness.   Hematological: Negative.    Psychiatric/Behavioral:  Positive for decreased concentration.        Medications and orders     All [facility] and updated in [facility] E[facility].      Objective     Vitals: per nursing home records    Physical Exam  Vitals and nursing note reviewed.   Constitutional:       Appearance: Normal appearance. She is obese.   HENT:      Head: Normocephalic and atraumatic.      Right Ear: Tympanic membrane, ear canal and external ear normal. Decreased hearing noted.      Left Ear: Tympanic membrane, ear canal and external ear normal. Decreased hearing noted.      Nose: Nose normal.      Mouth/Throat:      Mouth: Mucous membranes are moist.      Dentition: Abnormal dentition.      Pharynx: Oropharynx is clear.   Eyes:      Extraocular Movements: Extraocular movements intact.      Conjunctiva/sclera: Conjunctivae normal.      Pupils: Pupils are equal, round, and reactive to light.   Cardiovascular:      Rate and Rhythm: Normal rate. Rhythm irregular.      Pulses: Normal pulses.      Heart sounds: Murmur heard.   Pulmonary:      Effort: Pulmonary effort is normal.      Breath sounds: Normal breath sounds.   Abdominal:      General: Bowel sounds are normal.      Palpations: Abdomen is soft.   Musculoskeletal:         General: Normal range of motion.      Cervical back: Normal range of motion  and neck supple.   Skin:     General: Skin is warm and dry.      Coloration: Skin is pale.      Comments: PAD/PVD  Bilateral abrasions to ankles, no s/s infection present.  DSD intact.   Neurological:      General: No focal deficit present.      Mental Status: She is alert and oriented to person, place, and time.      Sensory: Sensory deficit present.      Motor: Weakness and tremor present.      Gait: Gait abnormal.      Comments: forgetful   Psychiatric:         Mood and Affect: Mood normal.         Speech: Speech normal.         Behavior: Behavior is slowed. Behavior is cooperative.         Thought Content: Thought content normal.         Cognition and Memory: Memory is impaired.         Judgment: Judgment is inappropriate.         Pertinent Laboratory/Diagnostic Studies:     The following labs and studies were reviewed please see chart or hospital paperwork for details.    Start toileting schedule.  Nursing to follow up on vascular follow up?    SHIRA Price  5/1/2024 5:50 PM

## 2024-05-14 ENCOUNTER — REMOTE DEVICE CLINIC VISIT (OUTPATIENT)
Dept: CARDIOLOGY CLINIC | Facility: CLINIC | Age: 84
End: 2024-05-14
Payer: MEDICARE

## 2024-05-14 DIAGNOSIS — Z95.0 CARDIAC PACEMAKER IN SITU: Primary | ICD-10-CM

## 2024-05-14 PROCEDURE — 93296 REM INTERROG EVL PM/IDS: CPT | Performed by: INTERNAL MEDICINE

## 2024-05-14 PROCEDURE — 93294 REM INTERROG EVL PM/LDLS PM: CPT | Performed by: INTERNAL MEDICINE

## 2024-05-14 NOTE — PROGRESS NOTES
Results for orders placed or performed in visit on 05/14/24   Cardiac EP device report    Narrative    MDT DUAL PPM  [employer]EL[employer] TRANSMISSION: BATTERY VOLTAGE ADEQUATE (15 MOS). AP-2%, -3%. ALL AVAILABLE LEAD PARAMETERS WITHIN NORMAL LIMITS. NO NEW SIGNIFICANT HIGH RATE EPISODES. PT IN % OF TIME & ON ELIQUIS, ASA 81MG & METOPROLOL. NORMAL DEVICE FUNCTION. GV

## 2024-06-12 ENCOUNTER — NURSING HOME VISIT (OUTPATIENT)
Dept: FAMILY MEDICINE CLINIC | Facility: CLINIC | Age: 84
End: 2024-06-12
Payer: MEDICARE

## 2024-06-12 DIAGNOSIS — I73.9 PERIPHERAL ARTERIAL DISEASE (HCC): Chronic | ICD-10-CM

## 2024-06-12 DIAGNOSIS — G62.9 NEUROPATHY: ICD-10-CM

## 2024-06-12 DIAGNOSIS — I25.10 CORONARY ARTERY DISEASE INVOLVING NATIVE CORONARY ARTERY OF NATIVE HEART WITHOUT ANGINA PECTORIS: Primary | ICD-10-CM

## 2024-06-12 DIAGNOSIS — N39.46 MIXED STRESS AND URGE URINARY INCONTINENCE: Chronic | ICD-10-CM

## 2024-06-12 DIAGNOSIS — I48.19 PERSISTENT ATRIAL FIBRILLATION (HCC): Chronic | ICD-10-CM

## 2024-06-12 DIAGNOSIS — I10 ESSENTIAL HYPERTENSION: ICD-10-CM

## 2024-06-12 PROBLEM — N39.44 NOCTURNAL ENURESIS: Status: RESOLVED | Noted: 2019-09-24 | Resolved: 2024-06-12

## 2024-06-12 PROCEDURE — 99309 SBSQ NF CARE MODERATE MDM 30: CPT | Performed by: NURSE PRACTITIONER

## 2024-06-12 NOTE — PROGRESS NOTES
[facility]  [address] [place] [address]  Facility: [facility]    NAME: Veda Sandoval  AGE: 84 y.o. SEX: female    DATE OF ENCOUNTER: 6/12/2024    Code status:  Full Code    Assessment and Plan     1. Coronary artery disease involving native coronary artery of native heart without angina pectoris  2. Peripheral arterial disease (HCC)  3. Persistent atrial fibrillation (HCC)  4. Neuropathy  5. Mixed stress and urge urinary incontinence      All medications and routine orders were reviewed and updated as needed.    Plan discussed with: Patient, nursing staff    Chief Complaint     Follow-up of chronic conditions    History of Present Illness     Ms. Sandoval is assessed for follow up. She refused to go to her vascular follow up appointment in April and has not decided whether or not to reschedule.  Continues on pletal along with eliquis and ASA without recurrent epistaxis/ s/s complications.  Feeling well, chronic pain is well managed.  Appetite robust, stays hydrated.  Bowel pattern labile; with miralax daily they are loose, with it prn she becomes constipated.  Will schedule it every other day.  Continues with mixed UI; using toileting schedule. Sleeps well at night.  Mood stable.  Afebrile, Bp elevated at 150-160/70s     The following portions of the patient's history were reviewed and updated as appropriate: current medications, past family history, past medical history, past social history, past surgical history and problem list.    Allergies:  Allergies   Allergen Reactions    Penicillins Hives    Sulfa Antibiotics Hives    Medical Tape Rash       Review of Systems     Review of Systems   Constitutional: Negative.  Negative for activity change, appetite change, chills, fatigue and fever.   HENT:  Positive for dental problem and hearing loss. Negative for congestion, ear pain, postnasal drip and sinus pain.    Eyes: Negative.    Respiratory: Negative.  Negative for  cough and shortness of breath.    Cardiovascular: Negative.  Negative for chest pain and leg swelling.   Gastrointestinal:  Positive for constipation. Negative for diarrhea.   Endocrine: Negative.    Genitourinary: Negative.  Negative for dysuria.   Musculoskeletal:  Positive for gait problem.   Skin: Negative.    Allergic/Immunologic: Negative.  Negative for immunocompromised state.   Neurological:  Positive for tremors and weakness. Negative for dizziness and light-headedness.   Hematological: Negative.    Psychiatric/Behavioral:  Positive for decreased concentration.        Medications and orders     All medications reviewed and updated in [facility].      Objective     Vitals: per nursing home records    Physical Exam  Vitals and nursing note reviewed.   Constitutional:       Appearance: Normal appearance. She is obese.   HENT:      Head: Normocephalic and atraumatic.      Right Ear: Tympanic membrane, ear canal and external ear normal. Decreased hearing noted.      Left Ear: Tympanic membrane, ear canal and external ear normal. Decreased hearing noted.      Nose: Nose normal.      Mouth/Throat:      Mouth: Mucous membranes are moist.      Dentition: Abnormal dentition.      Pharynx: Oropharynx is clear.   Eyes:      Extraocular Movements: Extraocular movements intact.      Conjunctiva/sclera: Conjunctivae normal.      Pupils: Pupils are equal, round, and reactive to light.   Cardiovascular:      Rate and Rhythm: Normal rate. Rhythm irregular.      Pulses: Normal pulses.      Heart sounds: Murmur heard.   Pulmonary:      Effort: Pulmonary effort is normal.      Breath sounds: Normal breath sounds.   Abdominal:      General: Bowel sounds are normal.      Palpations: Abdomen is soft.   Genitourinary:     Comments: Mixed UI.  On toileting schedule  Musculoskeletal:         General: Normal range of motion.      Cervical back: Normal range of motion and neck supple.      Right lower leg: No edema.      Left  lower leg: No edema.   Skin:     General: Skin is warm and dry.      Coloration: Skin is pale.      Comments: PVD/PAD discoloration.     Neurological:      General: No focal deficit present.      Mental Status: She is alert and oriented to person, place, and time.      Sensory: Sensory deficit present.      Motor: Weakness and tremor present.      Gait: Gait abnormal.      Comments: forgetful   Psychiatric:         Mood and Affect: Mood normal.         Speech: Speech normal.         Behavior: Behavior is slowed. Behavior is cooperative.         Thought Content: Thought content normal.         Cognition and Memory: Memory is impaired.         Judgment: Judgment is inappropriate.         Pertinent Laboratory/Diagnostic Studies:     The following labs and studies were reviewed please see chart or hospital paperwork for details.    Miralax 17g po every other day.  Check CBC, 6/14/24.  Losartan 25mg po daily.    SHIRA Price  6/12/2024 4:56 PM

## 2024-07-12 ENCOUNTER — NURSING HOME VISIT (OUTPATIENT)
Dept: FAMILY MEDICINE CLINIC | Facility: CLINIC | Age: 84
End: 2024-07-12
Payer: MEDICARE

## 2024-07-12 DIAGNOSIS — G62.9 NEUROPATHY: ICD-10-CM

## 2024-07-12 DIAGNOSIS — I48.19 PERSISTENT ATRIAL FIBRILLATION (HCC): Chronic | ICD-10-CM

## 2024-07-12 DIAGNOSIS — I73.9 PERIPHERAL ARTERIAL DISEASE (HCC): Chronic | ICD-10-CM

## 2024-07-12 DIAGNOSIS — I25.10 CORONARY ARTERY DISEASE INVOLVING NATIVE CORONARY ARTERY OF NATIVE HEART WITHOUT ANGINA PECTORIS: Primary | ICD-10-CM

## 2024-07-12 PROCEDURE — 99309 SBSQ NF CARE MODERATE MDM 30: CPT | Performed by: NURSE PRACTITIONER

## 2024-07-12 NOTE — PROGRESS NOTES
[facility]  [address] [place] [address]  Facility: [facility]    NAME: Veda Sandoval  AGE: 84 y.o. SEX: female    DATE OF ENCOUNTER: 7/12/2024    Code status:  Full Code    Assessment and Plan     1. Coronary artery disease involving native coronary artery of native heart without angina pectoris  2. Peripheral arterial disease (HCC)  3. Persistent atrial fibrillation (HCC)  4. Neuropathy      All medications and routine orders were reviewed and updated as needed.    Plan discussed with: Patient, nursing staff    Chief Complaint     Follow-up of chronic conditions    History of Present Illness     Ms. Sandoval is assessed for routine follow up. She is feeling well, oob to chair in NAD.  CBC last month was stable with pletal/ASA treatment.  No further epistaxis has occurred.  Chronic pain is well managed.  She denies dyspnea, chest pressure/palpitations.  Appetite stable, no s/s dehydration present.  Can be incontinent of bowel and bladder at times which causes her embarrassment.  Remains on toileting schedule to assist with incontinence.  Sleeps well at night.  Afebrile, VSS.      The following portions of the patient's history were reviewed and updated as appropriate: current medications, past family history, past medical history, past social history, past surgical history and problem list.    Allergies:  Allergies   Allergen Reactions    Penicillins Hives    Sulfa Antibiotics Hives    Medical Tape Rash       Review of Systems     Review of Systems   Constitutional: Negative.  Negative for activity change, appetite change, chills, fatigue and fever.   HENT:  Positive for dental problem and hearing loss. Negative for congestion, ear pain, postnasal drip and sinus pain.    Eyes: Negative.    Respiratory: Negative.  Negative for cough and shortness of breath.    Cardiovascular: Negative.  Negative for chest pain and leg swelling.   Gastrointestinal: Negative.  Negative for  constipation and diarrhea.   Endocrine: Negative.    Genitourinary: Negative.  Negative for dysuria.   Musculoskeletal:  Positive for gait problem.   Skin: Negative.    Allergic/Immunologic: Negative.  Negative for immunocompromised state.   Neurological:  Positive for tremors and weakness. Negative for dizziness and light-headedness.   Hematological: Negative.    Psychiatric/Behavioral:  Positive for decreased concentration.        Medications and orders     All medications reviewed and updated in skilled nursing EMR.      Objective     Vitals: per nursing home records    Physical Exam  Vitals and nursing note reviewed.   Constitutional:       Appearance: Normal appearance. She is obese.   HENT:      Head: Normocephalic and atraumatic.      Right Ear: Tympanic membrane, ear canal and external ear normal. Decreased hearing noted.      Left Ear: Tympanic membrane, ear canal and external ear normal. Decreased hearing noted.      Nose: Nose normal.      Mouth/Throat:      Mouth: Mucous membranes are moist.      Dentition: Abnormal dentition.      Pharynx: Oropharynx is clear.   Eyes:      Extraocular Movements: Extraocular movements intact.      Conjunctiva/sclera: Conjunctivae normal.      Pupils: Pupils are equal, round, and reactive to light.   Cardiovascular:      Rate and Rhythm: Normal rate. Rhythm irregular.      Pulses: Normal pulses.      Heart sounds: Murmur heard.   Pulmonary:      Effort: Pulmonary effort is normal.      Breath sounds: Normal breath sounds.   Abdominal:      General: Bowel sounds are normal.      Palpations: Abdomen is soft.   Musculoskeletal:         General: Normal range of motion.      Cervical back: Normal range of motion and neck supple.   Skin:     General: Skin is warm and dry.      Comments: Xerosis  PAD/PVD discoloration   Neurological:      General: No focal deficit present.      Mental Status: She is alert and oriented to person, place, and time.      Sensory: Sensory deficit  present.      Motor: Weakness and tremor present.      Gait: Gait abnormal.      Comments: forgetful   Psychiatric:         Mood and Affect: Mood normal.         Behavior: Behavior is slowed. Behavior is cooperative.         Thought Content: Thought content normal.         Cognition and Memory: Memory is impaired.         Judgment: Judgment is inappropriate.         Pertinent Laboratory/Diagnostic Studies:     The following labs and studies were reviewed please see chart or hospital paperwork for details.    Continue current medical management    SHIRA Price  7/12/2024 4:25 PM

## 2024-08-14 ENCOUNTER — NURSING HOME VISIT (OUTPATIENT)
Dept: FAMILY MEDICINE CLINIC | Facility: CLINIC | Age: 84
End: 2024-08-14
Payer: MEDICARE

## 2024-08-14 DIAGNOSIS — F32.1 CURRENT MODERATE EPISODE OF MAJOR DEPRESSIVE DISORDER, UNSPECIFIED WHETHER RECURRENT (HCC): Chronic | ICD-10-CM

## 2024-08-14 DIAGNOSIS — G25.0 BENIGN FAMILIAL TREMOR: ICD-10-CM

## 2024-08-14 DIAGNOSIS — I25.10 CORONARY ARTERY DISEASE INVOLVING NATIVE CORONARY ARTERY OF NATIVE HEART WITHOUT ANGINA PECTORIS: Primary | ICD-10-CM

## 2024-08-14 DIAGNOSIS — I48.19 PERSISTENT ATRIAL FIBRILLATION (HCC): Chronic | ICD-10-CM

## 2024-08-14 DIAGNOSIS — I73.9 PERIPHERAL ARTERIAL DISEASE (HCC): Chronic | ICD-10-CM

## 2024-08-14 PROCEDURE — 99307 SBSQ NF CARE SF MDM 10: CPT | Performed by: FAMILY MEDICINE

## 2024-08-14 NOTE — PROGRESS NOTES
[facility]  [address] [place] [address]  Facility: [facility]    NAME: Veda Sandoval  AGE: 84 y.o. SEX: female    DATE OF ENCOUNTER: 8/14/2024    Code status:  DNR w/ Hospitalization    Assessment and Plan     1. Coronary artery disease involving native coronary artery of native heart without angina pectoris  2. Peripheral arterial disease (HCC)  3. Persistent atrial fibrillation (HCC)  4. Benign familial tremor  5. Current moderate episode of major depressive disorder, unspecified whether recurrent (HCC)      All medications and routine orders were reviewed and updated as needed.    Plan discussed with: Family member    Chief Complaint     Interim evaluation    History of Present Illness     The patient reports feeling fairly well.  She gets some anxiety but overall has done well here.  She has had no further episodes of epistaxis.  She spends the majority of the day in her wheelchair and attends activities and goes to the dining room.  Bowel habits have been stable.  She denies any dyspnea.  There is been no dysuria.  Her appetite is okay.  She notes occasional joint pains particularly with weather change    The following portions of the patient's history were reviewed and updated as appropriate: current medications, past family history, past medical history, past social history, past surgical history and problem list.    Allergies:  Allergies   Allergen Reactions    Penicillins Hives    Sulfa Antibiotics Hives    Medical Tape Rash       Review of Systems     Review of Systems   Constitutional:  Negative for activity change, appetite change, chills, diaphoresis, fatigue and unexpected weight change.   HENT:  Negative for congestion, ear discharge, ear pain, hearing loss, nosebleeds and rhinorrhea.    Eyes:  Negative for pain, redness, itching and visual disturbance.   Respiratory:  Negative for cough, choking, chest tightness and shortness of breath.    Cardiovascular:   Negative for chest pain and leg swelling.   Gastrointestinal:  Negative for abdominal pain, blood in stool, constipation, diarrhea and nausea.   Endocrine: Negative for cold intolerance, polydipsia and polyphagia.   Genitourinary:  Negative for dysuria, frequency, hematuria and urgency.   Musculoskeletal:  Positive for arthralgias and back pain. Negative for gait problem, joint swelling, neck pain and neck stiffness.   Skin:  Negative for color change and rash.   Allergic/Immunologic: Negative for environmental allergies and food allergies.   Neurological:  Positive for weakness. Negative for dizziness, tremors, seizures, speech difficulty, numbness and headaches.   Hematological:  Negative for adenopathy. Bruises/bleeds easily.   Psychiatric/Behavioral:  Negative for behavioral problems, dysphoric mood, hallucinations and self-injury.        Medications and orders     All medications reviewed and updated in longterm EMR.      Objective     Vitals: per nursing home records    Physical Exam  Constitutional:       Appearance: Normal appearance. She is well-developed.   HENT:      Head: Normocephalic and atraumatic.      Right Ear: External ear normal.      Left Ear: External ear normal.      Mouth/Throat:      Mouth: Mucous membranes are moist.      Pharynx: Oropharynx is clear. No oropharyngeal exudate.   Eyes:      General: No scleral icterus.        Right eye: No discharge.         Left eye: No discharge.      Extraocular Movements: Extraocular movements intact.      Conjunctiva/sclera: Conjunctivae normal.      Pupils: Pupils are equal, round, and reactive to light.   Neck:      Thyroid: No thyromegaly.   Cardiovascular:      Rate and Rhythm: Normal rate. Rhythm irregular.      Heart sounds: Normal heart sounds. No murmur heard.     No gallop.   Pulmonary:      Effort: Pulmonary effort is normal. No respiratory distress.      Breath sounds: Normal breath sounds. No wheezing or rales.   Abdominal:      General:  Bowel sounds are normal.      Palpations: Abdomen is soft. There is no mass.      Tenderness: There is no guarding or rebound.   Musculoskeletal:         General: No tenderness or deformity. Normal range of motion.      Cervical back: Normal range of motion and neck supple.      Right lower leg: Edema present.      Left lower leg: Edema present.   Lymphadenopathy:      Cervical: No cervical adenopathy.   Skin:     General: Skin is warm and dry.      Findings: No rash.   Neurological:      Mental Status: She is alert and oriented to person, place, and time.      Cranial Nerves: No cranial nerve deficit.      Motor: Weakness present.      Coordination: Coordination normal.      Deep Tendon Reflexes: Reflexes are normal and symmetric. Reflexes normal.   Psychiatric:         Mood and Affect: Mood normal.         Behavior: Behavior normal.         Thought Content: Thought content normal.         Judgment: Judgment normal.         Pertinent Laboratory/Diagnostic Studies:     The following studies were reviewed please see chart or hospital paperwork for details.    Space for lab dictation no new diagnostics    - Continue with anticoagulation and the current therapy plan    Wesley Brandt DO  8/14/2024 12:52 PM

## 2024-09-18 ENCOUNTER — NURSING HOME VISIT (OUTPATIENT)
Dept: FAMILY MEDICINE CLINIC | Facility: CLINIC | Age: 84
End: 2024-09-18
Payer: MEDICARE

## 2024-09-18 DIAGNOSIS — I48.19 PERSISTENT ATRIAL FIBRILLATION (HCC): Primary | Chronic | ICD-10-CM

## 2024-09-18 DIAGNOSIS — I25.10 CORONARY ARTERY DISEASE INVOLVING NATIVE CORONARY ARTERY OF NATIVE HEART WITHOUT ANGINA PECTORIS: ICD-10-CM

## 2024-09-18 DIAGNOSIS — F32.1 CURRENT MODERATE EPISODE OF MAJOR DEPRESSIVE DISORDER, UNSPECIFIED WHETHER RECURRENT (HCC): Chronic | ICD-10-CM

## 2024-09-18 DIAGNOSIS — G62.9 NEUROPATHY: ICD-10-CM

## 2024-09-18 DIAGNOSIS — I74.4 PERIPHERAL ARTERY EMBOLISM OR THROMBOSIS (HCC): ICD-10-CM

## 2024-09-18 PROCEDURE — 99307 SBSQ NF CARE SF MDM 10: CPT | Performed by: FAMILY MEDICINE

## 2024-09-18 NOTE — PROGRESS NOTES
[facility]  [address] [place] [address]  Facility: [facility]    NAME: Veda Sandoval  AGE: 84 y.o. SEX: female    DATE OF ENCOUNTER: 9/18/2024    Code status:  DNR w/ Hospitalization    Assessment and Plan     1. Persistent atrial fibrillation (HCC)  2. Coronary artery disease involving native coronary artery of native heart without angina pectoris  3. Current moderate episode of major depressive disorder, unspecified whether recurrent (HCC)  4. Neuropathy  5. Peripheral artery embolism or thrombosis (HCC)      All medications and routine orders were reviewed and updated as needed.    Plan discussed with: Family member    Chief Complaint     Interim evaluation    History of Present Illness     Patient is seen for interim evaluation.  She reports feeling fairly well.  She has had no episodes of bleeding.  She has been applying calamine lotion to her legs and her rash has dissipated.  Bowel habits are stable.  Denies having dysuria.  No epistaxis.    The following portions of the patient's history were reviewed and updated as appropriate: current medications, past family history, past medical history, past social history, past surgical history and problem list.    Allergies:  Allergies   Allergen Reactions    Penicillins Hives    Sulfa Antibiotics Hives    Medical Tape Rash       Review of Systems     Review of Systems   Constitutional:  Negative for activity change, appetite change, chills, diaphoresis, fatigue and unexpected weight change.   HENT:  Negative for congestion, ear discharge, ear pain, hearing loss, nosebleeds and rhinorrhea.    Eyes:  Negative for pain, redness, itching and visual disturbance.   Respiratory:  Negative for cough, choking, chest tightness and shortness of breath.    Cardiovascular:  Negative for chest pain and leg swelling.   Gastrointestinal:  Negative for abdominal pain, blood in stool, constipation, diarrhea and nausea.   Endocrine: Negative  for cold intolerance, polydipsia and polyphagia.   Genitourinary:  Negative for dysuria, frequency, hematuria and urgency.   Musculoskeletal:  Negative for arthralgias, back pain, gait problem, joint swelling, neck pain and neck stiffness.   Skin:  Negative for color change and rash.   Allergic/Immunologic: Negative for environmental allergies and food allergies.   Neurological:  Positive for weakness and numbness. Negative for dizziness, tremors, seizures, speech difficulty and headaches.   Hematological:  Negative for adenopathy. Does not bruise/bleed easily.   Psychiatric/Behavioral:  Negative for behavioral problems, dysphoric mood, hallucinations and self-injury.        Medications and orders     All medications reviewed and updated in snf EMR.      Objective     Vitals: per nursing home records    Physical Exam  Constitutional:       Appearance: Normal appearance. She is well-developed.   HENT:      Head: Normocephalic and atraumatic.      Right Ear: External ear normal.      Left Ear: External ear normal.      Mouth/Throat:      Mouth: Mucous membranes are moist.      Pharynx: Oropharynx is clear. No oropharyngeal exudate.   Eyes:      General: No scleral icterus.        Right eye: No discharge.         Left eye: No discharge.      Extraocular Movements: Extraocular movements intact.      Conjunctiva/sclera: Conjunctivae normal.      Pupils: Pupils are equal, round, and reactive to light.   Neck:      Thyroid: No thyromegaly.   Cardiovascular:      Rate and Rhythm: Normal rate. Rhythm irregular.      Heart sounds: Normal heart sounds. No murmur heard.     No gallop.   Pulmonary:      Effort: Pulmonary effort is normal. No respiratory distress.      Breath sounds: Normal breath sounds. No wheezing or rales.   Abdominal:      General: Bowel sounds are normal.      Palpations: Abdomen is soft. There is no mass.      Tenderness: There is no guarding or rebound.   Musculoskeletal:         General: No  tenderness or deformity. Normal range of motion.      Cervical back: Normal range of motion and neck supple.   Lymphadenopathy:      Cervical: No cervical adenopathy.   Skin:     General: Skin is warm and dry.      Findings: No rash.   Neurological:      Mental Status: She is alert and oriented to person, place, and time.      Cranial Nerves: No cranial nerve deficit.      Sensory: Sensory deficit present.      Motor: Weakness present.      Coordination: Coordination normal.      Deep Tendon Reflexes: Reflexes are normal and symmetric. Reflexes normal.   Psychiatric:         Mood and Affect: Mood normal.         Behavior: Behavior normal.         Thought Content: Thought content normal.         Judgment: Judgment normal.         Pertinent Laboratory/Diagnostic Studies:     The following studies were reviewed please see chart or hospital paperwork for details.    Space for lab dictation no new diagnostics    - Check a BMP to evaluate renal function    Wesley Brandt DO  9/18/2024 11:30 AM

## 2024-10-21 ENCOUNTER — NURSING HOME VISIT (OUTPATIENT)
Dept: FAMILY MEDICINE CLINIC | Facility: CLINIC | Age: 84
End: 2024-10-21
Payer: MEDICARE

## 2024-10-21 DIAGNOSIS — I74.4 PERIPHERAL ARTERY EMBOLISM OR THROMBOSIS (HCC): ICD-10-CM

## 2024-10-21 DIAGNOSIS — R04.0 EPISTAXIS: Primary | ICD-10-CM

## 2024-10-21 DIAGNOSIS — F32.1 CURRENT MODERATE EPISODE OF MAJOR DEPRESSIVE DISORDER, UNSPECIFIED WHETHER RECURRENT (HCC): Chronic | ICD-10-CM

## 2024-10-21 DIAGNOSIS — I48.19 PERSISTENT ATRIAL FIBRILLATION (HCC): Chronic | ICD-10-CM

## 2024-10-21 PROCEDURE — 99308 SBSQ NF CARE LOW MDM 20: CPT | Performed by: FAMILY MEDICINE

## 2024-10-21 NOTE — PROGRESS NOTES
[facility]  [address] [place] [address]  Facility: [facility] [place]    NAME: Veda Sandoval  AGE: 84 y.o. SEX: female    DATE OF ENCOUNTER: 10/21/2024    Code status:  DNR w/ Hospitalization    Assessment and Plan     1. Epistaxis  2. Persistent atrial fibrillation (HCC)  3. Peripheral artery embolism or thrombosis (HCC)  4. Current moderate episode of major depressive disorder, unspecified whether recurrent (HCC)      All medications and routine orders were reviewed and updated as needed.    Plan discussed with: Family member    Chief Complaint     Interim evaluation    History of Present Illness     The patient has had 2 recent episodes of epistaxis again.  It has been almost a year since she has had any thing like this.  She previously came off of her anticoagulant and developed an embolism in the femoral artery requiring intervention.  He has tolerated her current anticoagulation for the past year.  She denies manual stimulation or nose picking.  She does note that the room is quite dry.    The following portions of the patient's history were reviewed and updated as appropriate: current medications, past family history, past medical history, past social history, past surgical history and problem list.    Allergies:  Allergies   Allergen Reactions    Penicillins Hives    Sulfa Antibiotics Hives    Medical Tape Rash       Review of Systems     Review of Systems   Constitutional:  Negative for activity change, appetite change, chills, diaphoresis, fatigue and unexpected weight change.   HENT:  Positive for nosebleeds. Negative for congestion, ear discharge, ear pain, hearing loss and rhinorrhea.    Eyes:  Negative for pain, redness, itching and visual disturbance.   Respiratory:  Negative for cough, choking, chest tightness and shortness of breath.    Cardiovascular:  Negative for chest pain and leg swelling.   Gastrointestinal:  Negative for abdominal pain, blood in stool,  constipation, diarrhea and nausea.   Endocrine: Negative for cold intolerance, polydipsia and polyphagia.   Genitourinary:  Negative for dysuria, frequency, hematuria and urgency.   Musculoskeletal:  Negative for arthralgias, back pain, gait problem, joint swelling, neck pain and neck stiffness.   Skin:  Negative for color change and rash.   Allergic/Immunologic: Negative for environmental allergies and food allergies.   Neurological:  Positive for weakness. Negative for dizziness, tremors, seizures, speech difficulty, numbness and headaches.   Hematological:  Negative for adenopathy. Does not bruise/bleed easily.   Psychiatric/Behavioral:  Negative for behavioral problems, dysphoric mood, hallucinations and self-injury.        Medications and orders     All medications reviewed and updated in assisted EMR.      Objective     Vitals: per nursing home records    Physical Exam  Constitutional:       Appearance: Normal appearance. She is well-developed.   HENT:      Head: Normocephalic and atraumatic.      Right Ear: External ear normal.      Left Ear: External ear normal.      Mouth/Throat:      Mouth: Mucous membranes are moist.      Pharynx: Oropharynx is clear. No oropharyngeal exudate.   Eyes:      General: No scleral icterus.        Right eye: No discharge.         Left eye: No discharge.      Extraocular Movements: Extraocular movements intact.      Conjunctiva/sclera: Conjunctivae normal.      Pupils: Pupils are equal, round, and reactive to light.   Neck:      Thyroid: No thyromegaly.   Cardiovascular:      Rate and Rhythm: Normal rate. Rhythm irregular.      Heart sounds: Normal heart sounds. No murmur heard.     No gallop.   Pulmonary:      Effort: Pulmonary effort is normal. No respiratory distress.      Breath sounds: Normal breath sounds. No wheezing or rales.   Abdominal:      General: Bowel sounds are normal.      Palpations: Abdomen is soft. There is no mass.      Tenderness: There is no guarding  or rebound.   Musculoskeletal:         General: No tenderness or deformity. Normal range of motion.      Cervical back: Normal range of motion and neck supple.   Lymphadenopathy:      Cervical: No cervical adenopathy.   Skin:     General: Skin is warm and dry.      Findings: No rash.   Neurological:      Mental Status: She is alert. She is disoriented.      Cranial Nerves: No cranial nerve deficit.      Motor: Weakness present.      Coordination: Coordination normal.      Deep Tendon Reflexes: Reflexes are normal and symmetric. Reflexes normal.   Psychiatric:         Mood and Affect: Mood normal.         Behavior: Behavior normal.         Pertinent Laboratory/Diagnostic Studies:     The following studies were reviewed please see chart or hospital paperwork for details.    Space for lab dictation no new diagnostics    - I encouraged the patient to moisturize her nasal passages with saline    Wesley Brandt DO  10/21/2024 12:59 PM

## 2024-10-27 ENCOUNTER — HOSPITAL ENCOUNTER (EMERGENCY)
Facility: HOSPITAL | Age: 84
Discharge: NURSING, REHAB OR LONG-TERM CARE | End: 2024-10-27
Attending: EMERGENCY MEDICINE | Admitting: EMERGENCY MEDICINE
Payer: MEDICARE

## 2024-10-27 VITALS
HEART RATE: 77 BPM | SYSTOLIC BLOOD PRESSURE: 114 MMHG | OXYGEN SATURATION: 94 % | RESPIRATION RATE: 18 BRPM | DIASTOLIC BLOOD PRESSURE: 66 MMHG | TEMPERATURE: 97.4 F

## 2024-10-27 DIAGNOSIS — R04.0 EPISTAXIS, RECURRENT: ICD-10-CM

## 2024-10-27 DIAGNOSIS — R04.0 RIGHT-SIDED EPISTAXIS: Primary | ICD-10-CM

## 2024-10-27 PROCEDURE — 99283 EMERGENCY DEPT VISIT LOW MDM: CPT

## 2024-10-27 PROCEDURE — 99284 EMERGENCY DEPT VISIT MOD MDM: CPT

## 2024-10-27 NOTE — DISCHARGE INSTRUCTIONS
You have been evaluated in the Emergency Department today for your nosebleed. Your bleeding was controlled in the ER with pressure. Avoid picking your nose or forcefully blowing your nose, as this can disrupt healing and lead to more bleeding.    If nosebleed occurs, sit upright and lean slightly forward to avoid swallowing blood. Pinch the soft part of your nose firmly for 30 minutes with the nose clip that has been provided to you. Did not check earlier of the nosebleed has stopped. You may also use ice or cold compress to the bridge of the nose if desired. Avoid straining, bending forward, or other activities that may cause increased pressure and lead to more bleeding.    Please follow-up with the ENT, a referral has been placed for you. Please follow up with your primary care physician within two days.    Return to the Emergency Department if you experience worsening or uncontrolled bleeding, shortness of breath, feeling lightheaded or dizzy, loss of consciousness, fainting, nausea or vomiting, chest pain, or for any other concerning symptoms.

## 2024-11-11 ENCOUNTER — NURSING HOME VISIT (OUTPATIENT)
Dept: FAMILY MEDICINE CLINIC | Facility: CLINIC | Age: 84
End: 2024-11-11
Payer: MEDICARE

## 2024-11-11 DIAGNOSIS — Z95.0 HISTORY OF PERMANENT CARDIAC PACEMAKER PLACEMENT: Chronic | ICD-10-CM

## 2024-11-11 DIAGNOSIS — R04.0 EPISTAXIS: Primary | ICD-10-CM

## 2024-11-11 DIAGNOSIS — I25.10 CORONARY ARTERY DISEASE INVOLVING NATIVE CORONARY ARTERY OF NATIVE HEART WITHOUT ANGINA PECTORIS: ICD-10-CM

## 2024-11-11 DIAGNOSIS — I48.19 PERSISTENT ATRIAL FIBRILLATION (HCC): Chronic | ICD-10-CM

## 2024-11-11 PROCEDURE — 99308 SBSQ NF CARE LOW MDM 20: CPT | Performed by: FAMILY MEDICINE

## 2024-11-11 NOTE — PROGRESS NOTES
[facility]  [address] [place] [address]  Facility: [facility]    NAME: Veda Sandoval  AGE: 84 y.o. SEX: female    DATE OF ENCOUNTER: 11/11/2024    Code status:  DNR w/ Hospitalization    Assessment and Plan     1. Epistaxis  2. Coronary artery disease involving native coronary artery of native heart without angina pectoris  3. Persistent atrial fibrillation (HCC)  4. History of permanent cardiac pacemaker placement      All medications and routine orders were reviewed and updated as needed.    Plan discussed with: Family member    Chief Complaint     Interim evaluation    History of Present Illness     The patient reports 2 episodes of epistaxis which were difficult to control.  She was given a compressive device in the emergency room which has helped.  He notes her bowel habits are stable.  She denies any dyspnea.  She reports that she occasionally has episodes of confusion.    The following portions of the patient's history were reviewed and updated as appropriate: current medications, past family history, past medical history, past social history, past surgical history and problem list.    Allergies:  Allergies   Allergen Reactions    Penicillins Hives    Sulfa Antibiotics Hives    Medical Tape Rash       Review of Systems     Review of Systems   Constitutional:  Negative for activity change, appetite change, chills, diaphoresis, fatigue and unexpected weight change.   HENT:  Positive for nosebleeds. Negative for congestion, ear discharge, ear pain, hearing loss and rhinorrhea.    Eyes:  Negative for pain, redness, itching and visual disturbance.   Respiratory:  Negative for cough, choking, chest tightness and shortness of breath.    Cardiovascular:  Negative for chest pain and leg swelling.   Gastrointestinal:  Negative for abdominal pain, blood in stool, constipation, diarrhea and nausea.   Endocrine: Negative for cold intolerance, polydipsia and polyphagia.    Genitourinary:  Negative for dysuria, frequency, hematuria and urgency.   Musculoskeletal:  Negative for arthralgias, back pain, gait problem, joint swelling, neck pain and neck stiffness.   Skin:  Negative for color change and rash.   Allergic/Immunologic: Negative for environmental allergies and food allergies.   Neurological:  Positive for weakness. Negative for dizziness, tremors, seizures, speech difficulty, numbness and headaches.   Hematological:  Negative for adenopathy. Does not bruise/bleed easily.   Psychiatric/Behavioral:  Positive for confusion. Negative for behavioral problems, dysphoric mood, hallucinations and self-injury.        Medications and orders     All medications reviewed and updated in [facility] EMR.      Objective     Vitals: per nursing home records    Physical Exam  Constitutional:       Appearance: Normal appearance. She is well-developed.   HENT:      Head: Normocephalic and atraumatic.      Right Ear: External ear normal.      Left Ear: External ear normal.      Mouth/Throat:      Mouth: Mucous membranes are moist.      Pharynx: Oropharynx is clear. No oropharyngeal exudate.   Eyes:      General: No scleral icterus.        Right eye: No discharge.         Left eye: No discharge.      Extraocular Movements: Extraocular movements intact.      Conjunctiva/sclera: Conjunctivae normal.      Pupils: Pupils are equal, round, and reactive to light.   Neck:      Thyroid: No thyromegaly.   Cardiovascular:      Rate and Rhythm: Normal rate. Rhythm irregular.      Heart sounds: Normal heart sounds. No murmur heard.     No gallop.   Pulmonary:      Effort: Pulmonary effort is normal. No respiratory distress.      Breath sounds: Normal breath sounds. No wheezing or rales.   Abdominal:      General: Bowel sounds are normal.      Palpations: Abdomen is soft. There is no mass.      Tenderness: There is no guarding or rebound.   Musculoskeletal:         General: No tenderness or deformity. Normal  range of motion.      Cervical back: Normal range of motion and neck supple.      Right lower leg: Edema present.      Left lower leg: Edema present.   Lymphadenopathy:      Cervical: No cervical adenopathy.   Skin:     General: Skin is warm and dry.      Findings: No rash.   Neurological:      Mental Status: She is alert and oriented to person, place, and time.      Cranial Nerves: No cranial nerve deficit.      Motor: Weakness present.      Coordination: Coordination normal.      Deep Tendon Reflexes: Reflexes are normal and symmetric. Reflexes normal.   Psychiatric:         Mood and Affect: Mood normal.         Behavior: Behavior normal.         Thought Content: Thought content normal.         Judgment: Judgment normal.         Pertinent Laboratory/Diagnostic Studies:     The following studies were reviewed please see chart or hospital paperwork for details.    Space for lab dictation no new diagnostics    - Continue with saline nasal gel    Wesley Brandt DO  11/11/2024 12:15 PM

## 2024-12-11 ENCOUNTER — NURSING HOME VISIT (OUTPATIENT)
Dept: FAMILY MEDICINE CLINIC | Facility: CLINIC | Age: 84
End: 2024-12-11
Payer: MEDICARE

## 2024-12-11 DIAGNOSIS — I48.19 PERSISTENT ATRIAL FIBRILLATION (HCC): Chronic | ICD-10-CM

## 2024-12-11 DIAGNOSIS — I25.10 CORONARY ARTERY DISEASE INVOLVING NATIVE CORONARY ARTERY OF NATIVE HEART WITHOUT ANGINA PECTORIS: Primary | ICD-10-CM

## 2024-12-11 DIAGNOSIS — Z95.0 HISTORY OF PERMANENT CARDIAC PACEMAKER PLACEMENT: Chronic | ICD-10-CM

## 2024-12-11 DIAGNOSIS — R04.0 EPISTAXIS: ICD-10-CM

## 2024-12-11 PROCEDURE — 99307 SBSQ NF CARE SF MDM 10: CPT | Performed by: FAMILY MEDICINE

## 2024-12-11 NOTE — PROGRESS NOTES
[facility]  [address] [place] [address]  Facility: [facility]    NAME: Veda Sandoval  AGE: 84 y.o. SEX: female    DATE OF ENCOUNTER: 12/11/2024    Code status:  DNR w/ Hospitalization    Assessment and Plan     1. Coronary artery disease involving native coronary artery of native heart without angina pectoris  2. Persistent atrial fibrillation (HCC)  3. Epistaxis  4. History of permanent cardiac pacemaker placement      All medications and routine orders were reviewed and updated as needed.    Plan discussed with: Family member    Chief Complaint     Interim evaluation    History of Present Illness     The patient is seen for interim evaluation.  The facility was able to obtain a humidifier for her room and she has had no further episodes of epistaxis.  This is a good long-term solution for her as it will moisturize the air and keep her from stimulating her nasal passages.  She denies any dyspnea.  Her bowel habits have been stable.  She is worried about urgency of bowel movement and is hesitant to take more laxatives.  Denies any dysuria.  Prefers to stay in her room to avoid contact with other people as she does not want to get sick.    The following portions of the patient's history were reviewed and updated as appropriate: current medications, past family history, past medical history, past social history, past surgical history and problem list.    Allergies:  Allergies   Allergen Reactions    Penicillins Hives    Sulfa Antibiotics Hives    Medical Tape Rash       Review of Systems     Review of Systems   Constitutional:  Negative for activity change, appetite change, chills, diaphoresis, fatigue and unexpected weight change.   HENT:  Negative for congestion, ear discharge, ear pain, hearing loss, nosebleeds and rhinorrhea.    Eyes:  Negative for pain, redness, itching and visual disturbance.   Respiratory:  Negative for cough, choking, chest tightness and shortness of  breath.    Cardiovascular:  Negative for chest pain and leg swelling.   Gastrointestinal:  Negative for abdominal pain, blood in stool, constipation, diarrhea and nausea.   Endocrine: Negative for cold intolerance, polydipsia and polyphagia.   Genitourinary:  Negative for dysuria, frequency, hematuria and urgency.   Musculoskeletal:  Positive for gait problem. Negative for arthralgias, back pain, joint swelling, neck pain and neck stiffness.   Skin:  Negative for color change and rash.   Allergic/Immunologic: Negative for environmental allergies and food allergies.   Neurological:  Positive for weakness. Negative for dizziness, tremors, seizures, speech difficulty, numbness and headaches.   Hematological:  Negative for adenopathy. Bruises/bleeds easily.   Psychiatric/Behavioral:  Negative for behavioral problems, dysphoric mood, hallucinations and self-injury.        Medications and orders     All medications reviewed and updated in intermediate EMR.      Objective     Vitals: per nursing home records    Physical Exam  Constitutional:       Appearance: Normal appearance. She is well-developed.   HENT:      Head: Normocephalic and atraumatic.      Right Ear: External ear normal.      Left Ear: External ear normal.      Mouth/Throat:      Mouth: Mucous membranes are moist.      Pharynx: Oropharynx is clear. No oropharyngeal exudate.   Eyes:      General: No scleral icterus.        Right eye: No discharge.         Left eye: No discharge.      Extraocular Movements: Extraocular movements intact.      Conjunctiva/sclera: Conjunctivae normal.      Pupils: Pupils are equal, round, and reactive to light.   Neck:      Thyroid: No thyromegaly.   Cardiovascular:      Rate and Rhythm: Normal rate. Rhythm irregular.      Heart sounds: Normal heart sounds. No murmur heard.     No gallop.   Pulmonary:      Effort: Pulmonary effort is normal. No respiratory distress.      Breath sounds: Normal breath sounds. No wheezing or rales.    Abdominal:      General: Bowel sounds are normal.      Palpations: Abdomen is soft. There is no mass.      Tenderness: There is no guarding or rebound.   Musculoskeletal:         General: No tenderness or deformity. Normal range of motion.      Cervical back: Normal range of motion and neck supple.      Right lower leg: Edema present.      Left lower leg: Edema present.   Lymphadenopathy:      Cervical: No cervical adenopathy.   Skin:     General: Skin is warm and dry.      Findings: No rash.   Neurological:      Mental Status: She is alert and oriented to person, place, and time.      Cranial Nerves: No cranial nerve deficit.      Motor: Weakness present.      Coordination: Coordination normal.      Deep Tendon Reflexes: Reflexes are normal and symmetric. Reflexes normal.   Psychiatric:         Mood and Affect: Mood normal.         Behavior: Behavior normal.         Thought Content: Thought content normal.         Judgment: Judgment normal.         Pertinent Laboratory/Diagnostic Studies:     The following studies were reviewed please see chart or hospital paperwork for details.    Space for lab dictation no new diagnostics    - Continue with the regular use of the humidifier.  Maintain the current medical regimen including anticoagulation    Wesley Brandt DO  12/11/2024 1:41 PM

## 2025-01-24 ENCOUNTER — NURSING HOME VISIT (OUTPATIENT)
Dept: FAMILY MEDICINE CLINIC | Facility: CLINIC | Age: 85
End: 2025-01-24
Payer: MEDICARE

## 2025-01-24 DIAGNOSIS — I25.10 CORONARY ARTERY DISEASE INVOLVING NATIVE CORONARY ARTERY OF NATIVE HEART WITHOUT ANGINA PECTORIS: ICD-10-CM

## 2025-01-24 DIAGNOSIS — Z95.0 HISTORY OF PERMANENT CARDIAC PACEMAKER PLACEMENT: Chronic | ICD-10-CM

## 2025-01-24 DIAGNOSIS — I48.19 PERSISTENT ATRIAL FIBRILLATION (HCC): Primary | Chronic | ICD-10-CM

## 2025-01-24 DIAGNOSIS — G25.0 BENIGN FAMILIAL TREMOR: ICD-10-CM

## 2025-01-24 DIAGNOSIS — R04.0 EPISTAXIS: ICD-10-CM

## 2025-01-24 PROBLEM — Z98.890 HISTORY OF EMBOLECTOMY: Status: RESOLVED | Noted: 2019-10-07 | Resolved: 2025-01-24

## 2025-01-24 PROCEDURE — 99308 SBSQ NF CARE LOW MDM 20: CPT | Performed by: FAMILY MEDICINE

## 2025-02-06 ENCOUNTER — NURSING HOME VISIT (OUTPATIENT)
Dept: FAMILY MEDICINE CLINIC | Facility: CLINIC | Age: 85
End: 2025-02-06
Payer: MEDICARE

## 2025-02-06 DIAGNOSIS — L03.116 CELLULITIS OF LEFT ANKLE: Primary | ICD-10-CM

## 2025-02-06 DIAGNOSIS — I25.10 CORONARY ARTERY DISEASE INVOLVING NATIVE CORONARY ARTERY OF NATIVE HEART WITHOUT ANGINA PECTORIS: ICD-10-CM

## 2025-02-06 DIAGNOSIS — I48.19 PERSISTENT ATRIAL FIBRILLATION (HCC): Chronic | ICD-10-CM

## 2025-02-06 DIAGNOSIS — I73.9 PERIPHERAL ARTERIAL DISEASE (HCC): Chronic | ICD-10-CM

## 2025-02-06 PROCEDURE — 99308 SBSQ NF CARE LOW MDM 20: CPT | Performed by: FAMILY MEDICINE

## 2025-02-06 NOTE — PROGRESS NOTES
[facility]  [address] [place] [address]  Facility: [facility]    NAME: Veda Sandoval  AGE: 85 y.o. SEX: female    DATE OF ENCOUNTER: 2/6/2025    Code status:  DNR w/ Hospitalization    Assessment and Plan     1. Cellulitis of left ankle  2. Persistent atrial fibrillation (HCC)  3. Peripheral arterial disease (HCC)  4. Coronary artery disease involving native coronary artery of native heart without angina pectoris      All medications and routine orders were reviewed and updated as needed.    Plan discussed with: Patient    Chief Complaint     Interim evaluation    History of Present Illness     The patient's orders were signed yesterday and she was seen today.  She was started on antibiotics because of cellulitis of her left lateral ankle.  Is also being seen by wound care.  She reports that it is feeling much better today.  Dressing is in place.  He has a history of lower limb ischemia related to embolism.  He needs to maintain her anticoagulation.  She denies any dyspnea.  Overall her mood has been good.  She has had no further episodes of epistaxis    The following portions of the patient's history were reviewed and updated as appropriate: current medications, past family history, past medical history, past social history, past surgical history and problem list.    Allergies:  Allergies   Allergen Reactions    Penicillins Hives    Sulfa Antibiotics Hives    Medical Tape Rash       Review of Systems     Review of Systems   Constitutional:  Negative for activity change, appetite change, chills, diaphoresis, fatigue and unexpected weight change.   HENT:  Negative for congestion, ear discharge, ear pain, hearing loss, nosebleeds and rhinorrhea.    Eyes:  Negative for pain, redness, itching and visual disturbance.   Respiratory:  Negative for cough, choking, chest tightness and shortness of breath.    Cardiovascular:  Negative for chest pain and leg swelling.    Gastrointestinal:  Negative for abdominal pain, blood in stool, constipation, diarrhea and nausea.   Endocrine: Negative for cold intolerance, polydipsia and polyphagia.   Genitourinary:  Negative for dysuria, frequency, hematuria and urgency.   Musculoskeletal:  Negative for arthralgias, back pain, gait problem, joint swelling, neck pain and neck stiffness.   Skin:  Positive for wound. Negative for color change and rash.   Allergic/Immunologic: Negative for environmental allergies and food allergies.   Neurological:  Positive for weakness. Negative for dizziness, seizures, speech difficulty, numbness and headaches.   Hematological:  Negative for adenopathy. Does not bruise/bleed easily.   Psychiatric/Behavioral:  Negative for behavioral problems, dysphoric mood, hallucinations and self-injury.        Medications and orders     All medications reviewed and updated in [facility]-way EMR.      Objective     Vitals: per nursing home records    Physical Exam  Constitutional:       Appearance: Normal appearance. She is well-developed.   HENT:      Head: Normocephalic and atraumatic.      Right Ear: External ear normal.      Left Ear: External ear normal.      Mouth/Throat:      Mouth: Mucous membranes are moist.      Pharynx: Oropharynx is clear. No oropharyngeal exudate.   Eyes:      General: No scleral icterus.        Right eye: No discharge.         Left eye: No discharge.      Extraocular Movements: Extraocular movements intact.      Conjunctiva/sclera: Conjunctivae normal.      Pupils: Pupils are equal, round, and reactive to light.   Neck:      Thyroid: No thyromegaly.   Cardiovascular:      Rate and Rhythm: Normal rate. Rhythm irregular.      Heart sounds: Normal heart sounds. No murmur heard.     No gallop.   Pulmonary:      Effort: Pulmonary effort is normal. No respiratory distress.      Breath sounds: Normal breath sounds. No wheezing or rales.   Abdominal:      General: Bowel sounds are normal.       Palpations: Abdomen is soft. There is no mass.      Tenderness: There is no guarding or rebound.   Musculoskeletal:         General: Tenderness present. No deformity. Normal range of motion.      Cervical back: Normal range of motion and neck supple.      Left lower leg: Edema present.   Lymphadenopathy:      Cervical: No cervical adenopathy.   Skin:     General: Skin is warm and dry.      Findings: Erythema and lesion present. No rash.   Neurological:      Mental Status: She is alert and oriented to person, place, and time.      Cranial Nerves: No cranial nerve deficit.      Coordination: Coordination normal.      Deep Tendon Reflexes: Reflexes are normal and symmetric. Reflexes normal.   Psychiatric:         Mood and Affect: Mood normal.         Behavior: Behavior normal.         Thought Content: Thought content normal.         Judgment: Judgment normal.         Pertinent Laboratory/Diagnostic Studies:     The following studies were reviewed please see chart or hospital paperwork for details.    Space for lab dictation no new diagnostics    - Complete the full course of antibiotic    Wesley Brandt DO  2/6/2025 2:05 PM

## 2025-02-19 ENCOUNTER — REMOTE DEVICE CLINIC VISIT (OUTPATIENT)
Dept: CARDIOLOGY CLINIC | Facility: CLINIC | Age: 85
End: 2025-02-19
Payer: MEDICARE

## 2025-02-19 ENCOUNTER — RESULTS FOLLOW-UP (OUTPATIENT)
Dept: CARDIOLOGY CLINIC | Facility: CLINIC | Age: 85
End: 2025-02-19

## 2025-02-19 DIAGNOSIS — Z95.0 CARDIAC PACEMAKER IN SITU: Primary | ICD-10-CM

## 2025-02-19 PROCEDURE — 93296 REM INTERROG EVL PM/IDS: CPT | Performed by: INTERNAL MEDICINE

## 2025-02-19 PROCEDURE — 93294 REM INTERROG EVL PM/LDLS PM: CPT | Performed by: INTERNAL MEDICINE

## 2025-02-19 NOTE — PROGRESS NOTES
Results for orders placed or performed in visit on 02/19/25   Cardiac EP device report    Narrative    MDT DUAL PPM/ NOT MRI CONDITIONAL  [employer] TRANSMISSION: BATTERY VOLTAGE NEARING JOANNA (9 MOS, <1-20 MOS). WILL SCHEDULE MONTHLY BATTERY CHECKS. AP-1%, -2%. ALL AVAILABLE LEAD PARAMETERS WITHIN NORMAL LIMITS. 1 AF EPISODE IN PROGRESS. AF BURDEN SINCE 11/8/23-41.4%. PT ON ELIQUIS, ASA 81MG & METOPROLOL. NORMAL DEVICE FUNCTION. GV

## 2025-03-10 ENCOUNTER — NURSING HOME VISIT (OUTPATIENT)
Dept: GERIATRICS | Facility: OTHER | Age: 85
End: 2025-03-10
Payer: MEDICARE

## 2025-03-10 VITALS
WEIGHT: 186.8 LBS | HEART RATE: 77 BPM | BODY MASS INDEX: 34.17 KG/M2 | DIASTOLIC BLOOD PRESSURE: 46 MMHG | SYSTOLIC BLOOD PRESSURE: 116 MMHG | RESPIRATION RATE: 18 BRPM | TEMPERATURE: 98.2 F

## 2025-03-10 DIAGNOSIS — I10 ESSENTIAL HYPERTENSION: ICD-10-CM

## 2025-03-10 DIAGNOSIS — F41.1 GENERALIZED ANXIETY DISORDER: Chronic | ICD-10-CM

## 2025-03-10 DIAGNOSIS — I48.19 PERSISTENT ATRIAL FIBRILLATION (HCC): Chronic | ICD-10-CM

## 2025-03-10 DIAGNOSIS — R04.0 EPISTAXIS: ICD-10-CM

## 2025-03-10 DIAGNOSIS — I73.9 PERIPHERAL ARTERIAL DISEASE (HCC): Primary | Chronic | ICD-10-CM

## 2025-03-10 DIAGNOSIS — F32.1 CURRENT MODERATE EPISODE OF MAJOR DEPRESSIVE DISORDER, UNSPECIFIED WHETHER RECURRENT (HCC): Chronic | ICD-10-CM

## 2025-03-10 DIAGNOSIS — E78.2 MIXED HYPERLIPIDEMIA: Chronic | ICD-10-CM

## 2025-03-10 DIAGNOSIS — F51.01 PRIMARY INSOMNIA: ICD-10-CM

## 2025-03-10 DIAGNOSIS — I74.4 PERIPHERAL ARTERY EMBOLISM OR THROMBOSIS (HCC): ICD-10-CM

## 2025-03-10 PROCEDURE — 99309 SBSQ NF CARE MODERATE MDM 30: CPT

## 2025-03-10 RX ORDER — LOSARTAN POTASSIUM 25 MG/1
25 TABLET ORAL DAILY
COMMUNITY

## 2025-03-10 NOTE — ASSESSMENT & PLAN NOTE
First line is behavioral therapy  Avoid sedative hypnotics such as benzodiazepines and benadryl  Encourage staying awake during the day  Encourage daytime activity, morning exercise  Decrease or eliminate day time naps  Avoid caffiene, alcohol especially during late afternoon and evening hours  Establish a night time routine  Continue melatonin 3 mg daily at bedtime  Continue trazodone 75 mg daily at bedtime

## 2025-03-10 NOTE — PROGRESS NOTES
[address], [address], [place] [address]  [phone]    NAME: Veda Sandoval  AGE: 85 y.o. SEX: female    Progress Note    Location: [facility]  POS: 32 ([facility])  Code Status: Full Code    Chief complaint / Reason for visit: Follow-up visit    Assessment/Plan:    Epistaxis  History of epistaxis d/t chronic anticoagulation  Currently stable, no new complaints   Resident has in place SYR saline nasal gel and nasal saline spray medication regimen    Mixed hyperlipidemia  Lipid panel completed 10/09/2019  cholesterol-162  triglycerides-132  HDL-32  LDL-104  Recommend repeat lipid panel  Continue atorvastatin 20 mg daily in the evening        Primary insomnia  First line is behavioral therapy  Avoid sedative hypnotics such as benzodiazepines and benadryl  Encourage staying awake during the day  Encourage daytime activity, morning exercise  Decrease or eliminate day time naps  Avoid caffiene, alcohol especially during late afternoon and evening hours  Establish a night time routine  Continue melatonin 3 mg daily at bedtime  Continue trazodone 75 mg daily at bedtime      Depression  Mood stable  Continue duloxetine 60 mg daily  Continue trazodone 75 mg daily at bedtime  Continue supportive care  Encourage participation in group activities    Generalized anxiety disorder  Mood stable  Continue duloxetine 60 mg daily    Essential hypertension  Follows with Cardiology  Blood pressures reviewed from facility records, stable  Current medications: HCTZ 25 mg daily, losartan 25 mg daily, metoprolol succinate 100 mg daily  Based on recent blood pressures, can consider titration off losartan   Avoid hypotension    Peripheral arterial disease (HCC)  Follows with Vascular Surgery   Last seen on 01/16/2024  Primarily up in wheelchair during the day   Feet in dependent position  Bilateral feet with rukhsana discoloration  Poor circulation noted on exam  Current medications: cilostazol 50 mg  BID    Peripheral artery embolism or thrombosis (HCC)  history of left embolectomy/thrombectomy of femoral artery in October 2019  Previously on xarelto   Changed to Eliquis 5 mg BID  Continue Eliquis 5 mg BID, aspirin 81 mg daily and atorvastatin 20 mg daily  Follows with Vascular Surgery    Persistent atrial fibrillation (HCC)  PPM  Device checks with Cardiology  Currently anticoagulated with eliquis 5 mg BID  Rate controlled with metoprolol succinate 100 mg daily    Veda is an 85 year old female, seen today at [facility].  Medical history includes, not limited to, MDD, atherosclerotic heart disease, hypertension, PAF, peripheral vascular disease, hyperlipidemia, insomnia, and GERD.    Veda is seen today for a routine follow up visit.  Upon entering her room she is out of bed, in her wheelchair.  Appears to be moving around well.  Currently denies pain.  Denies pain to bilateral lower extremities.  She states the nursing staff has been applying cream to her legs to avoid her itching them.  She states she moves around in the wheelchair.  Ambulates only to transfer from the chair to the bed.  She states she has been sleeping well at night.  Offers no complaints at this time.          Nursing and prior provider notes reviewed on this visit. Discussed visit with PCP and nursing staff/ supervisor.      Review of Systems   Constitutional:  Positive for activity change. Negative for appetite change, fatigue, fever and unexpected weight change.   HENT:  Negative for congestion.    Eyes:  Negative for pain, discharge and visual disturbance.   Respiratory:  Negative for cough and shortness of breath.    Cardiovascular:  Negative for chest pain and palpitations.   Gastrointestinal:  Negative for abdominal pain, constipation and diarrhea.   Genitourinary:  Negative for difficulty urinating, dysuria, hematuria and urgency.   Musculoskeletal:  Positive for gait problem. Negative for arthralgias.   Skin:  Negative for color  change.   Neurological:  Negative for syncope and weakness.   Psychiatric/Behavioral:  Negative for confusion and sleep disturbance.    All other systems reviewed and are negative.         ALLERGY: Reviewed, unchanged  Allergies   Allergen Reactions    Penicillins Hives    Sulfa Antibiotics Hives    Medical Tape Rash       HISTORY:  Medical Hx: Reviewed, unchanged  Family Hx: Reviewed, unchanged  Soc Hx: Reviewed,  unchanged      Physical Exam  Vitals reviewed.   Constitutional:       General: She is not in acute distress.     Appearance: Normal appearance. She is not ill-appearing.   HENT:      Head: Normocephalic.      Right Ear: Tympanic membrane normal.      Left Ear: Tympanic membrane normal.      Nose: Nose normal. No congestion.      Mouth/Throat:      Mouth: Mucous membranes are moist.      Pharynx: Oropharynx is clear.   Eyes:      General:         Right eye: No discharge.         Left eye: No discharge.      Extraocular Movements: Extraocular movements intact.      Conjunctiva/sclera: Conjunctivae normal.      Pupils: Pupils are equal, round, and reactive to light.   Cardiovascular:      Rate and Rhythm: Normal rate.      Pulses: Normal pulses.   Pulmonary:      Effort: Pulmonary effort is normal. No respiratory distress.      Breath sounds: Normal breath sounds.   Chest:      Chest wall: No tenderness.   Abdominal:      General: Bowel sounds are normal.      Palpations: Abdomen is soft.      Tenderness: There is no abdominal tenderness.   Musculoskeletal:         General: No swelling. Normal range of motion.      Cervical back: Normal range of motion.      Right lower leg: No edema.      Left lower leg: No edema.   Skin:     General: Skin is warm and dry.      Comments: Nate discoloration of bilateral lower extremities   Neurological:      Mental Status: She is alert and oriented to person, place, and time. Mental status is at baseline.      Motor: No weakness.   Psychiatric:         Mood and Affect:  "Mood normal.         Behavior: Behavior normal.         Thought Content: Thought content normal.            Laboratory results / Imaging reviewed: Hard copy/ies in medical chart:    Vitals:    03/07/25 1248   BP: (!) 116/46   Pulse: 77   Resp: 18   Temp: 98.2 °F (36.8 °C)       Current Medications:  All medications reviewed and updated in Nursing Home Chart    Please note: This note was completed in part utilizing a voice-recognition software may have been used in the preparation of this document. Grammatical errors, random word insertion, spelling mistakes, and incomplete sentences may be an occasional consequence of the system secondary to software limitations, ambient noise and hardware issues. Occasional wrong word or \"sound-alike\" substitutions may have occurred due to the inherent limitations of voice recognition software. At the time of dictation, efforts were made to edit, clarify and/or correct errors. Interpretation should be guided by context. Please read the chart carefully and recognize, using context, where substitutions have occurred. If you have any questions or concerns about the context, text or information contained within the body of this dictation, please contact myself, the provider, for further clarification.      SHIRA Bridges  3/10/2025  "

## 2025-03-10 NOTE — ASSESSMENT & PLAN NOTE
Lipid panel completed 10/09/2019  cholesterol-162  triglycerides-132  HDL-32  LDL-104  Recommend repeat lipid panel  Continue atorvastatin 20 mg daily in the evening

## 2025-03-10 NOTE — ASSESSMENT & PLAN NOTE
Follows with Vascular Surgery   Last seen on 01/16/2024  Primarily up in wheelchair during the day   Feet in dependent position  Bilateral feet with rukhsana discoloration  Poor circulation noted on exam  Current medications: cilostazol 50 mg BID

## 2025-03-10 NOTE — ASSESSMENT & PLAN NOTE
history of left embolectomy/thrombectomy of femoral artery in October 2019  Previously on xarelto   Changed to Eliquis 5 mg BID  Continue Eliquis 5 mg BID, aspirin 81 mg daily and atorvastatin 20 mg daily  Follows with Vascular Surgery

## 2025-03-10 NOTE — ASSESSMENT & PLAN NOTE
PPM  Device checks with Cardiology  Currently anticoagulated with eliquis 5 mg BID  Rate controlled with metoprolol succinate 100 mg daily

## 2025-03-10 NOTE — ASSESSMENT & PLAN NOTE
Follows with Cardiology  Blood pressures reviewed from facility records, stable  Current medications: HCTZ 25 mg daily, losartan 25 mg daily, metoprolol succinate 100 mg daily  Based on recent blood pressures, can consider titration off losartan   Avoid hypotension

## 2025-03-10 NOTE — ASSESSMENT & PLAN NOTE
History of epistaxis d/t chronic anticoagulation  Currently stable, no new complaints   Resident has in place SYR saline nasal gel and nasal saline spray medication regimen

## 2025-03-19 ENCOUNTER — REMOTE DEVICE CLINIC VISIT (OUTPATIENT)
Dept: CARDIOLOGY CLINIC | Facility: CLINIC | Age: 85
End: 2025-03-19

## 2025-03-19 DIAGNOSIS — Z95.0 CARDIAC PACEMAKER IN SITU: Primary | ICD-10-CM

## 2025-03-19 PROCEDURE — RECHECK: Performed by: INTERNAL MEDICINE

## 2025-03-19 NOTE — PROGRESS NOTES
Results for orders placed or performed in visit on 03/19/25   Cardiac EP device report    Narrative    MDT DUAL PPM/ NOT MRI CONDITIONAL  [employer] TRANSMISSION TO CHECK BATTERY STATUS- NB: BATTERY VOLTAGE NEARING JOANNA (9 MOS, <1-20 MOS). WILL SCHEDULE MONTHLY BATTERY CHECKS. AP-1%, 2%. ALL AVAILABLE LEAD PARAMETERS WITHIN NORMAL LIMITS. 4 AMS EPISODES & CURRENTLY IN AF. AF BURDEN SINCE 11/8/23- 39.1%. PT ON ELIQUIS, ASA 81MG & METOPROLOL. NORMAL DEVICE FUNCTION. GV

## 2025-03-22 ENCOUNTER — RESULTS FOLLOW-UP (OUTPATIENT)
Dept: NON INVASIVE DIAGNOSTICS | Facility: HOSPITAL | Age: 85
End: 2025-03-22

## 2025-04-14 ENCOUNTER — NURSING HOME VISIT (OUTPATIENT)
Dept: FAMILY MEDICINE CLINIC | Facility: CLINIC | Age: 85
End: 2025-04-14
Payer: MEDICARE

## 2025-04-14 DIAGNOSIS — F32.1 CURRENT MODERATE EPISODE OF MAJOR DEPRESSIVE DISORDER, UNSPECIFIED WHETHER RECURRENT (HCC): Primary | Chronic | ICD-10-CM

## 2025-04-14 DIAGNOSIS — I25.10 CORONARY ARTERY DISEASE INVOLVING NATIVE CORONARY ARTERY OF NATIVE HEART WITHOUT ANGINA PECTORIS: ICD-10-CM

## 2025-04-14 DIAGNOSIS — I48.19 PERSISTENT ATRIAL FIBRILLATION (HCC): Chronic | ICD-10-CM

## 2025-04-14 DIAGNOSIS — I73.9 PERIPHERAL ARTERIAL DISEASE (HCC): Chronic | ICD-10-CM

## 2025-04-14 DIAGNOSIS — G25.0 BENIGN FAMILIAL TREMOR: ICD-10-CM

## 2025-04-14 PROCEDURE — 99308 SBSQ NF CARE LOW MDM 20: CPT | Performed by: FAMILY MEDICINE

## 2025-04-14 NOTE — PROGRESS NOTES
[facility]  [address] [place] [address]  Facility: [facility]    NAME: Veda Sandoval  AGE: 85 y.o. SEX: female    DATE OF ENCOUNTER: 4/14/2025    Code status:  DNR w/ Hospitalization    Assessment and Plan     1. Current moderate episode of major depressive disorder, unspecified whether recurrent (HCC)  2. Coronary artery disease involving native coronary artery of native heart without angina pectoris  3. Peripheral arterial disease (HCC)  4. Persistent atrial fibrillation (HCC)  5. Benign familial tremor      All medications and routine orders were reviewed and updated as needed.    Plan discussed with: Patient    Chief Complaint     Interim evaluation    History of Present Illness     The patient is visibly upset and continues to talk about the death of her oldest child.  I believe she could benefit from some counseling regarding this grief reaction.  She notes no further epistaxis.  She has had no recent falls.  Bowel habits have been stable.    The following portions of the patient's history were reviewed and updated as appropriate: current medications, past family history, past medical history, past social history, past surgical history and problem list.    Allergies:  Allergies   Allergen Reactions    Penicillins Hives    Sulfa Antibiotics Hives    Medical Tape Rash       Review of Systems     Review of Systems   Constitutional:  Negative for activity change, appetite change, chills, diaphoresis, fatigue and unexpected weight change.   HENT:  Negative for congestion, ear discharge, ear pain, hearing loss, nosebleeds and rhinorrhea.    Eyes:  Negative for pain, redness, itching and visual disturbance.   Respiratory:  Negative for cough, choking, chest tightness and shortness of breath.    Cardiovascular:  Positive for leg swelling. Negative for chest pain.   Gastrointestinal:  Negative for abdominal pain, blood in stool, constipation, diarrhea and nausea.   Endocrine:  Negative for cold intolerance, polydipsia and polyphagia.   Genitourinary:  Negative for dysuria, frequency, hematuria and urgency.   Musculoskeletal:  Negative for arthralgias, back pain, gait problem, joint swelling, neck pain and neck stiffness.   Skin:  Negative for color change and rash.   Allergic/Immunologic: Negative for environmental allergies and food allergies.   Neurological:  Positive for weakness. Negative for dizziness, tremors, seizures, speech difficulty, numbness and headaches.   Hematological:  Negative for adenopathy. Does not bruise/bleed easily.   Psychiatric/Behavioral:  Positive for dysphoric mood. Negative for behavioral problems, hallucinations and self-injury.        Medications and orders     All medications reviewed and updated in [facility] EMR.      Objective     Vitals: per nursing home records    Physical Exam  Constitutional:       Appearance: Normal appearance. She is well-developed.   HENT:      Head: Normocephalic and atraumatic.      Right Ear: External ear normal.      Left Ear: External ear normal.      Mouth/Throat:      Mouth: Mucous membranes are moist.      Pharynx: Oropharynx is clear. No oropharyngeal exudate.   Eyes:      General: No scleral icterus.        Right eye: No discharge.         Left eye: No discharge.      Extraocular Movements: Extraocular movements intact.      Conjunctiva/sclera: Conjunctivae normal.      Pupils: Pupils are equal, round, and reactive to light.   Neck:      Thyroid: No thyromegaly.   Cardiovascular:      Rate and Rhythm: Normal rate. Rhythm irregular.      Heart sounds: Normal heart sounds. No murmur heard.     No gallop.   Pulmonary:      Effort: Pulmonary effort is normal. No respiratory distress.      Breath sounds: Normal breath sounds. No wheezing or rales.   Abdominal:      General: Bowel sounds are normal.      Palpations: Abdomen is soft. There is no mass.      Tenderness: There is no guarding or rebound.   Musculoskeletal:          General: No tenderness or deformity. Normal range of motion.      Cervical back: Normal range of motion and neck supple.      Right lower leg: Edema present.      Left lower leg: Edema present.   Lymphadenopathy:      Cervical: No cervical adenopathy.   Skin:     General: Skin is warm and dry.      Findings: No rash.   Neurological:      Mental Status: She is alert and oriented to person, place, and time.      Cranial Nerves: No cranial nerve deficit.      Motor: Weakness present.      Coordination: Coordination normal.      Deep Tendon Reflexes: Reflexes are normal and symmetric. Reflexes normal.   Psychiatric:      Comments: Depressed mood         Pertinent Laboratory/Diagnostic Studies:     The following studies were reviewed please see chart or hospital paperwork for details.    Space for lab dictation no new diagnostics    - Keep the current medical regimen.  Consult psychology regarding grief counseling    Wesley Brandt DO  4/14/2025 1:30 PM

## 2025-05-14 ENCOUNTER — NURSING HOME VISIT (OUTPATIENT)
Dept: FAMILY MEDICINE CLINIC | Facility: CLINIC | Age: 85
End: 2025-05-14
Payer: MEDICARE

## 2025-05-14 DIAGNOSIS — I25.10 CORONARY ARTERY DISEASE INVOLVING NATIVE CORONARY ARTERY OF NATIVE HEART WITHOUT ANGINA PECTORIS: Primary | ICD-10-CM

## 2025-05-14 DIAGNOSIS — F32.1 CURRENT MODERATE EPISODE OF MAJOR DEPRESSIVE DISORDER, UNSPECIFIED WHETHER RECURRENT (HCC): Chronic | ICD-10-CM

## 2025-05-14 DIAGNOSIS — Z95.0 HISTORY OF PERMANENT CARDIAC PACEMAKER PLACEMENT: Chronic | ICD-10-CM

## 2025-05-14 DIAGNOSIS — I48.19 PERSISTENT ATRIAL FIBRILLATION (HCC): Chronic | ICD-10-CM

## 2025-05-14 DIAGNOSIS — N39.46 MIXED STRESS AND URGE URINARY INCONTINENCE: Chronic | ICD-10-CM

## 2025-05-14 DIAGNOSIS — G25.0 BENIGN FAMILIAL TREMOR: ICD-10-CM

## 2025-05-14 PROCEDURE — 99307 SBSQ NF CARE SF MDM 10: CPT | Performed by: FAMILY MEDICINE

## 2025-05-14 NOTE — PROGRESS NOTES
[facility]  [address] [place] [address]  Facility: [facility]    NAME: Veda Sandoval  AGE: 85 y.o. SEX: female    DATE OF ENCOUNTER: 5/14/2025    Code status:  DNR w/ Hospitalization    Assessment and Plan     1. Coronary artery disease involving native coronary artery of native heart without angina pectoris  2. Persistent atrial fibrillation (HCC)  3. Benign familial tremor  4. History of permanent cardiac pacemaker placement  5. Mixed stress and urge urinary incontinence  6. Current moderate episode of major depressive disorder, unspecified whether recurrent (HCC)      All medications and routine orders were reviewed and updated as needed.    Plan discussed with: Family member    Chief Complaint     Interim evaluation    History of Present Illness     Patient is seen for interim evaluation.  He notes she has had no further episodes of epistaxis.  She is frustrated by her incontinence.  Her mood is better.  Her appetite is good.  She denies pain.  He is unsteady as she attempts to transfer and ambulate however.    The following portions of the patient's history were reviewed and updated as appropriate: current medications, past family history, past medical history, past social history, past surgical history and problem list.    Allergies:  Allergies[1]    Review of Systems     Review of Systems   Constitutional:  Negative for activity change, appetite change, chills, diaphoresis, fatigue and unexpected weight change.   HENT:  Negative for congestion, ear discharge, ear pain, hearing loss, nosebleeds and rhinorrhea.    Eyes:  Negative for pain, redness, itching and visual disturbance.   Respiratory:  Negative for cough, choking, chest tightness and shortness of breath.    Cardiovascular:  Negative for chest pain and leg swelling.   Gastrointestinal:  Negative for abdominal pain, blood in stool, constipation, diarrhea and nausea.   Endocrine: Negative for cold intolerance,  polydipsia and polyphagia.   Genitourinary:  Negative for dysuria, frequency, hematuria and urgency.   Musculoskeletal:  Positive for gait problem. Negative for arthralgias, back pain, joint swelling, neck pain and neck stiffness.   Skin:  Negative for color change and rash.   Allergic/Immunologic: Negative for environmental allergies and food allergies.   Neurological:  Positive for weakness. Negative for dizziness, tremors, seizures, speech difficulty, numbness and headaches.   Hematological:  Negative for adenopathy. Does not bruise/bleed easily.   Psychiatric/Behavioral:  Positive for dysphoric mood. Negative for behavioral problems, hallucinations and self-injury.        Medications and orders     [facility] and [facility]      Objective     Vitals: per nursing home records    Physical Exam  Constitutional:       Appearance: Normal appearance. She is well-developed.   HENT:      Head: Normocephalic and atraumatic.      Right Ear: External ear normal.      Left Ear: External ear normal.      Mouth/Throat:      Mouth: Mucous membranes are moist.      Pharynx: Oropharynx is clear. No oropharyngeal exudate.     Eyes:      General: No scleral icterus.        Right eye: No discharge.         Left eye: No discharge.      Extraocular Movements: Extraocular movements intact.      Conjunctiva/sclera: Conjunctivae normal.      Pupils: Pupils are equal, round, and reactive to light.     Neck:      Thyroid: No thyromegaly.     Cardiovascular:      Rate and Rhythm: Normal rate. Rhythm irregular.      Heart sounds: Normal heart sounds. No murmur heard.     No gallop.   Pulmonary:      Effort: Pulmonary effort is normal. No respiratory distress.      Breath sounds: Normal breath sounds. No wheezing or rales.   Abdominal:      General: Bowel sounds are normal.      Palpations: Abdomen is soft. There is no mass.      Tenderness: There is no guarding or rebound.     Musculoskeletal:          General: No tenderness or deformity. Normal range of motion.      Cervical back: Normal range of motion and neck supple.      Right lower leg: Edema present.      Left lower leg: Edema present.   Lymphadenopathy:      Cervical: No cervical adenopathy.     Skin:     General: Skin is warm and dry.      Findings: Bruising present. No rash.     Neurological:      Mental Status: She is alert and oriented to person, place, and time.      Cranial Nerves: No cranial nerve deficit.      Motor: Weakness present.      Coordination: Coordination normal.      Deep Tendon Reflexes: Reflexes are normal and symmetric. Reflexes normal.     Psychiatric:         Mood and Affect: Mood normal.         Behavior: Behavior normal.         Thought Content: Thought content normal.         Judgment: Judgment normal.         Pertinent Laboratory/Diagnostic Studies:     The following studies were reviewed please see chart or hospital paperwork for details.    Space for lab dictation no new diagnostics    - Maintain the current medical regimen and therapy plan    Wesley Brandt DO  5/14/2025 2:00 PM           [1]   Allergies  Allergen Reactions    Penicillins Hives    Sulfa Antibiotics Hives    Medical Tape Rash

## 2025-06-04 ENCOUNTER — NURSING HOME VISIT (OUTPATIENT)
Dept: FAMILY MEDICINE CLINIC | Facility: CLINIC | Age: 85
End: 2025-06-04
Payer: MEDICARE

## 2025-06-04 DIAGNOSIS — I25.10 CORONARY ARTERY DISEASE INVOLVING NATIVE CORONARY ARTERY OF NATIVE HEART WITHOUT ANGINA PECTORIS: Primary | ICD-10-CM

## 2025-06-04 DIAGNOSIS — F41.1 GENERALIZED ANXIETY DISORDER: Chronic | ICD-10-CM

## 2025-06-04 DIAGNOSIS — I73.9 PERIPHERAL ARTERIAL DISEASE (HCC): Chronic | ICD-10-CM

## 2025-06-04 DIAGNOSIS — Z95.0 HISTORY OF PERMANENT CARDIAC PACEMAKER PLACEMENT: Chronic | ICD-10-CM

## 2025-06-04 DIAGNOSIS — I48.19 PERSISTENT ATRIAL FIBRILLATION (HCC): Chronic | ICD-10-CM

## 2025-06-04 PROCEDURE — 99307 SBSQ NF CARE SF MDM 10: CPT | Performed by: FAMILY MEDICINE

## 2025-06-04 NOTE — PROGRESS NOTES
[facility]  [address] [place] [address]  Facility: [facility]    NAME: Veda Sandoval  AGE: 85 y.o. SEX: female    DATE OF ENCOUNTER: 6/4/2025    Code status:  DNR w/ Hospitalization    Assessment and Plan     1. Coronary artery disease involving native coronary artery of native heart without angina pectoris  2. Persistent atrial fibrillation (HCC)  3. Peripheral arterial disease (HCC)  4. Generalized anxiety disorder  5. History of permanent cardiac pacemaker placement      All medications and routine orders were reviewed and updated as needed.    Plan discussed with: Patient    Chief Complaint     Interim evaluation    History of Present Illness     Patient is seen for interim evaluation.  She reports feeling well.  She denies any dyspnea and has had no chest pain.  She has had no epistaxis and no signs of bleeding elsewhere.  Tolerating her anticoagulant.  No heartburn indigestion belching or burping.  Bowel habits are stable.  Denies dysuria    The following portions of the patient's history were reviewed and updated as appropriate: current medications, past family history, past medical history, past social history, past surgical history and problem list.    Allergies:  Allergies[1]    Review of Systems     Review of Systems   Constitutional:  Negative for activity change, appetite change, chills, diaphoresis, fatigue and unexpected weight change.   HENT:  Negative for congestion, ear discharge, ear pain, hearing loss, nosebleeds and rhinorrhea.    Eyes:  Negative for pain, redness, itching and visual disturbance.   Respiratory:  Negative for cough, choking, chest tightness and shortness of breath.    Cardiovascular:  Positive for leg swelling. Negative for chest pain.   Gastrointestinal:  Negative for abdominal pain, blood in stool, constipation, diarrhea and nausea.   Endocrine: Negative for cold intolerance, polydipsia and polyphagia.   Genitourinary:  Negative for  dysuria, frequency, hematuria and urgency.   Musculoskeletal:  Negative for arthralgias, back pain, gait problem, joint swelling, neck pain and neck stiffness.   Skin:  Negative for color change and rash.   Allergic/Immunologic: Negative for environmental allergies and food allergies.   Neurological:  Positive for weakness. Negative for dizziness, tremors, seizures, speech difficulty, numbness and headaches.   Hematological:  Negative for adenopathy. Bruises/bleeds easily.   Psychiatric/Behavioral:  Negative for behavioral problems, dysphoric mood, hallucinations and self-injury.        Medications and orders     All medications reviewed and updated in [facility] EMR.      Objective     Vitals: per nursing home records    Physical Exam  Constitutional:       Appearance: Normal appearance. She is well-developed.   HENT:      Head: Normocephalic and atraumatic.      Right Ear: External ear normal.      Left Ear: External ear normal.      Mouth/Throat:      Mouth: Mucous membranes are moist.      Pharynx: Oropharynx is clear. No oropharyngeal exudate.     Eyes:      General: No scleral icterus.        Right eye: No discharge.         Left eye: No discharge.      Extraocular Movements: Extraocular movements intact.      Conjunctiva/sclera: Conjunctivae normal.      Pupils: Pupils are equal, round, and reactive to light.     Neck:      Thyroid: No thyromegaly.     Cardiovascular:      Rate and Rhythm: Normal rate. Rhythm irregular.      Heart sounds: Normal heart sounds. No murmur heard.     No gallop.   Pulmonary:      Effort: Pulmonary effort is normal. No respiratory distress.      Breath sounds: Normal breath sounds. No wheezing or rales.   Abdominal:      General: Bowel sounds are normal.      Palpations: Abdomen is soft. There is no mass.      Tenderness: There is no guarding or rebound.     Musculoskeletal:         General: No tenderness or deformity. Normal range of motion.      Cervical back: Normal range of  motion and neck supple.      Right lower leg: Edema present.      Left lower leg: Edema present.   Lymphadenopathy:      Cervical: No cervical adenopathy.     Skin:     General: Skin is warm and dry.      Findings: Bruising present. No rash.     Neurological:      Mental Status: She is alert and oriented to person, place, and time.      Cranial Nerves: No cranial nerve deficit.      Motor: Weakness present.      Coordination: Coordination normal.      Deep Tendon Reflexes: Reflexes are normal and symmetric. Reflexes normal.     Psychiatric:         Mood and Affect: Mood normal.         Behavior: Behavior normal.         Thought Content: Thought content normal.         Judgment: Judgment normal.         Pertinent Laboratory/Diagnostic Studies:     The following studies were reviewed please see chart or hospital paperwork for details.    Space for lab dictation no new diagnostics    - Maintain the current regimen    Wesley Brandt DO  6/4/2025 3:02 PM           [1]   Allergies  Allergen Reactions    Penicillins Hives    Sulfa Antibiotics Hives    Medical Tape Rash

## 2025-07-23 ENCOUNTER — NURSING HOME VISIT (OUTPATIENT)
Dept: FAMILY MEDICINE CLINIC | Facility: CLINIC | Age: 85
End: 2025-07-23
Payer: MEDICARE

## 2025-07-23 DIAGNOSIS — I48.19 PERSISTENT ATRIAL FIBRILLATION (HCC): Chronic | ICD-10-CM

## 2025-07-23 DIAGNOSIS — F41.1 GENERALIZED ANXIETY DISORDER: Chronic | ICD-10-CM

## 2025-07-23 DIAGNOSIS — I73.9 PERIPHERAL ARTERIAL DISEASE (HCC): Primary | Chronic | ICD-10-CM

## 2025-07-23 DIAGNOSIS — G25.0 BENIGN FAMILIAL TREMOR: ICD-10-CM

## 2025-07-23 PROCEDURE — 99307 SBSQ NF CARE SF MDM 10: CPT | Performed by: FAMILY MEDICINE

## 2025-07-23 NOTE — PROGRESS NOTES
[facility]  [address] [place] [address]  Facility: [facility]    NAME: Veda Sandoval  AGE: 85 y.o. SEX: female    DATE OF ENCOUNTER: 7/23/2025    Code status:  DNR w/ Hospitalization    Assessment and Plan     1. Peripheral arterial disease (HCC)  2. Persistent atrial fibrillation (HCC)  3. Benign familial tremor  4. Generalized anxiety disorder      All medications and routine orders were reviewed and updated as needed.    Plan discussed with: Family member    Chief Complaint     Interim evaluation    History of Present Illness     Patient is seen for interim evaluation.  She has had no further signs of bleeding or epistaxis.  She does bruise easily.  Overall her mood is improved.  Her appetite is at baseline.  Bowel habits are stable.  She has no dyspnea.  She denies dysuria or any hematuria.    The following portions of the patient's history were reviewed and updated as appropriate: current medications, past family history, past medical history, past social history, past surgical history and problem list.    Allergies:  Allergies[1]    Review of Systems     Review of Systems   Constitutional:  Negative for activity change, appetite change, chills, diaphoresis, fatigue and unexpected weight change.   HENT:  Negative for congestion, ear discharge, ear pain, hearing loss, nosebleeds and rhinorrhea.    Eyes:  Negative for pain, redness, itching and visual disturbance.   Respiratory:  Negative for cough, choking, chest tightness and shortness of breath.    Cardiovascular:  Negative for chest pain and leg swelling.   Gastrointestinal:  Negative for abdominal pain, blood in stool, constipation, diarrhea and nausea.   Endocrine: Negative for cold intolerance, polydipsia and polyphagia.   Genitourinary:  Negative for dysuria, frequency, hematuria and urgency.   Musculoskeletal:  Positive for gait problem. Negative for arthralgias, back pain, joint swelling, neck pain and neck  stiffness.   Skin:  Negative for color change and rash.   Allergic/Immunologic: Negative for environmental allergies and food allergies.   Neurological:  Positive for weakness. Negative for dizziness, tremors, seizures, speech difficulty, numbness and headaches.   Hematological:  Negative for adenopathy. Bruises/bleeds easily.   Psychiatric/Behavioral:  Positive for dysphoric mood. Negative for behavioral problems, hallucinations and self-injury.        Medications and orders     All medications reviewed and updated in [facility] EMR.      Objective     Vitals: per nursing home records    Physical Exam  Constitutional:       Appearance: Normal appearance. She is well-developed.   HENT:      Head: Normocephalic and atraumatic.      Right Ear: External ear normal.      Left Ear: External ear normal.      Mouth/Throat:      Mouth: Mucous membranes are moist.      Pharynx: Oropharynx is clear. No oropharyngeal exudate.     Eyes:      General: No scleral icterus.        Right eye: No discharge.         Left eye: No discharge.      Extraocular Movements: Extraocular movements intact.      Conjunctiva/sclera: Conjunctivae normal.      Pupils: Pupils are equal, round, and reactive to light.     Neck:      Thyroid: No thyromegaly.     Cardiovascular:      Rate and Rhythm: Normal rate. Rhythm irregular.      Heart sounds: Normal heart sounds. No murmur heard.     No gallop.   Pulmonary:      Effort: Pulmonary effort is normal. No respiratory distress.      Breath sounds: Normal breath sounds. No wheezing or rales.   Abdominal:      General: Bowel sounds are normal.      Palpations: Abdomen is soft. There is no mass.      Tenderness: There is no guarding or rebound.     Musculoskeletal:         General: Tenderness present. No deformity. Normal range of motion.      Cervical back: Normal range of motion and neck supple.   Lymphadenopathy:      Cervical: No cervical adenopathy.     Skin:     General: Skin is warm and dry.       Findings: Bruising present. No rash.     Neurological:      Mental Status: She is alert and oriented to person, place, and time.      Cranial Nerves: No cranial nerve deficit.      Motor: Weakness present.      Coordination: Coordination normal.      Deep Tendon Reflexes: Reflexes are normal and symmetric. Reflexes normal.     Psychiatric:         Mood and Affect: Mood normal.         Behavior: Behavior normal.         Thought Content: Thought content normal.         Judgment: Judgment normal.         Pertinent Laboratory/Diagnostic Studies:     The following studies were reviewed please see chart or hospital paperwork for details.    Space for lab dictation no new diagnostics    - The same regimen with a palliative approach    Wesley Brandt DO  7/23/2025 2:21 PM           [1]   Allergies  Allergen Reactions    Penicillins Hives    Sulfa Antibiotics Hives    Medical Tape Rash

## 2025-07-28 ENCOUNTER — TELEPHONE (OUTPATIENT)
Dept: CARDIOLOGY CLINIC | Facility: CLINIC | Age: 85
End: 2025-07-28

## 2025-07-28 ENCOUNTER — REMOTE DEVICE CLINIC VISIT (OUTPATIENT)
Dept: CARDIOLOGY CLINIC | Facility: CLINIC | Age: 85
End: 2025-07-28
Payer: MEDICARE

## 2025-07-28 DIAGNOSIS — Z95.0 CARDIAC PACEMAKER IN SITU: Primary | ICD-10-CM

## 2025-07-28 PROCEDURE — 93294 REM INTERROG EVL PM/LDLS PM: CPT | Performed by: STUDENT IN AN ORGANIZED HEALTH CARE EDUCATION/TRAINING PROGRAM

## 2025-07-28 PROCEDURE — 93296 REM INTERROG EVL PM/IDS: CPT | Performed by: STUDENT IN AN ORGANIZED HEALTH CARE EDUCATION/TRAINING PROGRAM

## 2025-08-14 ENCOUNTER — NURSING HOME VISIT (OUTPATIENT)
Dept: GERIATRICS | Facility: OTHER | Age: 85
End: 2025-08-14
Payer: MEDICARE

## (undated) DEVICE — SUT VICRYL 3-0 SH 27 IN J416H

## (undated) DEVICE — SURGICEL 2 X 3

## (undated) DEVICE — MEDI-VAC YANK SUCT HNDL W/TPRD BULBOUS TIP: Brand: CARDINAL HEALTH

## (undated) DEVICE — 3M™ TEGADERM™ TRANSPARENT FILM DRESSING FRAME STYLE, 1626W, 4 IN X 4-3/4 IN (10 CM X 12 CM), 50/CT 4CT/CASE: Brand: 3M™ TEGADERM™

## (undated) DEVICE — SUT SILK 2-0 18 IN A185H

## (undated) DEVICE — BAG DECANTER

## (undated) DEVICE — INTENDED FOR TISSUE SEPARATION, AND OTHER PROCEDURES THAT REQUIRE A SHARP SURGICAL BLADE TO PUNCTURE OR CUT.: Brand: BARD-PARKER SAFETY BLADES SIZE 15, STERILE

## (undated) DEVICE — SUT SILK 3-0 18 IN A184H

## (undated) DEVICE — GLOVE INDICATOR PI UNDERGLOVE SZ 6.5 BLUE

## (undated) DEVICE — PETRI DISH STERILE

## (undated) DEVICE — 1200CC GUARDIAN II: Brand: GUARDIAN

## (undated) DEVICE — 3M™ TEGADERM™ TRANSPARENT FILM DRESSING FRAME STYLE, 1628, 6 IN X 8 IN (15 CM X 20 CM), 10/CT 8CT/CASE: Brand: 3M™ TEGADERM™

## (undated) DEVICE — TRAY FOLEY 16FR URIMETER SURESTEP

## (undated) DEVICE — GLOVE SRG BIOGEL ECLIPSE 6

## (undated) DEVICE — ADHESIVE SKIN HIGH VISCOSITY EXOFIN 1ML

## (undated) DEVICE — DRAPE SURGIKIT SADDLE BAG

## (undated) DEVICE — DRAPE SHEET THREE QUARTER

## (undated) DEVICE — 3000CC GUARDIAN II: Brand: GUARDIAN

## (undated) DEVICE — DRESSING MEPILEX AG BORDER 4 X 8 IN

## (undated) DEVICE — PAD GROUNDING ADULT

## (undated) DEVICE — TIBURON SPLIT SHEET: Brand: CONVERTORS

## (undated) DEVICE — SUT ETHILON 3-0 PS-1 18 IN 1663G

## (undated) DEVICE — FOGARTY ARTERIAL EMBOLECTOMY CATHETER 3F 80CM: Brand: FOGARTY

## (undated) DEVICE — LIGACLIP MCA MULTIPLE CLIP APPLIERS, 20 SMALL CLIPS: Brand: LIGACLIP

## (undated) DEVICE — SUT MONOCRYL 4-0 PS-2 18 IN Y496G

## (undated) DEVICE — 3M™ IOBAN™ 2 ANTIMICROBIAL INCISE DRAPE 6650EZ: Brand: IOBAN™ 2

## (undated) DEVICE — CHLORAPREP HI-LITE 26ML ORANGE

## (undated) DEVICE — FOGARTY ARTERIAL EMBOLECTOMY CATHETER 4F 80CM: Brand: FOGARTY

## (undated) DEVICE — SPONGE

## (undated) DEVICE — SUT PROLENE 6-0 BV130 30 IN 8709H

## (undated) DEVICE — TUBING SUCTION 5MM X 12 FT

## (undated) DEVICE — 3M™ V.A.C.® GRANUFOAM™ DRESSING KIT, M8275052, MEDIUM: Brand: 3M™ V.A.C.® GRANUFOAM™

## (undated) DEVICE — VAC CANISTER 500ML

## (undated) DEVICE — SUT VICRYL 2-0 CT-1 27 IN J259H

## (undated) DEVICE — STERILE BETHLEHEM FEM POP PACK: Brand: CARDINAL HEALTH

## (undated) DEVICE — PENCIL ELECTROSURG E-Z CLEAN -0035H

## (undated) DEVICE — VESSEL LOOPS X-RAY DETECTABLE: Brand: DEROYAL

## (undated) DEVICE — MICROPUNCTURE 401

## (undated) DEVICE — SURGICEL 4 X 8

## (undated) DEVICE — GLOVE SRG BIOGEL 6

## (undated) DEVICE — SPONGE STICK WITH PVP-I: Brand: KENDALL

## (undated) DEVICE — BETHLEHEM UNIVERSAL MINOR GEN: Brand: CARDINAL HEALTH

## (undated) DEVICE — SUT PROLENE 5-0 C-1 24 IN M8725

## (undated) DEVICE — HANDPIECE SET WITH RETRACTABLE COAXIAL FAN SPRAY TIP AND SUCTION TUBE: Brand: INTERPULSE

## (undated) DEVICE — SUT VICRYL 3-0 PS-2 18 IN J497G

## (undated) DEVICE — FOGARTY IRRIGATION CATHETER 4F, 80CM: Brand: FOGARTY

## (undated) DEVICE — HP FLOW SWITCH

## (undated) DEVICE — DECANTER: Brand: UNBRANDED

## (undated) DEVICE — INTENDED FOR TISSUE SEPARATION, AND OTHER PROCEDURES THAT REQUIRE A SHARP SURGICAL BLADE TO PUNCTURE OR CUT.: Brand: BARD-PARKER ® CARBON RIB-BACK BLADES